# Patient Record
Sex: MALE | Race: WHITE | Employment: FULL TIME | URBAN - METROPOLITAN AREA
[De-identification: names, ages, dates, MRNs, and addresses within clinical notes are randomized per-mention and may not be internally consistent; named-entity substitution may affect disease eponyms.]

---

## 2017-01-03 ENCOUNTER — OFFICE VISIT (OUTPATIENT)
Dept: FAMILY MEDICINE CLINIC | Age: 67
End: 2017-01-03

## 2017-01-03 VITALS
WEIGHT: 240 LBS | SYSTOLIC BLOOD PRESSURE: 110 MMHG | HEART RATE: 84 BPM | RESPIRATION RATE: 20 BRPM | TEMPERATURE: 98.1 F | DIASTOLIC BLOOD PRESSURE: 70 MMHG | BODY MASS INDEX: 31.66 KG/M2

## 2017-01-03 DIAGNOSIS — H53.9 VISION DISTURBANCE: ICD-10-CM

## 2017-01-03 DIAGNOSIS — R42 DIZZINESS: ICD-10-CM

## 2017-01-03 DIAGNOSIS — I63.81 LACUNAR INFARCTION (HCC): Primary | ICD-10-CM

## 2017-01-03 DIAGNOSIS — F17.210 CIGARETTE SMOKER: ICD-10-CM

## 2017-01-03 DIAGNOSIS — R20.0 NUMBNESS ON RIGHT SIDE: ICD-10-CM

## 2017-01-03 DIAGNOSIS — S09.90XD CLOSED HEAD INJURY, SUBSEQUENT ENCOUNTER: ICD-10-CM

## 2017-01-03 DIAGNOSIS — H53.2 DIPLOPIA: ICD-10-CM

## 2017-01-03 PROCEDURE — 99214 OFFICE O/P EST MOD 30 MIN: CPT | Performed by: NURSE PRACTITIONER

## 2017-01-03 RX ORDER — ZOLPIDEM TARTRATE 10 MG/1
10 TABLET ORAL
Refills: 0 | COMMUNITY
Start: 2017-01-01 | End: 2017-01-30 | Stop reason: SDUPTHER

## 2017-01-30 RX ORDER — ALPRAZOLAM 0.5 MG/1
0.5 TABLET ORAL 2 TIMES DAILY PRN
Qty: 60 TABLET | Refills: 1 | Status: SHIPPED | OUTPATIENT
Start: 2017-01-30 | End: 2017-03-30 | Stop reason: SDUPTHER

## 2017-01-30 RX ORDER — ZOLPIDEM TARTRATE 10 MG/1
10 TABLET ORAL NIGHTLY PRN
Qty: 30 TABLET | Refills: 1 | Status: SHIPPED | OUTPATIENT
Start: 2017-01-30 | End: 2017-03-30 | Stop reason: SDUPTHER

## 2017-02-09 ENCOUNTER — OFFICE VISIT (OUTPATIENT)
Dept: PHYSICAL MEDICINE AND REHAB | Age: 67
End: 2017-02-09

## 2017-02-09 VITALS
SYSTOLIC BLOOD PRESSURE: 124 MMHG | WEIGHT: 248 LBS | HEART RATE: 79 BPM | HEIGHT: 73 IN | DIASTOLIC BLOOD PRESSURE: 81 MMHG | BODY MASS INDEX: 32.87 KG/M2

## 2017-02-09 DIAGNOSIS — R42 DIZZINESS: ICD-10-CM

## 2017-02-09 DIAGNOSIS — I69.30 HISTORY OF STROKE WITH RESIDUAL DEFICIT: Primary | ICD-10-CM

## 2017-02-09 PROCEDURE — G8484 FLU IMMUNIZE NO ADMIN: HCPCS | Performed by: PSYCHIATRY & NEUROLOGY

## 2017-02-09 PROCEDURE — 99213 OFFICE O/P EST LOW 20 MIN: CPT | Performed by: PSYCHIATRY & NEUROLOGY

## 2017-02-09 PROCEDURE — 4004F PT TOBACCO SCREEN RCVD TLK: CPT | Performed by: PSYCHIATRY & NEUROLOGY

## 2017-02-09 PROCEDURE — G8417 CALC BMI ABV UP PARAM F/U: HCPCS | Performed by: PSYCHIATRY & NEUROLOGY

## 2017-02-09 PROCEDURE — G8427 DOCREV CUR MEDS BY ELIG CLIN: HCPCS | Performed by: PSYCHIATRY & NEUROLOGY

## 2017-02-09 PROCEDURE — G8599 NO ASA/ANTIPLAT THER USE RNG: HCPCS | Performed by: PSYCHIATRY & NEUROLOGY

## 2017-02-09 PROCEDURE — 3017F COLORECTAL CA SCREEN DOC REV: CPT | Performed by: PSYCHIATRY & NEUROLOGY

## 2017-03-30 RX ORDER — ZOLPIDEM TARTRATE 10 MG/1
10 TABLET ORAL NIGHTLY PRN
Qty: 30 TABLET | Refills: 1 | Status: SHIPPED | OUTPATIENT
Start: 2017-03-30 | End: 2017-06-13 | Stop reason: SDUPTHER

## 2017-03-30 RX ORDER — ALPRAZOLAM 0.5 MG/1
0.5 TABLET ORAL 2 TIMES DAILY PRN
Qty: 60 TABLET | Refills: 1 | Status: SHIPPED | OUTPATIENT
Start: 2017-03-30 | End: 2017-06-13 | Stop reason: SDUPTHER

## 2017-06-05 RX ORDER — ALPRAZOLAM 0.5 MG/1
0.5 TABLET ORAL 2 TIMES DAILY PRN
Qty: 60 TABLET | Refills: 1 | Status: CANCELLED | OUTPATIENT
Start: 2017-06-05

## 2017-06-05 RX ORDER — ZOLPIDEM TARTRATE 10 MG/1
10 TABLET ORAL NIGHTLY PRN
Qty: 30 TABLET | Refills: 1 | Status: CANCELLED | OUTPATIENT
Start: 2017-06-05

## 2017-06-13 ENCOUNTER — OFFICE VISIT (OUTPATIENT)
Dept: FAMILY MEDICINE CLINIC | Age: 67
End: 2017-06-13

## 2017-06-13 VITALS
SYSTOLIC BLOOD PRESSURE: 102 MMHG | HEIGHT: 73 IN | BODY MASS INDEX: 30.62 KG/M2 | DIASTOLIC BLOOD PRESSURE: 70 MMHG | HEART RATE: 76 BPM | RESPIRATION RATE: 20 BRPM | WEIGHT: 231 LBS | TEMPERATURE: 98.4 F

## 2017-06-13 DIAGNOSIS — R25.2 CRAMPS OF RIGHT LOWER EXTREMITY: ICD-10-CM

## 2017-06-13 DIAGNOSIS — G47.00 INSOMNIA, UNSPECIFIED TYPE: Primary | Chronic | ICD-10-CM

## 2017-06-13 DIAGNOSIS — G89.29 CHRONIC PAIN OF RIGHT KNEE: ICD-10-CM

## 2017-06-13 DIAGNOSIS — Z96.651 STATUS POST TOTAL RIGHT KNEE REPLACEMENT: ICD-10-CM

## 2017-06-13 DIAGNOSIS — F41.9 ANXIETY: ICD-10-CM

## 2017-06-13 DIAGNOSIS — Z86.73 HISTORY OF CVA (CEREBROVASCULAR ACCIDENT): ICD-10-CM

## 2017-06-13 DIAGNOSIS — M25.561 CHRONIC PAIN OF RIGHT KNEE: ICD-10-CM

## 2017-06-13 PROCEDURE — 99213 OFFICE O/P EST LOW 20 MIN: CPT | Performed by: NURSE PRACTITIONER

## 2017-06-13 PROCEDURE — 4004F PT TOBACCO SCREEN RCVD TLK: CPT | Performed by: NURSE PRACTITIONER

## 2017-06-13 PROCEDURE — 3017F COLORECTAL CA SCREEN DOC REV: CPT | Performed by: NURSE PRACTITIONER

## 2017-06-13 PROCEDURE — G8427 DOCREV CUR MEDS BY ELIG CLIN: HCPCS | Performed by: NURSE PRACTITIONER

## 2017-06-13 PROCEDURE — G8599 NO ASA/ANTIPLAT THER USE RNG: HCPCS | Performed by: NURSE PRACTITIONER

## 2017-06-13 PROCEDURE — G8417 CALC BMI ABV UP PARAM F/U: HCPCS | Performed by: NURSE PRACTITIONER

## 2017-06-13 RX ORDER — ALPRAZOLAM 0.5 MG/1
0.5 TABLET ORAL 2 TIMES DAILY PRN
Qty: 60 TABLET | Refills: 1 | Status: SHIPPED | OUTPATIENT
Start: 2017-06-13 | End: 2017-08-16 | Stop reason: SDUPTHER

## 2017-06-13 RX ORDER — ZOLPIDEM TARTRATE 10 MG/1
10 TABLET ORAL NIGHTLY PRN
Qty: 30 TABLET | Refills: 1 | Status: SHIPPED | OUTPATIENT
Start: 2017-06-13 | End: 2017-08-16 | Stop reason: SDUPTHER

## 2017-06-13 RX ORDER — TIZANIDINE 4 MG/1
4 TABLET ORAL EVERY 8 HOURS PRN
Qty: 60 TABLET | Refills: 1 | Status: SHIPPED | OUTPATIENT
Start: 2017-06-13 | End: 2017-09-26 | Stop reason: SDUPTHER

## 2017-06-13 ASSESSMENT — PATIENT HEALTH QUESTIONNAIRE - PHQ9
SUM OF ALL RESPONSES TO PHQ9 QUESTIONS 1 & 2: 0
1. LITTLE INTEREST OR PLEASURE IN DOING THINGS: 0
SUM OF ALL RESPONSES TO PHQ QUESTIONS 1-9: 0
2. FEELING DOWN, DEPRESSED OR HOPELESS: 0

## 2017-08-17 RX ORDER — ALPRAZOLAM 0.5 MG/1
0.5 TABLET ORAL 2 TIMES DAILY PRN
Qty: 60 TABLET | Refills: 0 | Status: SHIPPED | OUTPATIENT
Start: 2017-08-17 | End: 2017-09-26 | Stop reason: SDUPTHER

## 2017-08-17 RX ORDER — ZOLPIDEM TARTRATE 10 MG/1
10 TABLET ORAL NIGHTLY PRN
Qty: 30 TABLET | Refills: 0 | Status: SHIPPED | OUTPATIENT
Start: 2017-08-17 | End: 2017-09-26 | Stop reason: SDUPTHER

## 2017-09-26 ENCOUNTER — OFFICE VISIT (OUTPATIENT)
Dept: FAMILY MEDICINE CLINIC | Age: 67
End: 2017-09-26
Payer: COMMERCIAL

## 2017-09-26 VITALS
BODY MASS INDEX: 31.32 KG/M2 | DIASTOLIC BLOOD PRESSURE: 76 MMHG | HEIGHT: 74 IN | RESPIRATION RATE: 16 BRPM | WEIGHT: 244 LBS | TEMPERATURE: 97.7 F | SYSTOLIC BLOOD PRESSURE: 116 MMHG | HEART RATE: 88 BPM

## 2017-09-26 DIAGNOSIS — F41.8 SITUATIONAL ANXIETY: ICD-10-CM

## 2017-09-26 DIAGNOSIS — Z87.898 HISTORY OF ALCOHOL USE: ICD-10-CM

## 2017-09-26 DIAGNOSIS — M54.40 CHRONIC LOW BACK PAIN WITH SCIATICA, SCIATICA LATERALITY UNSPECIFIED, UNSPECIFIED BACK PAIN LATERALITY: ICD-10-CM

## 2017-09-26 DIAGNOSIS — G89.29 CHRONIC LOW BACK PAIN WITH SCIATICA, SCIATICA LATERALITY UNSPECIFIED, UNSPECIFIED BACK PAIN LATERALITY: ICD-10-CM

## 2017-09-26 DIAGNOSIS — E78.2 MIXED HYPERLIPIDEMIA: Chronic | ICD-10-CM

## 2017-09-26 DIAGNOSIS — F51.01 PRIMARY INSOMNIA: Primary | Chronic | ICD-10-CM

## 2017-09-26 DIAGNOSIS — Z87.891 HISTORY OF TOBACCO USE: ICD-10-CM

## 2017-09-26 PROCEDURE — G8427 DOCREV CUR MEDS BY ELIG CLIN: HCPCS | Performed by: NURSE PRACTITIONER

## 2017-09-26 PROCEDURE — 99213 OFFICE O/P EST LOW 20 MIN: CPT | Performed by: NURSE PRACTITIONER

## 2017-09-26 PROCEDURE — 4004F PT TOBACCO SCREEN RCVD TLK: CPT | Performed by: NURSE PRACTITIONER

## 2017-09-26 PROCEDURE — G8417 CALC BMI ABV UP PARAM F/U: HCPCS | Performed by: NURSE PRACTITIONER

## 2017-09-26 PROCEDURE — 3017F COLORECTAL CA SCREEN DOC REV: CPT | Performed by: NURSE PRACTITIONER

## 2017-09-26 PROCEDURE — G8599 NO ASA/ANTIPLAT THER USE RNG: HCPCS | Performed by: NURSE PRACTITIONER

## 2017-09-26 RX ORDER — TIZANIDINE 4 MG/1
4 TABLET ORAL EVERY 8 HOURS PRN
Qty: 60 TABLET | Refills: 1 | Status: SHIPPED | OUTPATIENT
Start: 2017-09-26 | End: 2017-12-11 | Stop reason: SDUPTHER

## 2017-09-26 RX ORDER — ZOLPIDEM TARTRATE 10 MG/1
10 TABLET ORAL NIGHTLY PRN
Qty: 30 TABLET | Refills: 0 | Status: SHIPPED | OUTPATIENT
Start: 2017-09-26 | End: 2017-11-03 | Stop reason: SDUPTHER

## 2017-09-26 RX ORDER — ALPRAZOLAM 0.5 MG/1
0.5 TABLET ORAL 2 TIMES DAILY PRN
Qty: 60 TABLET | Refills: 0 | Status: SHIPPED | OUTPATIENT
Start: 2017-09-26 | End: 2017-11-03 | Stop reason: SDUPTHER

## 2017-09-26 NOTE — MR AVS SNAPSHOT
Name Date    Tdap (Boostrix, Adacel) 10/1/2014      Preventive Care        Date Due    Hepatitis C screening is recommended for all adults regardless of risk factors born between [facility] at least once (lifetime) who have never been tested. 1950    HIV screening is recommended for all people regardless of risk factors  aged 15-65 years at least once (lifetime) who have never been HIV tested. 11/4/1967    Pneumococcal Vaccine - Pneumovax for adults aged 19-64 years with: chronic heart disease, chronic lung disease, diabetes mellitus, alcoholism, chronic liver disease, or cigarette smoking. (1 of 1 - PPSV23) 11/4/1971    Zoster Vaccine 11/4/2012    Yearly Flu Vaccine (1) 9/1/2017    Colonoscopy 11/29/2020    Cholesterol Screening 12/25/2021    Tetanus Combination Vaccine (2 - Td) 10/1/2024            MyChart Signup           [employer] allows you to send messages to your doctor, view your test results, renew your prescriptions, schedule appointments, view visit notes, and more. How Do I Sign Up? 1. In your Internet browser, go to [email or web address]  2. Click on the Sign Up Now link in the Sign In box. You will see the New Member Sign Up page. 3. Enter your [employer] Access Code exactly as it appears below. You will not need to use this code after youve completed the sign-up process. If you do not sign up before the expiration date, you must request a new code. [employer] Access Code: [email or web address]  Expires: 11/25/2017  8:19 AM    4. Enter your Social Security Number ([id number]) and Date of Birth (mm/dd/yyyy) as indicated and click Submit. You will be taken to the next sign-up page. 5. Create a [employer] ID. This will be your [employer] login ID and cannot be changed, so think of one that is secure and easy to remember. 6. Create a [employer] password. You can change your password at any time. 7. Enter your Password Reset Question and Answer.  This can be used at a later time if you forget your password. 8. Enter your e-mail address. You will receive e-mail notification when new information is available in [address] 9. Click Sign Up. You can now view your medical record. Additional Information  If you have questions, please contact the physician practice where you receive care. Remember, [employer] is NOT to be used for urgent needs. For medical emergencies, dial 911. For questions regarding your [employer] account call [phone]. If you have a clinical question, please call your doctor's office.

## 2017-10-06 ASSESSMENT — ENCOUNTER SYMPTOMS: BACK PAIN: 1

## 2017-10-06 NOTE — PROGRESS NOTES
[address]  [facility]  [address] [place] [address] Rd., [address] [address]  Dept: [phone]  Dept Fax: [phone]: [phone]  PROGRESS NOTE      Visit Date: 10/6/2017    Jose David Veliz is a 61 y.o. male who presents today for:  Chief Complaint   Patient presents with    Medication Refill     Ambien and Xanax         Subjective:  HPI Comments: Refills/ 3 month. Hx insomnia, anxiety, chronic low back pain/ knee pain. Hx tobacco use/ etoh use. Mood stable. meds working well. Denies cp, sob, abd pain, edema. Review of Systems   Musculoskeletal: Positive for back pain. Psychiatric/Behavioral: Positive for sleep disturbance. The patient is nervous/anxious. All other systems reviewed and are negative.     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 Olivier Braun     Family History   Problem Relation Age of Onset    Dementia Mother     Heart Disease Father     High Blood Pressure Sister     Cancer Brother      testicular cancer     Social History   Substance Use Topics    Smoking status: Current Every Day Smoker     Packs/day: 1.00     Years: 30.00     Types: Cigarettes     Start date: 7/16/1968    Smokeless tobacco: Never Used      Comment: printed to avs    Alcohol use 25.2 oz/week     42 Cans of beer per week      Current Outpatient Prescriptions   Medication Sig Dispense Refill    ALPRAZolam (XANAX) 0.5 MG tablet Take 1 tablet by mouth 2 times daily as needed for sciatica, sciatica laterality unspecified, unspecified back pain laterality      Requested Prescriptions     Signed Prescriptions Disp Refills    ALPRAZolam (XANAX) 0.5 MG tablet 60 tablet 0     Sig: Take 1 tablet by mouth 2 times daily as needed for Anxiety    zolpidem (AMBIEN) 10 MG tablet 30 tablet 0     Sig: Take 1 tablet by mouth nightly as needed for Sleep    tiZANidine (ZANAFLEX) 4 MG tablet 60 tablet 1     Sig: Take 1 tablet by mouth every 8 hours as needed (leg cramps)     No orders of the defined types were placed in this encounter. Patient given educational materials - see patient instructions. Discussed use, benefit, and side effects of prescribed medications. All patient questions answered. Pt voiced understanding. Reviewed health maintenance. Patient agreed with treatment plan. Follow up as directed. Controlled Substances Monitoring:     Attestation: The Prescription Monitoring Report for this patient was reviewed today. (Elizabeth Francois NP)  Documentation: Possible medication side effects, risk of tolerance and/or dependence, and alternative treatments discussed., No signs of potential drug abuse or diversion identified. Elizabeth Francois NP)  Medication Contracts: Medication contract signed today.  Elizabeth Francois NP)      Electronically signed by Cody Griffin NP on 10/6/2017 at 8:45 AM

## 2017-11-07 RX ORDER — ZOLPIDEM TARTRATE 10 MG/1
10 TABLET ORAL NIGHTLY PRN
Qty: 30 TABLET | Refills: 0 | Status: SHIPPED | OUTPATIENT
Start: 2017-11-07 | End: 2017-12-11 | Stop reason: SDUPTHER

## 2017-11-07 RX ORDER — ALPRAZOLAM 0.5 MG/1
0.5 TABLET ORAL 2 TIMES DAILY PRN
Qty: 60 TABLET | Refills: 0 | Status: SHIPPED | OUTPATIENT
Start: 2017-11-07 | End: 2017-12-11 | Stop reason: SDUPTHER

## 2017-12-11 ENCOUNTER — OFFICE VISIT (OUTPATIENT)
Dept: FAMILY MEDICINE CLINIC | Age: 67
End: 2017-12-11
Payer: COMMERCIAL

## 2017-12-11 VITALS
HEART RATE: 76 BPM | TEMPERATURE: 98.2 F | WEIGHT: 243 LBS | SYSTOLIC BLOOD PRESSURE: 116 MMHG | RESPIRATION RATE: 12 BRPM | DIASTOLIC BLOOD PRESSURE: 76 MMHG | BODY MASS INDEX: 31.41 KG/M2

## 2017-12-11 DIAGNOSIS — J40 BRONCHITIS: ICD-10-CM

## 2017-12-11 DIAGNOSIS — E78.2 MIXED HYPERLIPIDEMIA: Chronic | ICD-10-CM

## 2017-12-11 DIAGNOSIS — F43.23 SITUATIONAL MIXED ANXIETY AND DEPRESSIVE DISORDER: Primary | ICD-10-CM

## 2017-12-11 DIAGNOSIS — Z86.73 HISTORY OF CVA IN ADULTHOOD: ICD-10-CM

## 2017-12-11 DIAGNOSIS — F51.01 PRIMARY INSOMNIA: ICD-10-CM

## 2017-12-11 DIAGNOSIS — M15.9 PRIMARY OSTEOARTHRITIS INVOLVING MULTIPLE JOINTS: ICD-10-CM

## 2017-12-11 DIAGNOSIS — Z12.5 SCREENING FOR PROSTATE CANCER: ICD-10-CM

## 2017-12-11 DIAGNOSIS — F17.210 CIGARETTE SMOKER: ICD-10-CM

## 2017-12-11 PROCEDURE — G8599 NO ASA/ANTIPLAT THER USE RNG: HCPCS | Performed by: NURSE PRACTITIONER

## 2017-12-11 PROCEDURE — 4004F PT TOBACCO SCREEN RCVD TLK: CPT | Performed by: NURSE PRACTITIONER

## 2017-12-11 PROCEDURE — 4040F PNEUMOC VAC/ADMIN/RCVD: CPT | Performed by: NURSE PRACTITIONER

## 2017-12-11 PROCEDURE — 99214 OFFICE O/P EST MOD 30 MIN: CPT | Performed by: NURSE PRACTITIONER

## 2017-12-11 PROCEDURE — G8417 CALC BMI ABV UP PARAM F/U: HCPCS | Performed by: NURSE PRACTITIONER

## 2017-12-11 PROCEDURE — G8427 DOCREV CUR MEDS BY ELIG CLIN: HCPCS | Performed by: NURSE PRACTITIONER

## 2017-12-11 PROCEDURE — 3017F COLORECTAL CA SCREEN DOC REV: CPT | Performed by: NURSE PRACTITIONER

## 2017-12-11 PROCEDURE — G8484 FLU IMMUNIZE NO ADMIN: HCPCS | Performed by: NURSE PRACTITIONER

## 2017-12-11 PROCEDURE — 1123F ACP DISCUSS/DSCN MKR DOCD: CPT | Performed by: NURSE PRACTITIONER

## 2017-12-11 RX ORDER — AZITHROMYCIN 250 MG/1
TABLET, FILM COATED ORAL
Qty: 6 TABLET | Refills: 0 | Status: SHIPPED | OUTPATIENT
Start: 2017-12-11 | End: 2017-12-21

## 2017-12-11 RX ORDER — ALPRAZOLAM 0.5 MG/1
0.5 TABLET ORAL 2 TIMES DAILY PRN
Qty: 60 TABLET | Refills: 0 | Status: SHIPPED | OUTPATIENT
Start: 2017-12-11 | End: 2018-01-11 | Stop reason: SDUPTHER

## 2017-12-11 RX ORDER — TIZANIDINE 4 MG/1
4 TABLET ORAL EVERY 8 HOURS PRN
Qty: 60 TABLET | Refills: 1 | Status: SHIPPED | OUTPATIENT
Start: 2017-12-11 | End: 2018-02-26

## 2017-12-11 RX ORDER — ZOLPIDEM TARTRATE 10 MG/1
10 TABLET ORAL NIGHTLY PRN
Qty: 30 TABLET | Refills: 0 | Status: SHIPPED | OUTPATIENT
Start: 2017-12-11 | End: 2018-01-11 | Stop reason: SDUPTHER

## 2017-12-11 RX ORDER — DEXTROMETHORPHAN HYDROBROMIDE AND PROMETHAZINE HYDROCHLORIDE 15; 6.25 MG/5ML; MG/5ML
5 SYRUP ORAL 3 TIMES DAILY PRN
Qty: 120 ML | Refills: 0 | Status: SHIPPED | OUTPATIENT
Start: 2017-12-11 | End: 2017-12-18

## 2017-12-22 ASSESSMENT — ENCOUNTER SYMPTOMS
EYE DISCHARGE: 1
SINUS PRESSURE: 1
COUGH: 1
BACK PAIN: 1
GASTROINTESTINAL NEGATIVE: 1
EYE ITCHING: 1
SHORTNESS OF BREATH: 0

## 2017-12-22 NOTE — PROGRESS NOTES
normal mood and affect. His speech is normal and behavior is normal. Judgment and thought content normal. Cognition and memory are normal.   Nursing note and vitals reviewed. /76   Pulse 76   Temp 98.2 °F (36.8 °C) (Oral)   Resp 12   Wt 243 lb (110.2 kg)   BMI 31.41 kg/m²       Impression/Plan:  1. Situational mixed anxiety and depressive disorder    2. Primary insomnia    3. Primary osteoarthritis involving multiple joints    4. Bronchitis    5. History of CVA in adulthood    6. Cigarette smoker    7. Mixed hyperlipidemia    8. Screening for prostate cancer      Requested Prescriptions     Signed Prescriptions Disp Refills    zolpidem (AMBIEN) 10 MG tablet 30 tablet 0     Sig: Take 1 tablet by mouth nightly as needed for Sleep .  ALPRAZolam (XANAX) 0.5 MG tablet 60 tablet 0     Sig: Take 1 tablet by mouth 2 times daily as needed for Anxiety .  tiZANidine (ZANAFLEX) 4 MG tablet 60 tablet 1     Sig: Take 1 tablet by mouth every 8 hours as needed (leg cramps)    azithromycin (ZITHROMAX Z-ALLY) 250 MG tablet 6 tablet 0     Si pills orally for 1 day, then 1 pill orally for 4 days    promethazine-dextromethorphan (PROMETHAZINE-DM) 6.25-15 MG/5ML syrup 120 mL 0     Sig: Take 5 mLs by mouth 3 times daily as needed for Cough     Orders Placed This Encounter   Procedures    Comprehensive Metabolic Panel     Standing Status:   Future     Standing Expiration Date:   2018    Psa screening     Standing Status:   Future     Standing Expiration Date:   2018    Lipid Panel     Standing Status:   Future     Standing Expiration Date:   2018     Order Specific Question:   Is Patient Fasting?/# of Hours     Answer:   yes/12       Patient given educational materials - see patient instructions. Discussed use, benefit, and side effects of prescribed medications. All patient questions answered. Pt voiced understanding. Reviewed health maintenance. Patient agreed with treatment plan.  Follow

## 2018-01-11 NOTE — TELEPHONE ENCOUNTER
Mason Cash called requesting a refill on the following medications:  Requested Prescriptions     Pending Prescriptions Disp Refills    zolpidem (AMBIEN) 10 MG tablet 30 tablet 0     Sig: Take 1 tablet by mouth nightly as needed for Sleep.  ALPRAZolam (XANAX) 0.5 MG tablet 60 tablet 0     Sig: Take 1 tablet by mouth 2 times daily as needed for Anxiety.      Pharmacy verified:  .pv      Date of last visit: 12/11/17  Date of next visit (if applicable): Visit date not found  [facility] [address]

## 2018-01-12 RX ORDER — ZOLPIDEM TARTRATE 10 MG/1
10 TABLET ORAL NIGHTLY PRN
Qty: 30 TABLET | Refills: 0 | Status: SHIPPED | OUTPATIENT
Start: 2018-01-12 | End: 2018-02-13 | Stop reason: SDUPTHER

## 2018-01-12 RX ORDER — ALPRAZOLAM 0.5 MG/1
0.5 TABLET ORAL 2 TIMES DAILY PRN
Qty: 60 TABLET | Refills: 0 | Status: SHIPPED | OUTPATIENT
Start: 2018-01-12 | End: 2018-02-13 | Stop reason: SDUPTHER

## 2018-02-02 LAB
CHOLESTEROL, TOTAL: 214 MG/DL
CHOLESTEROL/HDL RATIO: NORMAL
HDLC SERPL-MCNC: 38 MG/DL (ref 35–70)
LDL CHOLESTEROL CALCULATED: 145 MG/DL (ref 0–160)
PROSTATE SPECIFIC ANTIGEN: 1.3 NG/ML
TRIGL SERPL-MCNC: 155 MG/DL
VLDLC SERPL CALC-MCNC: NORMAL MG/DL

## 2018-02-13 DIAGNOSIS — F51.01 PRIMARY INSOMNIA: Primary | Chronic | ICD-10-CM

## 2018-02-13 DIAGNOSIS — F41.9 ANXIETY: Chronic | ICD-10-CM

## 2018-02-13 RX ORDER — ALPRAZOLAM 0.5 MG/1
0.5 TABLET ORAL 2 TIMES DAILY PRN
Qty: 60 TABLET | Refills: 0 | Status: SHIPPED | OUTPATIENT
Start: 2018-02-13 | End: 2018-03-14 | Stop reason: SDUPTHER

## 2018-02-13 RX ORDER — ZOLPIDEM TARTRATE 10 MG/1
10 TABLET ORAL NIGHTLY PRN
Qty: 30 TABLET | Refills: 0 | Status: SHIPPED | OUTPATIENT
Start: 2018-02-13 | End: 2018-03-14 | Stop reason: SDUPTHER

## 2018-02-13 NOTE — TELEPHONE ENCOUNTER
Pt came in requesting refills to be sent to [facility] on [address] Please notify pt when sent    zolpidem (AMBIEN) 10 MG tablet  Last refill: #30/0 1/12/18    ALPRAZolam (XANAX) 0.5 MG tablet  Last refill: #60/0 1/12/18    LOV 12/11/17

## 2018-02-26 ENCOUNTER — HOSPITAL ENCOUNTER (EMERGENCY)
Age: 68
Discharge: HOME | End: 2018-02-26
Payer: COMMERCIAL

## 2018-02-26 VITALS
BODY MASS INDEX: 32.47 KG/M2 | OXYGEN SATURATION: 94 % | WEIGHT: 245 LBS | RESPIRATION RATE: 20 BRPM | DIASTOLIC BLOOD PRESSURE: 69 MMHG | SYSTOLIC BLOOD PRESSURE: 128 MMHG | TEMPERATURE: 100.2 F | HEART RATE: 117 BPM | HEIGHT: 73 IN

## 2018-02-26 DIAGNOSIS — J02.9 ACUTE PHARYNGITIS, UNSPECIFIED ETIOLOGY: Primary | ICD-10-CM

## 2018-02-26 DIAGNOSIS — J20.9 ACUTE BRONCHITIS, UNSPECIFIED ORGANISM: ICD-10-CM

## 2018-02-26 LAB
FLU A ANTIGEN: NEGATIVE
FLU B ANTIGEN: NEGATIVE

## 2018-02-26 PROCEDURE — 99213 OFFICE O/P EST LOW 20 MIN: CPT

## 2018-02-26 PROCEDURE — 99213 OFFICE O/P EST LOW 20 MIN: CPT | Performed by: NURSE PRACTITIONER

## 2018-02-26 PROCEDURE — 87804 INFLUENZA ASSAY W/OPTIC: CPT

## 2018-02-26 RX ORDER — NICOTINE 14MG/24HR
1 PATCH, TRANSDERMAL 24 HOURS TRANSDERMAL DAILY
Qty: 14 CAPSULE | Refills: 0 | Status: SHIPPED | OUTPATIENT
Start: 2018-02-26 | End: 2018-03-12

## 2018-02-26 RX ORDER — ONDANSETRON 4 MG/1
4 TABLET, ORALLY DISINTEGRATING ORAL EVERY 8 HOURS PRN
Qty: 9 TABLET | Refills: 0 | Status: SHIPPED | OUTPATIENT
Start: 2018-02-26 | End: 2018-02-28

## 2018-02-26 RX ORDER — AMOXICILLIN 500 MG/1
500 CAPSULE ORAL 3 TIMES DAILY
Qty: 30 CAPSULE | Refills: 0 | Status: SHIPPED | OUTPATIENT
Start: 2018-02-26 | End: 2018-03-08

## 2018-02-26 RX ORDER — AZELASTINE 1 MG/ML
1 SPRAY, METERED NASAL 2 TIMES DAILY
Qty: 1 BOTTLE | Refills: 0 | Status: ON HOLD | OUTPATIENT
Start: 2018-02-26 | End: 2018-04-04 | Stop reason: ALTCHOICE

## 2018-02-26 RX ORDER — ALBUTEROL SULFATE 90 UG/1
2 AEROSOL, METERED RESPIRATORY (INHALATION) 2 TIMES DAILY
Qty: 1 INHALER | Refills: 0 | Status: ON HOLD | OUTPATIENT
Start: 2018-02-26 | End: 2018-04-04 | Stop reason: ALTCHOICE

## 2018-02-26 RX ORDER — PROMETHAZINE HYDROCHLORIDE AND CODEINE PHOSPHATE 6.25; 1 MG/5ML; MG/5ML
5 SYRUP ORAL NIGHTLY PRN
Qty: 50 ML | Refills: 0 | Status: SHIPPED | OUTPATIENT
Start: 2018-02-26 | End: 2018-02-28

## 2018-02-26 ASSESSMENT — ENCOUNTER SYMPTOMS
APNEA: 0
SORE THROAT: 1
VOMITING: 1
CONSTIPATION: 0
DIARRHEA: 0
WHEEZING: 0
COUGH: 1
NAUSEA: 1
SWOLLEN GLANDS: 0
RHINORRHEA: 1
CHEST TIGHTNESS: 0
STRIDOR: 0
CHOKING: 0
SINUS PRESSURE: 1
SHORTNESS OF BREATH: 0
SINUS PAIN: 1

## 2018-02-26 ASSESSMENT — PAIN SCALES - GENERAL: PAINLEVEL_OUTOF10: 5

## 2018-02-26 NOTE — ED PROVIDER NOTES
[address]  Urgent Care Encounter      CHIEF COMPLAINT       Chief Complaint   Patient presents with    Cough    Nasal Congestion    Generalized Body Aches    Headache       Nurses Notes reviewed and I agree except as noted in the HPI. HISTORY OF PRESENT ILLNESS   Palmer Johnson is a 74 y.o. The history is provided by the patient. No  was used. URI   Presenting symptoms: congestion, cough, rhinorrhea and sore throat    Presenting symptoms: no ear pain, no facial pain, no fatigue and no fever    Severity:  Moderate  Onset quality:  Sudden  Duration:  3 days  Timing:  Constant  Progression:  Worsening  Chronicity:  Recurrent  Relieved by:  Nothing  Worsened by:  Certain positions  Ineffective treatments:  OTC medications, certain positions, drinking, hot fluids and rest  Associated symptoms: headaches, myalgias and sinus pain    Associated symptoms: no arthralgias, no neck pain, no sneezing, no swollen glands and no wheezing    Risk factors: sick contacts    Risk factors: not elderly, no chronic cardiac disease, no chronic kidney disease, no chronic respiratory disease, no diabetes mellitus, no immunosuppression, no recent illness and no recent travel        REVIEW OF SYSTEMS     Review of Systems   Constitutional: Positive for appetite change. Negative for chills, diaphoresis, fatigue and fever. HENT: Positive for congestion, postnasal drip, rhinorrhea, sinus pain, sinus pressure and sore throat. Negative for ear pain and sneezing. Respiratory: Positive for cough. Negative for apnea, choking, chest tightness, shortness of breath, wheezing and stridor. Cardiovascular: Negative for chest pain, palpitations and leg swelling. Gastrointestinal: Positive for nausea and vomiting. Negative for constipation and diarrhea. Musculoskeletal: Positive for myalgias. Negative for arthralgias and neck pain.    Neurological: Positive for dizziness, light-headedness and headaches. 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 CURRENT MEDICATIONS       Previous Medications    ALPRAZOLAM (XANAX) 0.5 MG TABLET    Take 1 tablet by mouth 2 times daily as needed for Anxiety. ZOLPIDEM (AMBIEN) 10 MG TABLET    Take 1 tablet by mouth nightly as needed for Sleep. ALLERGIES     Patient is is allergic to tape [adhesive tape]. FAMILY HISTORY     Patient's family history includes Cancer in his brother; Dementia in his mother; Heart Disease in his father; High Blood Pressure in his sister. SOCIAL HISTORY     Patient  reports that he has been smoking Cigarettes. He started smoking about 49 years ago. He has a 30.00 pack-year smoking history. He has never used smokeless tobacco. He reports that he drinks about 25.2 oz of alcohol per week . He reports that he does not use drugs. PHYSICAL EXAM     ED TRIAGE VITALS  BP: 128/69, Temp: 100.2 °F (37.9 °C), Pulse: 117, Resp: 20, SpO2: 94 %  Physical Exam   Constitutional: He is oriented to person, place, and time. He appears well-developed and well-nourished. He is active and cooperative. Non-toxic appearance. He does not have a sickly appearance. He appears ill. No distress. HENT:   Head: Normocephalic and atraumatic. Right Ear: Tympanic membrane is bulging. Left Ear: Tympanic membrane is bulging. Nose: Mucosal edema and rhinorrhea present. Right sinus exhibits maxillary sinus tenderness. Left sinus exhibits maxillary sinus tenderness.    Mouth/Throat: develop, high fever >102, vomiting, breathing difficulty, lethargy, chest pain or shortness of breath Dial 911. The patient or patient's representative is agreeable to the treatment plan they're advised to follow-up with her primary care provider in one week for reevaluation. PATIENT REFERRED TO:  Joselin Joseph NP  [address] [facility] [address]  [phone]    Schedule an appointment as soon as possible for a visit   If symptoms worsen    DISCHARGE MEDICATIONS:  New Prescriptions    ALBUTEROL SULFATE HFA (VENTOLIN HFA) 108 (90 BASE) MCG/ACT INHALER    Inhale 2 puffs into the lungs 2 times daily for 5 days And every 6 hours as needed for shortness of breath, wheeze. AMOXICILLIN (AMOXIL) 500 MG CAPSULE    Take 1 capsule by mouth 3 times daily for 10 days    AZELASTINE (ASTELIN) 0.1 % NASAL SPRAY    1 spray by Nasal route 2 times daily for 7 days Use in each nostril as directed    ONDANSETRON (ZOFRAN ODT) 4 MG DISINTEGRATING TABLET    Take 1 tablet by mouth every 8 hours as needed for Nausea    PROMETHAZINE-CODEINE (PHENERGAN WITH CODEINE) 6.25-10 MG/5ML SYRUP    Take 5 mLs by mouth nightly as needed for Cough for up to 5 days.     SACCHAROMYCES BOULARDII (PROBIOTIC) 250 MG CAPS    Take 1 capsule by mouth daily for 14 days     Current Discharge Medication List          Joselin Doss, CARYL Doss, CARYL  02/26/18 [address] Richard Aviles NP  02/26/18 [id number]

## 2018-02-28 ENCOUNTER — OFFICE VISIT (OUTPATIENT)
Dept: FAMILY MEDICINE CLINIC | Age: 68
End: 2018-02-28
Payer: COMMERCIAL

## 2018-02-28 VITALS
HEART RATE: 88 BPM | WEIGHT: 242 LBS | SYSTOLIC BLOOD PRESSURE: 90 MMHG | RESPIRATION RATE: 20 BRPM | OXYGEN SATURATION: 94 % | BODY MASS INDEX: 32.07 KG/M2 | DIASTOLIC BLOOD PRESSURE: 58 MMHG | TEMPERATURE: 98 F | HEIGHT: 73 IN

## 2018-02-28 DIAGNOSIS — J40 BRONCHITIS: ICD-10-CM

## 2018-02-28 DIAGNOSIS — J18.9 PNEUMONIA OF LEFT LOWER LOBE DUE TO INFECTIOUS ORGANISM: Primary | ICD-10-CM

## 2018-02-28 PROCEDURE — G8599 NO ASA/ANTIPLAT THER USE RNG: HCPCS | Performed by: NURSE PRACTITIONER

## 2018-02-28 PROCEDURE — 3017F COLORECTAL CA SCREEN DOC REV: CPT | Performed by: NURSE PRACTITIONER

## 2018-02-28 PROCEDURE — G8417 CALC BMI ABV UP PARAM F/U: HCPCS | Performed by: NURSE PRACTITIONER

## 2018-02-28 PROCEDURE — 4040F PNEUMOC VAC/ADMIN/RCVD: CPT | Performed by: NURSE PRACTITIONER

## 2018-02-28 PROCEDURE — G8427 DOCREV CUR MEDS BY ELIG CLIN: HCPCS | Performed by: NURSE PRACTITIONER

## 2018-02-28 PROCEDURE — 4004F PT TOBACCO SCREEN RCVD TLK: CPT | Performed by: NURSE PRACTITIONER

## 2018-02-28 PROCEDURE — G8484 FLU IMMUNIZE NO ADMIN: HCPCS | Performed by: NURSE PRACTITIONER

## 2018-02-28 PROCEDURE — 99213 OFFICE O/P EST LOW 20 MIN: CPT | Performed by: NURSE PRACTITIONER

## 2018-02-28 PROCEDURE — 1123F ACP DISCUSS/DSCN MKR DOCD: CPT | Performed by: NURSE PRACTITIONER

## 2018-02-28 PROCEDURE — 96372 THER/PROPH/DIAG INJ SC/IM: CPT | Performed by: NURSE PRACTITIONER

## 2018-02-28 RX ORDER — DEXTROMETHORPHAN HYDROBROMIDE AND PROMETHAZINE HYDROCHLORIDE 15; 6.25 MG/5ML; MG/5ML
5 SYRUP ORAL 3 TIMES DAILY PRN
Qty: 180 ML | Refills: 0 | Status: SHIPPED | OUTPATIENT
Start: 2018-02-28 | End: 2018-03-10

## 2018-02-28 RX ORDER — PREDNISONE 1 MG/1
TABLET ORAL
Qty: 30 TABLET | Refills: 0 | Status: SHIPPED | OUTPATIENT
Start: 2018-02-28 | End: 2018-03-10

## 2018-02-28 RX ORDER — METHYLPREDNISOLONE ACETATE 80 MG/ML
120 INJECTION, SUSPENSION INTRA-ARTICULAR; INTRALESIONAL; INTRAMUSCULAR; SOFT TISSUE ONCE
Status: COMPLETED | OUTPATIENT
Start: 2018-02-28 | End: 2018-02-28

## 2018-02-28 RX ORDER — LEVOFLOXACIN 500 MG/1
500 TABLET, FILM COATED ORAL DAILY
Qty: 10 TABLET | Refills: 0 | Status: SHIPPED | OUTPATIENT
Start: 2018-02-28 | End: 2018-03-10

## 2018-02-28 RX ADMIN — METHYLPREDNISOLONE ACETATE 120 MG: 80 INJECTION, SUSPENSION INTRA-ARTICULAR; INTRALESIONAL; INTRAMUSCULAR; SOFT TISSUE at 12:46

## 2018-02-28 NOTE — PATIENT INSTRUCTIONS
Patient Education        Stopping Smoking: Care Instructions  Your Care Instructions    Cigarette smokers crave the nicotine in cigarettes. Giving it up is much harder than simply changing a habit. Your body has to stop craving the nicotine. It is hard to quit, but you can do it. There are many tools that people use to quit smoking. You may find that combining tools works best for you. There are several steps to quitting. First you get ready to quit. Then you get support to help you. After that, you learn new skills and behaviors to become a nonsmoker. For many people, a necessary step is getting and using medicine. Your doctor will help you set up the plan that best meets your needs. You may want to attend a smoking cessation program to help you quit smoking. When you choose a program, look for one that has proven success. Ask your doctor for ideas. You will greatly increase your chances of success if you take medicine as well as get counseling or join a cessation program.  Some of the changes you feel when you first quit tobacco are uncomfortable. Your body will miss the nicotine at first, and you may feel short-tempered and grumpy. You may have trouble sleeping or concentrating. Medicine can help you deal with these symptoms. You may struggle with changing your smoking habits and rituals. The last step is the tricky one: Be prepared for the smoking urge to continue for a time. This is a lot to deal with, but keep at it. You will feel better. Follow-up care is a key part of your treatment and safety. Be sure to make and go to all appointments, and call your doctor if you are having problems. It's also a good idea to know your test results and keep a list of the medicines you take. How can you care for yourself at home? · Ask your family, friends, and coworkers for support. You have a better chance of quitting if you have help and support.   · Join a support group, such as Nicotine Anonymous, for people who get mad at yourself if you smoke again. Make a list of things you learned and think about when you want to try again, such as next week, next month, or next year. Where can you learn more? Go to https://rafaela.[employer]. org and sign in to your [employer] account. Enter I511 in the [place] box to learn more about \"Stopping Smoking: Care Instructions. \"     If you do not have an account, please click on the \"Sign Up Now\" link. Current as of: March 20, 2017  Content Version: 11.5  © 0183-1124 Healthwise, Incorporated. Care instructions adapted under license by [facility] ([facility]). If you have questions about a medical condition or this instruction, always ask your healthcare professional. [address] any warranty or liability for your use of this information.

## 2018-02-28 NOTE — LETTER
[facility]  [address] [place] [address] Rd., [address] [address]  Phone: [phone]  Fax: [phone]    Magnus Grey NP        February 28, 2018     Patient: Claude Figueroa   YOB: 1950   Date of Visit: 2/28/2018       To Whom it May Concern:    Jules Martinez was seen in my clinic on 2/28/2018. He has been under my care from 2/28/2018 and may return to work on 03/05/2018     If you have any questions or concerns, please don't hesitate to call.     Sincerely,         Magnus Grey NP

## 2018-03-05 ASSESSMENT — ENCOUNTER SYMPTOMS
GASTROINTESTINAL NEGATIVE: 1
EYES NEGATIVE: 1
SHORTNESS OF BREATH: 1
COUGH: 1
SINUS PAIN: 1
SINUS PRESSURE: 1

## 2018-03-05 NOTE — PROGRESS NOTES
[address]  [facility]  [facility]  Dept: [phone]  Dept Fax: [phone]: [phone]  PROGRESS NOTE      Visit Date: 3/5/2018    Mehdi Alejandra is a 74 y.o. male who presents today for:  Chief Complaint   Patient presents with   [facility] ED Follow-up     Seen at [facility] on 2/26 Dx with Acute pharyngitis and Bronchitis. Tx with amox, promethazine with codeine, albuterol, astelin, zofran.  sinus pressure, yellowish nasal drainage, stuffy nose,    Cough     coughing up yellowish phlegm, chest tightness, SOB, Body aches, fatigue, chills. Sx for 5 days. Subjective:  [facility] 2/26/18 bronchitis. tx amoxil, albuterol. C/o continued sob, cough, congestion, body aches, fatigue, chest tightness, chills. Symptoms past 5 days. .           Review of Systems   Constitutional: Positive for activity change, appetite change, chills and fatigue. HENT: Positive for congestion, sinus pain and sinus pressure. Eyes: Negative. Respiratory: Positive for cough and shortness of breath. Cardiovascular: Negative. Gastrointestinal: Negative. Endocrine: Negative. Genitourinary: Negative. Musculoskeletal: Negative. Skin: Negative. Neurological: Positive for weakness and headaches. Hematological: Negative. Psychiatric/Behavioral: Negative.       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 Cornelio Saunders     Family History   Problem Relation Age of

## 2018-03-14 DIAGNOSIS — F41.9 ANXIETY: Chronic | ICD-10-CM

## 2018-03-14 DIAGNOSIS — F51.01 PRIMARY INSOMNIA: Chronic | ICD-10-CM

## 2018-03-15 RX ORDER — ALPRAZOLAM 0.5 MG/1
0.5 TABLET ORAL 2 TIMES DAILY PRN
Qty: 60 TABLET | Refills: 0 | Status: SHIPPED | OUTPATIENT
Start: 2018-03-15 | End: 2018-04-10 | Stop reason: SDUPTHER

## 2018-03-15 RX ORDER — ZOLPIDEM TARTRATE 10 MG/1
10 TABLET ORAL NIGHTLY PRN
Qty: 30 TABLET | Refills: 0 | Status: ON HOLD | OUTPATIENT
Start: 2018-03-15 | End: 2018-04-08 | Stop reason: HOSPADM

## 2018-04-04 ENCOUNTER — APPOINTMENT (OUTPATIENT)
Dept: CT IMAGING | Age: 68
DRG: 035 | End: 2018-04-04
Payer: COMMERCIAL

## 2018-04-04 ENCOUNTER — APPOINTMENT (OUTPATIENT)
Dept: MRI IMAGING | Age: 68
DRG: 035 | End: 2018-04-04
Payer: COMMERCIAL

## 2018-04-04 ENCOUNTER — HOSPITAL ENCOUNTER (INPATIENT)
Age: 68
LOS: 4 days | Discharge: HOME | DRG: 035 | End: 2018-04-08
Attending: EMERGENCY MEDICINE | Admitting: INTERNAL MEDICINE
Payer: COMMERCIAL

## 2018-04-04 ENCOUNTER — OFFICE VISIT (OUTPATIENT)
Dept: FAMILY MEDICINE CLINIC | Age: 68
End: 2018-04-04
Payer: COMMERCIAL

## 2018-04-04 VITALS
HEART RATE: 64 BPM | WEIGHT: 250 LBS | DIASTOLIC BLOOD PRESSURE: 70 MMHG | HEIGHT: 74 IN | BODY MASS INDEX: 32.08 KG/M2 | TEMPERATURE: 98.1 F | RESPIRATION RATE: 16 BRPM | SYSTOLIC BLOOD PRESSURE: 116 MMHG

## 2018-04-04 DIAGNOSIS — R47.81 SLURRED SPEECH: ICD-10-CM

## 2018-04-04 DIAGNOSIS — R41.0 CONFUSION: ICD-10-CM

## 2018-04-04 DIAGNOSIS — Z86.73 HISTORY OF CARDIOEMBOLIC CEREBROVASCULAR ACCIDENT (CVA): ICD-10-CM

## 2018-04-04 DIAGNOSIS — G45.9 TRANSIENT CEREBRAL ISCHEMIA, UNSPECIFIED TYPE: Primary | ICD-10-CM

## 2018-04-04 DIAGNOSIS — F33.9 RECURRENT MAJOR DEPRESSIVE DISORDER, REMISSION STATUS UNSPECIFIED (HCC): Primary | ICD-10-CM

## 2018-04-04 LAB
ANION GAP SERPL CALCULATED.3IONS-SCNC: 11 MEQ/L (ref 8–16)
ANISOCYTOSIS: ABNORMAL
BASOPHILS # BLD: 0.6 %
BASOPHILS ABSOLUTE: 0 THOU/MM3 (ref 0–0.1)
BUN BLDV-MCNC: 19 MG/DL (ref 7–22)
CALCIUM SERPL-MCNC: 9.5 MG/DL (ref 8.5–10.5)
CHLORIDE BLD-SCNC: 101 MEQ/L (ref 98–111)
CO2: 26 MEQ/L (ref 23–33)
CREAT SERPL-MCNC: 0.8 MG/DL (ref 0.4–1.2)
EOSINOPHIL # BLD: 3.8 %
EOSINOPHILS ABSOLUTE: 0.3 THOU/MM3 (ref 0–0.4)
GFR SERPL CREATININE-BSD FRML MDRD: > 90 ML/MIN/1.73M2
GLUCOSE BLD-MCNC: 108 MG/DL (ref 70–108)
HCT VFR BLD CALC: 46.3 % (ref 42–52)
HEMOGLOBIN: 15.9 GM/DL (ref 14–18)
LV EF: 63 %
LVEF MODALITY: NORMAL
LYMPHOCYTES # BLD: 24.1 %
LYMPHOCYTES ABSOLUTE: 2 THOU/MM3 (ref 1–4.8)
MCH RBC QN AUTO: 32.2 PG (ref 27–31)
MCHC RBC AUTO-ENTMCNC: 34.4 GM/DL (ref 33–37)
MCV RBC AUTO: 93.6 FL (ref 80–94)
MONOCYTES # BLD: 5.1 %
MONOCYTES ABSOLUTE: 0.4 THOU/MM3 (ref 0.4–1.3)
NUCLEATED RED BLOOD CELLS: 0 /100 WBC
OSMOLALITY CALCULATION: 278.5 MOSMOL/KG (ref 275–300)
PDW BLD-RTO: 15.2 % (ref 11.5–14.5)
PLATELET # BLD: 258 THOU/MM3 (ref 130–400)
PMV BLD AUTO: 8.3 FL (ref 7.4–10.4)
POTASSIUM SERPL-SCNC: 4.4 MEQ/L (ref 3.5–5.2)
RBC # BLD: 4.94 MILL/MM3 (ref 4.7–6.1)
SEG NEUTROPHILS: 66.4 %
SEGMENTED NEUTROPHILS ABSOLUTE COUNT: 5.4 THOU/MM3 (ref 1.8–7.7)
SODIUM BLD-SCNC: 138 MEQ/L (ref 135–145)
TROPONIN T: < 0.01 NG/ML
WBC # BLD: 8.1 THOU/MM3 (ref 4.8–10.8)

## 2018-04-04 PROCEDURE — 3017F COLORECTAL CA SCREEN DOC REV: CPT | Performed by: NURSE PRACTITIONER

## 2018-04-04 PROCEDURE — 80048 BASIC METABOLIC PNL TOTAL CA: CPT

## 2018-04-04 PROCEDURE — 1200000003 HC TELEMETRY R&B

## 2018-04-04 PROCEDURE — G8427 DOCREV CUR MEDS BY ELIG CLIN: HCPCS | Performed by: NURSE PRACTITIONER

## 2018-04-04 PROCEDURE — 99213 OFFICE O/P EST LOW 20 MIN: CPT | Performed by: NURSE PRACTITIONER

## 2018-04-04 PROCEDURE — 70496 CT ANGIOGRAPHY HEAD: CPT

## 2018-04-04 PROCEDURE — 93005 ELECTROCARDIOGRAM TRACING: CPT | Performed by: EMERGENCY MEDICINE

## 2018-04-04 PROCEDURE — G8417 CALC BMI ABV UP PARAM F/U: HCPCS | Performed by: NURSE PRACTITIONER

## 2018-04-04 PROCEDURE — 99285 EMERGENCY DEPT VISIT HI MDM: CPT

## 2018-04-04 PROCEDURE — 70498 CT ANGIOGRAPHY NECK: CPT

## 2018-04-04 PROCEDURE — 70551 MRI BRAIN STEM W/O DYE: CPT

## 2018-04-04 PROCEDURE — 70450 CT HEAD/BRAIN W/O DYE: CPT

## 2018-04-04 PROCEDURE — 84484 ASSAY OF TROPONIN QUANT: CPT

## 2018-04-04 PROCEDURE — G8598 ASA/ANTIPLAT THER USED: HCPCS | Performed by: NURSE PRACTITIONER

## 2018-04-04 PROCEDURE — G0378 HOSPITAL OBSERVATION PER HR: HCPCS

## 2018-04-04 PROCEDURE — 85025 COMPLETE CBC W/AUTO DIFF WBC: CPT

## 2018-04-04 PROCEDURE — 93306 TTE W/DOPPLER COMPLETE: CPT

## 2018-04-04 PROCEDURE — 4004F PT TOBACCO SCREEN RCVD TLK: CPT | Performed by: NURSE PRACTITIONER

## 2018-04-04 PROCEDURE — 99223 1ST HOSP IP/OBS HIGH 75: CPT | Performed by: INTERNAL MEDICINE

## 2018-04-04 PROCEDURE — 36415 COLL VENOUS BLD VENIPUNCTURE: CPT

## 2018-04-04 PROCEDURE — 1123F ACP DISCUSS/DSCN MKR DOCD: CPT | Performed by: NURSE PRACTITIONER

## 2018-04-04 PROCEDURE — 6360000004 HC RX CONTRAST MEDICATION: Performed by: EMERGENCY MEDICINE

## 2018-04-04 PROCEDURE — 6370000000 HC RX 637 (ALT 250 FOR IP): Performed by: INTERNAL MEDICINE

## 2018-04-04 PROCEDURE — 4040F PNEUMOC VAC/ADMIN/RCVD: CPT | Performed by: NURSE PRACTITIONER

## 2018-04-04 RX ORDER — ACETAMINOPHEN 325 MG/1
650 TABLET ORAL EVERY 4 HOURS PRN
Status: DISCONTINUED | OUTPATIENT
Start: 2018-04-04 | End: 2018-04-06 | Stop reason: SDUPTHER

## 2018-04-04 RX ORDER — ASPIRIN 81 MG/1
81 TABLET, CHEWABLE ORAL DAILY
Status: DISCONTINUED | OUTPATIENT
Start: 2018-04-04 | End: 2018-04-04 | Stop reason: ALTCHOICE

## 2018-04-04 RX ORDER — POTASSIUM CHLORIDE 20 MEQ/1
40 TABLET, EXTENDED RELEASE ORAL PRN
Status: DISCONTINUED | OUTPATIENT
Start: 2018-04-04 | End: 2018-04-08 | Stop reason: HOSPADM

## 2018-04-04 RX ORDER — ZOLPIDEM TARTRATE 10 MG/1
10 TABLET ORAL NIGHTLY PRN
Status: DISCONTINUED | OUTPATIENT
Start: 2018-04-04 | End: 2018-04-08 | Stop reason: HOSPADM

## 2018-04-04 RX ORDER — POTASSIUM CHLORIDE 20MEQ/15ML
40 LIQUID (ML) ORAL PRN
Status: DISCONTINUED | OUTPATIENT
Start: 2018-04-04 | End: 2018-04-08 | Stop reason: HOSPADM

## 2018-04-04 RX ORDER — AZELASTINE 1 MG/ML
1 SPRAY, METERED NASAL 2 TIMES DAILY
Status: DISCONTINUED | OUTPATIENT
Start: 2018-04-04 | End: 2018-04-05 | Stop reason: ALTCHOICE

## 2018-04-04 RX ORDER — SODIUM CHLORIDE 0.9 % (FLUSH) 0.9 %
10 SYRINGE (ML) INJECTION EVERY 12 HOURS SCHEDULED
Status: DISCONTINUED | OUTPATIENT
Start: 2018-04-04 | End: 2018-04-08 | Stop reason: HOSPADM

## 2018-04-04 RX ORDER — ALPRAZOLAM 0.5 MG/1
0.5 TABLET ORAL 2 TIMES DAILY PRN
Status: DISCONTINUED | OUTPATIENT
Start: 2018-04-04 | End: 2018-04-08 | Stop reason: HOSPADM

## 2018-04-04 RX ORDER — ATORVASTATIN CALCIUM 20 MG/1
20 TABLET, FILM COATED ORAL NIGHTLY
Status: DISCONTINUED | OUTPATIENT
Start: 2018-04-04 | End: 2018-04-06

## 2018-04-04 RX ORDER — POTASSIUM CHLORIDE 7.45 MG/ML
10 INJECTION INTRAVENOUS PRN
Status: DISCONTINUED | OUTPATIENT
Start: 2018-04-04 | End: 2018-04-08 | Stop reason: HOSPADM

## 2018-04-04 RX ORDER — SODIUM CHLORIDE 9 MG/ML
INJECTION, SOLUTION INTRAVENOUS ONCE
Status: DISCONTINUED | OUTPATIENT
Start: 2018-04-04 | End: 2018-04-08 | Stop reason: HOSPADM

## 2018-04-04 RX ORDER — ONDANSETRON 2 MG/ML
4 INJECTION INTRAMUSCULAR; INTRAVENOUS EVERY 6 HOURS PRN
Status: DISCONTINUED | OUTPATIENT
Start: 2018-04-04 | End: 2018-04-06 | Stop reason: DRUGHIGH

## 2018-04-04 RX ORDER — SODIUM CHLORIDE 0.9 % (FLUSH) 0.9 %
10 SYRINGE (ML) INJECTION PRN
Status: DISCONTINUED | OUTPATIENT
Start: 2018-04-04 | End: 2018-04-06 | Stop reason: SDUPTHER

## 2018-04-04 RX ADMIN — ATORVASTATIN CALCIUM 20 MG: 20 TABLET, FILM COATED ORAL at 20:41

## 2018-04-04 RX ADMIN — IOPAMIDOL 80 ML: 755 INJECTION, SOLUTION INTRAVENOUS at 10:42

## 2018-04-04 ASSESSMENT — ENCOUNTER SYMPTOMS
EYE REDNESS: 0
FACIAL SWELLING: 0
SHORTNESS OF BREATH: 0
ABDOMINAL DISTENTION: 0
PHOTOPHOBIA: 0
COUGH: 0
NAUSEA: 0
DIARRHEA: 0
RHINORRHEA: 0
EYE DISCHARGE: 0
VOMITING: 0
STRIDOR: 0
COLOR CHANGE: 0

## 2018-04-04 ASSESSMENT — PAIN SCALES - GENERAL
PAINLEVEL_OUTOF10: 0

## 2018-04-05 ENCOUNTER — APPOINTMENT (OUTPATIENT)
Dept: MRI IMAGING | Age: 68
DRG: 035 | End: 2018-04-05
Payer: COMMERCIAL

## 2018-04-05 LAB
ANION GAP SERPL CALCULATED.3IONS-SCNC: 11 MEQ/L (ref 8–16)
BUN BLDV-MCNC: 18 MG/DL (ref 7–22)
CALCIUM SERPL-MCNC: 9.3 MG/DL (ref 8.5–10.5)
CHLORIDE BLD-SCNC: 102 MEQ/L (ref 98–111)
CHOLESTEROL, TOTAL: 195 MG/DL (ref 100–199)
CO2: 25 MEQ/L (ref 23–33)
CREAT SERPL-MCNC: 0.7 MG/DL (ref 0.4–1.2)
EKG ATRIAL RATE: 75 BPM
EKG P AXIS: 79 DEGREES
EKG P-R INTERVAL: 174 MS
EKG Q-T INTERVAL: 400 MS
EKG QRS DURATION: 82 MS
EKG QTC CALCULATION (BAZETT): 446 MS
EKG R AXIS: 6 DEGREES
EKG T AXIS: 61 DEGREES
EKG VENTRICULAR RATE: 75 BPM
GFR SERPL CREATININE-BSD FRML MDRD: > 90 ML/MIN/1.73M2
GLUCOSE BLD-MCNC: 101 MG/DL (ref 70–108)
HDLC SERPL-MCNC: 37 MG/DL
LDL CHOLESTEROL CALCULATED: 132 MG/DL
POTASSIUM REFLEX MAGNESIUM: 4.4 MEQ/L (ref 3.5–5.2)
SODIUM BLD-SCNC: 138 MEQ/L (ref 135–145)
TRIGL SERPL-MCNC: 129 MG/DL (ref 0–199)
TROPONIN T: < 0.01 NG/ML

## 2018-04-05 PROCEDURE — 1200000003 HC TELEMETRY R&B

## 2018-04-05 PROCEDURE — G8978 MOBILITY CURRENT STATUS: HCPCS

## 2018-04-05 PROCEDURE — 99232 SBSQ HOSP IP/OBS MODERATE 35: CPT | Performed by: FAMILY MEDICINE

## 2018-04-05 PROCEDURE — 72148 MRI LUMBAR SPINE W/O DYE: CPT

## 2018-04-05 PROCEDURE — G8979 MOBILITY GOAL STATUS: HCPCS

## 2018-04-05 PROCEDURE — 6370000000 HC RX 637 (ALT 250 FOR IP): Performed by: INTERNAL MEDICINE

## 2018-04-05 PROCEDURE — 72141 MRI NECK SPINE W/O DYE: CPT

## 2018-04-05 PROCEDURE — 2580000003 HC RX 258: Performed by: INTERNAL MEDICINE

## 2018-04-05 PROCEDURE — 6370000000 HC RX 637 (ALT 250 FOR IP): Performed by: FAMILY MEDICINE

## 2018-04-05 PROCEDURE — 97161 PT EVAL LOW COMPLEX 20 MIN: CPT

## 2018-04-05 PROCEDURE — 36415 COLL VENOUS BLD VENIPUNCTURE: CPT

## 2018-04-05 PROCEDURE — G8980 MOBILITY D/C STATUS: HCPCS

## 2018-04-05 PROCEDURE — 80061 LIPID PANEL: CPT

## 2018-04-05 PROCEDURE — 037K3DZ DILATION OF RIGHT INTERNAL CAROTID ARTERY WITH INTRALUMINAL DEVICE, PERCUTANEOUS APPROACH: ICD-10-PCS | Performed by: PSYCHIATRY & NEUROLOGY

## 2018-04-05 PROCEDURE — 80048 BASIC METABOLIC PNL TOTAL CA: CPT

## 2018-04-05 RX ORDER — ASPIRIN 81 MG/1
81 TABLET, CHEWABLE ORAL DAILY
Status: DISCONTINUED | OUTPATIENT
Start: 2018-04-05 | End: 2018-04-08 | Stop reason: HOSPADM

## 2018-04-05 RX ADMIN — ATORVASTATIN CALCIUM 20 MG: 20 TABLET, FILM COATED ORAL at 20:07

## 2018-04-05 RX ADMIN — ASPIRIN 81 MG: 81 TABLET, CHEWABLE ORAL at 16:52

## 2018-04-05 RX ADMIN — ALPRAZOLAM 0.5 MG: 0.5 TABLET ORAL at 10:19

## 2018-04-05 RX ADMIN — ALPRAZOLAM 0.5 MG: 0.5 TABLET ORAL at 20:11

## 2018-04-05 RX ADMIN — ZOLPIDEM TARTRATE 10 MG: 10 TABLET, FILM COATED ORAL at 21:27

## 2018-04-05 RX ADMIN — Medication 10 ML: at 20:07

## 2018-04-05 ASSESSMENT — PAIN SCALES - GENERAL
PAINLEVEL_OUTOF10: 0

## 2018-04-05 ASSESSMENT — ENCOUNTER SYMPTOMS
EYES NEGATIVE: 1
RESPIRATORY NEGATIVE: 1
GASTROINTESTINAL NEGATIVE: 1

## 2018-04-06 ENCOUNTER — APPOINTMENT (OUTPATIENT)
Dept: CARDIAC CATH/INVASIVE PROCEDURES | Age: 68
DRG: 035 | End: 2018-04-06
Payer: COMMERCIAL

## 2018-04-06 LAB
ABO: NORMAL
ACTIVATED CLOTTING TIME: 219 SECONDS (ref 1–150)
ALBUMIN SERPL-MCNC: 3.7 G/DL (ref 3.5–5.1)
ALP BLD-CCNC: 76 U/L (ref 38–126)
ALT SERPL-CCNC: 19 U/L (ref 11–66)
ANION GAP SERPL CALCULATED.3IONS-SCNC: 12 MEQ/L (ref 8–16)
ANTIBODY SCREEN: NORMAL
AST SERPL-CCNC: 14 U/L (ref 5–40)
BILIRUB SERPL-MCNC: 0.9 MG/DL (ref 0.3–1.2)
BUN BLDV-MCNC: 19 MG/DL (ref 7–22)
CALCIUM SERPL-MCNC: 9.2 MG/DL (ref 8.5–10.5)
CHLORIDE BLD-SCNC: 101 MEQ/L (ref 98–111)
CO2: 25 MEQ/L (ref 23–33)
CREAT SERPL-MCNC: 0.9 MG/DL (ref 0.4–1.2)
GFR SERPL CREATININE-BSD FRML MDRD: 85 ML/MIN/1.73M2
GLUCOSE BLD-MCNC: 103 MG/DL (ref 70–108)
HCT VFR BLD CALC: 45.2 % (ref 42–52)
HEMOGLOBIN: 15.6 GM/DL (ref 14–18)
INR BLD: 1.07 (ref 0.85–1.13)
MCH RBC QN AUTO: 32.2 PG (ref 27–31)
MCHC RBC AUTO-ENTMCNC: 34.4 GM/DL (ref 33–37)
MCV RBC AUTO: 93.7 FL (ref 80–94)
MRSA SCREEN RT-PCR: NEGATIVE
PDW BLD-RTO: 15.3 % (ref 11.5–14.5)
PLATELET # BLD: 250 THOU/MM3 (ref 130–400)
PMV BLD AUTO: 8.4 FL (ref 7.4–10.4)
POTASSIUM SERPL-SCNC: 4.3 MEQ/L (ref 3.5–5.2)
RBC # BLD: 4.83 MILL/MM3 (ref 4.7–6.1)
RH FACTOR: NORMAL
SEDIMENTATION RATE, ERYTHROCYTE: 9 MM/HR (ref 0–10)
SODIUM BLD-SCNC: 138 MEQ/L (ref 135–145)
TOTAL PROTEIN: 6.6 G/DL (ref 6.1–8)
WBC # BLD: 7.8 THOU/MM3 (ref 4.8–10.8)

## 2018-04-06 PROCEDURE — 85027 COMPLETE CBC AUTOMATED: CPT

## 2018-04-06 PROCEDURE — 2000000000 HC ICU R&B

## 2018-04-06 PROCEDURE — 99233 SBSQ HOSP IP/OBS HIGH 50: CPT | Performed by: INTERNAL MEDICINE

## 2018-04-06 PROCEDURE — C1887 CATHETER, GUIDING: HCPCS

## 2018-04-06 PROCEDURE — 36226 PLACE CATH VERTEBRAL ART: CPT

## 2018-04-06 PROCEDURE — C1725 CATH, TRANSLUMIN NON-LASER: HCPCS

## 2018-04-06 PROCEDURE — 36223 PLACE CATH CAROTID/INOM ART: CPT | Performed by: PSYCHIATRY & NEUROLOGY

## 2018-04-06 PROCEDURE — 6370000000 HC RX 637 (ALT 250 FOR IP): Performed by: FAMILY MEDICINE

## 2018-04-06 PROCEDURE — C1769 GUIDE WIRE: HCPCS

## 2018-04-06 PROCEDURE — 85610 PROTHROMBIN TIME: CPT

## 2018-04-06 PROCEDURE — 85651 RBC SED RATE NONAUTOMATED: CPT

## 2018-04-06 PROCEDURE — 80053 COMPREHEN METABOLIC PANEL: CPT

## 2018-04-06 PROCEDURE — 97165 OT EVAL LOW COMPLEX 30 MIN: CPT

## 2018-04-06 PROCEDURE — 85347 COAGULATION TIME ACTIVATED: CPT

## 2018-04-06 PROCEDURE — 6360000002 HC RX W HCPCS

## 2018-04-06 PROCEDURE — G8988 SELF CARE GOAL STATUS: HCPCS

## 2018-04-06 PROCEDURE — 2580000003 HC RX 258: Performed by: PSYCHIATRY & NEUROLOGY

## 2018-04-06 PROCEDURE — C1876 STENT, NON-COA/NON-COV W/DEL: HCPCS

## 2018-04-06 PROCEDURE — C1894 INTRO/SHEATH, NON-LASER: HCPCS

## 2018-04-06 PROCEDURE — 6370000000 HC RX 637 (ALT 250 FOR IP): Performed by: INTERNAL MEDICINE

## 2018-04-06 PROCEDURE — 2500000003 HC RX 250 WO HCPCS

## 2018-04-06 PROCEDURE — 87641 MR-STAPH DNA AMP PROBE: CPT

## 2018-04-06 PROCEDURE — G8987 SELF CARE CURRENT STATUS: HCPCS

## 2018-04-06 PROCEDURE — 37215 TRANSCATH STENT CCA W/EPS: CPT | Performed by: PSYCHIATRY & NEUROLOGY

## 2018-04-06 PROCEDURE — 36223 PLACE CATH CAROTID/INOM ART: CPT

## 2018-04-06 PROCEDURE — 6370000000 HC RX 637 (ALT 250 FOR IP)

## 2018-04-06 PROCEDURE — 97535 SELF CARE MNGMENT TRAINING: CPT

## 2018-04-06 PROCEDURE — 86901 BLOOD TYPING SEROLOGIC RH(D): CPT

## 2018-04-06 PROCEDURE — 37215 TRANSCATH STENT CCA W/EPS: CPT

## 2018-04-06 PROCEDURE — 87081 CULTURE SCREEN ONLY: CPT

## 2018-04-06 PROCEDURE — C1884 EMBOLIZATION PROTECT SYST: HCPCS

## 2018-04-06 PROCEDURE — 6360000002 HC RX W HCPCS: Performed by: PSYCHIATRY & NEUROLOGY

## 2018-04-06 PROCEDURE — 86900 BLOOD TYPING SEROLOGIC ABO: CPT

## 2018-04-06 PROCEDURE — 86850 RBC ANTIBODY SCREEN: CPT

## 2018-04-06 PROCEDURE — 36415 COLL VENOUS BLD VENIPUNCTURE: CPT

## 2018-04-06 RX ORDER — SODIUM CHLORIDE 0.9 % (FLUSH) 0.9 %
10 SYRINGE (ML) INJECTION PRN
Status: DISCONTINUED | OUTPATIENT
Start: 2018-04-06 | End: 2018-04-08 | Stop reason: HOSPADM

## 2018-04-06 RX ORDER — SODIUM CHLORIDE 9 MG/ML
INJECTION, SOLUTION INTRAVENOUS CONTINUOUS
Status: DISCONTINUED | OUTPATIENT
Start: 2018-04-06 | End: 2018-04-06

## 2018-04-06 RX ORDER — DOPAMINE HYDROCHLORIDE 160 MG/100ML
2.5 INJECTION, SOLUTION INTRAVENOUS CONTINUOUS PRN
Status: DISCONTINUED | OUTPATIENT
Start: 2018-04-06 | End: 2018-04-08 | Stop reason: HOSPADM

## 2018-04-06 RX ORDER — ATORVASTATIN CALCIUM 40 MG/1
40 TABLET, FILM COATED ORAL NIGHTLY
Status: DISCONTINUED | OUTPATIENT
Start: 2018-04-06 | End: 2018-04-08 | Stop reason: HOSPADM

## 2018-04-06 RX ORDER — SODIUM CHLORIDE 9 MG/ML
INJECTION, SOLUTION INTRAVENOUS CONTINUOUS
Status: DISCONTINUED | OUTPATIENT
Start: 2018-04-06 | End: 2018-04-06 | Stop reason: SDUPTHER

## 2018-04-06 RX ORDER — DOPAMINE HYDROCHLORIDE 160 MG/100ML
2.5 INJECTION, SOLUTION INTRAVENOUS CONTINUOUS
Status: DISCONTINUED | OUTPATIENT
Start: 2018-04-06 | End: 2018-04-06

## 2018-04-06 RX ORDER — ACETAMINOPHEN 325 MG/1
650 TABLET ORAL EVERY 4 HOURS PRN
Status: DISCONTINUED | OUTPATIENT
Start: 2018-04-06 | End: 2018-04-08 | Stop reason: HOSPADM

## 2018-04-06 RX ORDER — ONDANSETRON 2 MG/ML
4 INJECTION INTRAMUSCULAR; INTRAVENOUS EVERY 8 HOURS PRN
Status: DISCONTINUED | OUTPATIENT
Start: 2018-04-06 | End: 2018-04-08 | Stop reason: HOSPADM

## 2018-04-06 RX ORDER — SODIUM CHLORIDE 9 MG/ML
INJECTION, SOLUTION INTRAVENOUS CONTINUOUS
Status: DISCONTINUED | OUTPATIENT
Start: 2018-04-06 | End: 2018-04-08 | Stop reason: HOSPADM

## 2018-04-06 RX ORDER — HYDROCODONE BITARTRATE AND ACETAMINOPHEN 5; 325 MG/1; MG/1
1 TABLET ORAL EVERY 4 HOURS PRN
Status: DISCONTINUED | OUTPATIENT
Start: 2018-04-06 | End: 2018-04-08 | Stop reason: HOSPADM

## 2018-04-06 RX ADMIN — ALPRAZOLAM 0.5 MG: 0.5 TABLET ORAL at 20:00

## 2018-04-06 RX ADMIN — SODIUM CHLORIDE: 9 INJECTION, SOLUTION INTRAVENOUS at 04:27

## 2018-04-06 RX ADMIN — PROTAMINE SULFATE 100 MG: 10 INJECTION, SOLUTION INTRAVENOUS at 18:41

## 2018-04-06 RX ADMIN — ASPIRIN 81 MG: 81 TABLET, CHEWABLE ORAL at 10:28

## 2018-04-06 RX ADMIN — ATORVASTATIN CALCIUM 40 MG: 40 TABLET, FILM COATED ORAL at 20:00

## 2018-04-06 RX ADMIN — SODIUM CHLORIDE: 9 INJECTION, SOLUTION INTRAVENOUS at 18:33

## 2018-04-06 ASSESSMENT — PAIN SCALES - GENERAL
PAINLEVEL_OUTOF10: 0

## 2018-04-07 PROCEDURE — 6370000000 HC RX 637 (ALT 250 FOR IP): Performed by: FAMILY MEDICINE

## 2018-04-07 PROCEDURE — 6370000000 HC RX 637 (ALT 250 FOR IP): Performed by: INTERNAL MEDICINE

## 2018-04-07 PROCEDURE — 1210000002 HC MED SURG R&B - NEUROSCIENCE

## 2018-04-07 PROCEDURE — 2580000003 HC RX 258: Performed by: PSYCHIATRY & NEUROLOGY

## 2018-04-07 PROCEDURE — 6370000000 HC RX 637 (ALT 250 FOR IP): Performed by: PSYCHIATRY & NEUROLOGY

## 2018-04-07 PROCEDURE — 2580000003 HC RX 258: Performed by: INTERNAL MEDICINE

## 2018-04-07 RX ORDER — CLOPIDOGREL BISULFATE 75 MG/1
75 TABLET ORAL DAILY
Status: DISCONTINUED | OUTPATIENT
Start: 2018-04-07 | End: 2018-04-08 | Stop reason: HOSPADM

## 2018-04-07 RX ADMIN — CLOPIDOGREL 75 MG: 75 TABLET, FILM COATED ORAL at 09:04

## 2018-04-07 RX ADMIN — HYDROCODONE BITARTRATE AND ACETAMINOPHEN 1 TABLET: 5; 325 TABLET ORAL at 00:28

## 2018-04-07 RX ADMIN — Medication 10 ML: at 11:36

## 2018-04-07 RX ADMIN — Medication 10 ML: at 20:31

## 2018-04-07 RX ADMIN — ASPIRIN 81 MG: 81 TABLET, CHEWABLE ORAL at 09:04

## 2018-04-07 RX ADMIN — ATORVASTATIN CALCIUM 40 MG: 40 TABLET, FILM COATED ORAL at 20:31

## 2018-04-07 RX ADMIN — HYDROCODONE BITARTRATE AND ACETAMINOPHEN 1 TABLET: 5; 325 TABLET ORAL at 13:26

## 2018-04-07 RX ADMIN — ZOLPIDEM TARTRATE 10 MG: 10 TABLET, FILM COATED ORAL at 20:31

## 2018-04-07 ASSESSMENT — PAIN SCALES - GENERAL
PAINLEVEL_OUTOF10: 6
PAINLEVEL_OUTOF10: 0
PAINLEVEL_OUTOF10: 7
PAINLEVEL_OUTOF10: 8
PAINLEVEL_OUTOF10: 1
PAINLEVEL_OUTOF10: 0
PAINLEVEL_OUTOF10: 0

## 2018-04-07 ASSESSMENT — PAIN DESCRIPTION - ORIENTATION: ORIENTATION: RIGHT

## 2018-04-07 ASSESSMENT — PAIN DESCRIPTION - LOCATION
LOCATION: HEAD

## 2018-04-07 ASSESSMENT — PAIN DESCRIPTION - PAIN TYPE
TYPE: CHRONIC PAIN

## 2018-04-07 ASSESSMENT — PAIN DESCRIPTION - ONSET: ONSET: ON-GOING

## 2018-04-07 ASSESSMENT — PAIN DESCRIPTION - DESCRIPTORS
DESCRIPTORS: NAGGING
DESCRIPTORS: NAGGING;HEADACHE

## 2018-04-07 ASSESSMENT — PAIN DESCRIPTION - FREQUENCY: FREQUENCY: INTERMITTENT

## 2018-04-08 VITALS
DIASTOLIC BLOOD PRESSURE: 57 MMHG | BODY MASS INDEX: 31.36 KG/M2 | RESPIRATION RATE: 16 BRPM | HEART RATE: 58 BPM | SYSTOLIC BLOOD PRESSURE: 99 MMHG | TEMPERATURE: 97.3 F | OXYGEN SATURATION: 94 % | WEIGHT: 241 LBS

## 2018-04-08 PROBLEM — I65.21 STENOSIS OF RIGHT INTERNAL CAROTID ARTERY: Status: ACTIVE | Noted: 2018-04-08

## 2018-04-08 PROBLEM — G45.9 TIA (TRANSIENT ISCHEMIC ATTACK): Status: RESOLVED | Noted: 2018-04-04 | Resolved: 2018-04-08

## 2018-04-08 LAB
MRSA SCREEN: NORMAL
VRE CULTURE: NORMAL

## 2018-04-08 PROCEDURE — 99239 HOSP IP/OBS DSCHRG MGMT >30: CPT | Performed by: HOSPITALIST

## 2018-04-08 PROCEDURE — 6370000000 HC RX 637 (ALT 250 FOR IP): Performed by: PSYCHIATRY & NEUROLOGY

## 2018-04-08 PROCEDURE — 2580000003 HC RX 258: Performed by: INTERNAL MEDICINE

## 2018-04-08 PROCEDURE — 6370000000 HC RX 637 (ALT 250 FOR IP): Performed by: FAMILY MEDICINE

## 2018-04-08 RX ORDER — CLOPIDOGREL BISULFATE 75 MG/1
75 TABLET ORAL DAILY
Qty: 30 TABLET | Refills: 3 | Status: SHIPPED | OUTPATIENT
Start: 2018-04-09 | End: 2018-06-11 | Stop reason: ALTCHOICE

## 2018-04-08 RX ORDER — ATORVASTATIN CALCIUM 40 MG/1
40 TABLET, FILM COATED ORAL NIGHTLY
Qty: 30 TABLET | Refills: 3 | Status: SHIPPED | OUTPATIENT
Start: 2018-04-08 | End: 2018-06-11 | Stop reason: ALTCHOICE

## 2018-04-08 RX ADMIN — Medication 10 ML: at 10:57

## 2018-04-08 RX ADMIN — ASPIRIN 81 MG: 81 TABLET, CHEWABLE ORAL at 09:28

## 2018-04-08 RX ADMIN — CLOPIDOGREL 75 MG: 75 TABLET, FILM COATED ORAL at 09:28

## 2018-04-08 ASSESSMENT — PAIN SCALES - GENERAL: PAINLEVEL_OUTOF10: 0

## 2018-04-10 ENCOUNTER — OFFICE VISIT (OUTPATIENT)
Dept: FAMILY MEDICINE CLINIC | Age: 68
End: 2018-04-10
Payer: COMMERCIAL

## 2018-04-10 ENCOUNTER — TELEPHONE (OUTPATIENT)
Dept: FAMILY MEDICINE CLINIC | Age: 68
End: 2018-04-10

## 2018-04-10 VITALS
HEIGHT: 74 IN | RESPIRATION RATE: 20 BRPM | HEART RATE: 64 BPM | SYSTOLIC BLOOD PRESSURE: 102 MMHG | BODY MASS INDEX: 31.57 KG/M2 | WEIGHT: 246 LBS | TEMPERATURE: 97.6 F | DIASTOLIC BLOOD PRESSURE: 70 MMHG

## 2018-04-10 DIAGNOSIS — Z86.73 HISTORY OF CVA IN ADULTHOOD: ICD-10-CM

## 2018-04-10 DIAGNOSIS — G45.9 TRANSIENT CEREBRAL ISCHEMIA, UNSPECIFIED TYPE: ICD-10-CM

## 2018-04-10 DIAGNOSIS — F41.9 ANXIETY: Chronic | ICD-10-CM

## 2018-04-10 DIAGNOSIS — I65.21 INTERNAL CAROTID ARTERY STENOSIS, RIGHT: Primary | ICD-10-CM

## 2018-04-10 DIAGNOSIS — F33.41 RECURRENT MAJOR DEPRESSIVE DISORDER, IN PARTIAL REMISSION (HCC): ICD-10-CM

## 2018-04-10 DIAGNOSIS — S02.2XXD CLOSED FRACTURE OF NASAL BONE WITH ROUTINE HEALING, SUBSEQUENT ENCOUNTER: ICD-10-CM

## 2018-04-10 PROCEDURE — 99495 TRANSJ CARE MGMT MOD F2F 14D: CPT | Performed by: NURSE PRACTITIONER

## 2018-04-10 PROCEDURE — 1111F DSCHRG MED/CURRENT MED MERGE: CPT | Performed by: NURSE PRACTITIONER

## 2018-04-10 RX ORDER — ZOLPIDEM TARTRATE 10 MG/1
10 TABLET ORAL NIGHTLY PRN
Qty: 30 TABLET | Refills: 2 | Status: SHIPPED | OUTPATIENT
Start: 2018-04-10 | End: 2018-05-14 | Stop reason: SDUPTHER

## 2018-04-10 RX ORDER — ALPRAZOLAM 0.5 MG/1
0.5 TABLET ORAL 2 TIMES DAILY PRN
Qty: 60 TABLET | Refills: 2 | Status: SHIPPED | OUTPATIENT
Start: 2018-04-10 | End: 2018-05-14 | Stop reason: SDUPTHER

## 2018-04-10 RX ORDER — ZOLPIDEM TARTRATE 10 MG/1
10 TABLET ORAL NIGHTLY PRN
COMMUNITY
End: 2018-04-10 | Stop reason: SDUPTHER

## 2018-04-11 ENCOUNTER — TELEPHONE (OUTPATIENT)
Dept: NEUROLOGY | Age: 68
End: 2018-04-11

## 2018-05-14 DIAGNOSIS — F41.9 ANXIETY: Chronic | ICD-10-CM

## 2018-05-14 RX ORDER — ALPRAZOLAM 0.5 MG/1
0.5 TABLET ORAL 2 TIMES DAILY PRN
Qty: 60 TABLET | Refills: 2 | Status: SHIPPED | OUTPATIENT
Start: 2018-05-14 | End: 2018-08-13 | Stop reason: SDUPTHER

## 2018-05-14 RX ORDER — ZOLPIDEM TARTRATE 10 MG/1
10 TABLET ORAL NIGHTLY PRN
Qty: 30 TABLET | Refills: 2 | Status: SHIPPED | OUTPATIENT
Start: 2018-05-14 | End: 2018-08-13 | Stop reason: SDUPTHER

## 2018-05-15 ENCOUNTER — APPOINTMENT (OUTPATIENT)
Dept: CT IMAGING | Age: 68
End: 2018-05-15
Payer: COMMERCIAL

## 2018-05-15 ENCOUNTER — HOSPITAL ENCOUNTER (EMERGENCY)
Age: 68
Discharge: HOME | End: 2018-05-15
Attending: EMERGENCY MEDICINE
Payer: COMMERCIAL

## 2018-05-15 VITALS
RESPIRATION RATE: 18 BRPM | OXYGEN SATURATION: 98 % | WEIGHT: 250 LBS | SYSTOLIC BLOOD PRESSURE: 120 MMHG | BODY MASS INDEX: 33.13 KG/M2 | HEIGHT: 73 IN | HEART RATE: 74 BPM | TEMPERATURE: 98.1 F | DIASTOLIC BLOOD PRESSURE: 73 MMHG

## 2018-05-15 DIAGNOSIS — R29.898 WEAKNESS OF EXTREMITY: Primary | ICD-10-CM

## 2018-05-15 DIAGNOSIS — R55 NEAR SYNCOPE: ICD-10-CM

## 2018-05-15 LAB
ANION GAP SERPL CALCULATED.3IONS-SCNC: 12 MEQ/L (ref 8–16)
APTT: 29.8 SECONDS (ref 22–38)
BASOPHILS # BLD: 1.1 %
BASOPHILS ABSOLUTE: 0.1 THOU/MM3 (ref 0–0.1)
BUN BLDV-MCNC: 18 MG/DL (ref 7–22)
CALCIUM SERPL-MCNC: 9.2 MG/DL (ref 8.5–10.5)
CHLORIDE BLD-SCNC: 103 MEQ/L (ref 98–111)
CO2: 25 MEQ/L (ref 23–33)
CREAT SERPL-MCNC: 0.9 MG/DL (ref 0.4–1.2)
EKG ATRIAL RATE: 83 BPM
EKG P AXIS: 19 DEGREES
EKG P-R INTERVAL: 168 MS
EKG Q-T INTERVAL: 384 MS
EKG QRS DURATION: 84 MS
EKG QTC CALCULATION (BAZETT): 451 MS
EKG R AXIS: 10 DEGREES
EKG T AXIS: 71 DEGREES
EKG VENTRICULAR RATE: 83 BPM
EOSINOPHIL # BLD: 6.6 %
EOSINOPHILS ABSOLUTE: 0.5 THOU/MM3 (ref 0–0.4)
GFR SERPL CREATININE-BSD FRML MDRD: 85 ML/MIN/1.73M2
GLUCOSE BLD-MCNC: 122 MG/DL (ref 70–108)
HCT VFR BLD CALC: 42.7 % (ref 42–52)
HEMOGLOBIN: 14.7 GM/DL (ref 14–18)
INR BLD: 1.03 (ref 0.85–1.13)
LYMPHOCYTES # BLD: 28.8 %
LYMPHOCYTES ABSOLUTE: 2.1 THOU/MM3 (ref 1–4.8)
MCH RBC QN AUTO: 31.8 PG (ref 27–31)
MCHC RBC AUTO-ENTMCNC: 34.4 GM/DL (ref 33–37)
MCV RBC AUTO: 92.4 FL (ref 80–94)
MONOCYTES # BLD: 7.7 %
MONOCYTES ABSOLUTE: 0.6 THOU/MM3 (ref 0.4–1.3)
NUCLEATED RED BLOOD CELLS: 0 /100 WBC
OSMOLALITY CALCULATION: 282.6 MOSMOL/KG (ref 275–300)
PDW BLD-RTO: 14.1 % (ref 11.5–14.5)
PLATELET # BLD: 312 THOU/MM3 (ref 130–400)
PMV BLD AUTO: 8.2 FL (ref 7.4–10.4)
POTASSIUM SERPL-SCNC: 4.1 MEQ/L (ref 3.5–5.2)
RBC # BLD: 4.62 MILL/MM3 (ref 4.7–6.1)
SEG NEUTROPHILS: 55.8 %
SEGMENTED NEUTROPHILS ABSOLUTE COUNT: 4 THOU/MM3 (ref 1.8–7.7)
SODIUM BLD-SCNC: 140 MEQ/L (ref 135–145)
WBC # BLD: 7.2 THOU/MM3 (ref 4.8–10.8)

## 2018-05-15 PROCEDURE — 36415 COLL VENOUS BLD VENIPUNCTURE: CPT

## 2018-05-15 PROCEDURE — 6360000004 HC RX CONTRAST MEDICATION: Performed by: EMERGENCY MEDICINE

## 2018-05-15 PROCEDURE — 70496 CT ANGIOGRAPHY HEAD: CPT

## 2018-05-15 PROCEDURE — 70450 CT HEAD/BRAIN W/O DYE: CPT

## 2018-05-15 PROCEDURE — 85610 PROTHROMBIN TIME: CPT

## 2018-05-15 PROCEDURE — 93010 ELECTROCARDIOGRAM REPORT: CPT | Performed by: INTERNAL MEDICINE

## 2018-05-15 PROCEDURE — 70498 CT ANGIOGRAPHY NECK: CPT

## 2018-05-15 PROCEDURE — 85025 COMPLETE CBC W/AUTO DIFF WBC: CPT

## 2018-05-15 PROCEDURE — 99285 EMERGENCY DEPT VISIT HI MDM: CPT

## 2018-05-15 PROCEDURE — 85730 THROMBOPLASTIN TIME PARTIAL: CPT

## 2018-05-15 PROCEDURE — 80048 BASIC METABOLIC PNL TOTAL CA: CPT

## 2018-05-15 PROCEDURE — 93005 ELECTROCARDIOGRAM TRACING: CPT | Performed by: EMERGENCY MEDICINE

## 2018-05-15 RX ADMIN — IOPAMIDOL 80 ML: 755 INJECTION, SOLUTION INTRAVENOUS at 09:14

## 2018-05-15 ASSESSMENT — ENCOUNTER SYMPTOMS
RHINORRHEA: 0
BACK PAIN: 0
EYE DISCHARGE: 0
VOMITING: 0
EYE REDNESS: 0
SHORTNESS OF BREATH: 0
SORE THROAT: 0
DIARRHEA: 0
NAUSEA: 0
COUGH: 0
ABDOMINAL PAIN: 0
WHEEZING: 0

## 2018-05-17 ENCOUNTER — APPOINTMENT (OUTPATIENT)
Dept: GENERAL RADIOLOGY | Age: 68
End: 2018-05-17
Payer: COMMERCIAL

## 2018-05-17 ENCOUNTER — HOSPITAL ENCOUNTER (EMERGENCY)
Age: 68
Discharge: HOME | End: 2018-05-17
Attending: FAMILY MEDICINE
Payer: COMMERCIAL

## 2018-05-17 ENCOUNTER — OFFICE VISIT (OUTPATIENT)
Dept: NEUROLOGY | Age: 68
End: 2018-05-17

## 2018-05-17 VITALS
WEIGHT: 249 LBS | RESPIRATION RATE: 17 BRPM | BODY MASS INDEX: 33 KG/M2 | HEIGHT: 73 IN | DIASTOLIC BLOOD PRESSURE: 85 MMHG | OXYGEN SATURATION: 98 % | HEART RATE: 70 BPM | TEMPERATURE: 97.8 F | SYSTOLIC BLOOD PRESSURE: 114 MMHG

## 2018-05-17 VITALS
HEART RATE: 78 BPM | SYSTOLIC BLOOD PRESSURE: 128 MMHG | HEIGHT: 73 IN | WEIGHT: 249 LBS | DIASTOLIC BLOOD PRESSURE: 72 MMHG | BODY MASS INDEX: 33 KG/M2

## 2018-05-17 DIAGNOSIS — R42 DIZZINESS: Primary | ICD-10-CM

## 2018-05-17 DIAGNOSIS — S80.01XA CONTUSION OF RIGHT KNEE, INITIAL ENCOUNTER: ICD-10-CM

## 2018-05-17 DIAGNOSIS — R26.0 ATAXIA INVOLVING LEGS: ICD-10-CM

## 2018-05-17 DIAGNOSIS — I99.8 ISCHEMIA: Primary | ICD-10-CM

## 2018-05-17 PROCEDURE — 99024 POSTOP FOLLOW-UP VISIT: CPT | Performed by: PSYCHIATRY & NEUROLOGY

## 2018-05-17 PROCEDURE — 73564 X-RAY EXAM KNEE 4 OR MORE: CPT

## 2018-05-17 PROCEDURE — 6370000000 HC RX 637 (ALT 250 FOR IP)

## 2018-05-17 PROCEDURE — 99283 EMERGENCY DEPT VISIT LOW MDM: CPT

## 2018-05-17 RX ORDER — GINSENG 100 MG
CAPSULE ORAL ONCE
Status: COMPLETED | OUTPATIENT
Start: 2018-05-17 | End: 2018-05-17

## 2018-05-17 RX ORDER — ASPIRIN 81 MG/1
81 TABLET, CHEWABLE ORAL DAILY
COMMUNITY

## 2018-05-17 RX ORDER — GINSENG 100 MG
CAPSULE ORAL
Status: COMPLETED
Start: 2018-05-17 | End: 2018-05-17

## 2018-05-17 RX ADMIN — Medication: at 15:48

## 2018-05-17 RX ADMIN — BACITRACIN: 500 OINTMENT TOPICAL at 15:48

## 2018-05-17 ASSESSMENT — PAIN DESCRIPTION - FREQUENCY: FREQUENCY: CONTINUOUS

## 2018-05-17 ASSESSMENT — PAIN SCALES - GENERAL: PAINLEVEL_OUTOF10: 4

## 2018-05-17 ASSESSMENT — PAIN DESCRIPTION - PAIN TYPE: TYPE: ACUTE PAIN

## 2018-05-17 ASSESSMENT — ENCOUNTER SYMPTOMS
SHORTNESS OF BREATH: 0
SORE THROAT: 0
CHEST TIGHTNESS: 0
COUGH: 0
EYE PAIN: 0
EYE DISCHARGE: 0
BLOOD IN STOOL: 0
ABDOMINAL PAIN: 0

## 2018-05-17 ASSESSMENT — PAIN DESCRIPTION - ORIENTATION: ORIENTATION: RIGHT

## 2018-05-17 ASSESSMENT — PAIN DESCRIPTION - DESCRIPTORS: DESCRIPTORS: ACHING

## 2018-05-17 ASSESSMENT — PAIN DESCRIPTION - LOCATION: LOCATION: KNEE

## 2018-05-21 ENCOUNTER — OFFICE VISIT (OUTPATIENT)
Dept: FAMILY MEDICINE CLINIC | Age: 68
End: 2018-05-21
Payer: COMMERCIAL

## 2018-05-21 VITALS
DIASTOLIC BLOOD PRESSURE: 66 MMHG | HEIGHT: 74 IN | RESPIRATION RATE: 16 BRPM | BODY MASS INDEX: 32.6 KG/M2 | TEMPERATURE: 98.6 F | HEART RATE: 88 BPM | WEIGHT: 254 LBS | SYSTOLIC BLOOD PRESSURE: 116 MMHG

## 2018-05-21 DIAGNOSIS — F10.10 ETOH ABUSE: Chronic | ICD-10-CM

## 2018-05-21 DIAGNOSIS — M54.16 LUMBAR RADICULOPATHY: ICD-10-CM
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
## 2018-05-21 DIAGNOSIS — R42 DIZZINESS: ICD-10-CM

## 2018-05-21 DIAGNOSIS — R29.6 FREQUENT FALLS: ICD-10-CM

## 2018-05-21 DIAGNOSIS — Z86.73 HISTORY OF CVA (CEREBROVASCULAR ACCIDENT): ICD-10-CM

## 2018-05-21 DIAGNOSIS — F17.210 CIGARETTE SMOKER: ICD-10-CM

## 2018-05-21 DIAGNOSIS — I95.9 HYPOTENSION, UNSPECIFIED HYPOTENSION TYPE: ICD-10-CM

## 2018-05-21 DIAGNOSIS — R26.81 GAIT INSTABILITY: ICD-10-CM

## 2018-05-21 DIAGNOSIS — M48.061 LUMBAR FORAMINAL STENOSIS: ICD-10-CM

## 2018-05-21 PROCEDURE — 99214 OFFICE O/P EST MOD 30 MIN: CPT | Performed by: NURSE PRACTITIONER

## 2018-05-21 PROCEDURE — 4040F PNEUMOC VAC/ADMIN/RCVD: CPT | Performed by: NURSE PRACTITIONER

## 2018-05-21 PROCEDURE — G8417 CALC BMI ABV UP PARAM F/U: HCPCS | Performed by: NURSE PRACTITIONER

## 2018-05-21 PROCEDURE — G8598 ASA/ANTIPLAT THER USED: HCPCS | Performed by: NURSE PRACTITIONER

## 2018-05-21 PROCEDURE — 3017F COLORECTAL CA SCREEN DOC REV: CPT | Performed by: NURSE PRACTITIONER

## 2018-05-21 PROCEDURE — G8427 DOCREV CUR MEDS BY ELIG CLIN: HCPCS | Performed by: NURSE PRACTITIONER

## 2018-05-21 PROCEDURE — 1036F TOBACCO NON-USER: CPT | Performed by: NURSE PRACTITIONER

## 2018-05-21 PROCEDURE — 1123F ACP DISCUSS/DSCN MKR DOCD: CPT | Performed by: NURSE PRACTITIONER

## 2018-05-29 ASSESSMENT — ENCOUNTER SYMPTOMS
GASTROINTESTINAL NEGATIVE: 1
RESPIRATORY NEGATIVE: 1
BACK PAIN: 1
EYES NEGATIVE: 1

## 2018-06-04 ENCOUNTER — OFFICE VISIT (OUTPATIENT)
Dept: NEUROLOGY | Age: 68
End: 2018-06-04
Payer: COMMERCIAL

## 2018-06-04 VITALS
HEIGHT: 73 IN | SYSTOLIC BLOOD PRESSURE: 126 MMHG | BODY MASS INDEX: 33.66 KG/M2 | DIASTOLIC BLOOD PRESSURE: 60 MMHG | HEART RATE: 66 BPM | WEIGHT: 254 LBS

## 2018-06-04 DIAGNOSIS — R42 DIZZINESS: ICD-10-CM

## 2018-06-04 DIAGNOSIS — R55 SYNCOPE AND COLLAPSE: Primary | ICD-10-CM

## 2018-06-04 PROCEDURE — 1123F ACP DISCUSS/DSCN MKR DOCD: CPT | Performed by: PSYCHIATRY & NEUROLOGY

## 2018-06-04 PROCEDURE — G8598 ASA/ANTIPLAT THER USED: HCPCS | Performed by: PSYCHIATRY & NEUROLOGY

## 2018-06-04 PROCEDURE — 99213 OFFICE O/P EST LOW 20 MIN: CPT | Performed by: PSYCHIATRY & NEUROLOGY

## 2018-06-04 PROCEDURE — G8427 DOCREV CUR MEDS BY ELIG CLIN: HCPCS | Performed by: PSYCHIATRY & NEUROLOGY

## 2018-06-04 PROCEDURE — G8417 CALC BMI ABV UP PARAM F/U: HCPCS | Performed by: PSYCHIATRY & NEUROLOGY

## 2018-06-04 PROCEDURE — 1036F TOBACCO NON-USER: CPT | Performed by: PSYCHIATRY & NEUROLOGY

## 2018-06-04 PROCEDURE — 3017F COLORECTAL CA SCREEN DOC REV: CPT | Performed by: PSYCHIATRY & NEUROLOGY

## 2018-06-04 PROCEDURE — 4040F PNEUMOC VAC/ADMIN/RCVD: CPT | Performed by: PSYCHIATRY & NEUROLOGY

## 2018-06-11 ENCOUNTER — OFFICE VISIT (OUTPATIENT)
Dept: FAMILY MEDICINE CLINIC | Age: 68
End: 2018-06-11
Payer: COMMERCIAL

## 2018-06-11 VITALS
WEIGHT: 253 LBS | HEART RATE: 80 BPM | DIASTOLIC BLOOD PRESSURE: 66 MMHG | SYSTOLIC BLOOD PRESSURE: 104 MMHG | HEIGHT: 73 IN | TEMPERATURE: 98.1 F | RESPIRATION RATE: 20 BRPM | BODY MASS INDEX: 33.53 KG/M2

## 2018-06-11 DIAGNOSIS — I63.9 FOCAL INFARCTION OF BRAIN (HCC): ICD-10-CM

## 2018-06-11 DIAGNOSIS — Z86.73 HISTORY OF CVA IN ADULTHOOD: Primary | ICD-10-CM

## 2018-06-11 DIAGNOSIS — E78.2 MIXED HYPERLIPIDEMIA: Chronic | ICD-10-CM

## 2018-06-11 PROCEDURE — 99213 OFFICE O/P EST LOW 20 MIN: CPT | Performed by: NURSE PRACTITIONER

## 2018-06-11 PROCEDURE — 1123F ACP DISCUSS/DSCN MKR DOCD: CPT | Performed by: NURSE PRACTITIONER

## 2018-06-11 PROCEDURE — 1036F TOBACCO NON-USER: CPT | Performed by: NURSE PRACTITIONER

## 2018-06-11 PROCEDURE — 4040F PNEUMOC VAC/ADMIN/RCVD: CPT | Performed by: NURSE PRACTITIONER

## 2018-06-11 PROCEDURE — G8598 ASA/ANTIPLAT THER USED: HCPCS | Performed by: NURSE PRACTITIONER

## 2018-06-11 PROCEDURE — G8417 CALC BMI ABV UP PARAM F/U: HCPCS | Performed by: NURSE PRACTITIONER

## 2018-06-11 PROCEDURE — 3017F COLORECTAL CA SCREEN DOC REV: CPT | Performed by: NURSE PRACTITIONER

## 2018-06-11 PROCEDURE — G8427 DOCREV CUR MEDS BY ELIG CLIN: HCPCS | Performed by: NURSE PRACTITIONER

## 2018-06-11 RX ORDER — ROSUVASTATIN CALCIUM 5 MG/1
5 TABLET, COATED ORAL NIGHTLY
Qty: 30 TABLET | Refills: 3 | Status: SHIPPED | OUTPATIENT
Start: 2018-06-11 | End: 2018-10-03 | Stop reason: SDUPTHER

## 2018-06-11 RX ORDER — CLOPIDOGREL BISULFATE 75 MG/1
75 TABLET ORAL DAILY
COMMUNITY
End: 2018-08-06 | Stop reason: SDUPTHER

## 2018-06-14 ENCOUNTER — HOSPITAL ENCOUNTER (OUTPATIENT)
Dept: NON INVASIVE DIAGNOSTICS | Age: 68
Discharge: HOME | End: 2018-06-14
Payer: COMMERCIAL

## 2018-06-14 VITALS — HEIGHT: 73 IN | BODY MASS INDEX: 33.13 KG/M2 | WEIGHT: 250 LBS

## 2018-06-14 DIAGNOSIS — F10.10 ETOH ABUSE: Chronic | ICD-10-CM

## 2018-06-14 DIAGNOSIS — R26.81 GAIT INSTABILITY: ICD-10-CM

## 2018-06-14 DIAGNOSIS — R42 DIZZINESS: ICD-10-CM

## 2018-06-14 DIAGNOSIS — R29.6 FREQUENT FALLS: ICD-10-CM

## 2018-06-14 DIAGNOSIS — I95.9 HYPOTENSION, UNSPECIFIED HYPOTENSION TYPE: ICD-10-CM

## 2018-06-14 DIAGNOSIS — Z86.73 HISTORY OF CVA (CEREBROVASCULAR ACCIDENT): ICD-10-CM

## 2018-06-14 DIAGNOSIS — F17.210 CIGARETTE SMOKER: ICD-10-CM

## 2018-06-14 LAB
LV EF: 60 %
LVEF MODALITY: NORMAL

## 2018-06-14 PROCEDURE — A9500 TC99M SESTAMIBI: HCPCS | Performed by: NUCLEAR MEDICINE

## 2018-06-14 PROCEDURE — 93017 CV STRESS TEST TRACING ONLY: CPT

## 2018-06-14 PROCEDURE — 78452 HT MUSCLE IMAGE SPECT MULT: CPT

## 2018-06-14 PROCEDURE — 3430000000 HC RX DIAGNOSTIC RADIOPHARMACEUTICAL: Performed by: NUCLEAR MEDICINE

## 2018-06-14 RX ADMIN — Medication 30.8 MILLICURIE: at 09:16

## 2018-06-14 RX ADMIN — Medication 8.4 MILLICURIE: at 08:20

## 2018-06-22 ASSESSMENT — ENCOUNTER SYMPTOMS: BACK PAIN: 1

## 2018-06-27 ENCOUNTER — HOSPITAL ENCOUNTER (OUTPATIENT)
Dept: NON INVASIVE DIAGNOSTICS | Age: 68
Discharge: HOME | End: 2018-06-27
Payer: COMMERCIAL

## 2018-06-27 DIAGNOSIS — R55 SYNCOPE AND COLLAPSE: ICD-10-CM

## 2018-06-27 DIAGNOSIS — R42 DIZZINESS: ICD-10-CM

## 2018-06-27 PROCEDURE — 93225 XTRNL ECG REC<48 HRS REC: CPT

## 2018-06-27 PROCEDURE — 93660 TILT TABLE EVALUATION: CPT

## 2018-06-27 PROCEDURE — 93017 CV STRESS TEST TRACING ONLY: CPT

## 2018-06-27 PROCEDURE — 93226 XTRNL ECG REC<48 HR SCAN A/R: CPT

## 2018-08-06 ENCOUNTER — OFFICE VISIT (OUTPATIENT)
Dept: NEUROLOGY | Age: 68
End: 2018-08-06
Payer: COMMERCIAL

## 2018-08-06 VITALS
SYSTOLIC BLOOD PRESSURE: 128 MMHG | HEART RATE: 66 BPM | DIASTOLIC BLOOD PRESSURE: 74 MMHG | BODY MASS INDEX: 33.13 KG/M2 | HEIGHT: 73 IN | WEIGHT: 250 LBS

## 2018-08-06 DIAGNOSIS — R55 SYNCOPE AND COLLAPSE: Primary | ICD-10-CM

## 2018-08-06 DIAGNOSIS — R42 DIZZINESS: ICD-10-CM

## 2018-08-06 PROCEDURE — G8598 ASA/ANTIPLAT THER USED: HCPCS | Performed by: PSYCHIATRY & NEUROLOGY

## 2018-08-06 PROCEDURE — G8427 DOCREV CUR MEDS BY ELIG CLIN: HCPCS | Performed by: PSYCHIATRY & NEUROLOGY

## 2018-08-06 PROCEDURE — 3017F COLORECTAL CA SCREEN DOC REV: CPT | Performed by: PSYCHIATRY & NEUROLOGY

## 2018-08-06 PROCEDURE — 1101F PT FALLS ASSESS-DOCD LE1/YR: CPT | Performed by: PSYCHIATRY & NEUROLOGY

## 2018-08-06 PROCEDURE — 99213 OFFICE O/P EST LOW 20 MIN: CPT | Performed by: PSYCHIATRY & NEUROLOGY

## 2018-08-06 PROCEDURE — 1036F TOBACCO NON-USER: CPT | Performed by: PSYCHIATRY & NEUROLOGY

## 2018-08-06 PROCEDURE — G8417 CALC BMI ABV UP PARAM F/U: HCPCS | Performed by: PSYCHIATRY & NEUROLOGY

## 2018-08-06 PROCEDURE — 1123F ACP DISCUSS/DSCN MKR DOCD: CPT | Performed by: PSYCHIATRY & NEUROLOGY

## 2018-08-06 PROCEDURE — 4040F PNEUMOC VAC/ADMIN/RCVD: CPT | Performed by: PSYCHIATRY & NEUROLOGY

## 2018-08-06 RX ORDER — CLOPIDOGREL BISULFATE 75 MG/1
75 TABLET ORAL DAILY
Qty: 30 TABLET | Refills: 11 | Status: SHIPPED | OUTPATIENT
Start: 2018-08-06 | End: 2019-07-29 | Stop reason: SDUPTHER

## 2018-08-06 NOTE — PATIENT INSTRUCTIONS
1.  Continue with antiplatelets. 2. Modify risk factors for stroke (blood pressure, cholesterol, diabetes, smoking cessation etc.. Control)   3. Follow up as needed. 4. Call if any questions or concerns.

## 2018-08-06 NOTE — PROGRESS NOTES
NEUROLOGY OUT PATIENT FOLLOW UP NOTE:  8/6/201812:08 PM    López Rodríguez is here for follow up for stroke. He reports no new symptoms. He is doing well, no reproted balance issue or dizziness. He has right side numbness arm and trunk that is unchanged. No leg numbnesss. No diplopia. No headache. No leg numbnesss. He is on aspirin. ROS:    Cardiac: no chest pain. No palpitations. Renal : no flank pain, no hematuria    Skin: no rash  Reviewed labs since last evaluation and discussed with patient. Allergies   Allergen Reactions    Tape Guy Estrin Tape]        Current Outpatient Prescriptions:     clopidogrel (PLAVIX) 75 MG tablet, Take 75 mg by mouth daily, Disp: , Rfl:     rosuvastatin (CRESTOR) 5 MG tablet, Take 1 tablet by mouth nightly, Disp: 30 tablet, Rfl: 3    aspirin 81 MG chewable tablet, Take 81 mg by mouth daily, Disp: , Rfl:     ALPRAZolam (XANAX) 0.5 MG tablet, Take 1 tablet by mouth 2 times daily as needed for Anxiety. ., Disp: 60 tablet, Rfl: 2    zolpidem (AMBIEN) 10 MG tablet, Take 1 tablet by mouth nightly as needed for Sleep. ., Disp: 30 tablet, Rfl: 2      PE:   Vitals:    08/06/18 1129   BP: 128/74   Site: Right Arm   Position: Sitting   Cuff Size: Large Adult   Pulse: 66   Weight: 250 lb (113.4 kg)   Height: 6' 1\" (1.854 m)     General Appearance:  alert and cooperative  Skin:  No rashes or lesions. Language is Intact. Head:  no icterus  Neck: There is no carotid bruits. The Neck is supple. Neuro: CN 2-12 grossly intact with no focal deficits. Power 5/5 Throughout symmetric, Reflexes are symmetric. Long tracts are intact. Cerebellar exam is Intact. Sensory exam is intact to light touch, except numbness on the right side of the body. .  Gait is intact. Musculoskeletal:  Has no hand arthritis, no limitation of ROM in any of the four extremities.   Lower extremities no edema        DATA:  Results for orders placed or performed during the hospital encounter of 05/15/18   CBC Auto Differential   Result Value Ref Range    WBC 7.2 4.8 - 10.8 thou/mm3    RBC 4.62 (L) 4.70 - 6.10 mill/mm3    Hemoglobin 14.7 14.0 - 18.0 gm/dl    Hematocrit 42.7 42.0 - 52.0 %    MCV 92.4 80.0 - 94.0 fL    MCH 31.8 (H) 27.0 - 31.0 pg    MCHC 34.4 33.0 - 37.0 gm/dl    RDW 14.1 11.5 - 14.5 %    Platelets 240 895 - 301 thou/mm3    MPV 8.2 7.4 - 10.4 fL    Seg Neutrophils 55.8 %    Lymphocytes 28.8 %    Monocytes 7.7 %    Eosinophils 6.6 %    Basophils 1.1 %    nRBC 0 /100 wbc    Segs Absolute 4.0 1.8 - 7.7 thou/mm3    Lymphocytes # 2.1 1.0 - 4.8 thou/mm3    Monocytes # 0.6 0.4 - 1.3 thou/mm3    Eosinophils # 0.5 (H) 0.0 - 0.4 thou/mm3    Basophils # 0.1 0.0 - 0.1 thou/mm3   Basic Metabolic Panel   Result Value Ref Range    Sodium 140 135 - 145 meq/L    Potassium 4.1 3.5 - 5.2 meq/L    Chloride 103 98 - 111 meq/L    CO2 25 23 - 33 meq/L    Glucose 122 (H) 70 - 108 mg/dL    BUN 18 7 - 22 mg/dL    CREATININE 0.9 0.4 - 1.2 mg/dL    Calcium 9.2 8.5 - 10.5 mg/dL   APTT   Result Value Ref Range    aPTT 29.8 22.0 - 38.0 seconds   Protime-INR   Result Value Ref Range    INR 1.03 0.85 - 1.13   Anion Gap   Result Value Ref Range    Anion Gap 12.0 8.0 - 16.0 meq/L   Glomerular Filtration Rate, Estimated   Result Value Ref Range    Est, Glom Filt Rate 85 (A) ml/min/1.73m2   Osmolality   Result Value Ref Range    Osmolality Calc 282.6 275.0 - 300 mOsmol/kg   EKG 12 Lead   Result Value Ref Range    Ventricular Rate 83 BPM    Atrial Rate 83 BPM    P-R Interval 168 ms    QRS Duration 84 ms    Q-T Interval 384 ms    QTc Calculation (Bazett) 451 ms    P Axis 19 degrees    R Axis 10 degrees    T Axis 71 degrees        MRI brain 12/2016:   I reviewed the MRI brain and agree with interpretation.               Impression   SUBACUTE LACUNAR INFARCTION OF THE LEFT THALAMUS.       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       REMOTE LACUNAR INFARCTIONS AND SMALL VESSEL DISEASE SEQUELAE NOTED.           Tilt

## 2018-08-13 DIAGNOSIS — F41.9 ANXIETY: Chronic | ICD-10-CM

## 2018-08-13 DIAGNOSIS — G47.00 INSOMNIA, UNSPECIFIED TYPE: Primary | ICD-10-CM

## 2018-08-13 RX ORDER — ZOLPIDEM TARTRATE 10 MG/1
10 TABLET ORAL NIGHTLY PRN
Qty: 30 TABLET | Refills: 2 | Status: SHIPPED | OUTPATIENT
Start: 2018-08-13 | End: 2018-11-13 | Stop reason: SDUPTHER

## 2018-08-13 RX ORDER — ALPRAZOLAM 0.5 MG/1
0.5 TABLET ORAL 2 TIMES DAILY PRN
Qty: 60 TABLET | Refills: 2 | Status: SHIPPED | OUTPATIENT
Start: 2018-08-13 | End: 2018-11-13 | Stop reason: SDUPTHER

## 2018-08-13 NOTE — TELEPHONE ENCOUNTER
Fred Desir called requesting a refill on the following medications:  Requested Prescriptions     Pending Prescriptions Disp Refills    ALPRAZolam (XANAX) 0.5 MG tablet 60 tablet 2     Sig: Take 1 tablet by mouth 2 times daily as needed for Anxiety. Anibal Murguia zolpidem (AMBIEN) 10 MG tablet 30 tablet 2     Sig: Take 1 tablet by mouth nightly as needed for Sleep. Sarmad Rashid      Pharmacy verified:  [facility]      Date of last visit: 06-11-18   Date of next visit (if applicable): Visit date not found

## 2018-09-04 ENCOUNTER — OFFICE VISIT (OUTPATIENT)
Dept: FAMILY MEDICINE CLINIC | Age: 68
End: 2018-09-04
Payer: COMMERCIAL

## 2018-09-04 VITALS
WEIGHT: 251.2 LBS | RESPIRATION RATE: 18 BRPM | HEIGHT: 73 IN | HEART RATE: 80 BPM | DIASTOLIC BLOOD PRESSURE: 74 MMHG | BODY MASS INDEX: 33.29 KG/M2 | TEMPERATURE: 98.6 F | SYSTOLIC BLOOD PRESSURE: 114 MMHG

## 2018-09-04 DIAGNOSIS — H11.31 CONJUNCTIVAL HEMORRHAGE, RIGHT: Primary | ICD-10-CM

## 2018-09-04 DIAGNOSIS — H11.31 SUBCONJUNCTIVAL HEMORRHAGE, RIGHT: ICD-10-CM

## 2018-09-04 PROCEDURE — 3017F COLORECTAL CA SCREEN DOC REV: CPT | Performed by: NURSE PRACTITIONER

## 2018-09-04 PROCEDURE — 4040F PNEUMOC VAC/ADMIN/RCVD: CPT | Performed by: NURSE PRACTITIONER

## 2018-09-04 PROCEDURE — 99213 OFFICE O/P EST LOW 20 MIN: CPT | Performed by: NURSE PRACTITIONER

## 2018-09-04 PROCEDURE — G8598 ASA/ANTIPLAT THER USED: HCPCS | Performed by: NURSE PRACTITIONER

## 2018-09-04 PROCEDURE — 1036F TOBACCO NON-USER: CPT | Performed by: NURSE PRACTITIONER

## 2018-09-04 PROCEDURE — 1123F ACP DISCUSS/DSCN MKR DOCD: CPT | Performed by: NURSE PRACTITIONER

## 2018-09-04 PROCEDURE — G8427 DOCREV CUR MEDS BY ELIG CLIN: HCPCS | Performed by: NURSE PRACTITIONER

## 2018-09-04 PROCEDURE — G8417 CALC BMI ABV UP PARAM F/U: HCPCS | Performed by: NURSE PRACTITIONER

## 2018-09-04 PROCEDURE — 1101F PT FALLS ASSESS-DOCD LE1/YR: CPT | Performed by: NURSE PRACTITIONER

## 2018-09-04 RX ORDER — ERYTHROMYCIN 5 MG/G
OINTMENT OPHTHALMIC 2 TIMES DAILY
Qty: 1 TUBE | Refills: 0 | Status: SHIPPED | OUTPATIENT
Start: 2018-09-04 | End: 2018-09-14

## 2018-09-04 NOTE — PATIENT INSTRUCTIONS
have any pain in your eye.    Watch closely for changes in your health, and be sure to contact your doctor if:    · You do not get better as expected. Where can you learn more? Go to https://The [employer].Send Word Now. org and sign in to your EDM[employer] account. Enter N087 in the Score The Board box to learn more about \"Subconjunctival Hemorrhage: Care Instructions. \"     If you do not have an account, please click on the \"Sign Up Now\" link. Current as of: December 3, 2017  Content Version: 11.7  © 1801-3798 [employer], [employer]. Care instructions adapted under license by [address] If you have questions about a medical condition or this instruction, always ask your healthcare professional. [address] any warranty or liability for your use of this information.

## 2018-09-17 ASSESSMENT — ENCOUNTER SYMPTOMS: EYE REDNESS: 1

## 2018-09-17 NOTE — PROGRESS NOTES
Outpatient Prescriptions   Medication Sig Dispense Refill    ALPRAZolam (XANAX) 0.5 MG tablet Take 1 tablet by mouth 2 times daily as needed for Anxiety. . 60 tablet 2    zolpidem (AMBIEN) 10 MG tablet Take 1 tablet by mouth nightly as needed for Sleep. . 30 tablet 2    clopidogrel (PLAVIX) 75 MG tablet Take 1 tablet by mouth daily 30 tablet 11    rosuvastatin (CRESTOR) 5 MG tablet Take 1 tablet by mouth nightly 30 tablet 3    aspirin 81 MG chewable tablet Take 81 mg by mouth daily       No current facility-administered medications for this visit. Allergies   Allergen Reactions    Tape [Ad[facility] Maintenance   Topic Date Due    AAA screen  1950    Hepatitis C screen  1950    HIV screen  11/04/1967    Shingles Vaccine (1 of 2 - 2 Dose Series) 11/04/2002    Low dose CT lung screening  11/04/2007    Pneumococcal low/med risk (1 of 2 - PCV13) 11/04/2017    Flu vaccine (1) 09/01/2018    Diabetes screen  12/25/2019    Colon cancer screen colonoscopy  11/29/2020    Lipid screen  04/05/2023    DTaP/Tdap/Td vaccine (2 - Td) 10/01/2024         Objective:     Physical Exam   Constitutional: He appears well-developed and well-nourished. HENT:   Head: Normocephalic. Mouth/Throat: Oropharynx is clear and moist.   Eyes: Right conjunctiva has a hemorrhage. Neck: Neck supple. No JVD present. No thyromegaly present. Cardiovascular: Normal rate, regular rhythm, normal heart sounds and intact distal pulses. Pulmonary/Chest: Effort normal and breath sounds normal.   Abdominal: Soft. Musculoskeletal: Normal range of motion. Lymphadenopathy:     He has no cervical adenopathy. Neurological: He is alert. Skin: Skin is warm and dry. Psychiatric: He has a normal mood and affect. Nursing note and vitals reviewed.     /74 (Site: Right Arm, Position: Sitting)   Pulse 80   Temp 98.6 °F (37 °C) (Oral)   Resp 18   Ht 6' 1\" (1.854 m)   Wt 251 lb 3.2 oz (113.9 kg)

## 2018-10-03 RX ORDER — ROSUVASTATIN CALCIUM 5 MG/1
TABLET, COATED ORAL
Qty: 30 TABLET | Refills: 5 | Status: SHIPPED | OUTPATIENT
Start: 2018-10-03 | End: 2018-10-09 | Stop reason: SINTOL

## 2018-10-09 ENCOUNTER — OFFICE VISIT (OUTPATIENT)
Dept: FAMILY MEDICINE CLINIC | Age: 68
End: 2018-10-09
Payer: COMMERCIAL

## 2018-10-09 VITALS
RESPIRATION RATE: 18 BRPM | SYSTOLIC BLOOD PRESSURE: 108 MMHG | WEIGHT: 251 LBS | TEMPERATURE: 98.9 F | DIASTOLIC BLOOD PRESSURE: 74 MMHG | HEIGHT: 73 IN | HEART RATE: 80 BPM | BODY MASS INDEX: 33.27 KG/M2

## 2018-10-09 DIAGNOSIS — R11.2 NON-INTRACTABLE VOMITING WITH NAUSEA, UNSPECIFIED VOMITING TYPE: Primary | ICD-10-CM

## 2018-10-09 PROCEDURE — G8427 DOCREV CUR MEDS BY ELIG CLIN: HCPCS | Performed by: FAMILY MEDICINE

## 2018-10-09 PROCEDURE — 99213 OFFICE O/P EST LOW 20 MIN: CPT | Performed by: FAMILY MEDICINE

## 2018-10-09 PROCEDURE — 1101F PT FALLS ASSESS-DOCD LE1/YR: CPT | Performed by: FAMILY MEDICINE

## 2018-10-09 PROCEDURE — G8598 ASA/ANTIPLAT THER USED: HCPCS | Performed by: FAMILY MEDICINE

## 2018-10-09 PROCEDURE — G8417 CALC BMI ABV UP PARAM F/U: HCPCS | Performed by: FAMILY MEDICINE

## 2018-10-09 PROCEDURE — 1036F TOBACCO NON-USER: CPT | Performed by: FAMILY MEDICINE

## 2018-10-09 PROCEDURE — 1123F ACP DISCUSS/DSCN MKR DOCD: CPT | Performed by: FAMILY MEDICINE

## 2018-10-09 PROCEDURE — G8484 FLU IMMUNIZE NO ADMIN: HCPCS | Performed by: FAMILY MEDICINE

## 2018-10-09 PROCEDURE — 3017F COLORECTAL CA SCREEN DOC REV: CPT | Performed by: FAMILY MEDICINE

## 2018-10-09 PROCEDURE — 4040F PNEUMOC VAC/ADMIN/RCVD: CPT | Performed by: FAMILY MEDICINE

## 2018-10-09 RX ORDER — PANTOPRAZOLE SODIUM 40 MG/1
40 TABLET, DELAYED RELEASE ORAL DAILY
Qty: 14 TABLET | Refills: 0 | Status: SHIPPED | OUTPATIENT
Start: 2018-10-09 | End: 2019-01-14 | Stop reason: SDUPTHER

## 2018-10-09 ASSESSMENT — ENCOUNTER SYMPTOMS
NAUSEA: 1
ABDOMINAL PAIN: 0
SHORTNESS OF BREATH: 0
COUGH: 0
EYE PAIN: 0
SINUS PRESSURE: 0
ABDOMINAL DISTENTION: 0
CONSTIPATION: 0
VOMITING: 1
SORE THROAT: 0
RHINORRHEA: 0

## 2018-10-09 NOTE — PROGRESS NOTES
normal heart sounds. Pulmonary/Chest: Effort normal and breath sounds normal. He has no wheezes. He has no rales. Musculoskeletal: He exhibits no edema or tenderness. Neurological: He is alert and oriented to person, place, and time. Skin: Skin is warm and dry. He is not diaphoretic. No pallor. /74 (Site: Left Upper Arm, Position: Sitting)   Pulse 80   Temp 98.9 °F (37.2 °C) (Oral)   Resp 18   Ht 6' 1\" (1.854 m)   Wt 251 lb (113.9 kg)   BMI 33.12 kg/m²       Impression/Plan:  1. Non-intractable vomiting with nausea, unspecified vomiting type      Requested Prescriptions     Signed Prescriptions Disp Refills    pantoprazole (PROTONIX) 40 MG tablet 14 tablet 0     Sig: Take 1 tablet by mouth daily     No orders of the defined types were placed in this encounter. discontinue Crestor wait for symptoms to resolve before trialing another cholesterol medication    Patient given educational materials - see patient instructions. Discussed use, benefit, and side effects of prescribed medications. All patient questions answered. Pt voiced understanding. Reviewed health maintenance. Patient agreed with treatment plan. Follow up as directed. **This report has been created using voice recognition software. It may contain minor errors which are inherent in voice recognition technology. **       Electronically signed by Jeannette Draper MD on 10/9/2018 at 1:33 PM

## 2018-10-16 ENCOUNTER — OFFICE VISIT (OUTPATIENT)
Dept: FAMILY MEDICINE CLINIC | Age: 68
End: 2018-10-16
Payer: COMMERCIAL

## 2018-10-16 VITALS
WEIGHT: 255.2 LBS | HEIGHT: 73 IN | DIASTOLIC BLOOD PRESSURE: 70 MMHG | HEART RATE: 68 BPM | RESPIRATION RATE: 18 BRPM | BODY MASS INDEX: 33.82 KG/M2 | TEMPERATURE: 97.9 F | SYSTOLIC BLOOD PRESSURE: 120 MMHG

## 2018-10-16 DIAGNOSIS — R42 LIGHTHEADEDNESS: Primary | ICD-10-CM

## 2018-10-16 DIAGNOSIS — R42 DIZZINESS: ICD-10-CM

## 2018-10-16 DIAGNOSIS — I63.9 FOCAL INFARCTION OF BRAIN (HCC): ICD-10-CM

## 2018-10-16 PROCEDURE — 1036F TOBACCO NON-USER: CPT | Performed by: NURSE PRACTITIONER

## 2018-10-16 PROCEDURE — 3017F COLORECTAL CA SCREEN DOC REV: CPT | Performed by: NURSE PRACTITIONER

## 2018-10-16 PROCEDURE — 1101F PT FALLS ASSESS-DOCD LE1/YR: CPT | Performed by: NURSE PRACTITIONER

## 2018-10-16 PROCEDURE — 1123F ACP DISCUSS/DSCN MKR DOCD: CPT | Performed by: NURSE PRACTITIONER

## 2018-10-16 PROCEDURE — G8484 FLU IMMUNIZE NO ADMIN: HCPCS | Performed by: NURSE PRACTITIONER

## 2018-10-16 PROCEDURE — G8598 ASA/ANTIPLAT THER USED: HCPCS | Performed by: NURSE PRACTITIONER

## 2018-10-16 PROCEDURE — 99213 OFFICE O/P EST LOW 20 MIN: CPT | Performed by: NURSE PRACTITIONER

## 2018-10-16 PROCEDURE — G8417 CALC BMI ABV UP PARAM F/U: HCPCS | Performed by: NURSE PRACTITIONER

## 2018-10-16 PROCEDURE — 4040F PNEUMOC VAC/ADMIN/RCVD: CPT | Performed by: NURSE PRACTITIONER

## 2018-10-16 PROCEDURE — G8427 DOCREV CUR MEDS BY ELIG CLIN: HCPCS | Performed by: NURSE PRACTITIONER

## 2018-10-16 NOTE — PATIENT INSTRUCTIONS
You may receive a survey about your visit with us today. The feedback from our patients helps us identify what is working well and where the service to all patients can be enhanced. Thank you!   TAKE PLAVIX IN PM

## 2018-10-18 ASSESSMENT — ENCOUNTER SYMPTOMS
GASTROINTESTINAL NEGATIVE: 1
RESPIRATORY NEGATIVE: 1
EYES NEGATIVE: 1

## 2018-10-18 NOTE — PROGRESS NOTES
Neurological: He is oriented to person, place, and time. Skin: Skin is warm and dry. Capillary refill takes less than 2 seconds. Nursing note and vitals reviewed. /70 (Site: Right Upper Arm, Position: Sitting)   Pulse 68   Temp 97.9 °F (36.6 °C) (Oral)   Resp 18   Ht 6' 1\" (1.854 m)   Wt 255 lb 3.2 oz (115.8 kg)   BMI 33.67 kg/m²       Impression/Plan:  1. Lightheadedness    2. Dizziness    3. Focal infarction of brain [facility])      Requested Prescriptions      No prescriptions requested or ordered in this encounter     Orders Placed This Encounter   Procedures    CBC Auto Differential     Standing Status:   Future     Standing Expiration Date:   10/16/2019    Comprehensive Metabolic Panel     Standing Status:   Future     Standing Expiration Date:   10/16/2019    TSH without Reflex     Standing Status:   Future     Standing Expiration Date:   10/16/2019    Magnesium     Standing Status:   Future     Standing Expiration Date:   10/16/2019       Patient giveneducational materials - see patient instructions. Discussed use, benefit, and side effects of prescribed medications. All patient questions answered. Pt voiced understanding. Reviewed health maintenance. Patient agreedwith treatment plan. Follow up as directed. Continue medications as prescribed.  Educational information on above diagnosis  provided per AVS.      Electronically signed by MANE Barraza CNP on 10/18/2018 at 1:16 PM

## 2018-10-20 LAB
ABSOLUTE BASO #: 0.1 K/UL (ref 0–0.1)
ABSOLUTE EOS #: 0.5 K/UL (ref 0.1–0.4)
ABSOLUTE LYMPH #: 3.3 K/UL (ref 0.8–5.2)
ABSOLUTE MONO #: 0.9 K/UL (ref 0.1–0.9)
ABSOLUTE NEUT #: 4.2 K/UL (ref 1.3–9.1)
ALBUMIN SERPL-MCNC: 4.5 G/DL (ref 3.5–5.2)
ALK PHOSPHATASE: 83 U/L (ref 39–118)
ALT SERPL-CCNC: 28 U/L (ref 5–50)
ANION GAP SERPL CALCULATED.3IONS-SCNC: 14 MEQ/L (ref 10–19)
AST SERPL-CCNC: 17 U/L (ref 9–50)
BASOPHILS RELATIVE PERCENT: 0.8 %
BILIRUB SERPL-MCNC: 0.5 MG/DL
BUN BLDV-MCNC: 19 MG/DL (ref 8–23)
CALCIUM SERPL-MCNC: 9.6 MG/DL (ref 8.5–10.5)
CHLORIDE BLD-SCNC: 101 MEQ/L (ref 95–107)
CO2: 26 MEQ/L (ref 19–31)
CREAT SERPL-MCNC: 1 MG/DL (ref 0.8–1.4)
EGFR AFRICAN AMERICAN: 91.1 ML/MIN/1.73 M2
EGFR IF NONAFRICAN AMERICAN: 78.6 ML/MIN/1.73 M2
EOSINOPHILS RELATIVE PERCENT: 5.2 %
GLUCOSE: 84 MG/DL (ref 70–99)
HCT VFR BLD CALC: 42.6 % (ref 41.4–51)
HEMOGLOBIN: 15.3 G/DL (ref 13.8–17)
LYMPHOCYTE %: 37 %
MAGNESIUM: 2.4 MG/DL (ref 1.6–2.6)
MCH RBC QN AUTO: 31.7 PG (ref 27–34)
MCHC RBC AUTO-ENTMCNC: 35.9 G/DL (ref 31–36)
MCV RBC AUTO: 88.2 FL (ref 80–100)
MONOCYTES # BLD: 9.7 %
NEUTROPHILS RELATIVE PERCENT: 47 %
PDW BLD-RTO: 13.1 % (ref 10.8–14.8)
PLATELETS: 263 K/UL (ref 150–450)
POTASSIUM SERPL-SCNC: 4.3 MEQ/L (ref 3.5–5.4)
RBC: 4.83 M/UL (ref 4–5.5)
SODIUM BLD-SCNC: 141 MEQ/L (ref 135–146)
TOTAL PROTEIN: 7.1 G/DL (ref 6.1–8.3)
TSH SERPL DL<=0.05 MIU/L-ACNC: 3.12 UIU/ML (ref 0.4–4.1)
WBC: 9 K/UL (ref 3.7–10.8)

## 2018-11-13 DIAGNOSIS — F41.9 ANXIETY: Chronic | ICD-10-CM

## 2018-11-13 DIAGNOSIS — G47.00 INSOMNIA, UNSPECIFIED TYPE: ICD-10-CM

## 2018-11-13 NOTE — TELEPHONE ENCOUNTER
Katlin Cagle called requesting a refill on the following medications:  Requested Prescriptions     Pending Prescriptions Disp Refills    zolpidem (AMBIEN) 10 MG tablet 30 tablet 2     Sig: Take 1 tablet by mouth nightly as needed for Sleep. [facility] ALPRAZolam (XANAX) 0.5 MG tablet 60 tablet 2     Sig: Take 1 tablet by mouth 2 times daily as needed for Anxiety. .     Pharmacy verified: [facility]      Date of last visit: 10/16/18  Date of next visit (if applicable): Visit date not found

## 2018-11-14 RX ORDER — ALPRAZOLAM 0.5 MG/1
0.5 TABLET ORAL 2 TIMES DAILY PRN
Qty: 60 TABLET | Refills: 1 | Status: SHIPPED | OUTPATIENT
Start: 2018-11-14 | End: 2019-01-14 | Stop reason: SDUPTHER

## 2018-11-14 RX ORDER — ZOLPIDEM TARTRATE 10 MG/1
10 TABLET ORAL NIGHTLY PRN
Qty: 30 TABLET | Refills: 1 | Status: SHIPPED | OUTPATIENT
Start: 2018-11-14 | End: 2019-01-14 | Stop reason: SDUPTHER

## 2019-01-14 ENCOUNTER — OFFICE VISIT (OUTPATIENT)
Dept: FAMILY MEDICINE CLINIC | Age: 69
End: 2019-01-14
Payer: COMMERCIAL

## 2019-01-14 VITALS
HEART RATE: 76 BPM | TEMPERATURE: 98.4 F | SYSTOLIC BLOOD PRESSURE: 90 MMHG | BODY MASS INDEX: 32.98 KG/M2 | DIASTOLIC BLOOD PRESSURE: 74 MMHG | WEIGHT: 250 LBS | RESPIRATION RATE: 12 BRPM

## 2019-01-14 DIAGNOSIS — G47.00 INSOMNIA, UNSPECIFIED TYPE: ICD-10-CM

## 2019-01-14 DIAGNOSIS — J01.90 ACUTE BACTERIAL SINUSITIS: ICD-10-CM

## 2019-01-14 DIAGNOSIS — F41.9 ANXIETY: Chronic | ICD-10-CM

## 2019-01-14 DIAGNOSIS — F51.01 PRIMARY INSOMNIA: Chronic | ICD-10-CM

## 2019-01-14 DIAGNOSIS — A08.4 VIRAL GASTROENTERITIS: ICD-10-CM

## 2019-01-14 DIAGNOSIS — B96.89 ACUTE BACTERIAL SINUSITIS: ICD-10-CM

## 2019-01-14 DIAGNOSIS — K21.9 GASTROESOPHAGEAL REFLUX DISEASE, ESOPHAGITIS PRESENCE NOT SPECIFIED: Primary | ICD-10-CM

## 2019-01-14 DIAGNOSIS — H65.02 ACUTE SEROUS OTITIS MEDIA OF LEFT EAR, RECURRENCE NOT SPECIFIED: ICD-10-CM

## 2019-01-14 PROCEDURE — G8484 FLU IMMUNIZE NO ADMIN: HCPCS | Performed by: NURSE PRACTITIONER

## 2019-01-14 PROCEDURE — 1036F TOBACCO NON-USER: CPT | Performed by: NURSE PRACTITIONER

## 2019-01-14 PROCEDURE — 4040F PNEUMOC VAC/ADMIN/RCVD: CPT | Performed by: NURSE PRACTITIONER

## 2019-01-14 PROCEDURE — 99214 OFFICE O/P EST MOD 30 MIN: CPT | Performed by: NURSE PRACTITIONER

## 2019-01-14 PROCEDURE — G8427 DOCREV CUR MEDS BY ELIG CLIN: HCPCS | Performed by: NURSE PRACTITIONER

## 2019-01-14 PROCEDURE — 3017F COLORECTAL CA SCREEN DOC REV: CPT | Performed by: NURSE PRACTITIONER

## 2019-01-14 PROCEDURE — 1101F PT FALLS ASSESS-DOCD LE1/YR: CPT | Performed by: NURSE PRACTITIONER

## 2019-01-14 PROCEDURE — G8598 ASA/ANTIPLAT THER USED: HCPCS | Performed by: NURSE PRACTITIONER

## 2019-01-14 PROCEDURE — 1123F ACP DISCUSS/DSCN MKR DOCD: CPT | Performed by: NURSE PRACTITIONER

## 2019-01-14 PROCEDURE — G8417 CALC BMI ABV UP PARAM F/U: HCPCS | Performed by: NURSE PRACTITIONER

## 2019-01-14 RX ORDER — ALPRAZOLAM 0.5 MG/1
0.5 TABLET ORAL 2 TIMES DAILY PRN
Qty: 60 TABLET | Refills: 1 | Status: SHIPPED | OUTPATIENT
Start: 2019-01-14 | End: 2019-03-14 | Stop reason: SDUPTHER

## 2019-01-14 RX ORDER — AMOXICILLIN 500 MG/1
500 CAPSULE ORAL 3 TIMES DAILY
Qty: 30 CAPSULE | Refills: 0 | Status: SHIPPED | OUTPATIENT
Start: 2019-01-14 | End: 2019-01-24

## 2019-01-14 RX ORDER — ZOLPIDEM TARTRATE 10 MG/1
10 TABLET ORAL NIGHTLY PRN
Qty: 30 TABLET | Refills: 1 | Status: SHIPPED | OUTPATIENT
Start: 2019-01-14 | End: 2019-03-14 | Stop reason: SDUPTHER

## 2019-01-14 RX ORDER — PANTOPRAZOLE SODIUM 40 MG/1
40 TABLET, DELAYED RELEASE ORAL DAILY
Qty: 30 TABLET | Refills: 5 | Status: SHIPPED | OUTPATIENT
Start: 2019-01-14 | End: 2019-07-02 | Stop reason: SDUPTHER

## 2019-01-21 ASSESSMENT — ENCOUNTER SYMPTOMS
EYE PAIN: 0
SINUS PRESSURE: 1
EYES NEGATIVE: 1
EYE DISCHARGE: 0
ABDOMINAL PAIN: 0
WHEEZING: 0
RHINORRHEA: 0
SHORTNESS OF BREATH: 0
BACK PAIN: 0
COUGH: 0
SINUS PAIN: 1
CHEST TIGHTNESS: 0
SORE THROAT: 1
GASTROINTESTINAL NEGATIVE: 1
APNEA: 0
ABDOMINAL DISTENTION: 0
PHOTOPHOBIA: 0
RECTAL PAIN: 0

## 2019-03-14 DIAGNOSIS — F41.9 ANXIETY: Chronic | ICD-10-CM

## 2019-03-14 DIAGNOSIS — G47.00 INSOMNIA, UNSPECIFIED TYPE: ICD-10-CM

## 2019-03-14 RX ORDER — ALPRAZOLAM 0.5 MG/1
0.5 TABLET ORAL 2 TIMES DAILY PRN
Qty: 60 TABLET | Refills: 0 | Status: SHIPPED | OUTPATIENT
Start: 2019-03-14 | End: 2019-04-12 | Stop reason: SDUPTHER

## 2019-03-14 RX ORDER — ZOLPIDEM TARTRATE 10 MG/1
10 TABLET ORAL NIGHTLY PRN
Qty: 30 TABLET | Refills: 0 | Status: SHIPPED | OUTPATIENT
Start: 2019-03-14 | End: 2019-04-12 | Stop reason: SDUPTHER

## 2019-04-12 ENCOUNTER — OFFICE VISIT (OUTPATIENT)
Dept: FAMILY MEDICINE CLINIC | Age: 69
End: 2019-04-12
Payer: COMMERCIAL

## 2019-04-12 VITALS
SYSTOLIC BLOOD PRESSURE: 122 MMHG | HEART RATE: 72 BPM | HEIGHT: 74 IN | WEIGHT: 250 LBS | DIASTOLIC BLOOD PRESSURE: 70 MMHG | RESPIRATION RATE: 16 BRPM | BODY MASS INDEX: 32.08 KG/M2 | TEMPERATURE: 97.9 F

## 2019-04-12 DIAGNOSIS — F41.9 ANXIETY: Chronic | ICD-10-CM

## 2019-04-12 DIAGNOSIS — G47.00 INSOMNIA, UNSPECIFIED TYPE: ICD-10-CM

## 2019-04-12 DIAGNOSIS — S46.911A RIGHT SHOULDER STRAIN, INITIAL ENCOUNTER: ICD-10-CM

## 2019-04-12 DIAGNOSIS — S46.211A BICEPS STRAIN, RIGHT, INITIAL ENCOUNTER: Primary | ICD-10-CM

## 2019-04-12 PROCEDURE — 4040F PNEUMOC VAC/ADMIN/RCVD: CPT | Performed by: NURSE PRACTITIONER

## 2019-04-12 PROCEDURE — 1123F ACP DISCUSS/DSCN MKR DOCD: CPT | Performed by: NURSE PRACTITIONER

## 2019-04-12 PROCEDURE — G8427 DOCREV CUR MEDS BY ELIG CLIN: HCPCS | Performed by: NURSE PRACTITIONER

## 2019-04-12 PROCEDURE — G8598 ASA/ANTIPLAT THER USED: HCPCS | Performed by: NURSE PRACTITIONER

## 2019-04-12 PROCEDURE — 1036F TOBACCO NON-USER: CPT | Performed by: NURSE PRACTITIONER

## 2019-04-12 PROCEDURE — 99214 OFFICE O/P EST MOD 30 MIN: CPT | Performed by: NURSE PRACTITIONER

## 2019-04-12 PROCEDURE — 90670 PCV13 VACCINE IM: CPT | Performed by: NURSE PRACTITIONER

## 2019-04-12 PROCEDURE — 90471 IMMUNIZATION ADMIN: CPT | Performed by: NURSE PRACTITIONER

## 2019-04-12 PROCEDURE — 3017F COLORECTAL CA SCREEN DOC REV: CPT | Performed by: NURSE PRACTITIONER

## 2019-04-12 PROCEDURE — G8417 CALC BMI ABV UP PARAM F/U: HCPCS | Performed by: NURSE PRACTITIONER

## 2019-04-12 RX ORDER — KETOROLAC TROMETHAMINE 10 MG/1
10 TABLET, FILM COATED ORAL EVERY 6 HOURS PRN
Qty: 20 TABLET | Refills: 0 | Status: SHIPPED | OUTPATIENT
Start: 2019-04-12 | End: 2019-07-10 | Stop reason: SDUPTHER

## 2019-04-12 RX ORDER — ALPRAZOLAM 0.5 MG/1
0.5 TABLET ORAL 2 TIMES DAILY PRN
Qty: 60 TABLET | Refills: 0 | Status: SHIPPED | OUTPATIENT
Start: 2019-04-12 | End: 2019-05-07 | Stop reason: SDUPTHER

## 2019-04-12 RX ORDER — ZOLPIDEM TARTRATE 10 MG/1
10 TABLET ORAL NIGHTLY PRN
Qty: 30 TABLET | Refills: 0 | Status: SHIPPED | OUTPATIENT
Start: 2019-04-12 | End: 2019-05-07 | Stop reason: SDUPTHER

## 2019-04-12 NOTE — PROGRESS NOTES
Immunization(s) given during visit:    Immunizations     Name Date Dose Route    Pneumococcal 13-valent Conjugate ([id number]) 4/12/2019 0.5 mL Intramuscular    Site: Deltoid- Left    Lot: [id number]    NDC: 3677-1584-02

## 2019-04-18 ASSESSMENT — ENCOUNTER SYMPTOMS
EYES NEGATIVE: 1
RESPIRATORY NEGATIVE: 1
GASTROINTESTINAL NEGATIVE: 1
BACK PAIN: 1

## 2019-04-18 NOTE — PROGRESS NOTES
[address] [facility] [facility] FAM[facility] [address] [address]  Dept: [phone]  Dept Fax: [phone]  Loc: [phone]  PROGRESS NOTE      VisitDate: 4/12/2019    Rob Lee is a 79 y.o. male who presents today for:     Chief Complaint   Patient presents with    3 Month Follow-Up    Shoulder Injury     Right shoulder injury. He was carrying a grate with someone at work, they weigh about 150-200 lbs and the person on the other end dropped his end. It twisted and his bicep and shoulder are hurting now. Subjective:  Patient presents for routine 3 month follow-up/refills of pain and anxiety medication. Patient complains of acute right shoulder pain for the past 2 days. Felt a pulling sensation in right upper arm while lifting heavy iron grate. Eyes any bruising or deformity of arm or shoulder. History of the CVA, erectile dysfunction, anxiety, EtOH abuse, osteoarthritis of knees and chronic low back pain. Review of Systems   Constitutional: Negative. HENT: Negative. Eyes: Negative. Respiratory: Negative. Cardiovascular: Negative. Gastrointestinal: Negative. Endocrine: Negative. Genitourinary: Negative. Musculoskeletal: Positive for arthralgias, back pain and gait problem. Skin: Negative. Hematological: Negative. Psychiatric/Behavioral: Positive for sleep disturbance.      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 Agapito Serrano     Family History   Problem Relation Age of Onset    Dementia Mother     Heart Disease Father     High Blood Pressure Sister     Cancer Brother         testicular cancer     Social History     Tobacco Use    Smoking status: Former Smoker     Packs/day: 1.00     Years: 30.00     Pack years: 30.00     Types: Cigarettes     Start date: 1968     Last attempt to quit: 3/31/2018     Years since quittin.0    Smokeless tobacco: Never Used   Substance Use Topics    Alcohol use: Yes     Alcohol/week: 25.2 oz     Types: 42 Cans of beer per week     Comment: States last beer was in 3/2018      Current Outpatient Medications   Medication Sig Dispense Refill    zolpidem (AMBIEN) 10 MG tablet Take 1 tablet by mouth nightly as needed for Sleep. 30 tablet 0    ALPRAZolam (XANAX) 0.5 MG tablet Take 1 tablet by mouth 2 times daily as needed for Anxiety. 60 tablet 0    diclofenac sodium 1 % GEL Apply 2 g topically 4 times daily 2 Tube 1    ketorolac (TORADOL) 10 MG tablet Take 1 tablet by mouth every 6 hours as needed for Pain 20 tablet 0    pantoprazole (PROTONIX) 40 MG tablet Take 1 tablet by mouth daily 30 tablet 5    clopidogrel (PLAVIX) 75 MG tablet Take 1 tablet by mouth daily 30 tablet 11    aspirin 81 MG chewable tablet Take 81 mg by mouth daily       No current facility-administered medications for this visit.       Allergies   Allergen Reactions    Tape [Adhesive [address] [facility]   Topic Date Due    AAA screen  1952    Hepatitis C screen  1950    Shingles Vaccine (1 of 2) 2002    Low dose CT lung screening  2007    Flu vaccine (Season Ended) 2019    Pneumococcal 65+ years Vaccine (2 of 2 - PPSV23) 2020    Colon cancer screen colonoscopy  2020    Lipid screen  2023    DTaP/Tdap/Td vaccine (2 - Td) 10/01/2024         Objective: Physical Exam   Constitutional: He is oriented to person, place, and time. He appears well-developed and well-nourished. HENT:   Head: Normocephalic. Right Ear: External ear normal.   Left Ear: External ear normal.   Nose: Nose normal.   Mouth/Throat: Oropharynx is clear and moist.   Eyes: Pupils are equal, round, and reactive to light. Conjunctivae and EOM are normal.   Neck: Normal range of motion. Neck supple. No JVD present. No thyromegaly present. Cardiovascular: Normal rate, regular rhythm, normal heart sounds and intact distal pulses. Pulmonary/Chest: Effort normal and breath sounds normal.   Abdominal: Soft. Musculoskeletal:        Right shoulder: He exhibits decreased range of motion, tenderness, pain and decreased strength. He exhibits no effusion, no crepitus and no deformity. Right upper arm: He exhibits tenderness, bony tenderness and deformity. He exhibits no swelling. Arms:  Lymphadenopathy:     He has no cervical adenopathy. Neurological: He is alert and oriented to person, place, and time. Skin: Skin is warm and dry. Psychiatric: He has a normal mood and affect. His behavior is normal. Judgment and thought content normal.   Nursing note and vitals reviewed. /70   Pulse 72   Temp 97.9 °F (36.6 °C) (Oral)   Resp 16   Ht 6' 1.5\" (1.867 m)   Wt 250 lb (113.4 kg)   BMI 32.54 kg/m²       Impression/Plan:  1. Biceps strain, right, initial encounter    2. Insomnia, unspecified type    3. Anxiety    4. Right shoulder strain, initial encounter      Requested Prescriptions     Signed Prescriptions Disp Refills    zolpidem (AMBIEN) 10 MG tablet 30 tablet 0     Sig: Take 1 tablet by mouth nightly as needed for Sleep.  ALPRAZolam (XANAX) 0.5 MG tablet 60 tablet 0     Sig: Take 1 tablet by mouth 2 times daily as needed for Anxiety.     diclofenac sodium 1 % GEL 2 Tube 1     Sig: Apply 2 g topically 4 times daily    ketorolac (TORADOL) 10 MG tablet 20 tablet 0 Sig: Take 1 tablet by mouth every 6 hours as needed for Pain     Orders Placed This Encounter   Procedures    PREVNAR 13 IM (Pneumococcal conjugate vaccine 13-valent)       Patient giveneducational materials - see patient instructions. Discussed use, benefit, and side effects of prescribed medications. All patient questions answered. Pt voiced understanding. Reviewed health maintenance. Patient agreedwith treatment plan. Follow up as directed. Continue medications as prescribed. Educational information on above diagnosis  provided per AVS.  shoulder stretches provided to patient.     Electronically signed by MANE Cantrell CNP on 4/18/2019 at 11:43 AM

## 2019-05-07 DIAGNOSIS — F41.9 ANXIETY: Chronic | ICD-10-CM

## 2019-05-07 DIAGNOSIS — G47.00 INSOMNIA, UNSPECIFIED TYPE: ICD-10-CM

## 2019-05-07 NOTE — TELEPHONE ENCOUNTER
Last seen Yanni Aparicio FNP 4/12/19. Last xanax refill was 4/12/19 for #60. Last ambien refill was 4/12/19 for #30.  No f/u scheduled

## 2019-05-08 RX ORDER — ALPRAZOLAM 0.5 MG/1
TABLET ORAL
Qty: 60 TABLET | Refills: 0 | Status: SHIPPED | OUTPATIENT
Start: 2019-05-08 | End: 2019-06-10 | Stop reason: SDUPTHER

## 2019-05-08 RX ORDER — ZOLPIDEM TARTRATE 10 MG/1
TABLET ORAL
Qty: 30 TABLET | Refills: 0 | Status: SHIPPED | OUTPATIENT
Start: 2019-05-08 | End: 2019-06-10 | Stop reason: SDUPTHER

## 2019-06-10 DIAGNOSIS — G47.00 INSOMNIA, UNSPECIFIED TYPE: ICD-10-CM

## 2019-06-10 DIAGNOSIS — F41.9 ANXIETY: Chronic | ICD-10-CM

## 2019-06-10 RX ORDER — ALPRAZOLAM 0.5 MG/1
TABLET ORAL
Qty: 60 TABLET | Refills: 0 | Status: SHIPPED | OUTPATIENT
Start: 2019-06-10 | End: 2019-07-10 | Stop reason: SDUPTHER

## 2019-06-10 RX ORDER — ZOLPIDEM TARTRATE 10 MG/1
TABLET ORAL
Qty: 30 TABLET | Refills: 0 | Status: SHIPPED | OUTPATIENT
Start: 2019-06-10 | End: 2019-07-10 | Stop reason: SDUPTHER

## 2019-06-10 NOTE — TELEPHONE ENCOUNTER
Edilma Golden called requesting a refill on the following medications:  Requested Prescriptions     Pending Prescriptions Disp Refills    zolpidem (AMBIEN) 10 MG tablet 30 tablet 0    ALPRAZolam (XANAX) 0.5 MG tablet 60 tablet 0     Pharmacy verified:  .pv      Date of last visit:   Date of next visit (if applicable): Visit date not found                        Edilma Golden called requesting a refill on the following medications:  Requested Prescriptions     Pending Prescriptions Disp Refills    zolpidem (AMBIEN) 10 MG tablet 30 tablet 0    ALPRAZolam (XANAX) 0.5 MG tablet 60 tablet 0     Pharmacy verified:  .[facility]      Date of last visit: 04/12/19  Date of next visit (if applicable): Visit date not found

## 2019-07-02 RX ORDER — PANTOPRAZOLE SODIUM 40 MG/1
TABLET, DELAYED RELEASE ORAL
Qty: 30 TABLET | Refills: 5 | Status: SHIPPED | OUTPATIENT
Start: 2019-07-02 | End: 2019-12-26

## 2019-07-10 ENCOUNTER — OFFICE VISIT (OUTPATIENT)
Dept: FAMILY MEDICINE CLINIC | Age: 69
End: 2019-07-10
Payer: COMMERCIAL

## 2019-07-10 VITALS
WEIGHT: 242.8 LBS | BODY MASS INDEX: 31.6 KG/M2 | DIASTOLIC BLOOD PRESSURE: 66 MMHG | SYSTOLIC BLOOD PRESSURE: 118 MMHG | RESPIRATION RATE: 16 BRPM | HEART RATE: 72 BPM

## 2019-07-10 DIAGNOSIS — M54.31 SCIATICA OF RIGHT SIDE: Primary | ICD-10-CM

## 2019-07-10 DIAGNOSIS — G47.00 INSOMNIA, UNSPECIFIED TYPE: ICD-10-CM

## 2019-07-10 DIAGNOSIS — F51.01 PRIMARY INSOMNIA: ICD-10-CM

## 2019-07-10 DIAGNOSIS — F41.9 ANXIETY: Chronic | ICD-10-CM

## 2019-07-10 PROCEDURE — 99214 OFFICE O/P EST MOD 30 MIN: CPT | Performed by: NURSE PRACTITIONER

## 2019-07-10 PROCEDURE — 1123F ACP DISCUSS/DSCN MKR DOCD: CPT | Performed by: NURSE PRACTITIONER

## 2019-07-10 PROCEDURE — 96372 THER/PROPH/DIAG INJ SC/IM: CPT | Performed by: NURSE PRACTITIONER

## 2019-07-10 PROCEDURE — 1036F TOBACCO NON-USER: CPT | Performed by: NURSE PRACTITIONER

## 2019-07-10 PROCEDURE — 4040F PNEUMOC VAC/ADMIN/RCVD: CPT | Performed by: NURSE PRACTITIONER

## 2019-07-10 PROCEDURE — 3017F COLORECTAL CA SCREEN DOC REV: CPT | Performed by: NURSE PRACTITIONER

## 2019-07-10 PROCEDURE — G8598 ASA/ANTIPLAT THER USED: HCPCS | Performed by: NURSE PRACTITIONER

## 2019-07-10 PROCEDURE — G8417 CALC BMI ABV UP PARAM F/U: HCPCS | Performed by: NURSE PRACTITIONER

## 2019-07-10 PROCEDURE — G8427 DOCREV CUR MEDS BY ELIG CLIN: HCPCS | Performed by: NURSE PRACTITIONER

## 2019-07-10 RX ORDER — PREDNISONE 1 MG/1
5 TABLET ORAL 2 TIMES DAILY
Qty: 10 TABLET | Refills: 0 | Status: SHIPPED | OUTPATIENT
Start: 2019-07-10 | End: 2019-07-15

## 2019-07-10 RX ORDER — KETOROLAC TROMETHAMINE 10 MG/1
10 TABLET, FILM COATED ORAL EVERY 6 HOURS PRN
Qty: 20 TABLET | Refills: 0 | Status: SHIPPED | OUTPATIENT
Start: 2019-07-10 | End: 2019-10-07 | Stop reason: ALTCHOICE

## 2019-07-10 RX ORDER — ZOLPIDEM TARTRATE 10 MG/1
TABLET ORAL
Qty: 30 TABLET | Refills: 0 | Status: SHIPPED | OUTPATIENT
Start: 2019-07-10 | End: 2019-08-12 | Stop reason: SDUPTHER

## 2019-07-10 RX ORDER — METHYLPREDNISOLONE ACETATE 80 MG/ML
120 INJECTION, SUSPENSION INTRA-ARTICULAR; INTRALESIONAL; INTRAMUSCULAR; SOFT TISSUE ONCE
Status: COMPLETED | OUTPATIENT
Start: 2019-07-10 | End: 2019-07-10

## 2019-07-10 RX ORDER — ALPRAZOLAM 0.5 MG/1
TABLET ORAL
Qty: 60 TABLET | Refills: 0 | Status: SHIPPED | OUTPATIENT
Start: 2019-07-10 | End: 2019-08-09 | Stop reason: SDUPTHER

## 2019-07-10 RX ORDER — CELECOXIB 100 MG/1
100 CAPSULE ORAL 2 TIMES DAILY
Qty: 60 CAPSULE | Refills: 5 | Status: SHIPPED | OUTPATIENT
Start: 2019-07-10 | End: 2020-01-07

## 2019-07-10 RX ADMIN — METHYLPREDNISOLONE ACETATE 120 MG: 80 INJECTION, SUSPENSION INTRA-ARTICULAR; INTRALESIONAL; INTRAMUSCULAR; SOFT TISSUE at 10:45

## 2019-07-18 ASSESSMENT — ENCOUNTER SYMPTOMS
EYE DISCHARGE: 0
SHORTNESS OF BREATH: 0
PHOTOPHOBIA: 0
WHEEZING: 0
EYE PAIN: 0
CHEST TIGHTNESS: 0
APNEA: 0
RHINORRHEA: 0
ABDOMINAL PAIN: 0
BACK PAIN: 1
ABDOMINAL DISTENTION: 0
COUGH: 0

## 2019-07-18 NOTE — PROGRESS NOTES
above diagnosis  provided per AVS.  oarrs reviewed    Electronically signed by MANE Herrera CNP on 7/18/2019 at 10:52 AM

## 2019-07-29 RX ORDER — CLOPIDOGREL BISULFATE 75 MG/1
TABLET ORAL
Qty: 30 TABLET | Refills: 11 | Status: SHIPPED | OUTPATIENT
Start: 2019-07-29 | End: 2020-08-14 | Stop reason: SDUPTHER

## 2019-08-08 DIAGNOSIS — G47.00 INSOMNIA, UNSPECIFIED TYPE: ICD-10-CM

## 2019-08-08 DIAGNOSIS — F41.9 ANXIETY: Chronic | ICD-10-CM

## 2019-08-09 RX ORDER — ALPRAZOLAM 0.5 MG/1
TABLET ORAL
Qty: 60 TABLET | Refills: 0 | Status: SHIPPED | OUTPATIENT
Start: 2019-08-09 | End: 2019-09-09 | Stop reason: SDUPTHER

## 2019-08-12 DIAGNOSIS — G47.00 INSOMNIA, UNSPECIFIED TYPE: ICD-10-CM

## 2019-08-13 RX ORDER — ZOLPIDEM TARTRATE 10 MG/1
TABLET ORAL
Qty: 30 TABLET | Refills: 2 | Status: SHIPPED | OUTPATIENT
Start: 2019-08-13 | End: 2019-09-11

## 2019-09-09 DIAGNOSIS — F41.9 ANXIETY: Chronic | ICD-10-CM

## 2019-09-09 RX ORDER — ALPRAZOLAM 0.5 MG/1
TABLET ORAL
Qty: 60 TABLET | Refills: 0 | Status: SHIPPED | OUTPATIENT
Start: 2019-09-09 | End: 2019-10-07 | Stop reason: SDUPTHER

## 2019-10-01 ENCOUNTER — HOSPITAL ENCOUNTER (EMERGENCY)
Dept: GENERAL RADIOLOGY | Age: 69
Discharge: HOME | End: 2019-10-01
Payer: COMMERCIAL

## 2019-10-01 ENCOUNTER — HOSPITAL ENCOUNTER (EMERGENCY)
Age: 69
Discharge: HOME | End: 2019-10-01
Payer: COMMERCIAL

## 2019-10-01 VITALS
RESPIRATION RATE: 18 BRPM | BODY MASS INDEX: 31.14 KG/M2 | WEIGHT: 235 LBS | OXYGEN SATURATION: 95 % | SYSTOLIC BLOOD PRESSURE: 112 MMHG | HEIGHT: 73 IN | TEMPERATURE: 98.3 F | DIASTOLIC BLOOD PRESSURE: 65 MMHG | HEART RATE: 98 BPM

## 2019-10-01 DIAGNOSIS — J06.9 UPPER RESPIRATORY TRACT INFECTION, UNSPECIFIED TYPE: Primary | ICD-10-CM

## 2019-10-01 LAB
FLU A ANTIGEN: NEGATIVE
INFLUENZA B AG, EIA: NEGATIVE

## 2019-10-01 PROCEDURE — 99214 OFFICE O/P EST MOD 30 MIN: CPT | Performed by: NURSE PRACTITIONER

## 2019-10-01 PROCEDURE — 99214 OFFICE O/P EST MOD 30 MIN: CPT

## 2019-10-01 PROCEDURE — 71046 X-RAY EXAM CHEST 2 VIEWS: CPT

## 2019-10-01 PROCEDURE — 87804 INFLUENZA ASSAY W/OPTIC: CPT

## 2019-10-01 RX ORDER — DEXTROMETHORPHAN HYDROBROMIDE AND PROMETHAZINE HYDROCHLORIDE 15; 6.25 MG/5ML; MG/5ML
5 SYRUP ORAL SEE ADMIN INSTRUCTIONS
Qty: 118 ML | Refills: 0 | Status: SHIPPED | OUTPATIENT
Start: 2019-10-01 | End: 2019-10-08

## 2019-10-01 RX ORDER — LORATADINE AND PSEUDOEPHEDRINE 10; 240 MG/1; MG/1
1 TABLET, EXTENDED RELEASE ORAL DAILY
Qty: 30 TABLET | Refills: 0 | Status: SHIPPED | OUTPATIENT
Start: 2019-10-01 | End: 2020-08-18

## 2019-10-01 RX ORDER — BENZONATATE 200 MG/1
200 CAPSULE ORAL 3 TIMES DAILY PRN
Qty: 21 CAPSULE | Refills: 0 | Status: SHIPPED | OUTPATIENT
Start: 2019-10-01 | End: 2019-10-08

## 2019-10-01 ASSESSMENT — ENCOUNTER SYMPTOMS
SHORTNESS OF BREATH: 0
EYE ITCHING: 0
NAUSEA: 0
VOMITING: 0
EYE REDNESS: 0
COUGH: 1
SORE THROAT: 0

## 2019-10-01 ASSESSMENT — PAIN DESCRIPTION - DESCRIPTORS: DESCRIPTORS: HEADACHE

## 2019-10-01 ASSESSMENT — PAIN DESCRIPTION - PAIN TYPE: TYPE: ACUTE PAIN

## 2019-10-01 ASSESSMENT — PAIN - FUNCTIONAL ASSESSMENT: PAIN_FUNCTIONAL_ASSESSMENT: ACTIVITIES ARE NOT PREVENTED

## 2019-10-01 ASSESSMENT — PAIN SCALES - GENERAL: PAINLEVEL_OUTOF10: 7

## 2019-10-04 ENCOUNTER — TELEPHONE (OUTPATIENT)
Dept: FAMILY MEDICINE CLINIC | Age: 69
End: 2019-10-04

## 2019-10-07 ENCOUNTER — OFFICE VISIT (OUTPATIENT)
Dept: FAMILY MEDICINE CLINIC | Age: 69
End: 2019-10-07
Payer: COMMERCIAL

## 2019-10-07 VITALS
TEMPERATURE: 98.2 F | BODY MASS INDEX: 31.36 KG/M2 | HEART RATE: 74 BPM | SYSTOLIC BLOOD PRESSURE: 110 MMHG | RESPIRATION RATE: 16 BRPM | WEIGHT: 237.7 LBS | DIASTOLIC BLOOD PRESSURE: 64 MMHG

## 2019-10-07 DIAGNOSIS — G47.9 SLEEP DISTURBANCE: Primary | ICD-10-CM

## 2019-10-07 DIAGNOSIS — J40 BRONCHITIS: ICD-10-CM

## 2019-10-07 DIAGNOSIS — J41.8 MIXED SIMPLE AND MUCOPURULENT CHRONIC BRONCHITIS (HCC): ICD-10-CM

## 2019-10-07 DIAGNOSIS — F41.9 ANXIETY: Chronic | ICD-10-CM

## 2019-10-07 PROCEDURE — 3023F SPIROM DOC REV: CPT | Performed by: NURSE PRACTITIONER

## 2019-10-07 PROCEDURE — 4040F PNEUMOC VAC/ADMIN/RCVD: CPT | Performed by: NURSE PRACTITIONER

## 2019-10-07 PROCEDURE — G8926 SPIRO NO PERF OR DOC: HCPCS | Performed by: NURSE PRACTITIONER

## 2019-10-07 PROCEDURE — 3017F COLORECTAL CA SCREEN DOC REV: CPT | Performed by: NURSE PRACTITIONER

## 2019-10-07 PROCEDURE — 99214 OFFICE O/P EST MOD 30 MIN: CPT | Performed by: NURSE PRACTITIONER

## 2019-10-07 PROCEDURE — 1036F TOBACCO NON-USER: CPT | Performed by: NURSE PRACTITIONER

## 2019-10-07 PROCEDURE — 94640 AIRWAY INHALATION TREATMENT: CPT | Performed by: NURSE PRACTITIONER

## 2019-10-07 PROCEDURE — 1123F ACP DISCUSS/DSCN MKR DOCD: CPT | Performed by: NURSE PRACTITIONER

## 2019-10-07 PROCEDURE — G8427 DOCREV CUR MEDS BY ELIG CLIN: HCPCS | Performed by: NURSE PRACTITIONER

## 2019-10-07 PROCEDURE — G8484 FLU IMMUNIZE NO ADMIN: HCPCS | Performed by: NURSE PRACTITIONER

## 2019-10-07 PROCEDURE — 96372 THER/PROPH/DIAG INJ SC/IM: CPT | Performed by: NURSE PRACTITIONER

## 2019-10-07 PROCEDURE — G8417 CALC BMI ABV UP PARAM F/U: HCPCS | Performed by: NURSE PRACTITIONER

## 2019-10-07 PROCEDURE — G8598 ASA/ANTIPLAT THER USED: HCPCS | Performed by: NURSE PRACTITIONER

## 2019-10-07 RX ORDER — METHYLPREDNISOLONE ACETATE 80 MG/ML
160 INJECTION, SUSPENSION INTRA-ARTICULAR; INTRALESIONAL; INTRAMUSCULAR; SOFT TISSUE ONCE
Status: COMPLETED | OUTPATIENT
Start: 2019-10-07 | End: 2019-10-07

## 2019-10-07 RX ORDER — PREDNISONE 10 MG/1
TABLET ORAL
Qty: 30 TABLET | Refills: 0 | Status: SHIPPED | OUTPATIENT
Start: 2019-10-07 | End: 2019-10-17

## 2019-10-07 RX ORDER — ALPRAZOLAM 0.5 MG/1
TABLET ORAL
Qty: 60 TABLET | Refills: 0 | Status: SHIPPED | OUTPATIENT
Start: 2019-10-07 | End: 2019-11-07 | Stop reason: SDUPTHER

## 2019-10-07 RX ORDER — ZOLPIDEM TARTRATE 10 MG/1
TABLET ORAL
Refills: 0 | COMMUNITY
Start: 2019-09-13 | End: 2019-10-07 | Stop reason: SDUPTHER

## 2019-10-07 RX ORDER — CEFUROXIME AXETIL 250 MG/1
250 TABLET ORAL 2 TIMES DAILY
Qty: 20 TABLET | Refills: 0 | Status: SHIPPED | OUTPATIENT
Start: 2019-10-07 | End: 2019-10-17

## 2019-10-07 RX ORDER — ALBUTEROL SULFATE 2.5 MG/3ML
2.5 SOLUTION RESPIRATORY (INHALATION) ONCE
Status: COMPLETED | OUTPATIENT
Start: 2019-10-07 | End: 2019-10-07

## 2019-10-07 RX ORDER — DEXTROMETHORPHAN HYDROBROMIDE AND PROMETHAZINE HYDROCHLORIDE 15; 6.25 MG/5ML; MG/5ML
5 SYRUP ORAL 3 TIMES DAILY PRN
Qty: 120 ML | Refills: 0 | Status: SHIPPED | OUTPATIENT
Start: 2019-10-07 | End: 2019-10-14

## 2019-10-07 RX ORDER — NEBULIZER ACCESSORIES
1 KIT MISCELLANEOUS DAILY PRN
Qty: 1 KIT | Refills: 0 | Status: SHIPPED | OUTPATIENT
Start: 2019-10-07

## 2019-10-07 RX ORDER — ALBUTEROL SULFATE 2.5 MG/3ML
2.5 SOLUTION RESPIRATORY (INHALATION) EVERY 6 HOURS PRN
Qty: 120 EACH | Refills: 3 | Status: SHIPPED | OUTPATIENT
Start: 2019-10-07 | End: 2022-01-20 | Stop reason: SDUPTHER

## 2019-10-07 RX ORDER — ZOLPIDEM TARTRATE 10 MG/1
10 TABLET ORAL NIGHTLY PRN
Qty: 90 TABLET | Refills: 1 | Status: SHIPPED | OUTPATIENT
Start: 2019-10-07 | End: 2020-01-07 | Stop reason: SDUPTHER

## 2019-10-07 RX ORDER — ALBUTEROL SULFATE 90 UG/1
2 AEROSOL, METERED RESPIRATORY (INHALATION) EVERY 6 HOURS PRN
Qty: 1 INHALER | Refills: 3 | Status: SHIPPED | OUTPATIENT
Start: 2019-10-07 | End: 2020-08-18 | Stop reason: SDUPTHER

## 2019-10-07 RX ADMIN — METHYLPREDNISOLONE ACETATE 160 MG: 80 INJECTION, SUSPENSION INTRA-ARTICULAR; INTRALESIONAL; INTRAMUSCULAR; SOFT TISSUE at 12:30

## 2019-10-07 RX ADMIN — ALBUTEROL SULFATE 2.5 MG: 2.5 SOLUTION RESPIRATORY (INHALATION) at 12:25

## 2019-10-16 ASSESSMENT — ENCOUNTER SYMPTOMS
COUGH: 1
WHEEZING: 1
GASTROINTESTINAL NEGATIVE: 1
EYES NEGATIVE: 1
BACK PAIN: 1

## 2019-11-07 DIAGNOSIS — F41.9 ANXIETY: Chronic | ICD-10-CM

## 2019-11-07 RX ORDER — ALPRAZOLAM 0.5 MG/1
TABLET ORAL
Qty: 60 TABLET | Refills: 0 | Status: SHIPPED | OUTPATIENT
Start: 2019-11-07 | End: 2019-12-05 | Stop reason: SDUPTHER

## 2019-12-04 DIAGNOSIS — F41.9 ANXIETY: Chronic | ICD-10-CM

## 2019-12-05 RX ORDER — ALPRAZOLAM 0.5 MG/1
TABLET ORAL
Qty: 60 TABLET | Refills: 0 | Status: SHIPPED | OUTPATIENT
Start: 2019-12-05 | End: 2020-01-07 | Stop reason: SDUPTHER

## 2019-12-26 RX ORDER — PANTOPRAZOLE SODIUM 40 MG/1
TABLET, DELAYED RELEASE ORAL
Qty: 90 TABLET | Refills: 2 | Status: SHIPPED | OUTPATIENT
Start: 2019-12-26 | End: 2020-09-24

## 2020-01-07 ENCOUNTER — NURSE ONLY (OUTPATIENT)
Dept: LAB | Age: 70
End: 2020-01-07

## 2020-01-07 ENCOUNTER — OFFICE VISIT (OUTPATIENT)
Dept: FAMILY MEDICINE CLINIC | Age: 70
End: 2020-01-07
Payer: COMMERCIAL

## 2020-01-07 VITALS
RESPIRATION RATE: 18 BRPM | BODY MASS INDEX: 32.24 KG/M2 | DIASTOLIC BLOOD PRESSURE: 72 MMHG | TEMPERATURE: 98 F | WEIGHT: 244.4 LBS | SYSTOLIC BLOOD PRESSURE: 128 MMHG | HEART RATE: 80 BPM

## 2020-01-07 LAB
ALBUMIN SERPL-MCNC: 4.3 G/DL (ref 3.5–5.1)
ALP BLD-CCNC: 67 U/L (ref 38–126)
ALT SERPL-CCNC: 27 U/L (ref 11–66)
ANION GAP SERPL CALCULATED.3IONS-SCNC: 13 MEQ/L (ref 8–16)
AST SERPL-CCNC: 22 U/L (ref 5–40)
BILIRUB SERPL-MCNC: 0.7 MG/DL (ref 0.3–1.2)
BUN BLDV-MCNC: 17 MG/DL (ref 7–22)
CALCIUM SERPL-MCNC: 9.8 MG/DL (ref 8.5–10.5)
CHLORIDE BLD-SCNC: 104 MEQ/L (ref 98–111)
CHOLESTEROL, TOTAL: 207 MG/DL (ref 100–199)
CO2: 23 MEQ/L (ref 23–33)
CREAT SERPL-MCNC: 1 MG/DL (ref 0.4–1.2)
GFR SERPL CREATININE-BSD FRML MDRD: 74 ML/MIN/1.73M2
GLUCOSE BLD-MCNC: 114 MG/DL (ref 70–108)
HDLC SERPL-MCNC: 28 MG/DL
LDL CHOLESTEROL CALCULATED: 128 MG/DL
POTASSIUM SERPL-SCNC: 3.9 MEQ/L (ref 3.5–5.2)
PROSTATE SPECIFIC ANTIGEN: 0.6 NG/ML (ref 0–1)
SODIUM BLD-SCNC: 140 MEQ/L (ref 135–145)
TOTAL PROTEIN: 7.2 G/DL (ref 6.1–8)
TRIGL SERPL-MCNC: 257 MG/DL (ref 0–199)

## 2020-01-07 PROCEDURE — 4040F PNEUMOC VAC/ADMIN/RCVD: CPT | Performed by: NURSE PRACTITIONER

## 2020-01-07 PROCEDURE — 1123F ACP DISCUSS/DSCN MKR DOCD: CPT | Performed by: NURSE PRACTITIONER

## 2020-01-07 PROCEDURE — G0296 VISIT TO DETERM LDCT ELIG: HCPCS | Performed by: NURSE PRACTITIONER

## 2020-01-07 PROCEDURE — 90653 IIV ADJUVANT VACCINE IM: CPT | Performed by: NURSE PRACTITIONER

## 2020-01-07 PROCEDURE — G8482 FLU IMMUNIZE ORDER/ADMIN: HCPCS | Performed by: NURSE PRACTITIONER

## 2020-01-07 PROCEDURE — 90471 IMMUNIZATION ADMIN: CPT | Performed by: NURSE PRACTITIONER

## 2020-01-07 PROCEDURE — 99214 OFFICE O/P EST MOD 30 MIN: CPT | Performed by: NURSE PRACTITIONER

## 2020-01-07 PROCEDURE — G8427 DOCREV CUR MEDS BY ELIG CLIN: HCPCS | Performed by: NURSE PRACTITIONER

## 2020-01-07 PROCEDURE — G8417 CALC BMI ABV UP PARAM F/U: HCPCS | Performed by: NURSE PRACTITIONER

## 2020-01-07 PROCEDURE — 1036F TOBACCO NON-USER: CPT | Performed by: NURSE PRACTITIONER

## 2020-01-07 PROCEDURE — 3017F COLORECTAL CA SCREEN DOC REV: CPT | Performed by: NURSE PRACTITIONER

## 2020-01-07 RX ORDER — ZOLPIDEM TARTRATE 10 MG/1
10 TABLET ORAL NIGHTLY PRN
Qty: 90 TABLET | Refills: 1 | Status: SHIPPED | OUTPATIENT
Start: 2020-01-07 | End: 2020-05-08 | Stop reason: SDUPTHER

## 2020-01-07 RX ORDER — ALPRAZOLAM 0.5 MG/1
TABLET ORAL
Qty: 60 TABLET | Refills: 0 | Status: SHIPPED | OUTPATIENT
Start: 2020-01-07 | End: 2020-02-06 | Stop reason: SDUPTHER

## 2020-01-07 NOTE — PROGRESS NOTES
Smokeless tobacco: Never Used   Substance Use Topics    Alcohol use: Yes     Alcohol/week: 42.0 standard drinks     Types: 42 Cans of beer per week     Comment: States last beer was in 3/2018      Current Outpatient Medications   Medication Sig Dispense Refill    ALPRAZolam (XANAX) 0.5 MG tablet take 1 tablet by mouth twice a day if needed for anxiety 60 tablet 0    zolpidem (AMBIEN) 10 MG tablet Take 1 tablet by mouth nightly as needed for Sleep for up to 90 days. 90 tablet 1    pantoprazole (PROTONIX) 40 MG tablet take 1 tablet by mouth once daily 90 tablet 2    albuterol sulfate HFA (PROAIR HFA) 108 (90 Base) MCG/ACT inhaler Inhale 2 puffs into the lungs every 6 hours as needed for Wheezing 1 Inhaler 3    albuterol (PROVENTIL) (2.5 MG/3ML) 0.083% nebulizer solution Take 3 mLs by nebulization every 6 hours as needed for Wheezing 120 each 3    Respiratory Therapy Supplies (NEBULIZER/TUBING/MOUTHPIECE) KIT 1 kit by Does not apply route daily as needed (cough, wheeze) Diagnosis:RAD and Bronchitis 1 kit 0    loratadine-pseudoephedrine (CLARITIN-D 24 HOUR)  MG per extended release tablet Take 1 tablet by mouth daily 30 tablet 0    clopidogrel (PLAVIX) 75 MG tablet take 1 tablet by mouth once daily 30 tablet 11    aspirin 81 MG chewable tablet Take 81 mg by mouth daily       No current facility-administered medications for this visit. Allergies   Allergen Reactions    Tape [Adhesive [address] [facility] Maintenance   Topic Date Due    AAA screen  1950    Hepatitis C screen  1950    Shingles Vaccine (1 of 2) 11/04/2002    Low dose CT lung screening  11/04/2007    Diabetes screen  12/25/2019    Pneumococcal 65+ years Vaccine (2 of 2 - PPSV23) 04/12/2020    Colon cancer screen colonoscopy  11/29/2020    DTaP/Tdap/Td vaccine (2 - Td) 10/01/2024    Lipid screen  01/07/2025    Flu vaccine  Completed         Objective:     Physical Exam  Vitals signs and nursing note reviewed. Constitutional:       Appearance: Normal appearance. He is normal weight. HENT:      Head: Normocephalic. Right Ear: Tympanic membrane normal.      Left Ear: Tympanic membrane normal.      Nose: Nose normal.      Mouth/Throat:      Mouth: Mucous membranes are moist.      Pharynx: Oropharynx is clear. Eyes:      Extraocular Movements: Extraocular movements intact. Conjunctiva/sclera: Conjunctivae normal.      Pupils: Pupils are equal, round, and reactive to light. Neck:      Musculoskeletal: Normal range of motion and neck supple. Cardiovascular:      Rate and Rhythm: Normal rate and regular rhythm. Pulses: Normal pulses. Heart sounds: Normal heart sounds. Pulmonary:      Effort: Pulmonary effort is normal.      Breath sounds: Normal breath sounds. Abdominal:      General: Bowel sounds are normal.      Palpations: Abdomen is soft. Musculoskeletal: Normal range of motion. Skin:     General: Skin is warm. Capillary Refill: Capillary refill takes 2 to 3 seconds. Neurological:      General: No focal deficit present. Mental Status: He is alert and oriented to person, place, and time. Psychiatric:         Mood and Affect: Mood normal.         Behavior: Behavior normal.         Thought Content: Thought content normal.         Judgment: Judgment normal.       /72 (Site: Right Upper Arm)   Pulse 80   Temp 98 °F (36.7 °C) (Oral)   Resp 18   Wt 244 lb 6.4 oz (110.9 kg)   BMI 32.24 kg/m²       Impression/Plan:  1. Insomnia, unspecified type    2. Anxiety    3. Sleep disturbance    4. Primary osteoarthritis involving multiple joints    5. Screening cholesterol level    6. Screening for prostate cancer    7.  Personal history of tobacco use      Requested Prescriptions     Signed Prescriptions Disp Refills    ALPRAZolam (XANAX) 0.5 MG tablet 60 tablet 0     Sig: take 1 tablet by mouth twice a day if needed for anxiety    zolpidem (AMBIEN) 10 MG tablet 90 tablet 1 Sig: Take 1 tablet by mouth nightly as needed for Sleep for up to 90 days. Orders Placed This Encounter   Procedures    CT Lung Screening     Standing Status:   Future     Standing Expiration Date:   1/7/2021     Order Specific Question:   Is there documentation of shared decision making? Answer:   Yes     Order Specific Question:   Is this a low dose CT or a routine CT? Answer:   Low Dose CT [1]     Order Specific Question:   Is this the first (baseline) CT or an annual exam?     Answer:   Baseline [1]     Order Specific Question:   Does the patient show any signs or symptoms of lung cancer? Answer:   No     Order Specific Question:   Does the patient show any signs or symptoms of lung cancer? Answer:   No     Order Specific Question:   Smoking Status? Answer: Former Smoker [4]     Order Specific Question:   Date quit smoking? (must be within 15 years)     Answer:   3/31/2018     Order Specific Question:   Smoking packs per day? Answer:   1     Order Specific Question:   Years smoking? Answer:   30    INFLUENZA, TRIV, INACTIVATED, SUBUNIT, ADJUVANTED, 65 YRS AND OLDER, IM, PREFILL SYR, 0.5ML (FLUAD TRIV)    Comprehensive Metabolic Panel     Standing Status:   Future     Number of Occurrences:   1     Standing Expiration Date:   1/6/2021    Lipid Panel     Standing Status:   Future     Number of Occurrences:   1     Standing Expiration Date:   1/6/2021     Order Specific Question:   Is Patient Fasting?/# of Hours     Answer:   yes/12    Psa screening     Standing Status:   Future     Number of Occurrences:   1     Standing Expiration Date:   1/6/2021    WA VISIT TO DISCUSS LUNG CA SCREEN W LDCT       Patient giveneducational materials - see patient instructions. Discussed use, benefit, and side effects of prescribed medications. All patient questions answered. Pt voiced understanding. Reviewed health maintenance. Patient agreedwith treatment plan. Follow up as directed.

## 2020-01-07 NOTE — PROGRESS NOTES
Immunizations Administered     Name Date Dose Route    Influenza, Triv, inactivated, subunit, adjuvanted, IM (Fluad 65 yrs and older) 1/7/2020 0.5 mL Intramuscular    Site: Deltoid- Left    Lot: [id number]    Shahriar Velasco [address]: [id number]

## 2020-01-08 ENCOUNTER — TELEPHONE (OUTPATIENT)
Dept: FAMILY MEDICINE CLINIC | Age: 70
End: 2020-01-08

## 2020-01-10 ASSESSMENT — ENCOUNTER SYMPTOMS: BACK PAIN: 1

## 2020-01-20 ENCOUNTER — HOSPITAL ENCOUNTER (OUTPATIENT)
Dept: CT IMAGING | Age: 70
Discharge: HOME | End: 2020-01-20
Payer: COMMERCIAL

## 2020-01-20 PROCEDURE — G0297 LDCT FOR LUNG CA SCREEN: HCPCS

## 2020-01-23 ENCOUNTER — TELEPHONE (OUTPATIENT)
Dept: FAMILY MEDICINE CLINIC | Age: 70
End: 2020-01-23

## 2020-02-06 RX ORDER — ALPRAZOLAM 0.5 MG/1
TABLET ORAL
Qty: 60 TABLET | Refills: 0 | Status: SHIPPED | OUTPATIENT
Start: 2020-02-06 | End: 2020-03-05 | Stop reason: SDUPTHER

## 2020-03-05 RX ORDER — ALPRAZOLAM 0.5 MG/1
TABLET ORAL
Qty: 60 TABLET | Refills: 0 | Status: SHIPPED | OUTPATIENT
Start: 2020-03-05 | End: 2020-04-06 | Stop reason: SDUPTHER

## 2020-04-06 RX ORDER — ALPRAZOLAM 0.5 MG/1
TABLET ORAL
Qty: 60 TABLET | Refills: 0 | Status: SHIPPED | OUTPATIENT
Start: 2020-04-06 | End: 2020-05-08 | Stop reason: SDUPTHER

## 2020-04-06 NOTE — TELEPHONE ENCOUNTER
Nikita Oconnor called requesting a refill on the following medications:  Requested Prescriptions     Pending Prescriptions Disp Refills    ALPRAZolam (XANAX) 0.5 MG tablet 60 tablet 0     Sig: take 1 tablet by mouth twice a day if needed for anxiety     Pharmacy verified: [facility]      Date of last visit: 1/7  Date of next visit (if applicable): Visit date not found

## 2020-04-23 ENCOUNTER — TELEPHONE (OUTPATIENT)
Dept: FAMILY MEDICINE CLINIC | Age: 70
End: 2020-04-23

## 2020-04-23 RX ORDER — AMOXICILLIN 500 MG/1
500 CAPSULE ORAL 3 TIMES DAILY
Qty: 30 CAPSULE | Refills: 0 | Status: SHIPPED | OUTPATIENT
Start: 2020-04-23 | End: 2020-05-03

## 2020-04-23 NOTE — TELEPHONE ENCOUNTER
Patient called in saying about a month ago he broke a tooth and has been trying to get in to see a dentist and no one is open. It is now infected and he is in pain. He was wondering if you would call in something for the infection and for pain.     [facility] [address]    Please advise

## 2020-05-05 RX ORDER — ZOLPIDEM TARTRATE 10 MG/1
10 TABLET ORAL NIGHTLY PRN
Qty: 90 TABLET | Refills: 1 | Status: CANCELLED | OUTPATIENT
Start: 2020-05-05 | End: 2020-08-03

## 2020-05-05 RX ORDER — ALPRAZOLAM 0.5 MG/1
TABLET ORAL
Qty: 60 TABLET | Refills: 0 | Status: CANCELLED | OUTPATIENT
Start: 2020-05-05 | End: 2020-06-02

## 2020-05-06 NOTE — TELEPHONE ENCOUNTER
Called the pt and left a message on his voicemail to call the office back.      Transfer to [phone]

## 2020-05-08 ENCOUNTER — VIRTUAL VISIT (OUTPATIENT)
Dept: FAMILY MEDICINE CLINIC | Age: 70
End: 2020-05-08
Payer: COMMERCIAL

## 2020-05-08 PROCEDURE — 98967 PH1 ASSMT&MGMT NQHP 11-20: CPT | Performed by: NURSE PRACTITIONER

## 2020-05-08 RX ORDER — ALPRAZOLAM 0.5 MG/1
TABLET ORAL
Qty: 60 TABLET | Refills: 0 | Status: SHIPPED | OUTPATIENT
Start: 2020-05-08 | End: 2020-06-04 | Stop reason: SDUPTHER

## 2020-05-08 RX ORDER — CETIRIZINE HYDROCHLORIDE 10 MG/1
10 TABLET ORAL DAILY
Qty: 30 TABLET | Refills: 5 | Status: ON HOLD | OUTPATIENT
Start: 2020-05-08 | End: 2021-10-27

## 2020-05-08 RX ORDER — ZOLPIDEM TARTRATE 10 MG/1
10 TABLET ORAL NIGHTLY PRN
Qty: 90 TABLET | Refills: 1 | Status: SHIPPED | OUTPATIENT
Start: 2020-05-08 | End: 2020-11-03 | Stop reason: SDUPTHER

## 2020-05-08 RX ORDER — OLOPATADINE HYDROCHLORIDE 2 MG/ML
1 SOLUTION/ DROPS OPHTHALMIC DAILY
Qty: 1 BOTTLE | Refills: 1 | Status: SHIPPED | OUTPATIENT
Start: 2020-05-08 | End: 2020-07-07

## 2020-05-08 ASSESSMENT — ENCOUNTER SYMPTOMS
EYE ITCHING: 1
EYE DISCHARGE: 0
EYE REDNESS: 1

## 2020-06-04 ENCOUNTER — OFFICE VISIT (OUTPATIENT)
Dept: FAMILY MEDICINE CLINIC | Age: 70
End: 2020-06-04
Payer: COMMERCIAL

## 2020-06-04 VITALS
RESPIRATION RATE: 16 BRPM | SYSTOLIC BLOOD PRESSURE: 116 MMHG | HEIGHT: 73 IN | BODY MASS INDEX: 33.32 KG/M2 | DIASTOLIC BLOOD PRESSURE: 70 MMHG | HEART RATE: 77 BPM | TEMPERATURE: 98.1 F | WEIGHT: 251.4 LBS

## 2020-06-04 PROCEDURE — 99214 OFFICE O/P EST MOD 30 MIN: CPT | Performed by: NURSE PRACTITIONER

## 2020-06-04 PROCEDURE — 3017F COLORECTAL CA SCREEN DOC REV: CPT | Performed by: NURSE PRACTITIONER

## 2020-06-04 PROCEDURE — 96372 THER/PROPH/DIAG INJ SC/IM: CPT | Performed by: NURSE PRACTITIONER

## 2020-06-04 PROCEDURE — G8417 CALC BMI ABV UP PARAM F/U: HCPCS | Performed by: NURSE PRACTITIONER

## 2020-06-04 PROCEDURE — G8427 DOCREV CUR MEDS BY ELIG CLIN: HCPCS | Performed by: NURSE PRACTITIONER

## 2020-06-04 PROCEDURE — 1036F TOBACCO NON-USER: CPT | Performed by: NURSE PRACTITIONER

## 2020-06-04 PROCEDURE — 1123F ACP DISCUSS/DSCN MKR DOCD: CPT | Performed by: NURSE PRACTITIONER

## 2020-06-04 PROCEDURE — 4040F PNEUMOC VAC/ADMIN/RCVD: CPT | Performed by: NURSE PRACTITIONER

## 2020-06-04 RX ORDER — METHYLPREDNISOLONE ACETATE 80 MG/ML
160 INJECTION, SUSPENSION INTRA-ARTICULAR; INTRALESIONAL; INTRAMUSCULAR; SOFT TISSUE ONCE
Status: COMPLETED | OUTPATIENT
Start: 2020-06-04 | End: 2020-06-04

## 2020-06-04 RX ORDER — ALPRAZOLAM 0.5 MG/1
TABLET ORAL
Qty: 60 TABLET | Refills: 0 | Status: SHIPPED | OUTPATIENT
Start: 2020-06-04 | End: 2020-07-06 | Stop reason: SDUPTHER

## 2020-06-04 RX ADMIN — METHYLPREDNISOLONE ACETATE 160 MG: 80 INJECTION, SUSPENSION INTRA-ARTICULAR; INTRALESIONAL; INTRAMUSCULAR; SOFT TISSUE at 13:10

## 2020-06-08 ASSESSMENT — ENCOUNTER SYMPTOMS
BACK PAIN: 1
EYE REDNESS: 1
SINUS PRESSURE: 1
GASTROINTESTINAL NEGATIVE: 1
RESPIRATORY NEGATIVE: 1
EYE ITCHING: 1
SINUS PAIN: 1
EYE DISCHARGE: 1

## 2020-06-08 NOTE — PROGRESS NOTES
2016    RTK       KNEE ARTHROSCOPY      Rt knee Dr. Jason Rees     Family History   Problem Relation Age of Onset    Dementia Mother     Heart Disease Father     High Blood Pressure Sister     Cancer Brother         testicular cancer     Social History     Tobacco Use    Smoking status: Former Smoker     Packs/day: 1.00     Years: 30.00     Pack years: 30.00     Types: Cigarettes     Start date: 1968     Last attempt to quit: 3/31/2018     Years since quittin.1    Smokeless tobacco: Never Used   Substance Use Topics    Alcohol use: Yes     Alcohol/week: 42.0 standard drinks     Types: 42 Cans of beer per week     Comment: States last beer was in 3/2018      Current Outpatient Medications   Medication Sig Dispense Refill    ALPRAZolam (XANAX) 0.5 MG tablet take 1 tablet by mouth twice a day if needed for anxiety 60 tablet 0    zolpidem (AMBIEN) 10 MG tablet Take 1 tablet by mouth nightly as needed for Sleep for up to 90 days.  90 tablet 1    olopatadine (PATADAY) 0.2 % SOLN ophthalmic solution Place 1 drop into both eyes daily 1 Bottle 1    cetirizine (ZYRTEC) 10 MG tablet Take 1 tablet by mouth daily 30 tablet 5    pantoprazole (PROTONIX) 40 MG tablet take 1 tablet by mouth once daily 90 tablet 2    albuterol sulfate HFA (PROAIR HFA) 108 (90 Base) MCG/ACT inhaler Inhale 2 puffs into the lungs every 6 hours as needed for Wheezing 1 Inhaler 3    albuterol (PROVENTIL) (2.5 MG/3ML) 0.083% nebulizer solution Take 3 mLs by nebulization every 6 hours as needed for Wheezing 120 each 3    Respiratory Therapy Supplies (NEBULIZER/TUBING/MOUTHPIECE) KIT 1 kit by Does not apply route daily as needed (cough, wheeze) Diagnosis:RAD and Bronchitis 1 kit 0    loratadine-pseudoephedrine (CLARITIN-D 24 HOUR)  MG per extended release tablet Take 1 tablet by mouth daily 30 tablet 0    clopidogrel (PLAVIX) 75 MG tablet take 1 tablet by mouth once daily 30 tablet 11    aspirin 81 MG chewable Ht 6' 1\" (1.854 m)   Wt 251 lb 6.4 oz (114 kg)   BMI 33.17 kg/m²       Impression/Plan:  1. Allergic rhinitis due to pollen, unspecified seasonality    2. Anxiety    3. Sleep disturbance    4. Allergic conjunctivitis of both eyes    5. Primary osteoarthritis involving multiple joints    6. Primary insomnia    7. Hx of ischemic vertebrobasilar artery thalamic stroke    8. Mixed hyperlipidemia      Requested Prescriptions     Signed Prescriptions Disp Refills    ALPRAZolam (XANAX) 0.5 MG tablet 60 tablet 0     Sig: take 1 tablet by mouth twice a day if needed for anxiety     Orders Placed This Encounter   Procedures    VL DUP CAROTID BILATERAL     Standing Status:   Future     Standing Expiration Date:   6/4/2021     Order Specific Question:   Reason for exam:     Answer:   headaches, hist cva   Guy Baker MD, Neurology, [facility]     Referral Priority:   Routine     Referral Type:   Eval and Treat     Referral Reason:   Specialty Services Required     Referred to Provider:   Mannie Padilla MD     Requested Specialty:   Neurology     Number of Visits Requested:   1       Patient giveneducational materials - see patient instructions. Discussed use, benefit, and side effects of prescribed medications. All patient questions answered. Pt voiced understanding. Reviewed health maintenance. Patient agreedwith treatment plan. Follow up as directed. Allergic conjunctivitis information provided to patient. Oars report reviewed. Refills.        Electronically signed by MANE Perez CNP on 6/8/2020 at 11:02 AM

## 2020-06-11 ENCOUNTER — HOSPITAL ENCOUNTER (OUTPATIENT)
Dept: INTERVENTIONAL RADIOLOGY/VASCULAR | Age: 70
Discharge: HOME | End: 2020-06-11
Payer: COMMERCIAL

## 2020-06-11 PROCEDURE — 93880 EXTRACRANIAL BILAT STUDY: CPT

## 2020-06-12 ENCOUNTER — TELEPHONE (OUTPATIENT)
Dept: FAMILY MEDICINE CLINIC | Age: 70
End: 2020-06-12

## 2020-06-12 NOTE — TELEPHONE ENCOUNTER
----- Message from MANE Mckeon CNP sent at 6/12/2020  7:59 AM EDT -----  Carotid ultrasound within normal limits.   No significant stenosis noted

## 2020-06-12 NOTE — TELEPHONE ENCOUNTER
Called pt and advised of results. Pt was very relieved and thanked me for calling. Pt voiced understanding.

## 2020-07-06 RX ORDER — ALPRAZOLAM 0.5 MG/1
TABLET ORAL
Qty: 60 TABLET | Refills: 0 | Status: SHIPPED | OUTPATIENT
Start: 2020-07-06 | End: 2020-08-06 | Stop reason: SDUPTHER

## 2020-07-13 ENCOUNTER — OFFICE VISIT (OUTPATIENT)
Dept: NEUROLOGY | Age: 70
End: 2020-07-13
Payer: COMMERCIAL

## 2020-07-13 VITALS
OXYGEN SATURATION: 95 % | BODY MASS INDEX: 33.29 KG/M2 | HEIGHT: 73 IN | WEIGHT: 251.2 LBS | HEART RATE: 77 BPM | SYSTOLIC BLOOD PRESSURE: 122 MMHG | DIASTOLIC BLOOD PRESSURE: 68 MMHG

## 2020-07-13 PROCEDURE — 3017F COLORECTAL CA SCREEN DOC REV: CPT | Performed by: NURSE PRACTITIONER

## 2020-07-13 PROCEDURE — 1036F TOBACCO NON-USER: CPT | Performed by: NURSE PRACTITIONER

## 2020-07-13 PROCEDURE — 4040F PNEUMOC VAC/ADMIN/RCVD: CPT | Performed by: NURSE PRACTITIONER

## 2020-07-13 PROCEDURE — 99214 OFFICE O/P EST MOD 30 MIN: CPT | Performed by: NURSE PRACTITIONER

## 2020-07-13 PROCEDURE — G8427 DOCREV CUR MEDS BY ELIG CLIN: HCPCS | Performed by: NURSE PRACTITIONER

## 2020-07-13 PROCEDURE — 1123F ACP DISCUSS/DSCN MKR DOCD: CPT | Performed by: NURSE PRACTITIONER

## 2020-07-13 PROCEDURE — G8417 CALC BMI ABV UP PARAM F/U: HCPCS | Performed by: NURSE PRACTITIONER

## 2020-07-13 NOTE — PROGRESS NOTES
NEUROLOGY OUT PATIENT FOLLOW UP NOTE:  7/13/20208:27 AM    Yasmin Garcia is here for follow up for history of stroke. He was last seen 8/6/2018. He reports new symptoms of intermittent blurred vision and headache for the past 1 month. No jaw claudication. Location of his headache is frontal. He can have the blurred vision without headache. He has history of right carotid stent placed in 4/2018. He is on aspirin and plavix. He stopped his statin as he could not tolerate it due to side effects. He comes in today by himself to discuss the plan of care going forward. ROS:  Respiratory : no cough, no shortness of breath  Cardiac: no chest pain. No palpitations. Renal : no flank pain, no hematuria    Skin: no rash      Allergies   Allergen Reactions    Tape Alicia Nicky Tape]        Current Outpatient Medications:     ALPRAZolam (XANAX) 0.5 MG tablet, take 1 tablet by mouth twice a day if needed for anxiety, Disp: 60 tablet, Rfl: 0    zolpidem (AMBIEN) 10 MG tablet, Take 1 tablet by mouth nightly as needed for Sleep for up to 90 days. , Disp: 90 tablet, Rfl: 1    cetirizine (ZYRTEC) 10 MG tablet, Take 1 tablet by mouth daily, Disp: 30 tablet, Rfl: 5    pantoprazole (PROTONIX) 40 MG tablet, take 1 tablet by mouth once daily, Disp: 90 tablet, Rfl: 2    albuterol sulfate HFA (PROAIR HFA) 108 (90 Base) MCG/ACT inhaler, Inhale 2 puffs into the lungs every 6 hours as needed for Wheezing, Disp: 1 Inhaler, Rfl: 3    albuterol (PROVENTIL) (2.5 MG/3ML) 0.083% nebulizer solution, Take 3 mLs by nebulization every 6 hours as needed for Wheezing, Disp: 120 each, Rfl: 3    Respiratory Therapy Supplies (NEBULIZER/TUBING/MOUTHPIECE) KIT, 1 kit by Does not apply route daily as needed (cough, wheeze) Diagnosis:RAD and Bronchitis, Disp: 1 kit, Rfl: 0    clopidogrel (PLAVIX) 75 MG tablet, take 1 tablet by mouth once daily, Disp: 30 tablet, Rfl: 11    aspirin 81 MG chewable tablet, Take 81 mg by mouth daily, Disp: , Rfl:   loratadine-pseudoephedrine (CLARITIN-D 24 HOUR)  MG per extended release tablet, Take 1 tablet by mouth daily (Patient not taking: Reported on 7/13/2020), Disp: 30 tablet, Rfl: 0      PE:   Vitals:    07/13/20 0808   BP: 122/68   Site: Left Upper Arm   Position: Sitting   Cuff Size: Small Adult   Pulse: 77   SpO2: 95%   Weight: 251 lb 3.2 oz (113.9 kg)   Height: 6' 1\" (1.854 m)     General Appearance:  awake, alert, oriented, in no acute distress  Gen: NAD, Language is Intact. Skin: no rash, lesion, dry to touch. warm  Head: no rash, no icterus  Neck: There is no carotid bruits. The Neck is supple. There is no neck lymphadenopathy. Neuro: CN 2-12 grossly intact with no focal deficits. Power 5/5 in the bilateral upper and left lower extremity, +3/5 in right lower extremity. Reflexes are decreased  symmetric. Long tracts are reduced distally. Cerebellar exam is Intact. Sensory exam is intact to light touch. Gait is intact. Musculoskeletal:  Has no hand arthritis, no limitation of ROM in any of the four extremities. Lower extremities no edema  The abdomen is soft,  intact bowel sounds.          DATA:  Results for orders placed or performed in visit on 01/07/20   Psa screening   Result Value Ref Range    PSA 0.60 0.00 - 1.00 ng/ml   Lipid Panel   Result Value Ref Range    Cholesterol, Total 207 (H) 100 - 199 mg/dL    Triglycerides 257 (H) 0 - 199 mg/dL    HDL 28 mg/dL    LDL Calculated 128 mg/dL   Comprehensive Metabolic Panel   Result Value Ref Range    Glucose 114 (H) 70 - 108 mg/dL    CREATININE 1.0 0.4 - 1.2 mg/dL    BUN 17 7 - 22 mg/dL    Sodium 140 135 - 145 meq/L    Potassium 3.9 3.5 - 5.2 meq/L    Chloride 104 98 - 111 meq/L    CO2 23 23 - 33 meq/L    Calcium 9.8 8.5 - 10.5 mg/dL    AST 22 5 - 40 U/L    Alkaline Phosphatase 67 38 - 126 U/L    Total Protein 7.2 6.1 - 8.0 g/dL    Alb 4.3 3.5 - 5.1 g/dL    Total Bilirubin 0.7 0.3 - 1.2 mg/dL    ALT 27 11 - 66 U/L   Anion Gap   Result Value Ref of innominate artery, left common carotid artery or either subclavian artery. Right common carotid artery/ICA: The right common carotid artery is normal. There is a stent in the right carotid bulb. This is patent. There is no stenosis. There is no stenosis of the right internal carotid artery. Left common carotid artery/ICA: The left common carotid artery is normal. There is some calcified atherosclerosis of the left carotid bulb. There is no stenosis. There is no stenosis of the left internal carotid artery. Vertebral arteries: The vertebral arteries are codominant and normal.        CTA HEAD:      Internal carotid arteries: There is some calcified atheromatosis of the right internal carotid artery. There is no stenosis. The left internal carotid artery is normal. The ophthalmic artery origins are normal bilaterally. Middle cerebral arteries: Normal. The proximal branches are also normal.    Anterior cerebral arteries: Normal. The proximal branches are normal. There is a normal small anterior communicating artery. Vertebral arteries: The vertebral arteries are normal.    Basilar artery: Normal.    Superior cerebellar arteries: Normal.    Posterior cerebral arteries: Normal. The proximal branches are also normal.    There is a normal posterior communicating artery on the left. The superior sagittal sinus, vein of Tani, internal cerebral veins, straight sinus, transverse sinuses and sigmoid sinuses are patent. Axial source data: The lung apices are clear. There is no cervical adenopathy. The submandibular and parotid glands are within appropriate limits. Impression    1. No evidence of stenosis of either carotid bulb. 2. No intracranial stenoses or occlusions. **This report has been created using voice recognition software. It may contain minor errors which are inherent in voice recognition technology. **    Final report electronically signed by Dr. Robert Khan on 5/15/2018 10:05 AM     Results for orders placed during the hospital encounter of 04/04/18   MRI BRAIN WO CONTRAST    Narrative PROCEDURE: MRI BRAIN WO CONTRAST    CLINICAL INFORMATION: stroke. Additional history obtained from the electronic medical record indicates the patient has dizziness and right-sided numbness. COMPARISON: MRI of the brain dated December 26, 2016. TECHNIQUE: Multiplanar and multiple spin echo MRI images were obtained of the brain without contrast.    FINDINGS:    There is prominence of the sulcal spaces and ventricular system secondary to generalized, age-related parenchymal volume loss. Mild, patchy foci of hyperintense T2 signal are noted in the periventricular and deep cerebral white matter. This is similar   compared to the prior exam and likely corresponds to sequela of chronic microvascular ischemic change. Hyperintense T2 signal is also noted within the bilateral basal ganglia and medial aspect of the vashti. No restricted diffusion is identified to suggest   acute ischemia. Susceptibility in the bilateral basal ganglia is again noted and likely corresponds to age-related mineralization. The ventricles are midline and stable in size and morphology. No mass, mass effect or extra-axial fluid collection is identified. The basal cisterns and visualized vascular flow voids are patent. The pituitary, brain stem and cervical medullary junction   are otherwise within normal limits. The visualized orbits and temporal bone structures are unremarkable. There is redemonstration of mucosal thickening within the left frontal, bilateral ethmoid and maxillary sinuses. Impression    1. Patchy foci of hyperintense T2 signal are noted throughout the periventricular and deep cerebral white matter which likely correspond to chronic microvascular ischemic angiopathy.  Additional foci of hyperintense T2 signal are noted within the   bilateral basal ganglia and vahsti which likely correspond to remote lacunar infarcts. No evidence to suggest acute ischemia is identified. 2. 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 **This report has been created using voice recognition software. It may contain minor errors which are inherent in voice recognition technology. **    Final report electronically signed by Dr. Christopher Santana on 4/5/2018 9:00 AM       Results for orders placed during the hospital encounter of 05/15/18   CT HEAD WO CONTRAST    Narrative PROCEDURE: CT HEAD WO CONTRAST    CLINICAL INFORMATION: TIA, . Sudden onset dizziness and lightheadedness. Recent carotid stent. COMPARISON: Head CT 4/4/2018. TECHNIQUE: Noncontrast 5 mm axial images were obtained through the brain. All CT scans at this facility use dose modulation, iterative reconstruction, and/or weight-based dosing when appropriate to reduce radiation dose to as low as reasonably achievable. FINDINGS:        There is no hemorrhage. There are no intra-or extra-axial collections. There is no hydrocephalus, midline shift or mass effect. The gray-white matter differentiation is preserved. There is a stable old lacunar type infarct in the right basal ganglia. There is a tiny old infarct in the right cerebellum. No new areas of abnormal attenuation are noted. There is mucosal thickening in the ethmoid air cells. There is mild mucosal   thickening in the maxillary sinuses. There is no suspicious calvarial abnormality. There is no significant change in the appearance of the brain compared to the prior study. Impression  No evidence of an acute process. No change from prior. **This report has been created using voice recognition software. It may contain minor errors which are inherent in voice recognition technology. **    Final report electronically signed by Dr. Melva Patricia on 5/15/2018 9:32 AM Assessment:     Diagnosis Orders   1. History of stroke  MRI BRAIN WO CONTRAST    CTA HEAD W WO CONTRAST    CTA NECK W WO CONTRAST    Sedimentation Rate    Lipid Panel   2. Right leg weakness     3. Blurred vision  MRI BRAIN WO CONTRAST    CTA HEAD W WO CONTRAST    CTA NECK W WO CONTRAST    Sedimentation Rate    Lipid Panel   4. Other headache syndrome  MRI BRAIN WO CONTRAST    CTA HEAD W WO CONTRAST    CTA NECK W WO CONTRAST    Sedimentation Rate    Lipid Panel        Follow up for history of stroke. He reports new symptoms of intermittent blurred vision and headache for the past 1 month. No jaw claudication. Location of his headache is frontal. He can have the blurred vision without headache. He has history of right carotid stent placed in 4/2018. He is on aspirin and plavix. He reports he could not tolerate statins due to side effects. We will arrange for him to undergo MRI brain Wo contrast to evaluate for organic causes of his symptoms including, but not limited to stroke. We will also order CTA head and neck to screen for intracranial/extracranial stenosis. We will also arrange for him to undergo lab work checking for lipid panel and sed rate. After detailed discussion with patient we agreed on the following plan. Plan:  1. MRI Brain Wo contrast  2. CTA head and neck W/Wo contrast  3. Sed rate  4. Lipid panel  5. Continue with aspirin and plavix  6. Modify risk factors for stroke (blood pressure, cholesterol, diabetes, smoking cessation etc.. Control)  7. Report any new symptoms  8. Follow up in 1 month or sooner if needed. 9. Call if any questions or concerns. Please call if any questions.      Gabe Leary CNP

## 2020-07-21 ENCOUNTER — TELEPHONE (OUTPATIENT)
Dept: FAMILY MEDICINE CLINIC | Age: 70
End: 2020-07-21

## 2020-07-21 ENCOUNTER — TELEPHONE (OUTPATIENT)
Dept: NEUROLOGY | Age: 70
End: 2020-07-21

## 2020-07-21 LAB
CHOLESTEROL/HDL RATIO: 5.4 (ref 1–5)
CHOLESTEROL: 205 MG/DL (ref 150–200)
HDLC SERPL-MCNC: 38 MG/DL
LDL CHOLESTEROL CALCULATED: 131 MG/DL
LDL/HDL RATIO: 3.4
SEDIMENTATION RATE, ERYTHROCYTE: 12 MM/H (ref 0–20)
TRIGL SERPL-MCNC: 178 MG/DL (ref 27–150)
VLDLC SERPL CALC-MCNC: 36 MG/DL (ref 0–30)

## 2020-07-21 NOTE — TELEPHONE ENCOUNTER
Patient had labs done at neurologist's office. They want patient to f/u with you regarding the results of the lipid panel.

## 2020-07-21 NOTE — TELEPHONE ENCOUNTER
Patient notified and verbalized understanding.  Lipid panel results routed to PCP per patient request.

## 2020-07-27 ENCOUNTER — HOSPITAL ENCOUNTER (OUTPATIENT)
Dept: MRI IMAGING | Age: 70
Discharge: HOME | End: 2020-07-27
Payer: COMMERCIAL

## 2020-07-27 ENCOUNTER — TELEPHONE (OUTPATIENT)
Dept: NEUROLOGY | Age: 70
End: 2020-07-27

## 2020-07-27 ENCOUNTER — HOSPITAL ENCOUNTER (OUTPATIENT)
Dept: CT IMAGING | Age: 70
Discharge: HOME | End: 2020-07-27
Payer: COMMERCIAL

## 2020-07-27 LAB — POC CREATININE WHOLE BLOOD: 1 MG/DL (ref 0.5–1.2)

## 2020-07-27 PROCEDURE — 70551 MRI BRAIN STEM W/O DYE: CPT

## 2020-07-27 PROCEDURE — 70496 CT ANGIOGRAPHY HEAD: CPT

## 2020-07-27 PROCEDURE — 70498 CT ANGIOGRAPHY NECK: CPT

## 2020-07-27 PROCEDURE — 82565 ASSAY OF CREATININE: CPT

## 2020-07-27 PROCEDURE — 6360000004 HC RX CONTRAST MEDICATION: Performed by: NURSE PRACTITIONER

## 2020-07-27 RX ADMIN — IOPAMIDOL 85 ML: 755 INJECTION, SOLUTION INTRAVENOUS at 07:06

## 2020-07-27 NOTE — TELEPHONE ENCOUNTER
----- Message from MANE Fong CNP sent at 7/27/2020  9:29 AM EDT -----  Please let patient know the CTA head and neck showed No flow-limiting stenosis or aneurysm identified in the intracranial circulation. . Redemonstration of right carotid stent which appears patent without significant in-stent stenosis.   Roni Monreal, CNP

## 2020-08-06 RX ORDER — ALPRAZOLAM 0.5 MG/1
TABLET ORAL
Qty: 60 TABLET | Refills: 0 | Status: SHIPPED | OUTPATIENT
Start: 2020-08-06 | End: 2020-09-04 | Stop reason: SDUPTHER

## 2020-08-14 ENCOUNTER — OFFICE VISIT (OUTPATIENT)
Dept: NEUROLOGY | Age: 70
End: 2020-08-14
Payer: COMMERCIAL

## 2020-08-14 VITALS
DIASTOLIC BLOOD PRESSURE: 80 MMHG | WEIGHT: 251.8 LBS | BODY MASS INDEX: 33.37 KG/M2 | HEART RATE: 70 BPM | SYSTOLIC BLOOD PRESSURE: 116 MMHG | OXYGEN SATURATION: 95 % | HEIGHT: 73 IN

## 2020-08-14 PROCEDURE — 3017F COLORECTAL CA SCREEN DOC REV: CPT | Performed by: PSYCHIATRY & NEUROLOGY

## 2020-08-14 PROCEDURE — G8417 CALC BMI ABV UP PARAM F/U: HCPCS | Performed by: PSYCHIATRY & NEUROLOGY

## 2020-08-14 PROCEDURE — 1123F ACP DISCUSS/DSCN MKR DOCD: CPT | Performed by: PSYCHIATRY & NEUROLOGY

## 2020-08-14 PROCEDURE — 4004F PT TOBACCO SCREEN RCVD TLK: CPT | Performed by: PSYCHIATRY & NEUROLOGY

## 2020-08-14 PROCEDURE — G8427 DOCREV CUR MEDS BY ELIG CLIN: HCPCS | Performed by: PSYCHIATRY & NEUROLOGY

## 2020-08-14 PROCEDURE — 4040F PNEUMOC VAC/ADMIN/RCVD: CPT | Performed by: PSYCHIATRY & NEUROLOGY

## 2020-08-14 PROCEDURE — 99213 OFFICE O/P EST LOW 20 MIN: CPT | Performed by: PSYCHIATRY & NEUROLOGY

## 2020-08-14 RX ORDER — CLOPIDOGREL BISULFATE 75 MG/1
TABLET ORAL
Qty: 30 TABLET | Refills: 11 | Status: SHIPPED | OUTPATIENT
Start: 2020-08-14 | End: 2021-08-04

## 2020-08-14 NOTE — PROGRESS NOTES
needed (cough, wheeze) Diagnosis:RAD and Bronchitis (Patient not taking: Reported on 8/14/2020), Disp: 1 kit, Rfl: 0    loratadine-pseudoephedrine (CLARITIN-D 24 HOUR)  MG per extended release tablet, Take 1 tablet by mouth daily (Patient not taking: Reported on 7/13/2020), Disp: 30 tablet, Rfl: 0    clopidogrel (PLAVIX) 75 MG tablet, take 1 tablet by mouth once daily (Patient not taking: Reported on 8/14/2020), Disp: 30 tablet, Rfl: 11      PE:   Vitals:    08/14/20 0938   BP: 116/80   Site: Left Upper Arm   Position: Sitting   Cuff Size: Small Adult   Pulse: 70   SpO2: 95%   Weight: 251 lb 12.8 oz (114.2 kg)   Height: 6' 1\" (1.854 m)     General Appearance:  awake, alert, oriented, in no acute distress  Gen: NAD, Language is Intact. Skin: no rash, lesion, dry to touch. warm  Head: no rash, no icterus  Neck: There is no carotid bruits. The Neck is supple. There is no neck lymphadenopathy. Neuro: CN 2-12 grossly intact with no focal deficits. Power 5/5 in the bilateral upper and left lower extremity, +3/5 in right lower extremity. Reflexes are decreased  symmetric. Long tracts are reduced distally. Cerebellar exam is Intact. Sensory exam is intact to light touch. Gait is intact. Musculoskeletal:  Has no hand arthritis, no limitation of ROM in any of the four extremities. Lower extremities no edema  The abdomen is soft,  intact bowel sounds. DATA:  Results for orders placed or performed during the hospital encounter of 07/27/20   POCT Creatinine   Result Value Ref Range    POC CREATININE WHOLE BLOOD 1.0 0.5 - 1.2 mg/dl          Results for orders placed during the hospital encounter of 04/04/18   MRI CERVICAL SPINE WO CONTRAST    Narrative PROCEDURE: MRI CERVICAL SPINE WO CONTRAST    CLINICAL INFORMATION: COORDINATION CHANGES, NEW OR PROGRESSIVE,  .  Additional history obtained from the electronic medical record indicates the patient is having some dizziness with right-sided numbness which is resolved. Some right leg weakness. COMPARISON: None available. Correlation is made to CT of the cervical spine dated December 18, 2016. TECHNIQUE: Sagittal T1, T2 and STIR sequences were obtained through the cervical spine. Axial fast and echo and gradient echo T2-weighted images were obtained. FINDINGS:    The cervical spine is imaged from the craniocervical junction to the superior aspect of T3. Hyperintense T2 signal is noted within the vashti which likely corresponds to a remote lacunar infarct. No suspicious abnormality is identified at the cervical   medullary junction. No abnormal signal or expansion is present within the cervical spinal cord. There is preservation of the expected cervical lordosis. There is minimal anterolisthesis of C3 on C4 and minimal retrolisthesis of C5 on C6. The vertebral body heights are maintained. No compression fracture deformity or suspicious, marrow replacing   lesion is identified. Bulky anterior osteophytes are noted from C4 to C6. Degenerative facet arthropathy is present at every level. With regards to the disc spaces, at C2-C3, uncovertebral joint spurring is present without significant spinal canal narrowing. There is mild   neural foraminal narrowing on the right with mild to moderate neural foraminal narrowing on the left. At C3-C4, uncovertebral joint spurring is present. No significant spinal canal narrowing is seen. Mild neural foraminal narrowing is present bilaterally. At C4-C5, there is uncovertebral joint spurring and ligamentum flavum thickening resulting in mild spinal canal narrowing. Mild to moderate neural foraminal narrowing is present on the right without significant neural foraminal narrowing on the left. At C5-C6, there is a disc bulge and flattening of the ventral thecal sac resulting in mild spinal canal narrowing.  Uncovertebral joint spurring is also present and there is mild to moderate neural foraminal narrowing on the right without significant   neural foraminal narrowing on the left. At C6-C7, there is a disc osteophyte complex resulting in mild spinal canal narrowing. Uncovertebral joint spurring is also present and there is mild bilateral neural foraminal narrowing. At C7-T1, no significant spinal canal or neural foraminal narrowing is present. No suspicious abnormality is identified within the visualized paraspinal soft tissues. Impression Multilevel degenerative changes are present throughout the cervical spine and are further discussed by level in the findings. These are most significant at C4-C5 with uncovertebral joint spurring and ligamentum flavum thickening resulting in mild spinal   canal narrowing. Mild to moderate neural foraminal narrowing is also present on the right secondary to uncovertebral joint spurring and facet arthropathy. There is also mild spinal canal narrowing at C5-C6 secondary to a disc bulge. Mild to moderate   neural foraminal narrowing is also noted on the right at this level secondary to facet arthropathy. **This report has been created using voice recognition software. It may contain minor errors which are inherent in voice recognition technology. **    Final report electronically signed by Dr. Kiera Cosby on 4/5/2018 10:08 AM       Results for orders placed during the hospital encounter of 05/15/18   CTA HEAD W WO CONTRAST    Narrative PROCEDURE: CTA HEAD W WO CONTRAST, CTA [address]: tia. Sudden onset dizziness and lightheadedness. COMPARISON: CTA head and neck 4/4/2018. Head CT 5/15/2018. TECHNIQUE: 1 mm axial images were obtained through the head and neck after the fast bolus administration of contrast. A noncontrast localizer was obtained. 3-D reconstructions were performed on a dedicated 3-D workstation. These include multiplanar MPR   images and multiplanar MIP images.   Centerline reconstructions were obtained of the carotid systems. Isovue intravenous contrast was given. All CT scans at this facility use dose modulation, iterative reconstruction, and/or weight-based dosing when appropriate to reduce radiation dose to as low as reasonably achievable. FINDINGS:      CTA NECK:    Aortic arch and branches: The aortic arch is within appropriate limits. There is no stenosis at the origin of innominate artery, left common carotid artery or either subclavian artery. Right common carotid artery/ICA: The right common carotid artery is normal. There is a stent in the right carotid bulb. This is patent. There is no stenosis. There is no stenosis of the right internal carotid artery. Left common carotid artery/ICA: The left common carotid artery is normal. There is some calcified atherosclerosis of the left carotid bulb. There is no stenosis. There is no stenosis of the left internal carotid artery. Vertebral arteries: The vertebral arteries are codominant and normal.        CTA HEAD:      Internal carotid arteries: There is some calcified atheromatosis of the right internal carotid artery. There is no stenosis. The left internal carotid artery is normal. The ophthalmic artery origins are normal bilaterally. Middle cerebral arteries: Normal. The proximal branches are also normal.    Anterior cerebral arteries: Normal. The proximal branches are normal. There is a normal small anterior communicating artery. Vertebral arteries: The vertebral arteries are normal.    Basilar artery: Normal.    Superior cerebellar arteries: Normal.    Posterior cerebral arteries: Normal. The proximal branches are also normal.    There is a normal posterior communicating artery on the left. The superior sagittal sinus, vein of Tani, internal cerebral veins, straight sinus, transverse sinuses and sigmoid sinuses are patent. Axial source data: The lung apices are clear. There is no cervical adenopathy.  The submandibular and parotid glands are within appropriate limits. Impression    1. No evidence of stenosis of either carotid bulb. 2. No intracranial stenoses or occlusions. **This report has been created using voice recognition software. It may contain minor errors which are inherent in voice recognition technology. **    Final report electronically signed by Dr. Levon Smart on 5/15/2018 10:05 AM     Results for orders placed during the hospital encounter of 05/15/18   CTA NECK W WO CONTRAST    Narrative PROCEDURE: CTA HEAD W WO CONTRAST, CTA [address]: tia. Sudden onset dizziness and lightheadedness. COMPARISON: CTA head and neck 4/4/2018. Head CT 5/15/2018. TECHNIQUE: 1 mm axial images were obtained through the head and neck after the fast bolus administration of contrast. A noncontrast localizer was obtained. 3-D reconstructions were performed on a dedicated 3-D workstation. These include multiplanar MPR   images and multiplanar MIP images. Centerline reconstructions were obtained of the carotid systems. Isovue intravenous contrast was given. All CT scans at this facility use dose modulation, iterative reconstruction, and/or weight-based dosing when appropriate to reduce radiation dose to as low as reasonably achievable. FINDINGS:      CTA NECK:    Aortic arch and branches: The aortic arch is within appropriate limits. There is no stenosis at the origin of innominate artery, left common carotid artery or either subclavian artery. Right common carotid artery/ICA: The right common carotid artery is normal. There is a stent in the right carotid bulb. This is patent. There is no stenosis. There is no stenosis of the right internal carotid artery. Left common carotid artery/ICA: The left common carotid artery is normal. There is some calcified atherosclerosis of the left carotid bulb. There is no stenosis. There is no stenosis of the left internal carotid artery.     Vertebral arteries: The vertebral arteries are codominant and normal.        CTA HEAD:      Internal carotid arteries: There is some calcified atheromatosis of the right internal carotid artery. There is no stenosis. The left internal carotid artery is normal. The ophthalmic artery origins are normal bilaterally. Middle cerebral arteries: Normal. The proximal branches are also normal.    Anterior cerebral arteries: Normal. The proximal branches are normal. There is a normal small anterior communicating artery. Vertebral arteries: The vertebral arteries are normal.    Basilar artery: Normal.    Superior cerebellar arteries: Normal.    Posterior cerebral arteries: Normal. The proximal branches are also normal.    There is a normal posterior communicating artery on the left. The superior sagittal sinus, vein of Tani, internal cerebral veins, straight sinus, transverse sinuses and sigmoid sinuses are patent. Axial source data: The lung apices are clear. There is no cervical adenopathy. The submandibular and parotid glands are within appropriate limits. Impression    1. No evidence of stenosis of either carotid bulb. 2. No intracranial stenoses or occlusions. **This report has been created using voice recognition software. It may contain minor errors which are inherent in voice recognition technology. **    Final report electronically signed by Dr. Lesa Gutiérrez on 5/15/2018 10:05 AM     Results for orders placed during the hospital encounter of 04/04/18   MRI BRAIN WO CONTRAST    Narrative PROCEDURE: MRI BRAIN WO CONTRAST    CLINICAL INFORMATION: stroke. Additional history obtained from the electronic medical record indicates the patient has dizziness and right-sided numbness. COMPARISON: MRI of the brain dated December 26, 2016.     TECHNIQUE: Multiplanar and multiple spin echo MRI images were obtained of the brain without contrast.    FINDINGS:    There is prominence of the sulcal spaces and ventricular system secondary to generalized, age-related parenchymal volume loss. Mild, patchy foci of hyperintense T2 signal are noted in the periventricular and deep cerebral white matter. This is similar   compared to the prior exam and likely corresponds to sequela of chronic microvascular ischemic change. Hyperintense T2 signal is also noted within the bilateral basal ganglia and medial aspect of the vashti. No restricted diffusion is identified to suggest   acute ischemia. Susceptibility in the bilateral basal ganglia is again noted and likely corresponds to age-related mineralization. The ventricles are midline and stable in size and morphology. No mass, mass effect or extra-axial fluid collection is identified. The basal cisterns and visualized vascular flow voids are patent. The pituitary, brain stem and cervical medullary junction   are otherwise within normal limits. The visualized orbits and temporal bone structures are unremarkable. There is redemonstration of mucosal thickening within the left frontal, bilateral ethmoid and maxillary sinuses. Impression    1. Patchy foci of hyperintense T2 signal are noted throughout the periventricular and deep cerebral white matter which likely correspond to chronic microvascular ischemic angiopathy. Additional foci of hyperintense T2 signal are noted within the   bilateral basal ganglia and vashti which likely correspond to remote lacunar infarcts. No evidence to suggest acute ischemia is identified. 2. 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 **This report has been created using voice recognition software. It may contain minor errors which are inherent in voice recognition technology. **    Final report electronically signed by Dr. Arturo Horan on 4/5/2018 9:00 AM       Results for orders placed during the hospital encounter of 05/15/18   CT HEAD WO CONTRAST    Narrative PROCEDURE: CT HEAD WO CONTRAST    CLINICAL INFORMATION: TIA, . Sudden onset dizziness and lightheadedness. Recent carotid stent. COMPARISON: Head CT 4/4/2018. TECHNIQUE: Noncontrast 5 mm axial images were obtained through the brain. All CT scans at this facility use dose modulation, iterative reconstruction, and/or weight-based dosing when appropriate to reduce radiation dose to as low as reasonably achievable. FINDINGS:        There is no hemorrhage. There are no intra-or extra-axial collections. There is no hydrocephalus, midline shift or mass effect. The gray-white matter differentiation is preserved. There is a stable old lacunar type infarct in the right basal ganglia. There is a tiny old infarct in the right cerebellum. No new areas of abnormal attenuation are noted. There is mucosal thickening in the ethmoid air cells. There is mild mucosal   thickening in the maxillary sinuses. There is no suspicious calvarial abnormality. There is no significant change in the appearance of the brain compared to the prior study. Impression  No evidence of an acute process. No change from prior. **This report has been created using voice recognition software. It may contain minor errors which are inherent in voice recognition technology. **    Final report electronically signed by Dr. Deni Solis on 5/15/2018 9:32 AM     Sed rate 12  7/21/2020 12:45 AM - [facility]     Component  Value  Ref Range & Units  Status  Collected  Lab    Cholesterol  205High    150 - 200 mg/dL  Final  07/20/2020  3:40 PM  [facility]    Triglycerides  178High    27 - 150 mg/dL  Final  07/20/2020  3:40 PM  [facility]    HDL  38Low    >39 mg/dL  Final  07/20/2020  3:40 PM  [facility]           HDL <40 mg/dL - High Risk   HDL > or = 40mg/dL- Desirable   HDL >60 mg/dL - Negative Risk   ---------------------------------------------    LDL Calculated  131High    <130 mg/dL Final  07/20/2020  3:40 PM  [facility]           LDL    <100 mg/dL - Desirable   LDL    >160 mg/dL - High Risk   ---------------------------------------------    VLDL Cholesterol Calculated  36High    0 - 30 mg/dL  Final  07/20/2020  3:40 PM  [facility]    LDl/HDL Ratio  3.4  <3.5  Final  07/20/2020  3:40 PM  [facility]    Chol/HDL Ratio  5. 4High    1.0 - 5.0  Final  07/20/2020  3:40 PM  [facility]          Assessment:     Diagnosis Orders   1. History of stroke     2. Blurred vision     3. Other headache syndrome     4. Right leg weakness          Follow up for history of stroke. He reports new symptoms. CTA head and neck showed no flow-limiting stenosis, or aneurysm. MRI brain shows no evidence of acute infarct, multiple chronic lacunar infarcts in the basal ganglia thalamus left corona radiata. His lipid profile was abnormal his LDL was 131, the patient was advised to follow-up with his family physician to have target LDL below 70. He is also advised to continue on antiplatelets. We will also arrange for him to undergo lab work checking for lipid panel and sed rate. After detailed discussion with patient we agreed on the following plan. Plan:  1. Continue with aspirin and plavix  2. Target LDL below 70, follow-up with your family physician  3. Modify risk factors for stroke (blood pressure, cholesterol, diabetes, smoking cessation etc.. Control)  4. Report any new symptoms  5. Follow up in 6 month or sooner if needed. 6. Call if any questions or concerns. Please call if any questions.      Donavon Wyman MD

## 2020-08-14 NOTE — PATIENT INSTRUCTIONS
1. Continue with aspirin and plavix  2. Target LDL below 70, follow-up with your family physician  3. Modify risk factors for stroke (blood pressure, cholesterol, diabetes, smoking cessation etc.. Control)  4. Report any new symptoms  5. Follow up in 6 month or sooner if needed. 6. Call if any questions or concerns.

## 2020-08-18 ENCOUNTER — OFFICE VISIT (OUTPATIENT)
Dept: FAMILY MEDICINE CLINIC | Age: 70
End: 2020-08-18
Payer: COMMERCIAL

## 2020-08-18 VITALS
DIASTOLIC BLOOD PRESSURE: 80 MMHG | WEIGHT: 248 LBS | BODY MASS INDEX: 32.87 KG/M2 | RESPIRATION RATE: 20 BRPM | SYSTOLIC BLOOD PRESSURE: 138 MMHG | HEIGHT: 73 IN | HEART RATE: 84 BPM | TEMPERATURE: 97.9 F

## 2020-08-18 PROCEDURE — 4040F PNEUMOC VAC/ADMIN/RCVD: CPT | Performed by: NURSE PRACTITIONER

## 2020-08-18 PROCEDURE — 99214 OFFICE O/P EST MOD 30 MIN: CPT | Performed by: NURSE PRACTITIONER

## 2020-08-18 PROCEDURE — G8417 CALC BMI ABV UP PARAM F/U: HCPCS | Performed by: NURSE PRACTITIONER

## 2020-08-18 PROCEDURE — 4004F PT TOBACCO SCREEN RCVD TLK: CPT | Performed by: NURSE PRACTITIONER

## 2020-08-18 PROCEDURE — 3017F COLORECTAL CA SCREEN DOC REV: CPT | Performed by: NURSE PRACTITIONER

## 2020-08-18 PROCEDURE — 1123F ACP DISCUSS/DSCN MKR DOCD: CPT | Performed by: NURSE PRACTITIONER

## 2020-08-18 PROCEDURE — G8427 DOCREV CUR MEDS BY ELIG CLIN: HCPCS | Performed by: NURSE PRACTITIONER

## 2020-08-18 RX ORDER — ACYCLOVIR 800 MG/1
800 TABLET ORAL
Qty: 35 TABLET | Refills: 1 | Status: SHIPPED | OUTPATIENT
Start: 2020-08-18 | End: 2020-08-25

## 2020-08-18 RX ORDER — ALBUTEROL SULFATE 90 UG/1
2 AEROSOL, METERED RESPIRATORY (INHALATION) EVERY 6 HOURS PRN
Qty: 1 INHALER | Refills: 3 | Status: SHIPPED | OUTPATIENT
Start: 2020-08-18 | End: 2021-07-21 | Stop reason: SDUPTHER

## 2020-08-18 RX ORDER — EZETIMIBE 10 MG/1
10 TABLET ORAL DAILY
Qty: 30 TABLET | Refills: 3 | Status: SHIPPED | OUTPATIENT
Start: 2020-08-18 | End: 2020-12-09

## 2020-08-18 NOTE — PATIENT INSTRUCTIONS
Patient Education        Shingles: Care Instructions  Your Care Instructions     Shingles (herpes zoster) causes pain and a blistered rash. The rash can appear anywhere on the body but will be on only one side of the body, the left or right. It will be in a band, a strip, or a small area. The pain can be very severe. Shingles can also cause tingling or itching in the area of the rash. The blisters scab over after a few days and heal in 2 to 4 weeks. Medicines can help you feel better and may help prevent more serious problems caused by shingles. Shingles is caused by the same virus that causes chickenpox. When you have chickenpox, the virus gets into your nerve roots and stays there (becomes dormant) long after you get over the chickenpox. If the virus becomes active again, it can cause shingles. Follow-up care is a key part of your treatment and safety. Be sure to make and go to all appointments, and call your doctor if you are having problems. It's also a good idea to know your test results and keep a list of the medicines you take. How can you care for yourself at home? · Be safe with medicines. Take your medicines exactly as prescribed. Call your doctor if you think you are having a problem with your medicine. Antiviral medicine helps you get better faster. · Try not to scratch or pick at the blisters. They will crust over and fall off on their own if you leave them alone. · Put cool, wet cloths on the area to relieve pain and itching. You can also use calamine lotion. Try not to use so much lotion that it cakes and is hard to get off. · Put cornstarch or baking soda on the sores to help dry them out so they heal faster. · Do not use thick ointment, such as petroleum jelly, on the sores. This will keep them from drying and healing. · To help remove loose crusts, soak them in tap water. This can help decrease oozing, and dry and soothe the skin.   · Take an over-the-counter pain medicine, such as acetaminophen (Tylenol), ibuprofen (Advil, Motrin), or naproxen (Aleve). Read and follow all instructions on the label. · Avoid close contact with people until the blisters have healed. It is very important for you to avoid contact with anyone who has never had chickenpox or the chickenpox vaccine. Pregnant women, young babies, and anyone else who has a hard time fighting infection (such as someone with HIV, diabetes, or cancer) is especially at risk. When should you call for help? Call your doctor now or seek immediate medical care if:  · You have a new or higher fever. · You have a severe headache and a stiff neck. · You lose the ability to think clearly. · The rash spreads to your forehead, nose, eyes, or eyelids. · You have eye pain, or your vision gets worse. · You have new pain in your face, or you cannot move the muscles in your face. · Blisters spread to new parts of your body. Watch closely for changes in your health, and be sure to contact your doctor if:  · The rash has not healed after 2 to 4 weeks. · You still have pain after the rash has healed. Where can you learn more? Go to https://[email or web address] and sign in to your [employer] account. Becca Freire in the [facility] box to learn more about \"Shingles: Care Instructions. \"     If you do not have an account, please click on the \"Sign Up Now\" link. Current as of: February 11, 2020               Content Version: 12.5  © 2006-2020 [employer]. Care instructions adapted under license by [facility] ([facility]). If you have questions about a medical condition or this instruction, always ask your healthcare professional. [address] any warranty or liability for your use of this information.

## 2020-08-20 ASSESSMENT — ENCOUNTER SYMPTOMS: BACK PAIN: 1

## 2020-08-20 NOTE — PROGRESS NOTES
[address] [place] [id number]  Dept: [phone]  Dept Fax: [phone]  Loc: [phone]  PROGRESS NOTE      VisitDate: 8/18/2020    Evette Patel is a 81 y.o. male who presents today for:     Chief Complaint   Patient presents with    3 Month Follow-Up         Subjective:  Routine 3 months/refills. Recently refilled Xanax. requesting refill of albuterol. Patient complains of shingles left mid back. reports that he has been under increased stressors lately. History of CVA, ETO H use, smoker, GERD, insomnia, chronic arthritis/low back pain, anxiety/depression. Patient reports that he is no longer drinking alcohol or smoking. Reports that he walks daily. Denies any fever, headache, dizziness, syncope, chest pain, shortness of breath, abdominal pain or edema. Reports that he had a recent consult with neurology and they recommended possible statin therapy for his mildly elevated cholesterol. Review of Systems   Musculoskeletal: Positive for arthralgias and back pain. Psychiatric/Behavioral: Positive for decreased concentration and sleep disturbance. The patient is nervous/anxious. All other systems reviewed and are negative.     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 Albert Cardenas     Family History   Problem Relation Age of Onset    Dementia Mother     Heart Disease Father     High Blood Pressure Sister     Cancer Brother         testicular cancer     Social History     Tobacco Use    Smoking status: Current Every Day Smoker     Packs/day: 0.50     Years: 30.00     Pack years: 15.00     Types: Cigarettes     Start date: 7/16/1968    Smokeless tobacco: Never Used   Substance Use Topics    Alcohol use: Not Currently     Alcohol/week: 42.0 standard drinks     Types: 42 Cans of beer per week     Comment: States last beer was in 3/2018      Current Outpatient Medications   Medication Sig Dispense Refill    albuterol sulfate HFA (PROAIR HFA) 108 (90 Base) MCG/ACT inhaler Inhale 2 puffs into the lungs every 6 hours as needed for Wheezing 1 Inhaler 3    ezetimibe (ZETIA) 10 MG tablet Take 1 tablet by mouth daily 30 tablet 3    acyclovir (ZOVIRAX) 800 MG tablet Take 1 tablet by mouth 5 times daily for 7 days 35 tablet 1    clopidogrel (PLAVIX) 75 MG tablet take 1 tablet by mouth once daily 30 tablet 11    ALPRAZolam (XANAX) 0.5 MG tablet take 1 tablet by mouth twice a day if needed for anxiety 60 tablet 0    cetirizine (ZYRTEC) 10 MG tablet Take 1 tablet by mouth daily 30 tablet 5    pantoprazole (PROTONIX) 40 MG tablet take 1 tablet by mouth once daily 90 tablet 2    albuterol (PROVENTIL) (2.5 MG/3ML) 0.083% nebulizer solution Take 3 mLs by nebulization every 6 hours as needed for Wheezing 120 each 3    aspirin 81 MG chewable tablet Take 81 mg by mouth daily      Respiratory Therapy Supplies (NEBULIZER/TUBING/MOUTHPIECE) KIT 1 kit by Does not apply route daily as needed (cough, wheeze) Diagnosis:RAD and Bronchitis (Patient not taking: Reported on 8/14/2020) 1 kit 0     No current facility-administered medications for this visit.       Allergies   Allergen Reactions    Tape [[employer] Maintenance   Topic Date Due    AAA screen  1950    Hepatitis C screen  1950    Shingles Vaccine (1 of 2) 11/04/2002    Diabetes screen  12/25/2019    Pneumococcal 65+ years Vaccine (2 of 2 - PPSV23) 04/12/2020    Flu vaccine (1) 09/01/2020    Colon cancer screen colonoscopy  11/29/2020    DTaP/Tdap/Td vaccine (2 - Td) 10/01/2024    Lipid screen  07/20/2025    Hepatitis A vaccine  Aged Out    Hepatitis B vaccine  Aged Out    Hib vaccine  Aged Out    Meningococcal (ACWY) vaccine  Aged Out         Objective:     Physical Exam  Vitals signs and nursing note reviewed. Constitutional:       Appearance: Normal appearance. He is well-developed. He is not diaphoretic. HENT:      Head: Normocephalic and atraumatic. Not macrocephalic and not microcephalic. Right Ear: Hearing, tympanic membrane, ear canal and external ear normal. No drainage or tenderness. No middle ear effusion. No hemotympanum. Tympanic membrane is not injected, scarred, perforated or bulging. Left Ear: Hearing, tympanic membrane, ear canal and external ear normal. No drainage or tenderness. No middle ear effusion. No hemotympanum. Tympanic membrane is not injected, scarred, perforated or bulging. Nose: Nose normal. No nasal deformity, septal deviation, mucosal edema or rhinorrhea. Right Sinus: No maxillary sinus tenderness or frontal sinus tenderness. Left Sinus: No maxillary sinus tenderness or frontal sinus tenderness. Mouth/Throat:      Mouth: No oral lesions. Dentition: Normal dentition. Does not have dentures. No dental caries or dental abscesses. Pharynx: Oropharynx is clear. No oropharyngeal exudate or posterior oropharyngeal erythema. Tonsils: No tonsillar abscesses. Eyes:      General: Lids are normal. No scleral icterus. Extraocular Movements: Extraocular movements intact. Right eye: Normal extraocular motion. Left eye: Normal extraocular motion. Conjunctiva/sclera: Conjunctivae normal.      Right eye: Right conjunctiva is not injected. Left eye: Left conjunctiva is not injected. Pupils: Pupils are equal, round, and reactive to light. Neck:      Musculoskeletal: Normal range of motion and neck supple. Normal range of motion. No edema or erythema. Thyroid: No thyroid mass or thyromegaly. Vascular: No carotid bruit or JVD. Trachea: Trachea normal.   Cardiovascular:      Rate and Rhythm: Normal rate and regular rhythm. Heart sounds: Normal heart sounds, S1 normal and S2 normal. No murmur. No friction rub. No gallop. Pulmonary:      Effort: Pulmonary effort is normal. No respiratory distress. Breath sounds: Normal breath sounds. No wheezing, rhonchi or rales. Chest:      Chest wall: No tenderness. Abdominal:      General: Bowel sounds are normal.      Palpations: Abdomen is soft. There is no hepatomegaly, splenomegaly or mass. Tenderness: There is no guarding or rebound. Hernia: No hernia is present. There is no hernia in the ventral area or left inguinal area. Musculoskeletal: Normal range of motion. General: No tenderness. Lymphadenopathy:      Head:      Right side of head: No submental, submandibular, tonsillar, preauricular, posterior auricular or occipital adenopathy. Left side of head: No submental, submandibular, tonsillar, preauricular, posterior auricular or occipital adenopathy. Cervical: No cervical adenopathy. Right cervical: No superficial, deep or posterior cervical adenopathy. Left cervical: No superficial, deep or posterior cervical adenopathy. Upper Body:      Right upper body: No supraclavicular or pectoral adenopathy. Left upper body: No supraclavicular or pectoral adenopathy. Skin:     General: Skin is warm and dry. Coloration: Skin is not pale. Findings: Erythema and rash present. No bruising, ecchymosis, laceration or lesion. Rash is vesicular. Nails: There is no clubbing. Neurological:      General: No focal deficit present.       Mental Status: He is alert and oriented to person, place, and time. Cranial Nerves: No cranial nerve deficit. Motor: No abnormal muscle tone. Coordination: Coordination normal.      Deep Tendon Reflexes: Reflexes normal.   Psychiatric:         Mood and Affect: Mood normal.         Speech: Speech normal.         Behavior: Behavior normal.         Thought Content: Thought content normal.         Judgment: Judgment normal.       /80   Pulse 84   Temp 97.9 °F (36.6 °C) (Infrared)   Resp 20   Ht 6' 1\" (1.854 m)   Wt 248 lb (112.5 kg)   BMI 32.72 kg/m²       Impression/Plan:  1. Primary osteoarthritis involving multiple joints    2. Sleep disturbance    3. Primary insomnia    4. Mixed hyperlipidemia    5. Hx of ischemic vertebrobasilar artery thalamic stroke    6. Herpes zoster without complication    7. History of tobacco use    8.  History of ETOH abuse      Requested Prescriptions     Signed Prescriptions Disp Refills    albuterol sulfate HFA (PROAIR HFA) 108 (90 Base) MCG/ACT inhaler 1 Inhaler 3     Sig: Inhale 2 puffs into the lungs every 6 hours as needed for Wheezing    ezetimibe (ZETIA) 10 MG tablet 30 tablet 3     Sig: Take 1 tablet by mouth daily    acyclovir (ZOVIRAX) 800 MG tablet 35 tablet 1     Sig: Take 1 tablet by mouth 5 times daily for 7 days     Orders Placed This Encounter   Procedures    AST(SGOT) & ALT(SGPT)     Standing Status:   Future     Standing Expiration Date:   8/18/2021    Lipid Panel     Standing Status:   Future     Standing Expiration Date:   8/18/2021     Order Specific Question:   Is Patient Fasting?/# of Hours     Answer:   yes/12     Component      Latest Ref Rng & Units 7/27/2020 7/20/2020 7/20/2020           7:08 AM  3:40 PM  3:40 PM   Cholesterol      150 - 200 mg/dL   205 (H)   Triglycerides      27 - 150 mg/dL   178 (H)   HDL Cholesterol      >39 mg/dL   38 (L)   LDL Calculated      <130 mg/dL   131 (H)   VLDL Cholesterol Calculated      0 - 30 mg/dL   36 (H)   LDl/HDL Ratio      <3.5   3.4   Chol/HDL Ratio      1.0 - 5.0   5.4 (H)   Sed Rate      0 - 20 mm/h  12    POC CREATININE WHOLE BLOOD      0.5 - 1.2 mg/dl 1.0     Reviewed with patient    Patient giveneducational materials - see patient instructions. Discussed use, benefit, and side effects of prescribed medications. All patient questions answered. Pt voiced understanding. Reviewed health maintenance. Patient agreedwith treatment plan. Follow up as directed. For lipidemia information provided to patient low-fat high-fiber diet. Repeat AST ALT and FLP in 3 months. Recently refilled Xanax. Zetia 10 mg 1 p.o. daily. Patient reports intolerance to previous statin trials. Continue medications as prescribed.  Educational information on above diagnosis  provided per AVS.    Electronically signed by MANE Woods CNP on 8/20/2020 at 8:28 AM

## 2020-09-04 RX ORDER — ALPRAZOLAM 0.5 MG/1
TABLET ORAL
Qty: 60 TABLET | Refills: 2 | Status: SHIPPED | OUTPATIENT
Start: 2020-09-04 | End: 2020-11-03 | Stop reason: SDUPTHER

## 2020-09-24 RX ORDER — PANTOPRAZOLE SODIUM 40 MG/1
TABLET, DELAYED RELEASE ORAL
Qty: 90 TABLET | Refills: 3 | Status: SHIPPED | OUTPATIENT
Start: 2020-09-24 | End: 2021-10-03

## 2020-09-29 ENCOUNTER — TELEPHONE (OUTPATIENT)
Dept: FAMILY MEDICINE CLINIC | Age: 70
End: 2020-09-29

## 2020-09-29 LAB
ALT SERPL-CCNC: 17 U/L (ref 0–40)
AST SERPL-CCNC: 14 U/L (ref 0–41)
CHOLESTEROL/HDL RATIO: 5.1 (ref 1–5)
CHOLESTEROL: 153 MG/DL (ref 150–200)
HDLC SERPL-MCNC: 30 MG/DL
LDL CHOLESTEROL CALCULATED: 88 MG/DL
LDL/HDL RATIO: 2.9
TRIGL SERPL-MCNC: 176 MG/DL (ref 27–150)
VLDLC SERPL CALC-MCNC: 35 MG/DL (ref 0–30)

## 2020-09-29 NOTE — TELEPHONE ENCOUNTER
----- Message from MANE Beasley CNP sent at 9/29/2020  7:59 AM EDT -----  Flp much improved cholesterol 153. Continue current meds high-fiber low-fat diet.

## 2020-11-03 ENCOUNTER — OFFICE VISIT (OUTPATIENT)
Dept: FAMILY MEDICINE CLINIC | Age: 70
End: 2020-11-03
Payer: COMMERCIAL

## 2020-11-03 VITALS
RESPIRATION RATE: 16 BRPM | HEIGHT: 73 IN | HEART RATE: 76 BPM | SYSTOLIC BLOOD PRESSURE: 116 MMHG | BODY MASS INDEX: 32.74 KG/M2 | DIASTOLIC BLOOD PRESSURE: 76 MMHG | WEIGHT: 247 LBS | TEMPERATURE: 97.7 F

## 2020-11-03 PROBLEM — R07.9 CHEST PAIN: Status: RESOLVED | Noted: 2020-11-03 | Resolved: 2020-11-03

## 2020-11-03 LAB — HBA1C MFR BLD: 5.5 %

## 2020-11-03 PROCEDURE — 3017F COLORECTAL CA SCREEN DOC REV: CPT | Performed by: NURSE PRACTITIONER

## 2020-11-03 PROCEDURE — 90472 IMMUNIZATION ADMIN EACH ADD: CPT | Performed by: NURSE PRACTITIONER

## 2020-11-03 PROCEDURE — 4004F PT TOBACCO SCREEN RCVD TLK: CPT | Performed by: NURSE PRACTITIONER

## 2020-11-03 PROCEDURE — 99214 OFFICE O/P EST MOD 30 MIN: CPT | Performed by: NURSE PRACTITIONER

## 2020-11-03 PROCEDURE — 1123F ACP DISCUSS/DSCN MKR DOCD: CPT | Performed by: NURSE PRACTITIONER

## 2020-11-03 PROCEDURE — 90471 IMMUNIZATION ADMIN: CPT | Performed by: NURSE PRACTITIONER

## 2020-11-03 PROCEDURE — 83036 HEMOGLOBIN GLYCOSYLATED A1C: CPT | Performed by: NURSE PRACTITIONER

## 2020-11-03 PROCEDURE — 90694 VACC AIIV4 NO PRSRV 0.5ML IM: CPT | Performed by: NURSE PRACTITIONER

## 2020-11-03 PROCEDURE — G8427 DOCREV CUR MEDS BY ELIG CLIN: HCPCS | Performed by: NURSE PRACTITIONER

## 2020-11-03 PROCEDURE — G8484 FLU IMMUNIZE NO ADMIN: HCPCS | Performed by: NURSE PRACTITIONER

## 2020-11-03 PROCEDURE — 4040F PNEUMOC VAC/ADMIN/RCVD: CPT | Performed by: NURSE PRACTITIONER

## 2020-11-03 PROCEDURE — 90732 PPSV23 VACC 2 YRS+ SUBQ/IM: CPT | Performed by: NURSE PRACTITIONER

## 2020-11-03 PROCEDURE — G8417 CALC BMI ABV UP PARAM F/U: HCPCS | Performed by: NURSE PRACTITIONER

## 2020-11-03 RX ORDER — TRAMADOL HYDROCHLORIDE 50 MG/1
50 TABLET ORAL EVERY 4 HOURS PRN
Qty: 42 TABLET | Refills: 0 | Status: SHIPPED | OUTPATIENT
Start: 2020-11-03 | End: 2021-01-19 | Stop reason: SDUPTHER

## 2020-11-03 RX ORDER — ALPRAZOLAM 0.5 MG/1
TABLET ORAL
Qty: 60 TABLET | Refills: 2 | Status: SHIPPED | OUTPATIENT
Start: 2020-11-03 | End: 2021-02-01

## 2020-11-03 RX ORDER — ZOLPIDEM TARTRATE 10 MG/1
10 TABLET ORAL NIGHTLY PRN
Qty: 90 TABLET | Refills: 1 | Status: SHIPPED | OUTPATIENT
Start: 2020-11-03 | End: 2021-01-19

## 2020-11-03 NOTE — PROGRESS NOTES
[address] [facility]  [address] [facility] [place] [address]  Dept: [phone]  Dept Fax: [phone]  Loc: [phone]  PROGRESS NOTE      VisitDate: 11/3/2020    Tamar Novak is a 81 y.o. male who presents today for:     Chief Complaint   Patient presents with    3 Month Follow-Up     He is trying to quit smoking, it increases his appetite.  Injections     Flu shot and pneumovax         Subjective:  Routine 3-month follow-up. Refill of Xanax and Ambien. Patient requesting a orthopedic referral to [facility] for chronic right knee pain. History of CVA, ED, EtOH tobacco use, GERD, insomnia, chronic low back pain, anxiety/depression. Reports mood stable. Currently smoking half pack a day. Attempting to quit. Denies any fever, illness, headache, dizziness, syncope, chest pain, shortness of breath, abdominal pain, rash or edema. Patient in agreement to update flu vaccination as well as pneumovax. Review of Systems   Musculoskeletal: Positive for arthralgias and gait problem. Psychiatric/Behavioral: Positive for decreased concentration, dysphoric mood and sleep disturbance. The patient is nervous/anxious. All other systems reviewed and are negative.     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 Clara Douglass     Family History   Problem Relation Age of Onset    Dementia Mother     Heart Disease Father     High Blood Pressure Sister     Cancer Brother         testicular cancer     Social History     Tobacco Use    Smoking status: Current Every Day Smoker     Packs/day: 0.50     Years: 30.00     Pack years: 15.00     Types: Cigarettes     Start date: 7/16/1968    Smokeless tobacco: Never Used   Substance Use Topics    Alcohol use: Not Currently     Alcohol/week: 42.0 standard drinks     Types: 42 Cans of beer per week     Comment: States last beer was in 3/2018      Current Outpatient Medications   Medication Sig Dispense Refill    ALPRAZolam (XANAX) 0.5 MG tablet take 1 tablet by mouth twice a day if needed for anxiety 60 tablet 2    zolpidem (AMBIEN) 10 MG tablet Take 1 tablet by mouth nightly as needed for Sleep for up to 90 days. 90 tablet 1    traMADol (ULTRAM) 50 MG tablet Take 1 tablet by mouth every 4 hours as needed for Pain for up to 7 days. Intended supply: 7 days. Take lowest dose possible to manage pain 42 tablet 0    pantoprazole (PROTONIX) 40 MG tablet take 1 tablet by mouth once daily 90 tablet 3    albuterol sulfate HFA (PROAIR HFA) 108 (90 Base) MCG/ACT inhaler Inhale 2 puffs into the lungs every 6 hours as needed for Wheezing 1 Inhaler 3    ezetimibe (ZETIA) 10 MG tablet Take 1 tablet by mouth daily 30 tablet 3    clopidogrel (PLAVIX) 75 MG tablet take 1 tablet by mouth once daily 30 tablet 11    cetirizine (ZYRTEC) 10 MG tablet Take 1 tablet by mouth daily 30 tablet 5    albuterol (PROVENTIL) (2.5 MG/3ML) 0.083% nebulizer solution Take 3 mLs by nebulization every 6 hours as needed for Wheezing 120 each 3    Respiratory Therapy Supplies (NEBULIZER/TUBING/MOUTHPIECE) KIT 1 kit by Does not apply route daily as needed (cough, wheeze) Diagnosis:RAD and Bronchitis 1 kit 0    aspirin 81 MG chewable tablet Take 81 mg by mouth daily       No current facility-administered medications for this visit.       Allergies   Allergen Reactions    [address] [facility] Maintenance   Topic Date Due    AAA screen  1950    Hepatitis C screen  1950    Shingles Vaccine (1 of 2) 11/04/2002    Colon cancer screen colonoscopy  11/29/2020    Diabetes screen  11/03/2023    DTaP/Tdap/Td vaccine (2 - Td) 10/01/2024    Lipid screen  09/28/2025    Flu vaccine  Completed    Pneumococcal 65+ years Vaccine  Completed    Hepatitis A vaccine  Aged Out    Hepatitis B vaccine  Aged Out    Hib vaccine  Aged Out    Meningococcal (ACWY) vaccine  Aged Out         Objective:     Physical Exam  Vitals signs and nursing note reviewed. Constitutional:       Appearance: Normal appearance. HENT:      Head: Normocephalic. Right Ear: Tympanic membrane normal.      Left Ear: Tympanic membrane normal.      Nose: Nose normal.      Mouth/Throat:      Pharynx: Oropharynx is clear. Eyes:      Conjunctiva/sclera: Conjunctivae normal.      Pupils: Pupils are equal, round, and reactive to light. Neck:      Musculoskeletal: Normal range of motion. Cardiovascular:      Rate and Rhythm: Normal rate and regular rhythm. Pulses: Normal pulses. Heart sounds: Normal heart sounds. Pulmonary:      Effort: Pulmonary effort is normal.      Breath sounds: Normal breath sounds. Abdominal:      General: Bowel sounds are normal.      Palpations: Abdomen is soft. Musculoskeletal:      Right knee: He exhibits decreased range of motion, swelling, abnormal alignment, bony tenderness and abnormal meniscus. He exhibits no erythema, no LCL laxity and no MCL laxity. Tenderness found. Medial joint line, lateral joint line and patellar tendon tenderness noted. Skin:     General: Skin is warm. Capillary Refill: Capillary refill takes 2 to 3 seconds. Neurological:      General: No focal deficit present. Mental Status: He is alert and oriented to person, place, and time.    Psychiatric:         Mood and Affect: Mood normal.         Behavior: Behavior normal.         Thought Content: Thought content normal.         Judgment: Judgment normal.       /76   Pulse 76   Temp 97.7 °F (36.5 °C) (Temporal)   Resp 16   Ht 6' 1\" (1.854 m)   Wt 247 lb (112 kg)   BMI 32.59 kg/m²       Impression/Plan:  1. Primary osteoarthritis involving multiple joints    2. Anxiety    3. Sleep disturbance    4. Mixed hyperlipidemia    5. History of tobacco use    6. History of ETOH abuse    7. Hx of ischemic vertebrobasilar artery thalamic stroke    8. Primary insomnia    9. Chronic pain of right knee    10. Hyperglycemia      Requested Prescriptions     Signed Prescriptions Disp Refills    ALPRAZolam (XANAX) 0.5 MG tablet 60 tablet 2     Sig: take 1 tablet by mouth twice a day if needed for anxiety    zolpidem (AMBIEN) 10 MG tablet 90 tablet 1     Sig: Take 1 tablet by mouth nightly as needed for Sleep for up to 90 days.  traMADol (ULTRAM) 50 MG tablet 42 tablet 0     Sig: Take 1 tablet by mouth every 4 hours as needed for Pain for up to 7 days. Intended supply: 7 days. Take lowest dose possible to manage pain     Orders Placed This Encounter   Procedures    INFLUENZA, QUADV, ADJUVANTED, 72 YRS =, IM, PF, PREFILL SYR, 0.5ML (FLUAD)    Pneumococcal polysaccharide vaccine 23-valent greater than or equal to 1yo subcutaneous/IM    Amb External Referral To Orthopedic Surgery     Referral Priority:   Routine     Referral Type:   Consult for Advice and Opinion     Requested Specialty:   Orthopedic Surgery     Number of Visits Requested:   1    POCT glycosylated hemoglobin (Hb A1C)   A1c 5.5 in office    Patient giveneducational materials - see patient instructions. Discussed use, benefit, and side effects of prescribed medications. All patient questions answered. Pt voiced understanding. Reviewed health maintenance. Patient agreedwith treatment plan. Follow up as directed. Continue medications as prescribed.  Educational information on above diagnosis  provided per AVS.  Knee arthritis exercises provided. OARRS report reviewed. Prescription for tramadol also provided. Ambien and Xanax refilled. Pneumovax flu vaccine updated.   Follow-up in 3 months       Electronically signed by MANE Ayers CNP on 11/3/2020 at 11:42 AM

## 2020-11-18 ENCOUNTER — TELEPHONE (OUTPATIENT)
Dept: FAMILY MEDICINE CLINIC | Age: 70
End: 2020-11-18

## 2020-11-18 NOTE — TELEPHONE ENCOUNTER
Referral is made to Dr Denise Zaman in [facility]. Appt will be 12-23-20 at 10:30 am. Be there at 10:10. He must come alone and wear a mask. Bring all insurance information and any copay he may have. Left VM for him to return call to be informed.      [facility] [address], [address]

## 2020-12-09 RX ORDER — EZETIMIBE 10 MG/1
TABLET ORAL
Qty: 30 TABLET | Refills: 5 | Status: SHIPPED | OUTPATIENT
Start: 2020-12-09 | End: 2021-06-07

## 2021-01-19 ENCOUNTER — OFFICE VISIT (OUTPATIENT)
Dept: FAMILY MEDICINE CLINIC | Age: 71
End: 2021-01-19
Payer: COMMERCIAL

## 2021-01-19 VITALS
SYSTOLIC BLOOD PRESSURE: 124 MMHG | BODY MASS INDEX: 33.93 KG/M2 | RESPIRATION RATE: 20 BRPM | TEMPERATURE: 97.3 F | HEIGHT: 73 IN | DIASTOLIC BLOOD PRESSURE: 70 MMHG | WEIGHT: 256 LBS | HEART RATE: 88 BPM

## 2021-01-19 DIAGNOSIS — Z12.5 SCREENING FOR PROSTATE CANCER: ICD-10-CM

## 2021-01-19 DIAGNOSIS — G89.29 CHRONIC PAIN OF RIGHT KNEE: ICD-10-CM

## 2021-01-19 DIAGNOSIS — E78.2 MIXED HYPERLIPIDEMIA: ICD-10-CM

## 2021-01-19 DIAGNOSIS — Z86.73 HX OF ISCHEMIC VERTEBROBASILAR ARTERY THALAMIC STROKE: ICD-10-CM

## 2021-01-19 DIAGNOSIS — M15.9 PRIMARY OSTEOARTHRITIS INVOLVING MULTIPLE JOINTS: Primary | ICD-10-CM

## 2021-01-19 DIAGNOSIS — F41.9 ANXIETY: Chronic | ICD-10-CM

## 2021-01-19 DIAGNOSIS — M25.561 CHRONIC PAIN OF RIGHT KNEE: ICD-10-CM

## 2021-01-19 DIAGNOSIS — F51.01 PRIMARY INSOMNIA: ICD-10-CM

## 2021-01-19 DIAGNOSIS — Z87.891 HISTORY OF TOBACCO USE: ICD-10-CM

## 2021-01-19 DIAGNOSIS — F10.11 HISTORY OF ETOH ABUSE: ICD-10-CM

## 2021-01-19 DIAGNOSIS — Z12.11 SCREEN FOR COLON CANCER: ICD-10-CM

## 2021-01-19 DIAGNOSIS — Z11.59 ENCOUNTER FOR HEPATITIS C SCREENING TEST FOR LOW RISK PATIENT: ICD-10-CM

## 2021-01-19 DIAGNOSIS — G47.9 SLEEP DISTURBANCE: ICD-10-CM

## 2021-01-19 PROCEDURE — 90694 VACC AIIV4 NO PRSRV 0.5ML IM: CPT | Performed by: NURSE PRACTITIONER

## 2021-01-19 PROCEDURE — 99214 OFFICE O/P EST MOD 30 MIN: CPT | Performed by: NURSE PRACTITIONER

## 2021-01-19 PROCEDURE — 90471 IMMUNIZATION ADMIN: CPT | Performed by: NURSE PRACTITIONER

## 2021-01-19 RX ORDER — ZOLPIDEM TARTRATE 10 MG/1
10 TABLET, FILM COATED ORAL NIGHTLY PRN
Qty: 90 TABLET | Refills: 1 | Status: SHIPPED | OUTPATIENT
Start: 2021-01-19 | End: 2021-04-27 | Stop reason: SDUPTHER

## 2021-01-19 RX ORDER — TRAMADOL HYDROCHLORIDE 50 MG/1
50 TABLET ORAL EVERY 4 HOURS PRN
Qty: 120 TABLET | Refills: 0 | Status: SHIPPED | OUTPATIENT
Start: 2021-01-19 | End: 2021-04-27 | Stop reason: SDUPTHER

## 2021-01-19 NOTE — PROGRESS NOTES
Immunizations Administered     Name Date Dose Route    Influenza, Quadv, adjuvanted, 65 yrs +, IM, PF (Fluad) 1/19/2021 0.5 mL Intramuscular    Site: Deltoid- Left    Lot: [id number]    NDC: 08929-007-25

## 2021-01-19 NOTE — PATIENT INSTRUCTIONS
Patient Education        Stopping Smoking: Care Instructions  Your Care Instructions     Cigarette smokers crave the nicotine in cigarettes. Giving it up is much harder than simply changing a habit. Your body has to stop craving the nicotine. It is hard to quit, but you can do it. There are many tools that people use to quit smoking. You may find that combining tools works best for you. There are several steps to quitting. First you get ready to quit. Then you get support to help you. After that, you learn new skills and behaviors to become a nonsmoker. For many people, a necessary step is getting and using medicine. Your doctor will help you set up the plan that best meets your needs. You may want to attend a smoking cessation program to help you quit smoking. When you choose a program, look for one that has proven success. Ask your doctor for ideas. You will greatly increase your chances of success if you take medicine as well as get counseling or join a cessation program.  Some of the changes you feel when you first quit tobacco are uncomfortable. Your body will miss the nicotine at first, and you may feel short-tempered and grumpy. You may have trouble sleeping or concentrating. Medicine can help you deal with these symptoms. You may struggle with changing your smoking habits and rituals. The last step is the tricky one: Be prepared for the smoking urge to continue for a time. This is a lot to deal with, but keep at it. You will feel better. Follow-up care is a key part of your treatment and safety. Be sure to make and go to all appointments, and call your doctor if you are having problems. It's also a good idea to know your test results and keep a list of the medicines you take. How can you care for yourself at home? · Ask your family, friends, and coworkers for support. You have a better chance of quitting if you have help and support. · Join a support group, such as Nicotine Anonymous, for people who are trying to quit smoking. · Consider signing up for a smoking cessation program, such as the American Lung Association's Freedom from Smoking program.  · Get text messaging support. Go to the website at www.smokefree. gov to sign up for the [facility] program.  · Set a quit date. Pick your date carefully so that it is not right in the middle of a big deadline or stressful time. Once you quit, do not even take a puff. Get rid of all ashtrays and lighters after your last cigarette. Clean your house and your clothes so that they do not smell of smoke. · Learn how to be a nonsmoker. Think about ways you can avoid those things that make you reach for a cigarette. ? Avoid situations that put you at greatest risk for smoking. For some people, it is hard to have a drink with friends without smoking. For others, they might skip a coffee break with coworkers who smoke. ? Change your daily routine. Take a different route to work or eat a meal in a different place. · Cut down on stress. Calm yourself or release tension by doing an activity you enjoy, such as reading a book, taking a hot bath, or gardening. · Talk to your doctor or pharmacist about nicotine replacement therapy, which replaces the nicotine in your body. You still get nicotine but you do not use tobacco. Nicotine replacement products help you slowly reduce the amount of nicotine you need. These products come in several forms, many of them available over-the-counter:  ? Nicotine patches  ? Nicotine gum and lozenges  ? Nicotine inhaler  · Ask your doctor about bupropion (Wellbutrin) or varenicline (Chantix), which are prescription medicines. They do not contain nicotine. They help you by reducing withdrawal symptoms, such as stress and anxiety. · Some people find hypnosis, acupuncture, and massage helpful for ending the smoking habit. · Eat a healthy diet and get regular exercise. Having healthy habits will help your body move past its craving for nicotine. · Be prepared to keep trying. Most people are not successful the first few times they try to quit. Do not get mad at yourself if you smoke again. Make a list of things you learned and think about when you want to try again, such as next week, next month, or next year. Where can you learn more? Go to https://AuthypeJelli.Apex Guard. org and sign in to your VoxFeed account. Enter G619 in the Pins box to learn more about \"Stopping Smoking: Care Instructions. \"     If you do not have an account, please click on the \"Sign Up Now\" link. Current as of: March 12, 2020               Content Version: 12.6  © 3692-1632 [employer]. Care instructions adapted under license by [facility] ([facility]). If you have questions about a medical condition or this instruction, always ask your healthcare professional. [place] [address] any warranty or liability for your use of this information. Patient Education        Learning About the COVID-19 Vaccine  Overview     The COVID-19 vaccine can help you avoid getting COVID-19, a disease caused by a new type of coronavirus. COVID-19 can cause pneumonia and even death. You may need two doses of the vaccine. And you might need \"booster\" doses later on to help you stay protected. The vaccine prevents most cases of COVID-19. But if you do still catch COVID-19, your symptoms will probably be less severe than if you hadn't gotten the vaccine. You can't get COVID-19 from the vaccine. The risk of serious problems from the vaccine is very low. And you might not have any side effects from the vaccine at all. If you do, they will probably be a lot like the common side effects of other vaccines. They include things like a slight fever, muscle aches, and soreness. These side effects don't last too long, and they can be treated if they bother you. Why is it a good idea to get the COVID-19 vaccine? The COVID-19 vaccine is one of the best ways to help stop the pandemic. Getting vaccinated as soon as you can will help protect you from the virus. It will also help you protect people around you from the viruspeople who could really be hurt. The COVID-19 vaccine is safe and effective. In fact, the risk of serious problems from COVID-19 is much higher than the risk of serious problems from the vaccine. So it's safer to get the vaccine than it is to catch COVID-19. Who should get the COVID-19 vaccine? Everyone who is able to get the vaccine should get it as soon as possible. The more people who get vaccinated, the better we'll be able to stop the spread of the virus. The vaccine is extra important for people who are at high risk. This includes people who may be exposed to COVID-19 more often because of their jobs. It also includes people who are at high risk for complications from TCLFI-54 if they catch it. Some examples of people at high risk include those who:  · Work in health care. · Are considered essential workers. · Have certain health conditions. · Are older than age 72. If you've already had COVID-19, you may still be able to catch it again. Getting the vaccine may provide extra protection. Where can you learn more? Go to https://chpepiceweb.healthThe Bully Tracker. org and sign in to your Priceza account. Enter 96 [id number] in the Sina box to learn more about \"Learning About the COVID-19 Vaccine. \"     If you do not have an account, please click on the \"Sign Up Now\" link. Current as of: December 18, 2020               Content Version: 12.7  © 1449-0134 Healthwise, iSites. Care instructions adapted under license by [facility] ([facility]). If you have questions about a medical condition or this instruction, always ask your healthcare professional. [address] any warranty or liability for your use of this information. Patient Education        COVID-19 Vaccine: Care Instructions  Overview     The COVID-19 vaccine can help you avoid getting COVID-19, a disease caused by a new type of coronavirus. COVID-19 can cause pneumonia and even death. You may need two doses of the vaccine. And you might need \"booster\" doses later on to help you stay protected. The vaccine prevents most cases of COVID-19. But if you do still catch COVID-19, your symptoms will probably be less severe than if you hadn't gotten the vaccine. You can't get COVID-19 from the vaccine. The risk of serious problems from the vaccine is very low. And you might not have any side effects from the vaccine at all. If you do, they will probably be a lot like the common side effects of other vaccines. They include things like a slight fever, muscle aches, and soreness. These side effects don't last too long, and they can be treated if they bother you. Follow-up care is a key part of your treatment and safety. Be sure to make and go to all appointments, and call your doctor if you are having problems. It's also a good idea to know your test results and keep a list of the medicines you take. How can you care for yourself at home? · If you have a sore arm or a slight fever after the vaccine, take an over-the-counter pain medicine, such as acetaminophen or ibuprofen. Read and follow all instructions on the label. Do not give aspirin to anyone younger than 20. It has been linked to Reye syndrome, a serious illness. · Put ice or a cold pack on the sore area for 10 to 20 minutes at a time. Put a thin cloth between the ice and your skin. · Continue to practice social distancing, wear a mask, and follow all the steps for good hand-washing. When should you call for help? Call 911 anytime you think you may need emergency care. For example, call if after getting the COVID-19 vaccine:    · You have symptoms of a severe reaction to the vaccine. Symptoms of a severe reaction may include:  ? Severe difficulty breathing. ? Sudden raised, red areas (hives) all over your body. ? Severe lightheadedness. Watch closely for changes in your health, and be sure to contact your doctor if you have any problems. Where can you learn more? Go to https://"SmartTurn[employer] a [employer]".Foradian. org and sign in to your eucl3D account. Enter C126 in the Zank box to learn more about \"COVID-19 Vaccine: Care Instructions. \"     If you do not have an account, please click on the \"Sign Up Now\" link. Current as of: December 18, 2020               Content Version: 12.7  © 4280-7412 Healthwise, Incorporated. Care instructions adapted under license by [facility] ([facility]). If you have questions about a medical condition or this instruction, always ask your healthcare professional. [address] any warranty or liability for your use of this information.

## 2021-01-20 ASSESSMENT — ENCOUNTER SYMPTOMS: BACK PAIN: 1

## 2021-01-20 NOTE — PROGRESS NOTES
[address] [place] [place] [place] [address]  Dept: [phone]  Dept Fax: [phone]  Loc: [phone]  PROGRESS NOTE      VisitDate: 1/19/2021    Radha Amaro is a 78 y.o. male who presents today for:     Chief Complaint   Patient presents with    3 Month Follow-Up     Anxiety, Insomnia, Hyperlipidemia, OA    Health Maintenance     due for Hep C screen, and colonoscopy    Medication Refill         Subjective:  Routine 3-month follow-up. Refills. Due for routine labs, hepatitis screen and Cologuard. Patient requesting influenza vaccination. Reports feeling well. Patient is currently consulting with Dr. Malinda Wahl for potential revision of previous total knee. History of ischemic CVA, ED, EtOH abuse, GERD, insomnia, chronic low back pain, osteoarthritis knees, smoker. Denies any fever, illness, dizziness, syncope, headache, chest pain, shortness of breath, abdominal pain, rash or edema. continues to refrain from alcohol use. reports mood stable. Review of Systems   Musculoskeletal: Positive for arthralgias, back pain and gait problem. Psychiatric/Behavioral: Positive for decreased concentration, dysphoric mood and sleep disturbance. The patient is nervous/anxious. All other systems reviewed and are negative.     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)     Erectile dysfunction     ETOH abuse     GERD (gastroesophageal reflux disease)     Insomnia     Low back pain     Major depressive disorder, recurrent ([facility] [address] 4/4/2018    Other headache syndromes 12/25/2016    Sciatica     right side strain      Past Surgical History:   Procedure Laterality Date    APPENDECTOMY      COLONOSCOPY      EKG 12-LEAD  7/17/2015         HERNIA REPAIR      umbilical    JOINT REPLACEMENT  08/03/2016    RTK    Marni Israel [address] [facility]   Topic Date Due    AAA screen  1950    Hepatitis C screen  1950    Shingles Vaccine (1 of 2) 11/04/2002    Colon cancer screen colonoscopy  11/29/2020    Diabetes screen  11/03/2023    DTaP/Tdap/Td vaccine (2 - Td) 10/01/2024    Lipid screen  09/28/2025    Flu vaccine  Completed    Pneumococcal 65+ years Vaccine  Completed    Hepatitis A vaccine  Aged Out    Hepatitis B vaccine  Aged Out    Hib vaccine  Aged Out    Meningococcal (ACWY) vaccine  Aged Out         Objective:     Physical Exam  Vitals signs and nursing note reviewed. Constitutional:       Appearance: Normal appearance. He is well-developed. He is not diaphoretic. HENT:      Head: Normocephalic and atraumatic. Not macrocephalic and not microcephalic. Right Ear: Hearing, tympanic membrane, ear canal and external ear normal. No drainage or tenderness. No middle ear effusion. No hemotympanum. Tympanic membrane is not injected, scarred, perforated or bulging. Left Ear: Hearing, tympanic membrane, ear canal and external ear normal. No drainage or tenderness. No middle ear effusion. No hemotympanum. Tympanic membrane is not injected, scarred, perforated or bulging. Nose: Nose normal. No nasal deformity, septal deviation, mucosal edema or rhinorrhea. Right Sinus: No maxillary sinus tenderness or frontal sinus tenderness. Left Sinus: No maxillary sinus tenderness or frontal sinus tenderness. Mouth/Throat:      Mouth: Mucous membranes are moist. No oral lesions. Dentition: Normal dentition. Does not have dentures. No dental caries or dental abscesses. Pharynx: Oropharynx is clear. No oropharyngeal exudate or posterior oropharyngeal erythema. Tonsils: No tonsillar abscesses. Eyes:      General: Lids are normal. No scleral icterus. Extraocular Movements:      Right eye: Normal extraocular motion. Left eye: Normal extraocular motion. Conjunctiva/sclera: Conjunctivae normal.      Right eye: Right conjunctiva is not injected. Left eye: Left conjunctiva is not injected. Pupils: Pupils are equal, round, and reactive to light. Neck:      Musculoskeletal: Normal range of motion and neck supple. Normal range of motion. No edema or erythema. Thyroid: No thyroid mass or thyromegaly. Vascular: No carotid bruit or JVD. Trachea: Trachea normal.   Cardiovascular:      Rate and Rhythm: Normal rate and regular rhythm. Pulses: Normal pulses. Heart sounds: Normal heart sounds, S1 normal and S2 normal. No murmur. No friction rub. No gallop. Pulmonary:      Effort: Pulmonary effort is normal. No respiratory distress. Breath sounds: Normal breath sounds. No wheezing, rhonchi or rales. Chest:      Chest wall: No tenderness. Abdominal:      General: Bowel sounds are normal.      Palpations: Abdomen is soft. There is no hepatomegaly, splenomegaly or mass. Tenderness: There is no guarding or rebound. Hernia: No hernia is present. There is no hernia in the ventral area or left inguinal area. Musculoskeletal: Normal range of motion. Right knee: Tenderness found. Medial joint line and lateral joint line tenderness noted. Left knee: Tenderness found. Medial joint line and lateral joint line tenderness noted. Lymphadenopathy:      Head:      Right side of head: No submental, submandibular, tonsillar, preauricular, posterior auricular or occipital adenopathy. Left side of head: No submental, submandibular, tonsillar, preauricular, posterior auricular or occipital adenopathy. Cervical: No cervical adenopathy. Right cervical: No superficial, deep or posterior cervical adenopathy. Left cervical: No superficial, deep or posterior cervical adenopathy. Upper Body:      Right upper body: No supraclavicular or pectoral adenopathy. Left upper body: No supraclavicular or pectoral adenopathy. Skin:     General: Skin is warm and dry. Coloration: Skin is not pale. Findings: No bruising, ecchymosis, laceration, lesion or rash. Nails: There is no clubbing. Neurological:      General: No focal deficit present. Mental Status: He is alert and oriented to person, place, and time. Cranial Nerves: No cranial nerve deficit. Motor: No abnormal muscle tone. Coordination: Coordination normal.      Deep Tendon Reflexes: Reflexes normal.   Psychiatric:         Mood and Affect: Mood normal.         Speech: Speech normal.         Behavior: Behavior normal.         Thought Content: Thought content normal.         Judgment: Judgment normal.       /70   Pulse 88   Temp 97.3 °F (36.3 °C) (Temporal)   Resp 20   Ht 6' 0.5\" (1.842 m)   Wt 256 lb (116.1 kg)   BMI 34.24 kg/m²       Impression/Plan:  1. Primary osteoarthritis involving multiple joints    2. Anxiety    3. Chronic pain of right knee    4. Sleep disturbance    5. History of ETOH abuse    6. History of tobacco use    7. Primary insomnia    8. Mixed hyperlipidemia    9. Hx of ischemic vertebrobasilar artery thalamic stroke    10. Screen for colon cancer    11. Encounter for hepatitis C screening test for low risk patient    12. Screening for prostate cancer      Requested Prescriptions     Signed Prescriptions Disp Refills    traMADol (ULTRAM) 50 MG tablet 120 tablet 0     Sig: Take 1 tablet by mouth every 4 hours as needed for Pain for up to 30 days. Take lowest dose possible to manage pain    AMBIEN 10 MG tablet 90 tablet 1     Sig: Take 1 tablet by mouth nightly as needed for Sleep for up to 90 days.      Orders Placed This Encounter   Procedures    Cologuard (For External Results Only)

## 2021-02-01 DIAGNOSIS — F41.9 ANXIETY: Chronic | ICD-10-CM

## 2021-02-01 RX ORDER — ALPRAZOLAM 0.5 MG/1
TABLET ORAL
Qty: 60 TABLET | Refills: 2 | Status: SHIPPED | OUTPATIENT
Start: 2021-02-01 | End: 2021-04-27 | Stop reason: SDUPTHER

## 2021-02-02 LAB
ABSOLUTE BASO #: 0.1 X10E9/L (ref 0–0.2)
ABSOLUTE EOS #: 0.4 X10E9/L (ref 0–0.4)
ABSOLUTE LYMPH #: 3 X10E9/L (ref 1–3.5)
ABSOLUTE MONO #: 0.8 X10E9/L (ref 0–0.9)
ABSOLUTE NEUT #: 6.6 X10E9/L (ref 1.5–6.6)
ALBUMIN SERPL-MCNC: 4.1 G/DL (ref 3.2–5.3)
ALK PHOSPHATASE: 73 U/L (ref 39–130)
ALT SERPL-CCNC: 14 U/L (ref 0–40)
ANION GAP SERPL CALCULATED.3IONS-SCNC: 6 MMOL/L (ref 5–15)
AST SERPL-CCNC: 13 U/L (ref 0–41)
BASOPHILS RELATIVE PERCENT: 0.6 %
BILIRUB SERPL-MCNC: 0.5 MG/DL (ref 0.3–1.2)
BUN BLDV-MCNC: 14 MG/DL (ref 5–27)
CALCIUM SERPL-MCNC: 9.4 MG/DL (ref 8.5–10.5)
CHLORIDE BLD-SCNC: 103 MMOL/L (ref 98–109)
CHOLESTEROL/HDL RATIO: 5.4 (ref 1–5)
CHOLESTEROL: 156 MG/DL (ref 150–200)
CO2: 29 MMOL/L (ref 22–32)
CREAT SERPL-MCNC: 1.11 MG/DL (ref 0.6–1.3)
EGFR AFRICAN AMERICAN: >60 ML/MIN/1.73SQ.M
EGFR IF NONAFRICAN AMERICAN: >60 ML/MIN/1.73SQ.M
EOSINOPHILS RELATIVE PERCENT: 3.9 %
GLUCOSE: 85 MG/DL (ref 65–99)
HCT VFR BLD CALC: 46.4 % (ref 39–49)
HDLC SERPL-MCNC: 29 MG/DL
HEMOGLOBIN: 16 G/DL (ref 13–17)
HEPATITIS C GENOTYPE: NOT DETECTED
LDL CHOLESTEROL CALCULATED: 90 MG/DL
LDL/HDL RATIO: 3.1
LYMPHOCYTE %: 27.7 %
MCH RBC QN AUTO: 31.6 PG (ref 27–34)
MCHC RBC AUTO-ENTMCNC: 34.5 G/DL (ref 32–36)
MCV RBC AUTO: 92 FL (ref 80–100)
MONOCYTES # BLD: 7.4 %
NEUTROPHILS RELATIVE PERCENT: 60.4 %
PDW BLD-RTO: 14.4 % (ref 11.5–15)
PLATELETS: 336 X10E9/L (ref 150–450)
PMV BLD AUTO: 8.8 FL (ref 7–12)
POTASSIUM SERPL-SCNC: 4 MMOL/L (ref 3.5–5)
PSA, ULTRASENSITIVE: 1.26 NG/ML (ref 0–4)
RBC: 5.06 X10E12/L (ref 4.1–5.7)
SODIUM BLD-SCNC: 138 MMOL/L (ref 134–146)
TOTAL PROTEIN: 7.3 G/DL (ref 6–8)
TRIGL SERPL-MCNC: 184 MG/DL (ref 27–150)
VLDLC SERPL CALC-MCNC: 37 MG/DL (ref 0–30)
WBC: 11 X10E9/L (ref 4–11)

## 2021-02-08 ENCOUNTER — TELEPHONE (OUTPATIENT)
Dept: FAMILY MEDICINE CLINIC | Age: 71
End: 2021-02-08

## 2021-02-08 NOTE — TELEPHONE ENCOUNTER
----- Message from Remington Beeradu, MANE - CNP sent at 2/8/2021  8:12 AM EST -----  BC, CMP, FLP, PSA within normal limits. Negative hepatitis C screen.

## 2021-02-10 ENCOUNTER — TELEPHONE (OUTPATIENT)
Dept: FAMILY MEDICINE CLINIC | Age: 71
End: 2021-02-10

## 2021-02-10 DIAGNOSIS — R19.5 POSITIVE COLORECTAL CANCER SCREENING USING COLOGUARD TEST: Primary | ICD-10-CM

## 2021-02-10 NOTE — TELEPHONE ENCOUNTER
----- Message from MANE Madison CNP sent at 2/10/2021  9:28 AM EST -----  Positive Cologuard.   Recommend consult with gastroenterology

## 2021-02-11 ENCOUNTER — TELEPHONE (OUTPATIENT)
Dept: FAMILY MEDICINE CLINIC | Age: 71
End: 2021-02-11

## 2021-02-11 DIAGNOSIS — Z01.818 PRE-OP TESTING: Primary | ICD-10-CM

## 2021-02-11 NOTE — TELEPHONE ENCOUNTER
VM left for pt advising him to have pre-op testing done prior to his appt on Monday, Roseonly message sent as well

## 2021-02-11 NOTE — TELEPHONE ENCOUNTER
Pt has pre-op appt scheduled 2/15/21 for a right total knee arthroplasty, femoral revision with Dr Jennifer Hill at [facility] on 3/11/21. He needs to have a CBC, PTT, PT/INR, BMP, A1C, and EKG for pre-op testing, orders pended if ok.      Pt will need notified of orders

## 2021-02-12 ENCOUNTER — HOSPITAL ENCOUNTER (OUTPATIENT)
Age: 71
Discharge: HOME | End: 2021-02-12
Payer: COMMERCIAL

## 2021-02-12 DIAGNOSIS — Z01.818 PRE-OP TESTING: ICD-10-CM

## 2021-02-12 LAB
ANION GAP SERPL CALCULATED.3IONS-SCNC: 10 MEQ/L (ref 8–16)
APTT: 31.6 SECONDS (ref 22–38)
AVERAGE GLUCOSE: 108 MG/DL (ref 70–126)
BASOPHILS # BLD: 0.9 %
BASOPHILS ABSOLUTE: 0.1 THOU/MM3 (ref 0–0.1)
BUN BLDV-MCNC: 12 MG/DL (ref 7–22)
CALCIUM SERPL-MCNC: 9.3 MG/DL (ref 8.5–10.5)
CHLORIDE BLD-SCNC: 102 MEQ/L (ref 98–111)
CO2: 28 MEQ/L (ref 23–33)
CREAT SERPL-MCNC: 1 MG/DL (ref 0.4–1.2)
EKG ATRIAL RATE: 67 BPM
EKG P AXIS: 57 DEGREES
EKG P-R INTERVAL: 170 MS
EKG Q-T INTERVAL: 408 MS
EKG QRS DURATION: 86 MS
EKG QTC CALCULATION (BAZETT): 431 MS
EKG R AXIS: 16 DEGREES
EKG T AXIS: 67 DEGREES
EKG VENTRICULAR RATE: 67 BPM
EOSINOPHIL # BLD: 7.2 %
EOSINOPHILS ABSOLUTE: 0.6 THOU/MM3 (ref 0–0.4)
ERYTHROCYTE [DISTWIDTH] IN BLOOD BY AUTOMATED COUNT: 13.9 % (ref 11.5–14.5)
ERYTHROCYTE [DISTWIDTH] IN BLOOD BY AUTOMATED COUNT: 47.6 FL (ref 35–45)
GFR SERPL CREATININE-BSD FRML MDRD: 74 ML/MIN/1.73M2
GLUCOSE BLD-MCNC: 96 MG/DL (ref 70–108)
HBA1C MFR BLD: 5.6 % (ref 4.4–6.4)
HCT VFR BLD CALC: 48.2 % (ref 42–52)
HEMOGLOBIN: 15.7 GM/DL (ref 14–18)
IMMATURE GRANS (ABS): 0.02 THOU/MM3 (ref 0–0.07)
IMMATURE GRANULOCYTES: 0.3 %
INR BLD: 0.96 (ref 0.85–1.13)
LYMPHOCYTES # BLD: 41.8 %
LYMPHOCYTES ABSOLUTE: 3.3 THOU/MM3 (ref 1–4.8)
MCH RBC QN AUTO: 30.5 PG (ref 26–33)
MCHC RBC AUTO-ENTMCNC: 32.6 GM/DL (ref 32.2–35.5)
MCV RBC AUTO: 93.8 FL (ref 80–94)
MONOCYTES # BLD: 8 %
MONOCYTES ABSOLUTE: 0.6 THOU/MM3 (ref 0.4–1.3)
NUCLEATED RED BLOOD CELLS: 0 /100 WBC
PLATELET # BLD: 299 THOU/MM3 (ref 130–400)
PMV BLD AUTO: 10.2 FL (ref 9.4–12.4)
POTASSIUM SERPL-SCNC: 3.8 MEQ/L (ref 3.5–5.2)
RBC # BLD: 5.14 MILL/MM3 (ref 4.7–6.1)
SEG NEUTROPHILS: 41.8 %
SEGMENTED NEUTROPHILS ABSOLUTE COUNT: 3.3 THOU/MM3 (ref 1.8–7.7)
SODIUM BLD-SCNC: 140 MEQ/L (ref 135–145)
WBC # BLD: 7.8 THOU/MM3 (ref 4.8–10.8)

## 2021-02-12 PROCEDURE — 36415 COLL VENOUS BLD VENIPUNCTURE: CPT

## 2021-02-12 PROCEDURE — 80048 BASIC METABOLIC PNL TOTAL CA: CPT

## 2021-02-12 PROCEDURE — 83036 HEMOGLOBIN GLYCOSYLATED A1C: CPT

## 2021-02-12 PROCEDURE — 93005 ELECTROCARDIOGRAM TRACING: CPT

## 2021-02-12 PROCEDURE — 85730 THROMBOPLASTIN TIME PARTIAL: CPT

## 2021-02-12 PROCEDURE — 93010 ELECTROCARDIOGRAM REPORT: CPT | Performed by: INTERNAL MEDICINE

## 2021-02-12 PROCEDURE — 85610 PROTHROMBIN TIME: CPT

## 2021-02-12 PROCEDURE — 85025 COMPLETE CBC W/AUTO DIFF WBC: CPT

## 2021-02-15 ENCOUNTER — OFFICE VISIT (OUTPATIENT)
Dept: FAMILY MEDICINE CLINIC | Age: 71
End: 2021-02-15
Payer: COMMERCIAL

## 2021-02-15 VITALS
HEIGHT: 73 IN | HEART RATE: 76 BPM | BODY MASS INDEX: 32.95 KG/M2 | WEIGHT: 248.6 LBS | RESPIRATION RATE: 18 BRPM | DIASTOLIC BLOOD PRESSURE: 70 MMHG | TEMPERATURE: 97 F | SYSTOLIC BLOOD PRESSURE: 128 MMHG

## 2021-02-15 DIAGNOSIS — Z01.818 PREOP EXAMINATION: Primary | ICD-10-CM

## 2021-02-15 PROCEDURE — 99242 OFF/OP CONSLTJ NEW/EST SF 20: CPT | Performed by: NURSE PRACTITIONER

## 2021-02-17 ASSESSMENT — ENCOUNTER SYMPTOMS: BACK PAIN: 1

## 2021-02-17 NOTE — PROGRESS NOTES
[address] [place] [facility] [address]  Dept: [phone]  Dept Fax: [phone]  Loc: [phone]  PROGRESS NOTE      VisitDate: 2/15/2021    Charo Palma is a 78 y.o. male who presents today for:     Chief Complaint   Patient presents with    Pre-op Exam     Right TKA 3/11/21 in [place], pre-op testing in epic         Subjective:  Patient presents for preop examination for right revision total knee 3/11/2021 new [place] See Dr. Eunice Kennedy. History of CVA, erectile dysfunction, EtOH abuse, GERD, insomnia, low back pain, osteoarthritis of the knees, depression, sciatica. Denies any fever or illness. Denies chest pain, shortness of breath, abdominal pain. Review of Systems   Musculoskeletal: Positive for arthralgias, back pain and gait problem. Psychiatric/Behavioral: The patient is nervous/anxious. All other systems reviewed and are negative.     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 Charmain Schwab     Family History   Problem Relation Age of Onset    Dementia Mother     Heart Disease Father     High Blood Pressure Sister     Cancer Brother         testicular cancer     Social History     Tobacco Use    Smoking status: Current Every Day Smoker     Packs/day: 0.50     Years: 30.00     Pack years: 15.00     Types: Cigarettes Start date: 7/16/1968    Smokeless tobacco: Never Used    Tobacco comment: printed to avs   Substance Use Topics    Alcohol use: Not Currently     Alcohol/week: 42.0 standard drinks     Types: 42 Cans of beer per week     Comment: States last beer was in 3/2018      Current Outpatient Medications   Medication Sig Dispense Refill    ALPRAZolam (XANAX) 0.5 MG tablet take 1 tablet by mouth twice a day if needed for anxiety 60 tablet 2    traMADol (ULTRAM) 50 MG tablet Take 1 tablet by mouth every 4 hours as needed for Pain for up to 30 days. Take lowest dose possible to manage pain 120 tablet 0    AMBIEN 10 MG tablet Take 1 tablet by mouth nightly as needed for Sleep for up to 90 days. 90 tablet 1    ezetimibe (ZETIA) 10 MG tablet take 1 tablet by mouth once daily 30 tablet 5    pantoprazole (PROTONIX) 40 MG tablet take 1 tablet by mouth once daily 90 tablet 3    albuterol sulfate HFA (PROAIR HFA) 108 (90 Base) MCG/ACT inhaler Inhale 2 puffs into the lungs every 6 hours as needed for Wheezing 1 Inhaler 3    clopidogrel (PLAVIX) 75 MG tablet take 1 tablet by mouth once daily 30 tablet 11    cetirizine (ZYRTEC) 10 MG tablet Take 1 tablet by mouth daily 30 tablet 5    albuterol (PROVENTIL) (2.5 MG/3ML) 0.083% nebulizer solution Take 3 mLs by nebulization every 6 hours as needed for Wheezing 120 each 3    Respiratory Therapy Supplies (NEBULIZER/TUBING/MOUTHPIECE) KIT 1 kit by Does not apply route daily as needed (cough, wheeze) Diagnosis:RAD and Bronchitis 1 kit 0    aspirin 81 MG chewable tablet Take 81 mg by mouth daily       No current facility-administered medications for this visit.       Allergies   Allergen Reactions    Tape Lynn Cuellar [address] [facility]   Topic Date Due    AAA screen  1950    COVID-19 Vaccine (1 of 2) 11/04/1968    Shingles Vaccine (1 of 2) 11/04/2002    Colon cancer screen fecal DNA test (Cologuard)  02/09/2024    DTaP/Tdap/Td vaccine (2 - Td) 10/01/2024  Lipid screen  02/01/2026    Flu vaccine  Completed    Pneumococcal 65+ years Vaccine  Completed    Hepatitis C screen  Completed    Hepatitis A vaccine  Aged Out    Hepatitis B vaccine  Aged Out    Hib vaccine  Aged Out    Meningococcal (ACWY) vaccine  Aged Out         Objective:     Physical Exam  Vitals signs and nursing note reviewed. Constitutional:       Appearance: Normal appearance. He is well-developed. He is obese. He is not diaphoretic. HENT:      Head: Normocephalic and atraumatic. Not macrocephalic and not microcephalic. Right Ear: Hearing, tympanic membrane, ear canal and external ear normal. No drainage or tenderness. No middle ear effusion. No hemotympanum. Tympanic membrane is not injected, scarred, perforated or bulging. Left Ear: Hearing, tympanic membrane, ear canal and external ear normal. No drainage or tenderness. No middle ear effusion. No hemotympanum. Tympanic membrane is not injected, scarred, perforated or bulging. Nose: Nose normal. No nasal deformity, septal deviation, mucosal edema or rhinorrhea. Right Sinus: No maxillary sinus tenderness or frontal sinus tenderness. Left Sinus: No maxillary sinus tenderness or frontal sinus tenderness. Mouth/Throat:      Mouth: Mucous membranes are dry. No oral lesions. Dentition: Normal dentition. Does not have dentures. No dental caries or dental abscesses. Pharynx: Oropharynx is clear. No oropharyngeal exudate or posterior oropharyngeal erythema. Tonsils: No tonsillar abscesses. Comments: Top dentures. No obvious loose teeth on the bottom. Eyes:      General: Lids are normal. No scleral icterus. Extraocular Movements:      Right eye: Normal extraocular motion. Left eye: Normal extraocular motion. Conjunctiva/sclera: Conjunctivae normal.      Right eye: Right conjunctiva is not injected. Left eye: Left conjunctiva is not injected. Pupils: Pupils are equal, round, and reactive to light. Neck:      Musculoskeletal: Normal range of motion and neck supple. Normal range of motion. No edema or erythema. Thyroid: No thyroid mass or thyromegaly. Vascular: No carotid bruit or JVD. Trachea: Trachea normal.   Cardiovascular:      Rate and Rhythm: Normal rate and regular rhythm. Pulses: Normal pulses. Heart sounds: Normal heart sounds, S1 normal and S2 normal. No murmur. No friction rub. No gallop. Pulmonary:      Effort: Pulmonary effort is normal. No respiratory distress. Breath sounds: Normal breath sounds. No wheezing, rhonchi or rales. Chest:      Chest wall: No tenderness. Abdominal:      General: Bowel sounds are normal.      Palpations: Abdomen is soft. There is no hepatomegaly, splenomegaly or mass. Tenderness: There is no guarding or rebound. Hernia: No hernia is present. There is no hernia in the ventral area or left inguinal area. Musculoskeletal: Normal range of motion. General: No tenderness. Lymphadenopathy:      Head:      Right side of head: No submental, submandibular, tonsillar, preauricular, posterior auricular or occipital adenopathy. Left side of head: No submental, submandibular, tonsillar, preauricular, posterior auricular or occipital adenopathy. Cervical: No cervical adenopathy. Right cervical: No superficial, deep or posterior cervical adenopathy. Left cervical: No superficial, deep or posterior cervical adenopathy. Upper Body:      Right upper body: No supraclavicular or pectoral adenopathy. Left upper body: No supraclavicular or pectoral adenopathy. Skin:     General: Skin is warm and dry. Coloration: Skin is not pale. Findings: No bruising, ecchymosis, laceration, lesion or rash. Nails: There is no clubbing. Neurological:      General: No focal deficit present. Mental Status: He is alert and oriented to person, place, and time. Cranial Nerves: No cranial nerve deficit. Motor: No abnormal muscle tone. Coordination: Coordination normal.      Deep Tendon Reflexes: Reflexes normal.   Psychiatric:         Mood and Affect: Mood normal.         Speech: Speech normal.         Behavior: Behavior normal.         Thought Content:  Thought content normal.         Judgment: Judgment normal.       /70 (Site: Right Upper Arm)   Pulse 76   Temp 97 °F (36.1 °C) (Infrared)   Resp 18   Ht 6' 1\" (1.854 m)   Wt 248 lb 9.6 oz (112.8 kg)   BMI 32.80 kg/m²     Component      Latest Ref Rng & Units 2/12/2021   WBC      4.8 - 10.8 thou/mm3 7.8   RBC      4.70 - 6.10 mill/mm3 5.14   Hemoglobin Quant      14.0 - 18.0 gm/dl 15.7   Hematocrit      42.0 - 52.0 % 48.2   MCV      80.0 - 94.0 fL 93.8   MCH      26.0 - 33.0 pg 30.5   MCHC      32.2 - 35.5 gm/dl 32.6   RDW-CV      11.5 - 14.5 % 13.9   RDW-SD      35.0 - 45.0 fL 47.6 (H)   Platelet Count      897 - 400 thou/mm3 299   MPV      9.4 - 12.4 fL 10.2   Seg Neutrophils      % 41.8   Lymphocytes      % 41.8   Monocytes      % 8.0   Eosinophils      % 7.2   Basophils      % 0.9   Immature Granulocytes      % 0.3   Segs Absolute      1.8 - 7.7 thou/mm3 3.3   Lymphocytes Absolute      1.0 - 4.8 thou/mm3 3.3   Monocytes Absolute      0.4 - 1.3 thou/mm3 0.6   Eosinophils Absolute      0.0 - 0.4 thou/mm3 0.6 (H)   Basophils Absolute      0.0 - 0.1 thou/mm3 0.1   Immature Grans (Abs)      0.00 - 0.07 thou/mm3 0.02   Nucleated Red Blood Cells      /100 wbc 0   Sodium      135 - 145 meq/L 140   Potassium      3.5 - 5.2 meq/L 3.8   Chloride      98 - 111 meq/L 102   CO2      23 - 33 meq/L 28   Glucose      70 - 108 mg/dL 96   BUN      7 - 22 mg/dL 12   Creatinine      0.4 - 1.2 mg/dL 1.0   Calcium      8.5 - 10.5 mg/dL 9.3   Hemoglobin A1C      4.4 - 6.4 % 5.6   AVERAGE GLUCOSE      70 - 126 mg/dL 108   aPTT 22.0 - 38.0 seconds 31.6   INR      0.85 - 1.13 0.96   Anion Gap      8.0 - 16.0 meq/L 10.0   Est, Glom Filt Rate      ml/min/1.73m2 74 (A)      Value Ref Range & Units Status Collected Lab   Ventricular Rate 67  BPM Final 02/12/2021  7:44    Atrial Rate 67  BPM Final 02/12/2021  7:44    P-R Interval 170  ms Final 02/12/2021  7:44    QRS Duration 86  ms Final 02/12/2021  7:44    Q-T Interval 408  ms Final 02/12/2021  7:44    QTc Calculation (Bazett) 431  ms Final 02/12/2021  7:44    P Axis 57  degrees Final 02/12/2021  7:44    R Axis 16  degrees Final 02/12/2021  7:44    T Axis 67  degrees Final 02/12/2021  7:44    Testing Performed By    Lab - 10 [facility]. Name Director Address Valid Date Range   184-Unknown [facility] Unknown Unknown 05/03/12 1018-Present   Narrative & Impression    Normal sinus rhythm  Normal ECG  When compared with ECG of 15-MAY-2018 07:19,  No significant change was found  Confirmed by Marilou Bernal MD, Zulma Gimenez ([id number]) on 2/12/2021 6:29:01 PM   Scans on Order [id number]    Scan on 2/12/2021  6:29 PMScan on 2/12/2021  6:29 PM       Lab and Collection    EKG 12 lead - 2/12/2021  Result History    EKG 12 lead on 2/12/2021 - Result Edited   Order Report    Order Details    Impression/Plan:  1. Preop examination      Requested Prescriptions      No prescriptions requested or ordered in this encounter     No orders of the defined types were placed in this encounter. Patient giveneducational materials - see patient instructions. Discussed use, benefit, and side effects of prescribed medications. All patient questions answered. Pt voiced understanding. Reviewed health maintenance. Patient agreedwith treatment plan. Follow up as directed.    Acceptable risk for surgery Romulo Turner FNP       Electronically signed by MANE Cazares CNP on 2/17/2021 at 8:56 AM

## 2021-02-24 ENCOUNTER — OFFICE VISIT (OUTPATIENT)
Dept: NEUROLOGY | Age: 71
End: 2021-02-24
Payer: COMMERCIAL

## 2021-02-24 VITALS
DIASTOLIC BLOOD PRESSURE: 80 MMHG | WEIGHT: 247 LBS | SYSTOLIC BLOOD PRESSURE: 130 MMHG | HEIGHT: 73 IN | HEART RATE: 76 BPM | BODY MASS INDEX: 32.74 KG/M2

## 2021-02-24 DIAGNOSIS — Z86.73 HISTORY OF STROKE: Primary | ICD-10-CM

## 2021-02-24 PROCEDURE — 99213 OFFICE O/P EST LOW 20 MIN: CPT | Performed by: NURSE PRACTITIONER

## 2021-02-24 NOTE — PATIENT INSTRUCTIONS
1. Continue with aspirin and plavix  2. Target LDL below 70, follow-up with your family physician  3. Modify risk factors for stroke (blood pressure, cholesterol, diabetes, smoking cessation etc.. Control)  4. Report any new symptoms  5. Follow up in 1 year or sooner if needed. 6. Call if any questions or concerns.
no

## 2021-02-24 NOTE — PROGRESS NOTES
NEUROLOGY OUT PATIENT FOLLOW UP NOTE:  2/24/202110:27 AM    Brian Krueger is here for follow up for history of stroke. He is doing well. He denies any new symptoms. No new numbness or weakness. He is on aspirin, plavix, as well as lipid lowering medications. He comes in today by himself to discuss the plan of care going forward. ROS:  Respiratory : no cough, no shortness of breath  Cardiac: no chest pain. No palpitations. Renal : no flank pain, no hematuria    Skin: no rash      Allergies   Allergen Reactions    Tape Leslye Marks Tape]        Current Outpatient Medications:     ALPRAZolam (XANAX) 0.5 MG tablet, take 1 tablet by mouth twice a day if needed for anxiety, Disp: 60 tablet, Rfl: 2    AMBIEN 10 MG tablet, Take 1 tablet by mouth nightly as needed for Sleep for up to 90 days. , Disp: 90 tablet, Rfl: 1    ezetimibe (ZETIA) 10 MG tablet, take 1 tablet by mouth once daily, Disp: 30 tablet, Rfl: 5    pantoprazole (PROTONIX) 40 MG tablet, take 1 tablet by mouth once daily, Disp: 90 tablet, Rfl: 3    albuterol sulfate HFA (PROAIR HFA) 108 (90 Base) MCG/ACT inhaler, Inhale 2 puffs into the lungs every 6 hours as needed for Wheezing, Disp: 1 Inhaler, Rfl: 3    clopidogrel (PLAVIX) 75 MG tablet, take 1 tablet by mouth once daily, Disp: 30 tablet, Rfl: 11    cetirizine (ZYRTEC) 10 MG tablet, Take 1 tablet by mouth daily, Disp: 30 tablet, Rfl: 5    albuterol (PROVENTIL) (2.5 MG/3ML) 0.083% nebulizer solution, Take 3 mLs by nebulization every 6 hours as needed for Wheezing, Disp: 120 each, Rfl: 3    Respiratory Therapy Supplies (NEBULIZER/TUBING/MOUTHPIECE) KIT, 1 kit by Does not apply route daily as needed (cough, wheeze) Diagnosis:RAD and Bronchitis, Disp: 1 kit, Rfl: 0    aspirin 81 MG chewable tablet, Take 81 mg by mouth daily, Disp: , Rfl:     I reviewed the past medical history, allergies, medications, social history and family history.        PE:   Vitals:    02/24/21 1019   BP: 130/80 Site: Left Upper Arm   Position: Sitting   Cuff Size: Large Adult   Pulse: 76   Weight: 247 lb (112 kg)   Height: 6' 1\" (1.854 m)     General Appearance:  awake, alert, oriented, in no acute distress  Gen: NAD, Language is Intact. Skin: no rash, lesion, dry  to touch. warm  Head: no rash, no icterus  Neck: There is no carotid bruits. The Neck is supple. There is no neck lymphadenopathy. Neuro: CN 2-12 grossly intact with no focal deficits. Power 5/5 in the bilateral upper and left lower extremity, +4/5 in right lower extremity. , Reflexes are  symmetric. Long tracts are intact. Cerebellar exam is Intact. Sensory exam is intact to light touch. Gait is intact. Musculoskeletal:  Has no hand arthritis, no limitation of ROM in any of the four extremities. Lower extremities no edema  The abdomen is soft,  intact bowel sounds.          DATA:  Results for orders placed or performed during the hospital encounter of 02/12/21   CBC With Auto Differential   Result Value Ref Range    WBC 7.8 4.8 - 10.8 thou/mm3    RBC 5.14 4.70 - 6.10 mill/mm3    Hemoglobin 15.7 14.0 - 18.0 gm/dl    Hematocrit 48.2 42.0 - 52.0 %    MCV 93.8 80.0 - 94.0 fL    MCH 30.5 26.0 - 33.0 pg    MCHC 32.6 32.2 - 35.5 gm/dl    RDW-CV 13.9 11.5 - 14.5 %    RDW-SD 47.6 (H) 35.0 - 45.0 fL    Platelets 360 773 - 440 thou/mm3    MPV 10.2 9.4 - 12.4 fL    Seg Neutrophils 41.8 %    Lymphocytes 41.8 %    Monocytes 8.0 %    Eosinophils 7.2 %    Basophils 0.9 %    Immature Granulocytes 0.3 %    Segs Absolute 3.3 1.8 - 7.7 thou/mm3    Lymphocytes Absolute 3.3 1.0 - 4.8 thou/mm3    Monocytes Absolute 0.6 0.4 - 1.3 thou/mm3    Eosinophils Absolute 0.6 (H) 0.0 - 0.4 thou/mm3    Basophils Absolute 0.1 0.0 - 0.1 thou/mm3    Immature Grans (Abs) 0.02 0.00 - 0.07 thou/mm3    nRBC 0 /100 wbc   APTT   Result Value Ref Range    aPTT 31.6 22.0 - 38.0 seconds   Protime-INR   Result Value Ref Range    INR 0.96 0.85 - 1.71   Basic Metabolic Panel   Result Value Ref Range Impression Multilevel degenerative changes are present throughout the cervical spine and are further discussed by level in the findings. These are most significant at C4-C5 with uncovertebral joint spurring and ligamentum flavum thickening resulting in mild spinal   canal narrowing. Mild to moderate neural foraminal narrowing is also present on the right secondary to uncovertebral joint spurring and facet arthropathy. There is also mild spinal canal narrowing at C5-C6 secondary to a disc bulge. Mild to moderate   neural foraminal narrowing is also noted on the right at this level secondary to facet arthropathy. **This report has been created using voice recognition software. It may contain minor errors which are inherent in voice recognition technology. **    Final report electronically signed by Dr. Christofer Carter on 4/5/2018 10:08 AM       Results for orders placed during the hospital encounter of 07/27/20   CTA HEAD W WO CONTRAST    Narrative PROCEDURE: CTA HEAD W WO CONTRAST, CTA NECK W WO CONTRAST    CLINICAL INFORMATION: Blurred vision, History of stroke, Other headache syndrome . Worsening headaches and blurred vision for one month. COMPARISON: MR brain from the same date and CTA dated 5/15/2018. TECHNIQUE: Helical CT from the aortic arch through the head in arterial phase during intravenous administration of 85 mL  injected in the right [facility] with multiplanar reconstructions to include volumetric maximum intensity projection sequences. All CT scans at this facility use dose modulation, iterative reconstruction, and/or weight-based dosing when appropriate to reduce radiation dose to as low as reasonably achievable.     FINDINGS:     CTA HEAD: without focal nodular thickening or visualized mass. There are scattered cervical chain lymph nodes bilaterally without definite cervical lymphadenopathy identified. The parotid, submandibular and thyroid glands are unremarkable. There is calcified   granuloma in the right upper lobe with right hilar calcifications is evidence for old granulomatous disease. Visualized portions of lungs are otherwise clear. There are stable degenerative changes of the cervical spine. Impression    1. No flow-limiting stenosis or aneurysm identified in the intracranial circulation. 2. Redemonstration of right carotid stent which appears patent without significant in-stent stenosis. 3. Mild mural calcification at the left carotid bulb without hemodynamically significant stenosis by NASCET criteria. **This report has been created using voice recognition software. It may contain minor errors which are inherent in voice recognition technology. **    Final report electronically signed by Dr. Joan Day MD on 7/27/2020 9:24 AM     Results for orders placed during the hospital encounter of 07/27/20   CTA NECK W WO CONTRAST    Narrative PROCEDURE: CTA HEAD W WO CONTRAST, CTA NECK W WO CONTRAST    CLINICAL INFORMATION: Blurred vision, History of stroke, Other headache syndrome . Worsening headaches and blurred vision for one month. COMPARISON: MR brain from the same date and CTA dated 5/15/2018. TECHNIQUE: Helical CT from the aortic arch through the head in arterial phase during intravenous administration of 85 mL  injected in the right [facility] with multiplanar reconstructions to include volumetric maximum intensity projection sequences. All CT scans at this facility use dose modulation, iterative reconstruction, and/or weight-based dosing when appropriate to reduce radiation dose to as low as reasonably achievable.     FINDINGS:     CTA HEAD: without focal nodular thickening or visualized mass. There are scattered cervical chain lymph nodes bilaterally without definite cervical lymphadenopathy identified. The parotid, submandibular and thyroid glands are unremarkable. There is calcified   granuloma in the right upper lobe with right hilar calcifications is evidence for old granulomatous disease. Visualized portions of lungs are otherwise clear. There are stable degenerative changes of the cervical spine. Impression    1. No flow-limiting stenosis or aneurysm identified in the intracranial circulation. 2. Redemonstration of right carotid stent which appears patent without significant in-stent stenosis. 3. Mild mural calcification at the left carotid bulb without hemodynamically significant stenosis by NASCET criteria. **This report has been created using voice recognition software. It may contain minor errors which are inherent in voice recognition technology. **    Final report electronically signed by Dr. Yuridia Oates MD on 7/27/2020 9:24 AM     Results for orders placed during the hospital encounter of 07/27/20   MRI BRAIN WO CONTRAST    Narrative PROCEDURE: MRI BRAIN WO CONTRAST    INDICATION:  History of stroke, Blurred vision, Other headache syndrome. Worsening headaches and blurred vision for one month. COMPARISON: MR brain dated 4/4/2018. TECHNIQUE: Multiplanar and multiple spin echo MRI images were obtained of the brain without contrast.    FINDINGS:  There is stable mild parietal predominant volume loss.  There are multiple small focal areas of encephalomalacia in the bilateral thalami and basal ganglia, stable compared to prior exam. There is stable small focal area of encephalomalacia in the left corona radiata. There are mild scattered focal areas of T2/FLAIR prolongation elsewhere in the subcortical and deep white matter which have now the progressed in the interval. No intra or extra-axial mass is identified. No focal areas of restricted   diffusion are present. The major vascular flow voids appear patent. There is bilateral proptosis possibly on the basis of thyroid ophthalmopathy, stable compared to prior exam. Orbits are otherwise unremarkable. There is mild to moderate mucosal thickening in the paranasal   sinuses, similar to prior MRI. Mastoid air cells are clear. Impression    1. No evidence of acute intracranial abnormality. 2. Multiple chronic lacunar infarcts in the basal ganglia, thalami and left corona radiata, stable compared to prior MRI. 3. Mild chronic microvascular angiopathy which has mildly worsened in the interval.  4. Mild to moderate chronic sinusitis. **This report has been created using voice recognition software. It may contain minor errors which are inherent in voice recognition technology. **    Final report electronically signed by Dr. Yuridia Oates MD on 7/27/2020 8:34 AM       Results for orders placed during the hospital encounter of 05/15/18   CT HEAD WO CONTRAST    Narrative PROCEDURE: CT HEAD WO CONTRAST    CLINICAL INFORMATION: TIA, . Sudden onset dizziness and lightheadedness. Recent carotid stent. COMPARISON: Head CT 4/4/2018. TECHNIQUE: Noncontrast 5 mm axial images were obtained through the brain. All CT scans at this facility use dose modulation, iterative reconstruction, and/or weight-based dosing when appropriate to reduce radiation dose to as low as reasonably achievable. FINDINGS:        There is no hemorrhage. There are no intra-or extra-axial collections. There is no hydrocephalus, midline shift or mass effect. The gray-white matter differentiation is preserved. There is a stable old lacunar type infarct in the right basal ganglia. There is a tiny old infarct in the right cerebellum. No new areas of abnormal attenuation are noted. There is mucosal thickening in the ethmoid air cells. There is mild mucosal   thickening in the maxillary sinuses. There is no suspicious calvarial abnormality. There is no significant change in the appearance of the brain compared to the prior study. Impression  No evidence of an acute process. No change from prior. **This report has been created using voice recognition software. It may contain minor errors which are inherent in voice recognition technology. **    Final report electronically signed by Dr. Geovanna Laguerre on 5/15/2018 9:32 AM          Assessment:     Diagnosis Orders   1. History of stroke          He is doing well. He denies any new symptoms. No new numbness or weakness. He is on aspirin, plavix, as well as lipid lowering medications. After detailed discussion with patient we agreed on the following plan. Plan:  1. Continue with aspirin and plavix  2. Target LDL below 70, follow-up with your family physician  3. Modify risk factors for stroke (blood pressure, cholesterol, diabetes, smoking cessation etc.. Control)  4. Report any new symptoms  5. Follow up in 1 year or sooner if needed. 6. Call if any questions or concerns. Time spent evaluating patient, reviewing records, counseling with more than 50% of the time spent for counseling was 25 min.     Missy Ramirez CNP

## 2021-03-04 ENCOUNTER — TELEPHONE (OUTPATIENT)
Dept: FAMILY MEDICINE CLINIC | Age: 71
End: 2021-03-04

## 2021-03-04 DIAGNOSIS — Z20.822 ENCOUNTER FOR PREOPERATIVE SCREENING LABORATORY TESTING FOR COVID-19 VIRUS: Primary | ICD-10-CM

## 2021-03-04 DIAGNOSIS — Z01.812 ENCOUNTER FOR PREOPERATIVE SCREENING LABORATORY TESTING FOR COVID-19 VIRUS: Primary | ICD-10-CM

## 2021-03-04 NOTE — TELEPHONE ENCOUNTER
Needs covid test for pre op/knee surgery in [facility].  This is ordered per verbal order of Claudia Gallo

## 2021-03-08 ENCOUNTER — HOSPITAL ENCOUNTER (OUTPATIENT)
Age: 71
Discharge: HOME | End: 2021-03-08
Payer: COMMERCIAL

## 2021-03-08 PROCEDURE — U0003 INFECTIOUS AGENT DETECTION BY NUCLEIC ACID (DNA OR RNA); SEVERE ACUTE RESPIRATORY SYNDROME CORONAVIRUS 2 (SARS-COV-2) (CORONAVIRUS DISEASE [COVID-19]), AMPLIFIED PROBE TECHNIQUE, MAKING USE OF HIGH THROUGHPUT TECHNOLOGIES AS DESCRIBED BY CMS-2020-01-R: HCPCS

## 2021-03-09 LAB
SARS-COV-2: NOT DETECTED
SOURCE: NORMAL

## 2021-03-10 ENCOUNTER — TELEPHONE (OUTPATIENT)
Dept: FAMILY MEDICINE CLINIC | Age: 71
End: 2021-03-10

## 2021-03-10 DIAGNOSIS — Z87.891 PERSONAL HISTORY OF TOBACCO USE: Primary | ICD-10-CM

## 2021-03-10 NOTE — TELEPHONE ENCOUNTER
Pt is on recall calendar for a CT Lung screen. Left a message on pt's machine to call back to be informed and see if he wants to proceed. Order pended.

## 2021-03-23 NOTE — TELEPHONE ENCOUNTER
Just had knee surgery, he would like to wait at least one month so it is extended one month for them to contact him.

## 2021-04-27 ENCOUNTER — OFFICE VISIT (OUTPATIENT)
Dept: FAMILY MEDICINE CLINIC | Age: 71
End: 2021-04-27
Payer: COMMERCIAL

## 2021-04-27 VITALS
HEART RATE: 70 BPM | BODY MASS INDEX: 31.45 KG/M2 | TEMPERATURE: 98.6 F | WEIGHT: 238.4 LBS | OXYGEN SATURATION: 98 % | RESPIRATION RATE: 16 BRPM | DIASTOLIC BLOOD PRESSURE: 68 MMHG | SYSTOLIC BLOOD PRESSURE: 132 MMHG

## 2021-04-27 DIAGNOSIS — F10.11 HISTORY OF ETOH ABUSE: ICD-10-CM

## 2021-04-27 DIAGNOSIS — Z87.891 HISTORY OF TOBACCO USE: Primary | ICD-10-CM

## 2021-04-27 DIAGNOSIS — M15.9 PRIMARY OSTEOARTHRITIS INVOLVING MULTIPLE JOINTS: ICD-10-CM

## 2021-04-27 DIAGNOSIS — F51.01 PRIMARY INSOMNIA: ICD-10-CM

## 2021-04-27 DIAGNOSIS — G47.9 SLEEP DISTURBANCE: ICD-10-CM

## 2021-04-27 DIAGNOSIS — F41.9 ANXIETY: Chronic | ICD-10-CM

## 2021-04-27 DIAGNOSIS — G89.29 CHRONIC PAIN OF RIGHT KNEE: ICD-10-CM

## 2021-04-27 DIAGNOSIS — M25.561 CHRONIC PAIN OF RIGHT KNEE: ICD-10-CM

## 2021-04-27 DIAGNOSIS — Z96.659 STATUS POST TOTAL KNEE REPLACEMENT, UNSPECIFIED LATERALITY: ICD-10-CM

## 2021-04-27 PROCEDURE — 99214 OFFICE O/P EST MOD 30 MIN: CPT | Performed by: NURSE PRACTITIONER

## 2021-04-27 RX ORDER — ALPRAZOLAM 0.5 MG/1
0.5 TABLET ORAL
COMMUNITY
Start: 2021-03-08 | End: 2021-07-21 | Stop reason: SDUPTHER

## 2021-04-27 RX ORDER — ZOLPIDEM TARTRATE 10 MG/1
10 TABLET, FILM COATED ORAL NIGHTLY PRN
Qty: 90 TABLET | Refills: 1 | Status: SHIPPED | OUTPATIENT
Start: 2021-04-27 | End: 2022-05-11 | Stop reason: SDUPTHER

## 2021-04-27 RX ORDER — ZOLPIDEM TARTRATE 10 MG/1
10 TABLET ORAL NIGHTLY PRN
Qty: 90 TABLET | Refills: 1 | Status: CANCELLED | OUTPATIENT
Start: 2021-04-27 | End: 2021-07-26

## 2021-04-27 RX ORDER — ALPRAZOLAM 0.5 MG/1
TABLET ORAL
Qty: 60 TABLET | Refills: 2 | Status: SHIPPED | OUTPATIENT
Start: 2021-04-27 | End: 2021-05-25

## 2021-04-27 RX ORDER — TRAMADOL HYDROCHLORIDE 50 MG/1
50 TABLET ORAL EVERY 4 HOURS PRN
Qty: 120 TABLET | Refills: 0 | Status: SHIPPED | OUTPATIENT
Start: 2021-04-27 | End: 2021-07-23 | Stop reason: SDUPTHER

## 2021-04-27 RX ORDER — ZOLPIDEM TARTRATE 10 MG/1
10 TABLET ORAL
COMMUNITY
Start: 2021-03-08 | End: 2021-07-21 | Stop reason: SDUPTHER

## 2021-04-28 ASSESSMENT — ENCOUNTER SYMPTOMS: BACK PAIN: 1

## 2021-04-28 NOTE — PROGRESS NOTES
[address] [place] [facility] [address]  Dept: [phone]  Dept Fax: [phone]  Loc: [phone]  PROGRESS NOTE      VisitDate: 4/27/2021    Aleshia Rivers is a 78 y.o. male who presents today for:     Chief Complaint   Patient presents with    3 Month Follow-Up     anxiety, insomnia, OA, hyperlipidemia, recent surgery on right knee-doing well, back to work in [place]     second covid vaccine May 7, 2021         Subjective:  Routine 3-month/medication refills. Status post revision total right knee. Patient reports he is doing very well patient has completed physical therapy and range of motion within normal limits. Patient plans to return back to work in 2 weeks. History of CVA, ED, alcohol use, GERD, insomnia, chronic low back pain, anxiety/depression. Reports mood stable. Patient reports that he is exercising daily. Denies any fever, illness, chest pain, shortness of breath, headaches, dizziness, syncope, abdominal pain or edema. Patient reports this is abstaining from alcohol use. Patient is 1/2 pack a day smoker for 30 years. Review of Systems   Musculoskeletal: Positive for arthralgias and back pain. Psychiatric/Behavioral: Positive for decreased concentration, dysphoric mood and sleep disturbance. The patient is nervous/anxious. All other systems reviewed and are negative.     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)     Erectile dysfunction     ETOH abuse     GERD (gastroesophageal reflux disease)     Insomnia     Low back pain     [facility], [facility] 4/4/2018    Other headache syndromes 12/25/2016    Sciatica     right side strain      Past Surgical History:   Procedure Laterality Date    APPENDECTOMY      COLONOSCOPY      EKG 12-LEAD  7/17/2015         HERNIA REPAIR each 3    Respiratory Therapy Supplies (NEBULIZER/TUBING/MOUTHPIECE) KIT 1 kit by Does not apply route daily as needed (cough, wheeze) Diagnosis:RAD and Bronchitis 1 kit 0    aspirin 81 MG chewable tablet Take 81 mg by mouth daily       No current facility-administered medications for this visit. Allergies   Allergen Reactions    Tape Amelia Farley [address] [facility]   Topic Date Due    AAA screen  Never done    COVID-19 Vaccine (1) Never done    Shingles Vaccine (1 of 2) Never done    Colon cancer screen fecal DNA test (Cologuard)  02/09/2024    DTaP/Tdap/Td vaccine (2 - Td) 10/01/2024    Lipid screen  02/01/2026    Flu vaccine  Completed    Pneumococcal 65+ years Vaccine  Completed    Hepatitis C screen  Completed    Hepatitis A vaccine  Aged Out    Hepatitis B vaccine  Aged Out    Hib vaccine  Aged Out    Meningococcal (ACWY) vaccine  Aged Out         Objective:     Physical Exam  Vitals signs and nursing note reviewed. Constitutional:       Appearance: Normal appearance. He is well-developed and normal weight. He is not diaphoretic. HENT:      Head: Normocephalic and atraumatic. Not macrocephalic and not microcephalic. Right Ear: Hearing, tympanic membrane, ear canal and external ear normal. No drainage or tenderness. No middle ear effusion. No hemotympanum. Tympanic membrane is not injected, scarred, perforated or bulging. Left Ear: Hearing, tympanic membrane, ear canal and external ear normal. No drainage or tenderness. No middle ear effusion. No hemotympanum. Tympanic membrane is not injected, scarred, perforated or bulging. Nose: No nasal deformity, septal deviation, mucosal edema or rhinorrhea. Right Sinus: No maxillary sinus tenderness or frontal sinus tenderness. Left Sinus: No maxillary sinus tenderness or frontal sinus tenderness. Mouth/Throat:      Mouth: No oral lesions. Dentition: Normal dentition. Does not have dentures.  No dental caries or dental abscesses. Pharynx: Oropharynx is clear. No oropharyngeal exudate or posterior oropharyngeal erythema. Tonsils: No tonsillar abscesses. Eyes:      General: Lids are normal. No scleral icterus. Extraocular Movements:      Right eye: Normal extraocular motion. Left eye: Normal extraocular motion. Conjunctiva/sclera: Conjunctivae normal.      Right eye: Right conjunctiva is not injected. Left eye: Left conjunctiva is not injected. Pupils: Pupils are equal, round, and reactive to light. Neck:      Musculoskeletal: Normal range of motion and neck supple. Normal range of motion. No edema or erythema. Thyroid: No thyroid mass or thyromegaly. Vascular: No carotid bruit or JVD. Trachea: Trachea normal.   Cardiovascular:      Rate and Rhythm: Normal rate and regular rhythm. Pulses: Normal pulses. Heart sounds: Normal heart sounds, S1 normal and S2 normal. No murmur. No friction rub. No gallop. Pulmonary:      Effort: Pulmonary effort is normal. No respiratory distress. Breath sounds: Normal breath sounds. No wheezing, rhonchi or rales. Chest:      Chest wall: No tenderness. Abdominal:      General: Bowel sounds are normal.      Palpations: Abdomen is soft. There is no hepatomegaly, splenomegaly or mass. Tenderness: There is no guarding or rebound. Hernia: No hernia is present. There is no hernia in the ventral area or left inguinal area. Musculoskeletal: Normal range of motion. General: No tenderness. Lymphadenopathy:      Head:      Right side of head: No submental, submandibular, tonsillar, preauricular, posterior auricular or occipital adenopathy. Left side of head: No submental, submandibular, tonsillar, preauricular, posterior auricular or occipital adenopathy. Cervical: No cervical adenopathy. Right cervical: No superficial, deep or posterior cervical adenopathy.      Left cervical: No superficial, deep or posterior cervical adenopathy. Upper Body:      Right upper body: No supraclavicular or pectoral adenopathy. Left upper body: No supraclavicular or pectoral adenopathy. Skin:     General: Skin is warm and dry. Coloration: Skin is not pale. Findings: No bruising, ecchymosis, laceration, lesion or rash. Nails: There is no clubbing. Neurological:      General: No focal deficit present. Mental Status: He is alert and oriented to person, place, and time. Cranial Nerves: No cranial nerve deficit. Motor: No abnormal muscle tone. Coordination: Coordination normal.      Deep Tendon Reflexes: Reflexes normal.   Psychiatric:         Speech: Speech normal.         Behavior: Behavior normal.         Thought Content: Thought content normal.         Judgment: Judgment normal.       /68 (Site: Right Upper Arm)   Pulse 70   Temp 98.6 °F (37 °C) (Oral)   Resp 16   Wt 238 lb 6.4 oz (108.1 kg)   SpO2 98%   BMI 31.45 kg/m²       Impression/Plan:  1. History of tobacco use    2. Anxiety    3. Sleep disturbance    4. Primary insomnia    5. Chronic pain of right knee    6. History of ETOH abuse    7. Primary osteoarthritis involving multiple joints    8. Status post total knee replacement, unspecified laterality      Requested Prescriptions     Signed Prescriptions Disp Refills    AMBIEN 10 MG tablet 90 tablet 1     Sig: Take 1 tablet by mouth nightly as needed for Sleep for up to 90 days.  traMADol (ULTRAM) 50 MG tablet 120 tablet 0     Sig: Take 1 tablet by mouth every 4 hours as needed for Pain for up to 30 days. Take lowest dose possible to manage pain    ALPRAZolam (XANAX) 0.5 MG tablet 60 tablet 2     Sig: take 1 tablet by mouth twice a day if needed for anxiety     No orders of the defined types were placed in this encounter. Patient giveneducational materials - see patient instructions.   Discussed use, benefit, and side effects of prescribed medications. All patient questions answered. Pt voiced understanding. Reviewed health maintenance. Patient agreedwith treatment plan. Follow up as directed. Refills of Ambien, tramadol Xanax provided. OARRS report reviewed. Continue medications as prescribed.  Educational information on above diagnosis  provided per AVS. follow-up 3 months       30 minutes  Electronically signed by MANE Adkins CNP on 4/28/2021 at 8:36 AM

## 2021-05-17 DIAGNOSIS — F41.9 ANXIETY: Chronic | ICD-10-CM

## 2021-05-17 DIAGNOSIS — G47.9 SLEEP DISTURBANCE: ICD-10-CM

## 2021-05-17 RX ORDER — ZOLPIDEM TARTRATE 10 MG/1
10 TABLET ORAL NIGHTLY PRN
Qty: 90 TABLET | Refills: 1 | Status: SHIPPED | OUTPATIENT
Start: 2021-05-17 | End: 2021-11-11 | Stop reason: SDUPTHER

## 2021-06-07 RX ORDER — EZETIMIBE 10 MG/1
TABLET ORAL
Qty: 30 TABLET | Refills: 5 | Status: SHIPPED | OUTPATIENT
Start: 2021-06-07 | End: 2021-10-21 | Stop reason: SDUPTHER

## 2021-07-21 ENCOUNTER — OFFICE VISIT (OUTPATIENT)
Dept: FAMILY MEDICINE CLINIC | Age: 71
End: 2021-07-21
Payer: COMMERCIAL

## 2021-07-21 VITALS
WEIGHT: 234 LBS | HEIGHT: 73 IN | SYSTOLIC BLOOD PRESSURE: 122 MMHG | BODY MASS INDEX: 31.01 KG/M2 | TEMPERATURE: 98.2 F | HEART RATE: 84 BPM | RESPIRATION RATE: 24 BRPM | DIASTOLIC BLOOD PRESSURE: 76 MMHG

## 2021-07-21 DIAGNOSIS — Z87.891 HISTORY OF TOBACCO USE: ICD-10-CM

## 2021-07-21 DIAGNOSIS — F10.11 HISTORY OF ETOH ABUSE: ICD-10-CM

## 2021-07-21 DIAGNOSIS — F41.9 ANXIETY: Primary | ICD-10-CM

## 2021-07-21 DIAGNOSIS — M15.9 PRIMARY OSTEOARTHRITIS INVOLVING MULTIPLE JOINTS: ICD-10-CM

## 2021-07-21 DIAGNOSIS — E78.2 MIXED HYPERLIPIDEMIA: ICD-10-CM

## 2021-07-21 DIAGNOSIS — K21.9 GASTROESOPHAGEAL REFLUX DISEASE, UNSPECIFIED WHETHER ESOPHAGITIS PRESENT: ICD-10-CM

## 2021-07-21 DIAGNOSIS — G47.9 SLEEP DISTURBANCE: ICD-10-CM

## 2021-07-21 DIAGNOSIS — F51.01 PRIMARY INSOMNIA: ICD-10-CM

## 2021-07-21 DIAGNOSIS — Z86.73 HX OF ISCHEMIC VERTEBROBASILAR ARTERY THALAMIC STROKE: ICD-10-CM

## 2021-07-21 PROCEDURE — 99214 OFFICE O/P EST MOD 30 MIN: CPT | Performed by: NURSE PRACTITIONER

## 2021-07-21 RX ORDER — ALPRAZOLAM 0.5 MG/1
0.5 TABLET ORAL 2 TIMES DAILY
Qty: 60 TABLET | Refills: 2 | Status: SHIPPED | OUTPATIENT
Start: 2021-07-21 | End: 2021-08-20

## 2021-07-21 RX ORDER — ALBUTEROL SULFATE 90 UG/1
2 AEROSOL, METERED RESPIRATORY (INHALATION) EVERY 6 HOURS PRN
Qty: 1 INHALER | Refills: 3 | Status: SHIPPED | OUTPATIENT
Start: 2021-07-21 | End: 2022-01-20 | Stop reason: SDUPTHER

## 2021-07-21 SDOH — ECONOMIC STABILITY: FOOD INSECURITY: WITHIN THE PAST 12 MONTHS, THE FOOD YOU BOUGHT JUST DIDN'T LAST AND YOU DIDN'T HAVE MONEY TO GET MORE.: NEVER TRUE

## 2021-07-21 SDOH — ECONOMIC STABILITY: FOOD INSECURITY: WITHIN THE PAST 12 MONTHS, YOU WORRIED THAT YOUR FOOD WOULD RUN OUT BEFORE YOU GOT MONEY TO BUY MORE.: NEVER TRUE

## 2021-07-21 ASSESSMENT — SOCIAL DETERMINANTS OF HEALTH (SDOH): HOW HARD IS IT FOR YOU TO PAY FOR THE VERY BASICS LIKE FOOD, HOUSING, MEDICAL CARE, AND HEATING?: NOT HARD AT ALL

## 2021-07-23 DIAGNOSIS — G89.29 CHRONIC PAIN OF RIGHT KNEE: ICD-10-CM

## 2021-07-23 DIAGNOSIS — M25.561 CHRONIC PAIN OF RIGHT KNEE: ICD-10-CM

## 2021-07-23 RX ORDER — TRAMADOL HYDROCHLORIDE 50 MG/1
50 TABLET ORAL EVERY 4 HOURS PRN
Qty: 120 TABLET | Refills: 0 | Status: SHIPPED | OUTPATIENT
Start: 2021-07-23 | End: 2021-10-21 | Stop reason: SDUPTHER

## 2021-07-26 ASSESSMENT — ENCOUNTER SYMPTOMS
ABDOMINAL PAIN: 0
WHEEZING: 0
CHEST TIGHTNESS: 0
COUGH: 0
SHORTNESS OF BREATH: 0
ABDOMINAL DISTENTION: 0
BACK PAIN: 1

## 2021-07-26 NOTE — PROGRESS NOTES
[address] [address]  Dept: [phone]  Dept Fax: [phone]  Loc: [phone]  PROGRESS NOTE      VisitDate: 7/21/2021    Wilbur Landaverde is a 78 y.o. male who presents today for:     Chief Complaint   Patient presents with    Annual Exam     Routine check up    Eye Problem     Eyes are watering all the time, using an OTC eye rinse. Subjective:  Routine 3-month/medication refills. History of CVA, ED, EtOH abuse, GERD, insomnia chronic low back pain, osteoarthritis. Half pack a day smoker for the past 30 years. Denies any alcohol use currently. Denies any headaches, dizziness, syncope, chest pain, shortness of breath, abdominal pain or edema. Denies any dysuria. Reports regular bowel movements no blood or melena. Review of Systems   Constitutional: Negative for activity change, appetite change, chills, fatigue and fever. Eyes: Negative for visual disturbance. Respiratory: Negative for cough, chest tightness, shortness of breath and wheezing. Cardiovascular: Negative for chest pain, palpitations and leg swelling. Gastrointestinal: Negative for abdominal distention and abdominal pain. Genitourinary: Negative for dysuria. Musculoskeletal: Positive for arthralgias, back pain and gait problem. Negative for neck pain. Skin: Negative. Negative for rash. Neurological: Negative for dizziness, light-headedness and headaches. Hematological: Negative for adenopathy. Psychiatric/Behavioral: Positive for decreased concentration, dysphoric mood and sleep disturbance. The patient is nervous/anxious. All other systems reviewed and are negative.     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 Mariella Lima     Family History   Problem Relation Age of Onset    Dementia Mother     Heart Disease Father     High Blood Pressure Sister     Cancer Brother         testicular cancer     Social History     Tobacco Use    Smoking status: Current Every Day Smoker     Packs/day: 0.50     Years: 30.00     Pack years: 15.00     Types: Cigarettes     Start date: 7/16/1968    Smokeless tobacco: Never Used    Tobacco comment: printed to avs   Substance Use Topics    Alcohol use: Not Currently     Alcohol/week: 42.0 standard drinks     Types: 42 Cans of beer per week     Comment: States last beer was in 3/2018      Current Outpatient Medications   Medication Sig Dispense Refill    ALPRAZolam (XANAX) 0.5 MG tablet Take 1 tablet by mouth 2 times daily for 30 days. 60 tablet 2    albuterol sulfate HFA (PROAIR HFA) 108 (90 Base) MCG/ACT inhaler Inhale 2 puffs into the lungs every 6 hours as needed for Wheezing 1 Inhaler 3    ezetimibe (ZETIA) 10 MG tablet take 1 tablet by mouth once daily 30 tablet 5    zolpidem (AMBIEN) 10 MG tablet Take 1 tablet by mouth nightly as needed for Sleep for up to 90 days. 90 tablet 1    AMBIEN 10 MG tablet Take 1 tablet by mouth nightly as needed for Sleep for up to 90 days.  90 tablet 1    pantoprazole (PROTONIX) 40 MG tablet take 1 tablet by mouth once daily 90 tablet 3    clopidogrel (PLAVIX) 75 MG tablet take 1 tablet by mouth once daily 30 tablet 11    cetirizine (ZYRTEC) 10 MG tablet Take 1 tablet by mouth daily 30 tablet 5    albuterol (PROVENTIL) (2.5 MG/3ML) 0.083% nebulizer solution Take 3 mLs by nebulization every 6 hours as needed for Wheezing 120 each 3    Respiratory Therapy Supplies (NEBULIZER/TUBING/MOUTHPIECE) KIT 1 kit by Does not apply route daily as needed (cough, wheeze) Diagnosis:RAD and Bronchitis 1 kit 0    aspirin 81 MG chewable tablet Take 81 mg by mouth daily      traMADol (ULTRAM) 50 MG tablet Take 1 tablet by mouth every 4 hours as needed for Pain for up to 30 days. Take lowest dose possible to manage pain 120 tablet 0     No current facility-administered medications for this visit. Allergies   Allergen Reactions    T[facility] [address] [facility]   Topic Date Due    AAA screen  Never done    COVID-19 Vaccine (1) Never done    Shingles Vaccine (1 of 2) Never done    Flu vaccine (1) 09/01/2021    Colon cancer screen fecal DNA test (Cologuard)  02/09/2024    DTaP/Tdap/Td vaccine (2 - Td or Tdap) 10/01/2024    Lipid screen  02/01/2026    Pneumococcal 65+ years Vaccine  Completed    Hepatitis C screen  Completed    Hepatitis A vaccine  Aged Out    Hepatitis B vaccine  Aged Out    Hib vaccine  Aged Out    Meningococcal (ACWY) vaccine  Aged Out         Objective:     Physical Exam  Vitals and nursing note reviewed. Constitutional:       Appearance: Normal appearance. He is well-developed and normal weight. He is not diaphoretic. HENT:      Head: Normocephalic and atraumatic. Not macrocephalic and not microcephalic. Right Ear: Hearing, tympanic membrane, ear canal and external ear normal. No drainage or tenderness. No middle ear effusion. No hemotympanum. Tympanic membrane is not injected, scarred, perforated or bulging. Left Ear: Hearing, tympanic membrane, ear canal and external ear normal. No drainage or tenderness. No middle ear effusion. No hemotympanum. Tympanic membrane is not injected, scarred, perforated or bulging. Nose: Nose normal. No nasal deformity, septal deviation, mucosal edema or rhinorrhea.       Right Sinus: No maxillary sinus tenderness or frontal sinus tenderness. Left Sinus: No maxillary sinus tenderness or frontal sinus tenderness. Mouth/Throat:      Mouth: No oral lesions. Dentition: Normal dentition. Does not have dentures. No dental caries or dental abscesses. Pharynx: Oropharynx is clear. No oropharyngeal exudate or posterior oropharyngeal erythema. Tonsils: No tonsillar abscesses. Eyes:      General: Lids are normal. No scleral icterus. Extraocular Movements:      Right eye: Normal extraocular motion. Left eye: Normal extraocular motion. Conjunctiva/sclera: Conjunctivae normal.      Right eye: Right conjunctiva is not injected. Left eye: Left conjunctiva is not injected. Pupils: Pupils are equal, round, and reactive to light. Neck:      Thyroid: No thyroid mass or thyromegaly. Vascular: No carotid bruit or JVD. Trachea: Trachea normal.   Cardiovascular:      Rate and Rhythm: Normal rate and regular rhythm. Pulses: Normal pulses. Heart sounds: Normal heart sounds, S1 normal and S2 normal. No murmur heard. No friction rub. No gallop. Pulmonary:      Effort: Pulmonary effort is normal. No respiratory distress. Breath sounds: Normal breath sounds. No wheezing, rhonchi or rales. Chest:      Chest wall: No tenderness. Abdominal:      General: Bowel sounds are normal.      Palpations: Abdomen is soft. There is no hepatomegaly, splenomegaly or mass. Tenderness: There is no guarding or rebound. Hernia: No hernia is present. There is no hernia in the ventral area or left inguinal area. Musculoskeletal:         General: No tenderness. Normal range of motion. Cervical back: Normal range of motion and neck supple. No edema or erythema. Normal range of motion. Lymphadenopathy:      Head:      Right side of head: No submental, submandibular, tonsillar, preauricular, posterior auricular or occipital adenopathy.       Left side of head: placed in this encounter. Patient giveneducational materials - see patient instructions. Discussed use, benefit, and side effects of prescribed medications. All patient questions answered. Pt voiced understanding. Reviewed health maintenance. Patient agreedwith treatment plan. Follow up as directed. Xanax refilled. Albuterol refilled. OARRS report reviewed. Continue medications as prescribed. Educational information on above diagnosis  provided per AVS.  Daily walking regimen encouraged.      30 min  Electronically signed by MANE Ramos CNP on 7/26/2021 at 1:39 PM

## 2021-08-04 DIAGNOSIS — Z86.73 HISTORY OF STROKE: Primary | ICD-10-CM

## 2021-08-04 RX ORDER — CLOPIDOGREL BISULFATE 75 MG/1
TABLET ORAL
Qty: 30 TABLET | Refills: 11 | Status: SHIPPED | OUTPATIENT
Start: 2021-08-04 | End: 2022-07-20

## 2021-10-04 RX ORDER — PANTOPRAZOLE SODIUM 40 MG/1
TABLET, DELAYED RELEASE ORAL
Qty: 90 TABLET | Refills: 2 | Status: SHIPPED | OUTPATIENT
Start: 2021-10-04 | End: 2022-06-16

## 2021-10-21 ENCOUNTER — OFFICE VISIT (OUTPATIENT)
Dept: FAMILY MEDICINE CLINIC | Age: 71
End: 2021-10-21
Payer: COMMERCIAL

## 2021-10-21 VITALS
SYSTOLIC BLOOD PRESSURE: 126 MMHG | HEIGHT: 73 IN | RESPIRATION RATE: 20 BRPM | DIASTOLIC BLOOD PRESSURE: 72 MMHG | WEIGHT: 235 LBS | BODY MASS INDEX: 31.14 KG/M2 | TEMPERATURE: 98.9 F | HEART RATE: 88 BPM

## 2021-10-21 DIAGNOSIS — F41.8 SITUATIONAL ANXIETY: ICD-10-CM

## 2021-10-21 DIAGNOSIS — G47.9 SLEEP DISTURBANCE: Primary | ICD-10-CM

## 2021-10-21 DIAGNOSIS — Z86.73 HX OF ISCHEMIC VERTEBROBASILAR ARTERY THALAMIC STROKE: ICD-10-CM

## 2021-10-21 DIAGNOSIS — F41.0 PANIC DISORDER: ICD-10-CM

## 2021-10-21 DIAGNOSIS — F51.01 PRIMARY INSOMNIA: ICD-10-CM

## 2021-10-21 DIAGNOSIS — K21.9 GASTROESOPHAGEAL REFLUX DISEASE, UNSPECIFIED WHETHER ESOPHAGITIS PRESENT: ICD-10-CM

## 2021-10-21 DIAGNOSIS — M15.9 PRIMARY OSTEOARTHRITIS INVOLVING MULTIPLE JOINTS: ICD-10-CM

## 2021-10-21 DIAGNOSIS — G89.29 CHRONIC PAIN OF RIGHT KNEE: ICD-10-CM

## 2021-10-21 DIAGNOSIS — Z87.891 HISTORY OF TOBACCO USE: ICD-10-CM

## 2021-10-21 DIAGNOSIS — M25.561 CHRONIC PAIN OF RIGHT KNEE: ICD-10-CM

## 2021-10-21 DIAGNOSIS — E78.2 MIXED HYPERLIPIDEMIA: ICD-10-CM

## 2021-10-21 DIAGNOSIS — Z96.659 STATUS POST TOTAL KNEE REPLACEMENT, UNSPECIFIED LATERALITY: ICD-10-CM

## 2021-10-21 DIAGNOSIS — F10.11 HISTORY OF ETOH ABUSE: ICD-10-CM

## 2021-10-21 PROCEDURE — 90694 VACC AIIV4 NO PRSRV 0.5ML IM: CPT | Performed by: NURSE PRACTITIONER

## 2021-10-21 PROCEDURE — 90471 IMMUNIZATION ADMIN: CPT | Performed by: NURSE PRACTITIONER

## 2021-10-21 PROCEDURE — 99214 OFFICE O/P EST MOD 30 MIN: CPT | Performed by: NURSE PRACTITIONER

## 2021-10-21 RX ORDER — ALPRAZOLAM 0.5 MG/1
TABLET ORAL
COMMUNITY
Start: 2021-09-23 | End: 2021-10-21 | Stop reason: SDUPTHER

## 2021-10-21 RX ORDER — TRAMADOL HYDROCHLORIDE 50 MG/1
50 TABLET ORAL EVERY 4 HOURS PRN
Qty: 120 TABLET | Refills: 0 | Status: SHIPPED | OUTPATIENT
Start: 2021-10-21 | End: 2022-01-20 | Stop reason: SDUPTHER

## 2021-10-21 RX ORDER — ALPRAZOLAM 0.5 MG/1
TABLET ORAL
Qty: 120 TABLET | Refills: 0 | Status: SHIPPED | OUTPATIENT
Start: 2021-10-21 | End: 2021-11-24 | Stop reason: SDUPTHER

## 2021-10-21 RX ORDER — EZETIMIBE 10 MG/1
TABLET ORAL
Qty: 30 TABLET | Refills: 5 | Status: SHIPPED | OUTPATIENT
Start: 2021-10-21 | End: 2022-05-23

## 2021-10-21 ASSESSMENT — ENCOUNTER SYMPTOMS
COUGH: 0
CHEST TIGHTNESS: 0
ABDOMINAL PAIN: 0
SHORTNESS OF BREATH: 0
WHEEZING: 0
BACK PAIN: 0
ABDOMINAL DISTENTION: 0

## 2021-10-21 NOTE — PROGRESS NOTES
[address] [place] [facility] [address]  Dept: [phone]  Dept Fax: [phone]  Loc: [phone]  PROGRESS NOTE      VisitDate: 10/21/2021    Jose Suarez is a 78 y.o. male who presents today for:     Chief Complaint   Patient presents with    3 Month Follow-Up     Refill Xanax and Zetia         Subjective:  Patient presents for routine 3-month/refill of Xanax and Zetia. History of tobacco use, EtOH use, ED, GERD, insomnia, anxiety/panic/depression, osteoarthritis,thalamic cva. Patient complains of intermittent anxiety attacks/panic attacks. Reports he is not not experienced this in the past.  Patient does report increased stressors. Denies fever, illness, chest pain, shortness of breath, abdominal pain, headaches, syncope, dizziness, edema. Patient reports he continues to walk on a daily basis. Reports healthwise he is feeling very good. Review of Systems   Constitutional: Negative for activity change, appetite change, chills, fatigue and fever. Eyes: Negative for visual disturbance. Respiratory: Negative for cough, chest tightness, shortness of breath and wheezing. Cardiovascular: Negative for chest pain, palpitations and leg swelling. Gastrointestinal: Negative for abdominal distention and abdominal pain. Genitourinary: Negative for dysuria. Musculoskeletal: Positive for arthralgias. Negative for back pain and neck pain. Skin: Negative. Negative for rash. Neurological: Negative for dizziness, light-headedness and headaches. Hematological: Negative for adenopathy. Psychiatric/Behavioral: Positive for decreased concentration, dysphoric mood and sleep disturbance. The patient is nervous/anxious. All other systems reviewed and are negative.     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 Allergies   Allergen Reactions    Tape [address] [facility]   Topic Date Due    AAA screen  Never done    COVID-19 Vaccine (1) Never done    Shingles Vaccine (1 of 2) Never done    Flu vaccine (1) 09/01/2021    Colon cancer screen fecal DNA test (Cologuard)  02/09/2024    DTaP/Tdap/Td vaccine (2 - Td or Tdap) 10/01/2024    Lipid screen  02/01/2026    Pneumococcal 65+ years Vaccine  Completed    Hepatitis C screen  Completed    Hepatitis A vaccine  Aged Out    Hepatitis B vaccine  Aged Out    Hib vaccine  Aged Out    Meningococcal (ACWY) vaccine  Aged Out         Objective:     Physical Exam  Vitals and nursing note reviewed. Constitutional:       Appearance: Normal appearance. He is well-developed and normal weight. He is not diaphoretic. HENT:      Head: Normocephalic and atraumatic. Not macrocephalic and not microcephalic. Right Ear: Hearing, tympanic membrane, ear canal and external ear normal. No drainage or tenderness. No middle ear effusion. No hemotympanum. Tympanic membrane is not injected, scarred, perforated or bulging. Left Ear: Hearing, tympanic membrane, ear canal and external ear normal. No drainage or tenderness. No middle ear effusion. No hemotympanum. Tympanic membrane is not injected, scarred, perforated or bulging. Nose: Nose normal. No nasal deformity, septal deviation, mucosal edema or rhinorrhea. Right Sinus: No maxillary sinus tenderness or frontal sinus tenderness. Left Sinus: No maxillary sinus tenderness or frontal sinus tenderness. Mouth/Throat:      Mouth: No oral lesions. Dentition: Normal dentition. Does not have dentures.  No dental caries or dental abscesses. Pharynx: Oropharynx is clear. No oropharyngeal exudate or posterior oropharyngeal erythema. Tonsils: No tonsillar abscesses. Eyes:      General: Lids are normal. No scleral icterus. Extraocular Movements:      Right eye: Normal extraocular motion. Left eye: Normal extraocular motion. Conjunctiva/sclera: Conjunctivae normal.      Right eye: Right conjunctiva is not injected. Left eye: Left conjunctiva is not injected. Neck:      Thyroid: No thyroid mass or thyromegaly. Vascular: No carotid bruit or JVD. Trachea: Trachea normal.   Cardiovascular:      Rate and Rhythm: Normal rate and regular rhythm. Pulses: Normal pulses. Heart sounds: Normal heart sounds, S1 normal and S2 normal. No murmur heard. No friction rub. No gallop. Pulmonary:      Effort: Pulmonary effort is normal. No respiratory distress. Breath sounds: Normal breath sounds. No wheezing, rhonchi or rales. Chest:      Chest wall: No tenderness. Abdominal:      General: Bowel sounds are normal.      Palpations: Abdomen is soft. There is no hepatomegaly, splenomegaly or mass. Tenderness: There is no guarding or rebound. Hernia: No hernia is present. There is no hernia in the ventral area or left inguinal area. Musculoskeletal:         General: No tenderness. Normal range of motion. Cervical back: Normal range of motion and neck supple. No edema or erythema. Normal range of motion. Lymphadenopathy:      Head:      Right side of head: No submental, submandibular, tonsillar, preauricular, posterior auricular or occipital adenopathy. Left side of head: No submental, submandibular, tonsillar, preauricular, posterior auricular or occipital adenopathy. Cervical: No cervical adenopathy. Right cervical: No superficial, deep or posterior cervical adenopathy. Left cervical: No superficial, deep or posterior cervical adenopathy.       Upper Body:      Right upper body: No supraclavicular or pectoral adenopathy. Left upper body: No supraclavicular or pectoral adenopathy. Skin:     General: Skin is warm and dry. Coloration: Skin is not pale. Findings: No bruising, ecchymosis, laceration, lesion or rash. Nails: There is no clubbing. Neurological:      General: No focal deficit present. Mental Status: He is alert and oriented to person, place, and time. Cranial Nerves: No cranial nerve deficit. Motor: No abnormal muscle tone. Coordination: Coordination normal.      Deep Tendon Reflexes: Reflexes normal.   Psychiatric:         Attention and Perception: Attention normal.         Mood and Affect: Mood is anxious. Speech: Speech normal.         Behavior: Behavior normal. Behavior is cooperative. Thought Content: Thought content normal.         Cognition and Memory: Cognition normal.         Judgment: Judgment normal.       /72   Pulse 88   Temp 98.9 °F (37.2 °C) (Oral)   Resp 20   Ht 6' 1\" (1.854 m)   Wt 235 lb (106.6 kg)   BMI 31.00 kg/m²       Impression/Plan:  1. Sleep disturbance    2. Primary insomnia    3. Primary osteoarthritis involving multiple joints    4. Gastroesophageal reflux disease, unspecified whether esophagitis present    5. Hx of ischemic vertebrobasilar artery thalamic stroke    6. History of tobacco use    7. History of ETOH abuse    8. Mixed hyperlipidemia    9. Status post total knee replacement, unspecified laterality    10. Panic disorder    11. Situational anxiety    12. Chronic pain of right knee      Requested Prescriptions     Pending Prescriptions Disp Refills    traMADol (ULTRAM) 50 MG tablet 120 tablet 0     Sig: Take 1 tablet by mouth every 4 hours as needed for Pain for up to 30 days.  Take lowest dose possible to manage pain     Signed Prescriptions Disp Refills    ezetimibe (ZETIA) 10 MG tablet 30 tablet 5     Sig: take 1 tablet by mouth once daily    ALPRAZolam (XANAX) 0.5 MG tablet 120 tablet 0     Sig: take 1-2 tablet by mouth 3 times daily  As needed for anxiety     Orders Placed This Encounter   Procedures    INFLUENZA, QUADV, ADJUVANTED, 65 YRS =, IM, PF, PREFILL SYR, 0.5ML (FLUAD)    Comprehensive Metabolic Panel     Standing Status:   Future     Standing Expiration Date:   10/21/2022    Lipid Panel     Standing Status:   Future     Standing Expiration Date:   10/21/2022     Order Specific Question:   Is Patient Fasting?/# of Hours     Answer:   yes/12    T4, Free     Standing Status:   Future     Standing Expiration Date:   10/21/2022    TSH without Reflex     Standing Status:   Future     Standing Expiration Date:   10/21/2022       Patient giveneducational materials - see patient instructions. Discussed use, benefit, and side effects of prescribed medications. All patient questions answered. Pt voiced understanding. Reviewed health maintenance. Patient agreedwith treatment plan. Follow up as directed. CMP, FLP, TSH free T4 in December. Influenza vaccine updated today. Xanax increased 1 to 2 tablets 0.5 mg 3 times daily as needed anxiety. #120. OARRS report reviewed. Tramadol refilled for #120. Zetia refilled. Continue medications as prescribed. Educational information on above diagnosis  provided per AVS.  Continue daily walking regimen.        Electronically signed by MANE Cuevas CNP on 10/21/2021 at 11:38 AM

## 2021-10-21 NOTE — PROGRESS NOTES
Immunization(s) given during visit:    Immunizations Administered     Name Date Dose Route    Influenza, Quadv, adjuvanted, 65 yrs +, IM, PF (Fluad) 10/21/2021 0.5 mL Intramuscular    Site: Deltoid- Left    Lot: [id number]    NDC: 64863-611-90

## 2021-10-21 NOTE — PATIENT INSTRUCTIONS
Patient Education        Panic Attacks: Care Instructions  Overview     During a panic attack, you may have a feeling of intense fear or terror, trouble breathing, chest pain or tightness, heartbeat changes, dizziness, sweating, and shaking. A panic attack starts suddenly and usually lasts from 5 to 20 minutes but may last even longer. An attack can begin with a stressful event. Or it can happen without a cause. Although panic attacks can cause scary symptoms, you can learn to manage them with self-care, counseling, and medicine. Follow-up care is a key part of your treatment and safety. Be sure to make and go to all appointments, and call your doctor if you are having problems. It's also a good idea to know your test results and keep a list of the medicines you take. How can you care for yourself at home? · Take your medicine exactly as directed. Call your doctor if you think you are having a problem with your medicine. · Go to your counseling sessions and follow-up appointments. · Recognize and accept your anxiety. Then, when you are in a situation that makes you anxious, say to yourself, \"This is not an emergency. I feel uncomfortable, but I am not in danger. I can keep going even if I feel anxious. \"  · Be kind to your body:  ? Relieve tension with exercise or a massage. ? Get enough rest.  ? Avoid alcohol, caffeine, nicotine, and illegal drugs. They can increase your anxiety level, cause sleep problems, or trigger a panic attack. ? Learn and do relaxation techniques. See below for more about these techniques. · Engage your mind. Get out and do something you enjoy. Go to a funny movie, or take a walk or hike. Plan your day. Having too much or too little to do can make you anxious. · Keep a record of your symptoms. Discuss your fears with a good friend or family member, or join a support group for people with similar problems. Talking to others sometimes relieves stress.   · Get involved in social groups, or volunteer to help others. Being alone sometimes makes things seem worse than they are. · Get at least 30 minutes of exercise on most days of the week to relieve stress. Walking is a good choice. You also may want to do other activities, such as running, swimming, cycling, or playing tennis or team sports. Relaxation techniques  Do relaxation exercises for 10 to 20 minutes a day. You can play soothing, relaxing music while you do them, if you wish. · Tell others in your house that you are going to do your relaxation exercises. Ask them not to disturb you. · Find a comfortable place, away from all distractions and noise. · Lie down on your back, or sit with your back straight. · Focus on your breathing. Make it slow and steady. · Breathe in through your nose. Breathe out through either your nose or mouth. · Breathe deeply, filling up the area between your navel and your rib cage. Breathe so that your belly goes up and down. · Do not hold your breath. · Breathe like this for 5 to 10 minutes. Notice the feeling of calmness throughout your whole body. As you continue to breathe slowly and deeply, relax by doing the following for another 5 to 10 minutes:  · Tighten and relax each muscle group in your body. You can begin at your toes and work your way up to your head. · Imagine your muscle groups relaxing and becoming heavy. · Empty your mind of all thoughts. · Let yourself relax more and more deeply. · Become aware of the state of calmness that surrounds you. · When your relaxation time is over, you can bring yourself back to alertness by moving your fingers and toes and then your hands and feet and then stretching and moving your entire body. Sometimes people fall asleep during relaxation, but they usually wake up shortly afterward. · Always give yourself time to return to full alertness before you drive a car or do anything that might cause an accident if you are not fully alert.  Never play a relaxation tape while driving a car. When should you call for help? Call 911 anytime you think you may need emergency care. For example, call if:    · You feel you cannot stop from hurting yourself or someone else. Watch closely for changes in your health, and be sure to contact your doctor if:    · Your panic attacks get worse.     · You have new or different anxiety.     · You are not getting better as expected. Where can you learn more? Go to https://Databanq.CleanScapes. org and sign in to your [employer]wil[employer] account. Enter H601 in the [employer] box to learn more about \"Panic Attacks: Care Instructions. \"     If you do not have an account, please click on the \"Sign Up Now\" link. Current as of: June 16, 2021               Content Version: 13.0  © 6832-3372 [employer]wise, [employer]. Care instructions adapted under license by [facility] [address] If you have questions about a medical condition or this instruction, always ask your healthcare professional. [place] [address] any warranty or liability for your use of this information.

## 2021-10-26 ENCOUNTER — APPOINTMENT (OUTPATIENT)
Dept: CT IMAGING | Age: 71
DRG: 092 | End: 2021-10-26
Payer: COMMERCIAL

## 2021-10-26 ENCOUNTER — HOSPITAL ENCOUNTER (INPATIENT)
Age: 71
LOS: 3 days | Discharge: HOME | DRG: 092 | End: 2021-10-29
Attending: EMERGENCY MEDICINE | Admitting: HOSPITALIST
Payer: COMMERCIAL

## 2021-10-26 DIAGNOSIS — I50.23 ACUTE ON CHRONIC SYSTOLIC HEART FAILURE (HCC): ICD-10-CM

## 2021-10-26 DIAGNOSIS — I51.89 DIASTOLIC DYSFUNCTION: Primary | ICD-10-CM

## 2021-10-26 PROBLEM — R29.90 STROKE-LIKE SYMPTOMS: Status: ACTIVE | Noted: 2021-10-26

## 2021-10-26 LAB
ALBUMIN SERPL-MCNC: 4 G/DL (ref 3.5–5.1)
ALP BLD-CCNC: 87 U/L (ref 38–126)
ALT SERPL-CCNC: 16 U/L (ref 11–66)
AMMONIA: 33 UMOL/L (ref 11–60)
ANION GAP SERPL CALCULATED.3IONS-SCNC: 10 MEQ/L (ref 8–16)
AST SERPL-CCNC: 13 U/L (ref 5–40)
BASOPHILS # BLD: 0.6 %
BASOPHILS ABSOLUTE: 0.1 THOU/MM3 (ref 0–0.1)
BILIRUB SERPL-MCNC: 0.5 MG/DL (ref 0.3–1.2)
BUN BLDV-MCNC: 11 MG/DL (ref 7–22)
C-REACTIVE PROTEIN: 5.71 MG/DL (ref 0–1)
CALCIUM SERPL-MCNC: 9.2 MG/DL (ref 8.5–10.5)
CHLORIDE BLD-SCNC: 99 MEQ/L (ref 98–111)
CO2: 24 MEQ/L (ref 23–33)
CREAT SERPL-MCNC: 0.9 MG/DL (ref 0.4–1.2)
EKG ATRIAL RATE: 81 BPM
EKG P AXIS: 25 DEGREES
EKG P-R INTERVAL: 164 MS
EKG Q-T INTERVAL: 368 MS
EKG QRS DURATION: 80 MS
EKG QTC CALCULATION (BAZETT): 427 MS
EKG R AXIS: 15 DEGREES
EKG T AXIS: 60 DEGREES
EKG VENTRICULAR RATE: 81 BPM
EOSINOPHIL # BLD: 3.7 %
EOSINOPHILS ABSOLUTE: 0.4 THOU/MM3 (ref 0–0.4)
ERYTHROCYTE [DISTWIDTH] IN BLOOD BY AUTOMATED COUNT: 14 % (ref 11.5–14.5)
ERYTHROCYTE [DISTWIDTH] IN BLOOD BY AUTOMATED COUNT: 48.9 FL (ref 35–45)
GFR SERPL CREATININE-BSD FRML MDRD: 84 ML/MIN/1.73M2
GLUCOSE BLD-MCNC: 94 MG/DL (ref 70–108)
GLUCOSE BLD-MCNC: 97 MG/DL (ref 70–108)
HCT VFR BLD CALC: 44.8 % (ref 42–52)
HEMOGLOBIN: 15 GM/DL (ref 14–18)
IMMATURE GRANS (ABS): 0.02 THOU/MM3 (ref 0–0.07)
IMMATURE GRANULOCYTES: 0.2 %
LYMPHOCYTES # BLD: 32.4 %
LYMPHOCYTES ABSOLUTE: 3.3 THOU/MM3 (ref 1–4.8)
MCH RBC QN AUTO: 32.1 PG (ref 26–33)
MCHC RBC AUTO-ENTMCNC: 33.5 GM/DL (ref 32.2–35.5)
MCV RBC AUTO: 95.7 FL (ref 80–94)
MONOCYTES # BLD: 8.3 %
MONOCYTES ABSOLUTE: 0.8 THOU/MM3 (ref 0.4–1.3)
NUCLEATED RED BLOOD CELLS: 0 /100 WBC
OSMOLALITY CALCULATION: 265.7 MOSMOL/KG (ref 275–300)
PLATELET # BLD: 284 THOU/MM3 (ref 130–400)
PMV BLD AUTO: 9.7 FL (ref 9.4–12.4)
POTASSIUM REFLEX MAGNESIUM: 3.9 MEQ/L (ref 3.5–5.2)
RBC # BLD: 4.68 MILL/MM3 (ref 4.7–6.1)
SEDIMENTATION RATE, ERYTHROCYTE: 7 MM/HR (ref 0–10)
SEG NEUTROPHILS: 54.8 %
SEGMENTED NEUTROPHILS ABSOLUTE COUNT: 5.6 THOU/MM3 (ref 1.8–7.7)
SODIUM BLD-SCNC: 133 MEQ/L (ref 135–145)
TOTAL PROTEIN: 6.9 G/DL (ref 6.1–8)
TSH SERPL DL<=0.05 MIU/L-ACNC: 3.27 UIU/ML (ref 0.4–4.2)
WBC # BLD: 10.2 THOU/MM3 (ref 4.8–10.8)

## 2021-10-26 PROCEDURE — 70450 CT HEAD/BRAIN W/O DYE: CPT

## 2021-10-26 PROCEDURE — 82077 ASSAY SPEC XCP UR&BREATH IA: CPT

## 2021-10-26 PROCEDURE — 85651 RBC SED RATE NONAUTOMATED: CPT

## 2021-10-26 PROCEDURE — 87641 MR-STAPH DNA AMP PROBE: CPT

## 2021-10-26 PROCEDURE — 96361 HYDRATE IV INFUSION ADD-ON: CPT

## 2021-10-26 PROCEDURE — 6370000000 HC RX 637 (ALT 250 FOR IP): Performed by: STUDENT IN AN ORGANIZED HEALTH CARE EDUCATION/TRAINING PROGRAM

## 2021-10-26 PROCEDURE — G0378 HOSPITAL OBSERVATION PER HR: HCPCS

## 2021-10-26 PROCEDURE — 85025 COMPLETE CBC W/AUTO DIFF WBC: CPT

## 2021-10-26 PROCEDURE — 86140 C-REACTIVE PROTEIN: CPT

## 2021-10-26 PROCEDURE — 82140 ASSAY OF AMMONIA: CPT

## 2021-10-26 PROCEDURE — 87500 VANOMYCIN DNA AMP PROBE: CPT

## 2021-10-26 PROCEDURE — 84443 ASSAY THYROID STIM HORMONE: CPT

## 2021-10-26 PROCEDURE — 36415 COLL VENOUS BLD VENIPUNCTURE: CPT

## 2021-10-26 PROCEDURE — 82948 REAGENT STRIP/BLOOD GLUCOSE: CPT

## 2021-10-26 PROCEDURE — 99223 1ST HOSP IP/OBS HIGH 75: CPT | Performed by: NURSE PRACTITIONER

## 2021-10-26 PROCEDURE — 6360000002 HC RX W HCPCS: Performed by: STUDENT IN AN ORGANIZED HEALTH CARE EDUCATION/TRAINING PROGRAM

## 2021-10-26 PROCEDURE — 87081 CULTURE SCREEN ONLY: CPT

## 2021-10-26 PROCEDURE — 6360000004 HC RX CONTRAST MEDICATION: Performed by: EMERGENCY MEDICINE

## 2021-10-26 PROCEDURE — 87147 CULTURE TYPE IMMUNOLOGIC: CPT

## 2021-10-26 PROCEDURE — 99284 EMERGENCY DEPT VISIT MOD MDM: CPT

## 2021-10-26 PROCEDURE — 70498 CT ANGIOGRAPHY NECK: CPT

## 2021-10-26 PROCEDURE — 70496 CT ANGIOGRAPHY HEAD: CPT

## 2021-10-26 PROCEDURE — 80053 COMPREHEN METABOLIC PANEL: CPT

## 2021-10-26 PROCEDURE — 96372 THER/PROPH/DIAG INJ SC/IM: CPT

## 2021-10-26 PROCEDURE — 93005 ELECTROCARDIOGRAM TRACING: CPT | Performed by: EMERGENCY MEDICINE

## 2021-10-26 PROCEDURE — 99223 1ST HOSP IP/OBS HIGH 75: CPT | Performed by: HOSPITALIST

## 2021-10-26 PROCEDURE — 2580000003 HC RX 258: Performed by: STUDENT IN AN ORGANIZED HEALTH CARE EDUCATION/TRAINING PROGRAM

## 2021-10-26 PROCEDURE — 2060000000 HC ICU INTERMEDIATE R&B

## 2021-10-26 RX ORDER — ALPRAZOLAM 0.5 MG/1
0.5 TABLET ORAL 3 TIMES DAILY PRN
Status: DISCONTINUED | OUTPATIENT
Start: 2021-10-26 | End: 2021-10-29 | Stop reason: HOSPADM

## 2021-10-26 RX ORDER — SODIUM CHLORIDE 9 MG/ML
25 INJECTION, SOLUTION INTRAVENOUS PRN
Status: DISCONTINUED | OUTPATIENT
Start: 2021-10-26 | End: 2021-10-29 | Stop reason: HOSPADM

## 2021-10-26 RX ORDER — EZETIMIBE 10 MG/1
10 TABLET ORAL NIGHTLY
Status: DISCONTINUED | OUTPATIENT
Start: 2021-10-26 | End: 2021-10-26 | Stop reason: CLARIF

## 2021-10-26 RX ORDER — CLOPIDOGREL BISULFATE 75 MG/1
75 TABLET ORAL DAILY
Status: DISCONTINUED | OUTPATIENT
Start: 2021-10-27 | End: 2021-10-29 | Stop reason: HOSPADM

## 2021-10-26 RX ORDER — ALBUTEROL SULFATE 2.5 MG/3ML
2.5 SOLUTION RESPIRATORY (INHALATION) EVERY 6 HOURS PRN
Status: DISCONTINUED | OUTPATIENT
Start: 2021-10-26 | End: 2021-10-29 | Stop reason: HOSPADM

## 2021-10-26 RX ORDER — ASPIRIN 81 MG/1
81 TABLET, CHEWABLE ORAL DAILY
Status: DISCONTINUED | OUTPATIENT
Start: 2021-10-27 | End: 2021-10-29 | Stop reason: HOSPADM

## 2021-10-26 RX ORDER — ONDANSETRON 4 MG/1
4 TABLET, ORALLY DISINTEGRATING ORAL EVERY 8 HOURS PRN
Status: DISCONTINUED | OUTPATIENT
Start: 2021-10-26 | End: 2021-10-29 | Stop reason: HOSPADM

## 2021-10-26 RX ORDER — ONDANSETRON 2 MG/ML
4 INJECTION INTRAMUSCULAR; INTRAVENOUS EVERY 6 HOURS PRN
Status: DISCONTINUED | OUTPATIENT
Start: 2021-10-26 | End: 2021-10-29 | Stop reason: HOSPADM

## 2021-10-26 RX ORDER — LABETALOL 20 MG/4 ML (5 MG/ML) INTRAVENOUS SYRINGE
10 EVERY 10 MIN PRN
Status: DISCONTINUED | OUTPATIENT
Start: 2021-10-26 | End: 2021-10-29 | Stop reason: HOSPADM

## 2021-10-26 RX ORDER — ATORVASTATIN CALCIUM 80 MG/1
80 TABLET, FILM COATED ORAL NIGHTLY
Status: DISCONTINUED | OUTPATIENT
Start: 2021-10-26 | End: 2021-10-29 | Stop reason: HOSPADM

## 2021-10-26 RX ORDER — POLYETHYLENE GLYCOL 3350 17 G/17G
17 POWDER, FOR SOLUTION ORAL DAILY PRN
Status: DISCONTINUED | OUTPATIENT
Start: 2021-10-26 | End: 2021-10-29 | Stop reason: HOSPADM

## 2021-10-26 RX ORDER — PANTOPRAZOLE SODIUM 40 MG/1
40 TABLET, DELAYED RELEASE ORAL
Status: DISCONTINUED | OUTPATIENT
Start: 2021-10-27 | End: 2021-10-29 | Stop reason: HOSPADM

## 2021-10-26 RX ORDER — SODIUM CHLORIDE 9 MG/ML
INJECTION, SOLUTION INTRAVENOUS CONTINUOUS
Status: DISCONTINUED | OUTPATIENT
Start: 2021-10-26 | End: 2021-10-29 | Stop reason: HOSPADM

## 2021-10-26 RX ADMIN — ENOXAPARIN SODIUM 40 MG: 40 INJECTION SUBCUTANEOUS at 21:33

## 2021-10-26 RX ADMIN — SODIUM CHLORIDE: 9 INJECTION, SOLUTION INTRAVENOUS at 21:33

## 2021-10-26 RX ADMIN — ATORVASTATIN CALCIUM 80 MG: 80 TABLET, FILM COATED ORAL at 22:21

## 2021-10-26 RX ADMIN — IOPAMIDOL 80 ML: 755 INJECTION, SOLUTION INTRAVENOUS at 18:14

## 2021-10-26 ASSESSMENT — ENCOUNTER SYMPTOMS
ABDOMINAL PAIN: 0
DIARRHEA: 0
PHOTOPHOBIA: 0
SORE THROAT: 0
NAUSEA: 0
SHORTNESS OF BREATH: 0
VOMITING: 0
COUGH: 0
CONSTIPATION: 0

## 2021-10-26 NOTE — ED NOTES
Bed: 017A  Expected date:   Expected time:   Means of arrival:   Comments:  Pao Chavarria RN  10/26/21 [id number]

## 2021-10-26 NOTE — ED NOTES
Housekeeping at the bedside helping clean up the floor. Patient respirations are regular and unlabored. Patient appears to be in no current distress. Patient VSS. Call light is within reach. Patient expresses no needs at this time.        Abdirizak Wilson RN  10/26/21 [id number]

## 2021-10-26 NOTE — ED PROVIDER NOTES
[address] COMPLAINT       Chief Complaint   Patient presents with    Altered Mental Status    Aphasia       Nurses Notes reviewed and I agree except as noted in the HPI. HISTORY OF PRESENT ILLNESS    Xander Da Silva is a 78 y.o. male. Patient's last known normal was 6 AM.  He was mainly having trouble with speech. He was seen as an outpatient somewhere and was told to come in. At the time my evaluation I thought his speech was pretty clear. NIH score of 0. I did however discuss the patient with neurology on arrival.    REVIEW OF SYSTEMS         No fever, no visual changes, no chest pain, no dyspnea    Remainder of review of systems is otherwise reviewed as negative. 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 SURGICAL HISTORY      has a past surgical history that includes hernia repair; Appendectomy; Knee arthroscopy; EKG 12 Lead (7/17/2015); joint replacement (08/03/2016); and Colonoscopy.     CURRENT MEDICATIONS       Previous Medications    ALBUTEROL (PROVENTIL) (2.5 MG/3ML) 0.083% NEBULIZER SOLUTION    Take 3 mLs by nebulization every 6 hours as needed for Wheezing    ALBUTEROL SULFATE HFA (PROAIR HFA) 108 (90 BASE) MCG/ACT INHALER    Inhale 2 puffs into the lungs every 6 hours as needed for Wheezing    ALPRAZOLAM (XANAX) 0.5 MG TABLET    take 1-2 tablet by mouth 3 times daily  As needed for anxiety    ASPIRIN 81 MG CHEWABLE TABLET    Take 81 mg by mouth daily    CETIRIZINE (ZYRTEC) 10 MG TABLET    Take 1 tablet by mouth daily    CLOPIDOGREL (PLAVIX) 75 MG TABLET    take 1 tablet by mouth once daily    EZETIMIBE (ZETIA) 10 MG TABLET    take 1 tablet by mouth once daily    PANTOPRAZOLE (PROTONIX) 40 MG TABLET    take 1 tablet by mouth once daily    RESPIRATORY THERAPY SUPPLIES (NEBULIZER/TUBING/MOUTHPIECE) KIT    1 kit by Does not apply route daily as needed (cough, wheeze) Diagnosis:RAD and Bronchitis    TRAMADOL (ULTRAM) 50 MG TABLET    Take 1 tablet by mouth every 4 hours as needed for Pain for up to 30 days. Take lowest dose possible to manage pain       ALLERGIES     is allergic to tape [adhesive tape]. FAMILY HISTORY     He indicated that his mother is alive. He indicated that his father is . He indicated that the status of his sister is unknown. He indicated that the status of his brother is unknown.   family history includes Cancer in his brother; Dementia in his mother; Heart Disease in his father; High Blood Pressure in his sister. SOCIAL HISTORY      reports that he has been smoking cigarettes. He started smoking about 53 years ago. He has a 15.00 pack-year smoking history. He has never used smokeless tobacco. He reports previous alcohol use of about 42.0 standard drinks of alcohol per week. He reports that he does not use drugs. PHYSICAL EXAM     INITIAL VITALS:  oral temperature is 98.9 °F (37.2 °C). His blood pressure is 130/76 and his pulse is 78. His respiration is 22 and oxygen saturation is 97%. Constitutional: Somewhat chronically ill-appearing  Eyes:  Pupils are equal and reactive, extraocular muscles intact   HENT:  Atraumatic appearing  oropharynx moist, no pharyngeal exudates. Neck- normal range of motion,  supple   Respiratory:  No wheezing, rhonchi or rales  Cardiovascular: regular  GI:  Non tender, no rigidity, rebound or guarding  Musculoskeletal:  2/4 distal pulses, no pitting edema  Integument: warm and dry  Neurologic:  Alert & oriented x 3, cranial nerves II through XII are grossly intact. Equal strength in the upper and lower extremities bilaterally. Speech is clear but is very slow and deliberate. DIAGNOSTIC RESULTS     EKG: All EKG's are interpreted by the Emergency Department Physician who either signs or Co-signs this chart in the absence of a cardiologist.  EKG interpreted by me shows sinus rhythm at a rate of 81, QRS of 80, QTc of 427, axis of 15, no acute ischemic changes    RADIOLOGY: non-plain film images(s) such as CT, Ultrasound and MRI are read by the radiologist.  Initial CT of the head has come back negative    LABS:   Labs Reviewed   CBC WITH AUTO DIFFERENTIAL - Abnormal; Notable for the following components:       Result Value    RBC 4.68 (*)     MCV 95.7 (*)     RDW-SD 48.9 (*)     All other components within normal limits   COMPREHENSIVE METABOLIC PANEL W/ REFLEX TO MG FOR LOW K - Abnormal; Notable for the following components:    Sodium 133 (*)     All other components within normal limits   OSMOLALITY - Abnormal; Notable for the following components:    Osmolality Calc 265.7 (*)     All other components within normal limits   GLOMERULAR FILTRATION RATE, ESTIMATED - Abnormal; Notable for the following components:    Est, Glom Filt Rate 84 (*)     All other components within normal limits   AMMONIA   ANION GAP   POCT GLUCOSE       EMERGENCY DEPARTMENT COURSE:   Vitals:    Vitals:    10/26/21 1650 10/26/21 1758 10/26/21 1858   BP: 129/80 117/79 130/76   Pulse: 82 82 78   Resp: 18 13 22   Temp: 98.9 °F (37.2 °C)     TempSrc: Oral     SpO2: 95% 98% 97%     Patient was seen by the interventional neurology team.  They do feel that he might be having a bit of dysarthria. At most he is a 1 on the NIH score. He is well out of 6-hour window. They are recommending that he be admitted for further work-up. We have ordered a CTA of the head and neck. Patient admitted to the hospitalist service.       CRITICAL CARE:   10 min    CONSULTS:  Interventional neurology    PROCEDURES:  None    FINAL IMPRESSION    CVA versus TIA      DISPOSITION/PLAN   Admitted    DISCHARGE MEDICATIONS:  New Prescriptions    No medications on file       (Please note that portions of this note were completed with a voice recognition program.  Efforts were made to edit the dictations but occasionally words are mis-transcribed.)    Marciano Hartman, [address] [place] [address] DO [place]  10/26/21 1936

## 2021-10-26 NOTE — CONSULTS
Neurology Consult Note    Date:10/26/2021       [facility]  Patient Montse Mahoney     YOB: 1950     Age:75 y.o. Requesting Physician: Varsha Valdovinos DO     Reason for Consult:  Evaluate for expressive aphasia       Chief Complaint: slurred speech, gait concern    Subjective     Dimple Mehran is a 79-year-old male with a PMH of HLD, multiple lacunar infarcts, right carotid stent placement in 2018, prior brain bleed after a fall in 2018, and current half a pack per day smoker who presents to [facility] ED with his son with complaints of slurred speech. The patient went to bed around 7:30pm last night, around 6 am today, he called into work to call off due to recent back pain issues, for which he went to a chiropractor appointment today. He works with his son for the [address] [place] [address],[address] and his coworkers told his son that his dad sounded drunk when he called in to work this morning. His son then tried to call around lunch and the patient did not answer because he was at the chiropractor, but around 1:30 this afternoon his son stopped over. Per his son the patient went outside to meet him and was walking funny at this time, was noted to have slurred speech. The patient himself does not feel he has slurred speech. He is currently on aspirin and Plavix for secondary stroke prevention. CT head without any acute intracranial abnormality. Of note he does mention that he has had a lot of stress and anxiety lately. He denies any headache, vision difficulty, lightheadedness, dizziness, paresthesias, or weakness. Review of Systems   Review of Systems   Constitutional: Negative for chills and fever. HENT: Negative for congestion, postnasal drip and sore throat. Eyes: Negative for photophobia and visual disturbance. Respiratory: Negative for cough and shortness of breath. Cardiovascular: Negative for chest pain.    Gastrointestinal: Negative for abdominal pain, constipation, diarrhea, nausea and vomiting. Genitourinary: Negative for difficulty urinating and dysuria. Musculoskeletal: Negative for neck pain and neck stiffness. Skin: Negative for rash. Neurological: Negative for dizziness, facial asymmetry, weakness, light-headedness, numbness and headaches. Hematological: Does not bruise/bleed easily. Psychiatric/Behavioral: Negative for confusion and decreased concentration. Medications     No current facility-administered medications on file prior to encounter. Current Outpatient Medications on File Prior to Encounter   Medication Sig Dispense Refill    ezetimibe (ZETIA) 10 MG tablet take 1 tablet by mouth once daily 30 tablet 5    ALPRAZolam (XANAX) 0.5 MG tablet take 1-2 tablet by mouth 3 times daily  As needed for anxiety 120 tablet 0    traMADol (ULTRAM) 50 MG tablet Take 1 tablet by mouth every 4 hours as needed for Pain for up to 30 days.  Take lowest dose possible to manage pain 120 tablet 0    pantoprazole (PROTONIX) 40 MG tablet take 1 tablet by mouth once daily 90 tablet 2    clopidogrel (PLAVIX) 75 MG tablet take 1 tablet by mouth once daily 30 tablet 11    albuterol sulfate HFA (PROAIR HFA) 108 (90 Base) MCG/ACT inhaler Inhale 2 puffs into the lungs every 6 hours as needed for Wheezing 1 Inhaler 3    cetirizine (ZYRTEC) 10 MG tablet Take 1 tablet by mouth daily 30 tablet 5    albuterol (PROVENTIL) (2.5 MG/3ML) 0.083% nebulizer solution Take 3 mLs by nebulization every 6 hours as needed for Wheezing 120 each 3    Respiratory Therapy Supplies (NEBULIZER/TUBING/MOUTHPIECE) KIT 1 kit by Does not apply route daily as needed (cough, wheeze) Diagnosis:RAD and Bronchitis 1 kit 0    aspirin 81 MG chewable tablet Take 81 mg by mouth daily       Scheduled Meds:   Continuous Infusions:   PRN Meds:     Past History    Past Medical History:   has a past medical history of 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 Social History:   reports that he has been smoking cigarettes. He started smoking about 53 years ago. He has a 15.00 pack-year smoking history. He has never used smokeless tobacco. He reports previous alcohol use of about 42.0 standard drinks of alcohol per week. He reports that he does not use drugs. Family History:   Family History   Problem Relation Age of Onset    Dementia Mother     Heart Disease Father     High Blood Pressure Sister     Cancer Brother         testicular cancer       Physical Examination      Vitals:  /76   Pulse 78   Temp 98.9 °F (37.2 °C) (Oral)   Resp 22   SpO2 97%   Temp (24hrs), Av.9 °F (37.2 °C), Min:98.9 °F (37.2 °C), Max:98.9 °F (37.2 °C)      I/O (24Hr): No intake or output data in the 24 hours ending 10/26/21 1937      Physical Exam  Constitutional:       Appearance: He is ill-appearing. HENT:      Head: Normocephalic and atraumatic. Right Ear: External ear normal.      Left Ear: External ear normal.      Nose: Nose normal.      Mouth/Throat:      Mouth: Mucous membranes are moist.   Eyes:      Extraocular Movements: EOM normal.      Pupils: Pupils are equal, round, and reactive to light. Cardiovascular:      Rate and Rhythm: Normal rate and regular rhythm. Pulses: Normal pulses. Heart sounds: Normal heart sounds. Pulmonary:      Effort: Pulmonary effort is normal.      Breath sounds: Normal breath sounds. Abdominal:      General: Bowel sounds are normal.      Palpations: Abdomen is soft. Musculoskeletal:         General: Normal range of motion. Cervical back: Normal range of motion and neck supple. Skin:     General: Skin is warm and dry. Neurological:      General: No focal deficit present. Mental Status: He is alert and oriented to person, place, and time. Coordination: Finger-Nose-Finger Test and Heel to NIX [facility] Test normal.      Gait: Gait is intact. Deep Tendon Reflexes:      Reflex Scores:       Brachioradialis reflexes are 2+ on the right side and 2+ on the left side. Patellar reflexes are 1+ on the right side and 1+ on the left side. Achilles reflexes are 2+ on the right side and 2+ on the left side. Psychiatric:         Mood and Affect: Mood normal.         Speech: Speech is slurred. Behavior: Behavior normal.         Thought Content: Thought content normal.         Judgment: Judgment normal.       Neurologic Exam     Mental Status   Oriented to person, place, and time. Registration: recalls 3 of 3 objects. Follows 1 step commands. Attention: normal. Concentration: normal.   Speech: slurred   Level of consciousness: alert  Knowledge: good. Able to name object. Able to read. Able to repeat. Cranial Nerves     CN II   Visual fields full to confrontation. CN III, IV, VI   Pupils are equal, round, and reactive to light. Extraocular motions are normal.   Right pupil: Size: 3 mm. Shape: regular. Reactivity: brisk. Left pupil: Size: 3 mm. Shape: regular. Reactivity: brisk. CN V   Facial sensation intact. CN VII   Facial expression full, symmetric.      CN VIII   CN VIII normal.     CN IX, X   CN IX normal.   CN X normal.   Palate: symmetric    CN XI   CN XI normal.   Right trapezius strength: normal  Left trapezius strength: normal    CN XII   CN XII normal.   Tongue deviation: none    Motor Exam   Muscle bulk: normal  Overall muscle tone: normal  Right arm pronator drift: absent  Left arm pronator drift: absent    BUE: 5/5  BLE: 5/5     Sensory Exam   Light touch normal.     Gait, Coordination, and Reflexes     Gait  Gait: normal    Coordination   Finger to nose coordination: normal  Heel to shin coordination: normal    Tremor   Resting tremor: absent  Intention tremor: absent  Action tremor: absent    Reflexes   Right brachioradialis: 2+  Left brachioradialis: 2+  Right patellar: 1+  Left patellar: 1+  Right achilles: 2+  Left achilles: 2+  Gait not formally tested        Labs/Imaging/Diagnostics   Labs:  CBC:  Recent Labs     10/26/21  1725   WBC 10.2   RBC 4.68*   HGB 15.0   HCT 44.8   MCV 95.7*        CHEMISTRIES:  Recent Labs     10/26/21  1700   *   K 3.9   CL 99   CO2 24   BUN 11   CREATININE 0.9   GLUCOSE 97     PT/INR:No results for input(s): PROTIME, INR in the last 72 hours. APTT:No results for input(s): APTT in the last 72 hours. LIVER PROFILE:  Recent Labs     10/26/21  1700   AST 13   ALT 16   BILITOT 0.5   ALKPHOS 87       Imaging Last 24 Hours:  CT Head WO Contrast    Result Date: 10/26/2021  PROCEDURE: CT HEAD WO CONTRAST CLINICAL INFORMATION:TIA symptoms COMPARISON: MRI brain 7/27/2020 TECHNIQUE: 5 mm axial imaging through the head without IV contrast. All CT scans at this facility use dose modulation, iterative reconstruction, and/or weight based dosing when appropriate to reduce the radiation dose to as low as reasonably achievable. FINDINGS: Old lacunar infarcts are seen in the right basal ganglion, and right thalamus. Periventricular  and subcortical white matter hypodensities that likely relate to chronic microangiopathic disease. Mild cerebral volume loss. No ventriculomegaly. No midline shift or mass effect. No acute intracranial hemorrhage. No intracranial collection. Gray-white differentiation is unremarkable. The posterior fossa is unremarkable. The craniocervical junction is unremarkable. No acute bony abnormality. Mild paranasal sinus mucosal disease. The  mastoid air cells are clear. The orbits are unremarkable. 1. No acute intracranial abnormality. 2. Sequela of chronic microvascular changes. **This report has been created using voice recognition software. It may contain minor errors which are inherent in voice recognition technology. ** Final report electronically signed by  Tammie Peguero on 10/26/2021 7:10 PM        Assessment and Plan:          1. Dysarthria, history of CVA  · CT head with no acute intracranial abnormality   · CT angiogram head and neck pending  · MRI brain ordered  · NIHSS Q shift  · Frequent neuro checks  · Will assess risk factor control, A1c, LDL ordered. Will continue with risk factor control pending MRI results  · Continue with good hydration  · Neurology will continue to follow    This case was discussed with Dr. Darlyn Gomez and he is in agreement with the assessment and plan.       Electronically signed by MANE Ellis CNP on 10/26/21 at 7:37 PM EDT

## 2021-10-26 NOTE — ED NOTES
Patient in bed and talking with family at the bedside. Patient provided education regarding current situation with pt plan of care. Patient VSS and patient does not appear to be in any current distress at this time. Will continue to monitor. Call light within reach.        Mago Brush RN  10/26/21 [id number]

## 2021-10-26 NOTE — ED NOTES
Pt up in bed with blankets over bottom half. Patient updated on plan of care at this time and expresses no concerns regarding treatment. Patient VSS. Respirations are regular and unlabored, patient is alert and oriented x4. Patient bed rails up x2 and call light within reach. Will continue to monitor.        Shivani Thomson RN  10/26/21 8045

## 2021-10-27 ENCOUNTER — APPOINTMENT (OUTPATIENT)
Dept: MRI IMAGING | Age: 71
DRG: 092 | End: 2021-10-27
Payer: COMMERCIAL

## 2021-10-27 LAB
ANION GAP SERPL CALCULATED.3IONS-SCNC: 10 MEQ/L (ref 8–16)
AVERAGE GLUCOSE: 123 MG/DL (ref 70–126)
BUN BLDV-MCNC: 11 MG/DL (ref 7–22)
CALCIUM SERPL-MCNC: 9 MG/DL (ref 8.5–10.5)
CHLORIDE BLD-SCNC: 105 MEQ/L (ref 98–111)
CHOLESTEROL, TOTAL: 154 MG/DL (ref 100–199)
CO2: 25 MEQ/L (ref 23–33)
CREAT SERPL-MCNC: 0.8 MG/DL (ref 0.4–1.2)
ERYTHROCYTE [DISTWIDTH] IN BLOOD BY AUTOMATED COUNT: 14 % (ref 11.5–14.5)
ERYTHROCYTE [DISTWIDTH] IN BLOOD BY AUTOMATED COUNT: 48.8 FL (ref 35–45)
ETHYL ALCOHOL, SERUM: < 0.01 %
GFR SERPL CREATININE-BSD FRML MDRD: > 90 ML/MIN/1.73M2
GLUCOSE BLD-MCNC: 100 MG/DL (ref 70–108)
HBA1C MFR BLD: 6.1 % (ref 4.4–6.4)
HCT VFR BLD CALC: 44.7 % (ref 42–52)
HDLC SERPL-MCNC: 32 MG/DL
HEMOGLOBIN: 15 GM/DL (ref 14–18)
LDL CHOLESTEROL CALCULATED: 79 MG/DL
LV EF: 48 %
LVEF MODALITY: NORMAL
MAGNESIUM: 2.2 MG/DL (ref 1.6–2.4)
MCH RBC QN AUTO: 32.1 PG (ref 26–33)
MCHC RBC AUTO-ENTMCNC: 33.6 GM/DL (ref 32.2–35.5)
MCV RBC AUTO: 95.5 FL (ref 80–94)
MRSA SCREEN RT-PCR: NEGATIVE
OSMOLALITY CALCULATION: 278.9 MOSMOL/KG (ref 275–300)
PLATELET # BLD: 283 THOU/MM3 (ref 130–400)
PMV BLD AUTO: 9.5 FL (ref 9.4–12.4)
POTASSIUM SERPL-SCNC: 4.4 MEQ/L (ref 3.5–5.2)
RBC # BLD: 4.68 MILL/MM3 (ref 4.7–6.1)
SODIUM BLD-SCNC: 140 MEQ/L (ref 135–145)
TRIGL SERPL-MCNC: 214 MG/DL (ref 0–199)
VANCOMYCIN RESISTANT ENTEROCOCCUS: NEGATIVE
WBC # BLD: 8.8 THOU/MM3 (ref 4.8–10.8)

## 2021-10-27 PROCEDURE — 2500000003 HC RX 250 WO HCPCS

## 2021-10-27 PROCEDURE — 85027 COMPLETE CBC AUTOMATED: CPT

## 2021-10-27 PROCEDURE — 83735 ASSAY OF MAGNESIUM: CPT

## 2021-10-27 PROCEDURE — G0378 HOSPITAL OBSERVATION PER HR: HCPCS

## 2021-10-27 PROCEDURE — 80061 LIPID PANEL: CPT

## 2021-10-27 PROCEDURE — 70551 MRI BRAIN STEM W/O DYE: CPT

## 2021-10-27 PROCEDURE — 97165 OT EVAL LOW COMPLEX 30 MIN: CPT

## 2021-10-27 PROCEDURE — 83036 HEMOGLOBIN GLYCOSYLATED A1C: CPT

## 2021-10-27 PROCEDURE — 2060000000 HC ICU INTERMEDIATE R&B

## 2021-10-27 PROCEDURE — 36415 COLL VENOUS BLD VENIPUNCTURE: CPT

## 2021-10-27 PROCEDURE — 6360000002 HC RX W HCPCS: Performed by: STUDENT IN AN ORGANIZED HEALTH CARE EDUCATION/TRAINING PROGRAM

## 2021-10-27 PROCEDURE — 96372 THER/PROPH/DIAG INJ SC/IM: CPT

## 2021-10-27 PROCEDURE — 96374 THER/PROPH/DIAG INJ IV PUSH: CPT

## 2021-10-27 PROCEDURE — 96361 HYDRATE IV INFUSION ADD-ON: CPT

## 2021-10-27 PROCEDURE — 99233 SBSQ HOSP IP/OBS HIGH 50: CPT | Performed by: NURSE PRACTITIONER

## 2021-10-27 PROCEDURE — 80048 BASIC METABOLIC PNL TOTAL CA: CPT

## 2021-10-27 PROCEDURE — 6360000002 HC RX W HCPCS

## 2021-10-27 PROCEDURE — 92610 EVALUATE SWALLOWING FUNCTION: CPT

## 2021-10-27 PROCEDURE — 97535 SELF CARE MNGMENT TRAINING: CPT

## 2021-10-27 PROCEDURE — 2580000003 HC RX 258: Performed by: STUDENT IN AN ORGANIZED HEALTH CARE EDUCATION/TRAINING PROGRAM

## 2021-10-27 PROCEDURE — 96375 TX/PRO/DX INJ NEW DRUG ADDON: CPT

## 2021-10-27 PROCEDURE — 6370000000 HC RX 637 (ALT 250 FOR IP): Performed by: STUDENT IN AN ORGANIZED HEALTH CARE EDUCATION/TRAINING PROGRAM

## 2021-10-27 PROCEDURE — 93306 TTE W/DOPPLER COMPLETE: CPT

## 2021-10-27 RX ORDER — THIAMINE HYDROCHLORIDE 100 MG/ML
100 INJECTION, SOLUTION INTRAMUSCULAR; INTRAVENOUS DAILY
Status: DISCONTINUED | OUTPATIENT
Start: 2021-10-27 | End: 2021-10-29 | Stop reason: HOSPADM

## 2021-10-27 RX ORDER — FOLIC ACID 5 MG/ML
1 INJECTION, SOLUTION INTRAMUSCULAR; INTRAVENOUS; SUBCUTANEOUS DAILY
Status: DISCONTINUED | OUTPATIENT
Start: 2021-10-27 | End: 2021-10-29 | Stop reason: HOSPADM

## 2021-10-27 RX ORDER — ZOLPIDEM TARTRATE 10 MG/1
10 TABLET ORAL NIGHTLY PRN
COMMUNITY
End: 2022-01-20 | Stop reason: SDUPTHER

## 2021-10-27 RX ORDER — PHENOBARBITAL SODIUM 65 MG/ML
260 INJECTION INTRAMUSCULAR
Status: DISCONTINUED | OUTPATIENT
Start: 2021-10-27 | End: 2021-10-29 | Stop reason: HOSPADM

## 2021-10-27 RX ORDER — PHENOBARBITAL SODIUM 65 MG/ML
130 INJECTION INTRAMUSCULAR
Status: DISCONTINUED | OUTPATIENT
Start: 2021-10-27 | End: 2021-10-29 | Stop reason: HOSPADM

## 2021-10-27 RX ADMIN — PHENOBARBITAL SODIUM 130 MG: 65 INJECTION INTRAMUSCULAR at 12:58

## 2021-10-27 RX ADMIN — PANTOPRAZOLE SODIUM 40 MG: 40 TABLET, DELAYED RELEASE ORAL at 06:40

## 2021-10-27 RX ADMIN — ASPIRIN 81 MG CHEWABLE TABLET 81 MG: 81 TABLET CHEWABLE at 10:17

## 2021-10-27 RX ADMIN — CLOPIDOGREL BISULFATE 75 MG: 75 TABLET ORAL at 10:18

## 2021-10-27 RX ADMIN — ENOXAPARIN SODIUM 40 MG: 40 INJECTION SUBCUTANEOUS at 20:58

## 2021-10-27 RX ADMIN — ATORVASTATIN CALCIUM 80 MG: 80 TABLET, FILM COATED ORAL at 20:58

## 2021-10-27 RX ADMIN — SODIUM CHLORIDE: 9 INJECTION, SOLUTION INTRAVENOUS at 12:58

## 2021-10-27 RX ADMIN — FOLIC ACID 1 MG: 5 INJECTION, SOLUTION INTRAMUSCULAR; INTRAVENOUS; SUBCUTANEOUS at 14:49

## 2021-10-27 RX ADMIN — THIAMINE HYDROCHLORIDE 100 MG: 100 INJECTION, SOLUTION INTRAMUSCULAR; INTRAVENOUS at 14:49

## 2021-10-27 ASSESSMENT — ENCOUNTER SYMPTOMS
PHOTOPHOBIA: 0
COUGH: 0
SHORTNESS OF BREATH: 0

## 2021-10-27 ASSESSMENT — PAIN SCALES - GENERAL
PAINLEVEL_OUTOF10: 0

## 2021-10-27 NOTE — PLAN OF CARE
This is a Plan of Care note for a brief summary of the CC and main diagnostic workup and treatment. Full H&P to follow with date of service and time set prior to this note. Briefly this is a 51-year-old male, current everyday smoker, with PMH of multiple CVAs s/p right carotid stent placement in 2018 as well as a traumatic brain hemorrhage in 2018 after a fall who is being admitted for dysarthria and gait disturbances suspicious for acute CVA. Last known well time 7:30 PM on the night prior. Patient had called into work did call off due to lower back pain. Patient son, who also works with his father, was told by his coworkers that the patient sounded drunk when he called. Patient's son had gone to  the patient at the chiropractor's office when he noticed that the patient had an unsteady gait. Also reports that patient's speech was noticeably different than baseline. Due to his history of strokes, patient's son immediately brought the patient to the ED for further evaluation. Immediate CT head performed revealed no acute intracranial abnormalities. Patient is chronically on ASA and Plavix due to his carotid stent as well as for stroke prevention. CTA head and neck was ordered by neurology. Wet read shows no hemodynamically significant stenosis in the extracranial and intracranial carotid systems. MRI brain without contrast was ordered and is pending. On my evaluation patient does not exhibit any focal neurologic deficits. Strength is intact bilaterally in all extremities. Patient's gait was also tested and no abnormalities were seen. Patient's son and daughter at bedside reported that the patient was a heavy drinker in the past. Some concern that he is still drinking particularly on the weekends. Patient takes Xanax 3 times a day, took 1 dose during the day before last known well time. Has remained hemodynamically stable throughout the night.     Case and plan discussed with attending physician, Dr. Jennifer Roy Laureano Browning MD.    Jammie Perez MD   Internal Medicine Resident  PGY-2

## 2021-10-27 NOTE — PROGRESS NOTES
[address] ICU STEPDOWN TELEMETRY 4K  Clinical Swallow Evaluation      SLP Individual Minutes  Time In: 8326  Time Out: 1585  Minutes: 15  Timed Code Treatment Minutes: 0 Minutes       Date: 10/27/2021  Patient Name: Mckenna Maddox      CSN: [id number]   : 1950  (78 y.o.)  Gender: male   Referring Physician:  Triny Trimble MD  Diagnosis: Stroke-like symptoms  Secondary Diagnosis: Dysphagia, dysarthria     History of Present Illness/Injury: Patient admitted to [facility] with above med dx; please refer to physician H&P for full details. Per chart review, \"78year-old male, current everyday smoker, with PMH of multiple CVAs s/p right carotid stent placement in 2018 as well as a traumatic brain hemorrhage in 2018 after a fall who is being admitted for dysarthria and gait disturbances suspicious for acute CVA. Last known well time 7:30 PM on the night prior. Patient had called into work did call off due to lower back pain. Patient son, who also works with his father, was told by his coworkers that the patient sounded drunk when he called. Patient's son had gone to  the patient at the chiropractor's office when he noticed that the patient had an unsteady gait. Also reports that patient's speech was noticeably different than baseline. Due to his history of strokes, patient's son immediately brought the patient to the ED for further evaluation. \" MRI brain:    Impression       1. No evidence of acute intracranial abnormality. 2. Stable old lacunar infarcts in the bilateral basal ganglia, thalami and right cerebellar hemisphere compared to prior MRI. 3. Mild to moderate chronic sinus mucosal inflammation, decreased compared to prior MRI. ST consulted to complete clinical swallow evaluation d/t concerns for potential dysphagia within nursing swallow screen to assist with POC development as appropriate.      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 Upon arrival, patient resting in bed with son present at bedside. Prior to admission, patient endorsed consumption of \"softer foods\" with avoidance of hard/raw vegetables+fruits. Positive appetite communicated. *per patient and son reported, general resolving of slurred speech that resulting in presentation to this acute facility; dysarthria documented in 2016 s/p acute CVA with son reporting current level of intelligibility to, \"be at baseline\"; however, concerns conveyed for cognitive deficits presenting with needs to f/u with further evaluation (not able to be completed at date d/t patient's need for toileting)    OBJECTIVE:    Pain:  No pain reported. Current Diet: NPO    Respiratory Status:  Independent    Behavioral Observation:  Alert, Confused and Dysarthric    Oral Mechanism Evaluation:      Facial / Labial Impaired Decreased tone   Lingual Impaired Reduced strength, coordination, ROM   Dentition Impaired Top partial; sparse dentition    Velum WFL    Vocal Quality WFL    Sensation WFL    Cough Not Tested      Patient Evaluated Using: Thin H20 via cup+straw; mixed/soft solids    Oral Phase:  Impaired:  Impaired Mastication and Reduced Bolus Formation    Pharyngeal Phase: WFL:  Pharyngeal phase appears WFL but cannot rule out pharyngeal phase deficits from a bedside swallowing evaluation alone. Signs and Symptoms of Laryngeal Penetration/Aspiration: No signs/symptoms of aspiration evident in this evaluation, but cannot rule out silent aspiration.     Impresssions: Patient presents with mild oral dysphagia with inability to fully discern potential presence of pharyngeal phase deficits without formal instrumentation. Oral phase deficits presenting compounded d/t deficits for coordination/strength of lingual body with increased time required to achieve textural breakdown and to manipulate bolus within soft solids consumed. Thin H20 via cup+straw x4 oz consumed with what appeared to be adequate control/containment of viscous bolus. No overt s/s aspiration exhibited across all consistencies/trials consumed, certainly not able to r/o totality of airway invasion events and/or pharyngeal dysfunction at bedside alone; patient's swallow physiology appears functional to support goal for comfortable oral intake without distress. Recommendations for initiation of an easy to chew diet with thin liquids, f/u dysphagia services to be rendered to assist with achievement of baseline swallow function status. Will closely monitor pulmonary status throughout hospital course; should adverse changes be conveyed, will consider completion of instrumental evaluation via MBS. *post evaluation, patient without respiratory distress upon leaving room; RN Juan Garcia notified re: clinical findings and recommendations from the assessment; verbal receptiveness noted     RECOMMENDATIONS/ASSESSMENT:  Instrumental Evaluation: Instrumental evaluation not indicated at this time.   Diet Recommendations:  Easy to chew, thin liquids   Strategies:  Full Upright Position, Small Bite/Sip, Pulmonary Monitoring, Medications Whole with Puree, Alternate Solids and Liquids and Limit Distractions   Rehabilitation Potential: good    EDUCATION:  Learner: Patient and Family  Education:  Reviewed results and recommendations of this evaluation, Reviewed diet and strategies, Reviewed signs, symptoms and risks of aspiration, Reviewed ST goals and Plan of Care and Reviewed recommendations for follow-up  Evaluation of Education: Verbalizes understanding, Demonstrates with assistance and Needs further instruction    PLAN:  Speech Therapy evaluation to assess speech, language, cognition and/or voice   Skilled SLP intervention on acute care 3-5 x per week or until goals met and/or pt plateaus in function. Specific interventions for next session may include: dysphagia management. PATIENT GOAL:    Did not state. Will further assess during treatment. SHORT TERM GOALS:  Short-term Goals  Timeframe for Short-term Goals: 2 weeks  Goal 1: Patient will safely consume easy to chew diet with thin liquids without overt s/s aspiration to assist with nutrition/hydration measures. Goal 2: Consider completion of formal instrumentation via MBS should acute changes with pulmonary status be conveyed. Goal 3: Complete full cognitive linguistic evaluation to assist with further goal development per POC.     LONG TERM GOALS:  No LTG established given short ELOS      Theorrohith Su M.A., [facility]

## 2021-10-27 NOTE — PROGRESS NOTES
Neurology Progress Note    Date:10/27/2021       [id number]  Patient Gina Hoskins     YOB: 1950     Age:75 y.o. Requesting Physician: Haider Almazan MD     Reason for Consult:  Evaluate for expressive aphasia       Chief Complaint: slurred speech, gait concern    Subjective     Julia Canas is a 56-year-old male with a PMH of HLD, multiple lacunar infarcts, right carotid stent placement in 2018, prior brain bleed after a fall in 2018, and current half a pack per day smoker who presents to [facility] ED with his son with complaints of slurred speech. The patient went to bed around 7:30pm last night, around 6 am today, he called into work to call off due to recent back pain issues, for which he went to a chiropractor appointment today. He works with his son for the [address] [place] [address],[address] and his coworkers told his son that his dad sounded drunk when he called in to work this morning. His son then tried to call around lunch and the patient did not answer because he was at the chiropractor, but around 1:30 this afternoon his son stopped over. Per his son the patient went outside to meet him and was walking funny at this time, was noted to have slurred speech. The patient himself does not feel he has slurred speech. He is currently on aspirin and Plavix for secondary stroke prevention. CT head without any acute intracranial abnormality. Of note he does mention that he has had a lot of stress and anxiety lately. He denies any headache, vision difficulty, lightheadedness, dizziness, paresthesias, or weakness. Interval history 10/27/2021:  Patient had a good night. Still having some slurred speech intermittently. Denies any headache, numbness, tingling, weakness, dizziness, lightheadedness, or vision difficulty. Review of Systems   Review of Systems   Eyes: Negative for photophobia and visual disturbance. Respiratory: Negative for cough and shortness of breath.     Musculoskeletal: Negative for neck pain and neck stiffness. Neurological: Negative for dizziness, facial asymmetry, weakness, light-headedness, numbness and headaches. Hematological: Does not bruise/bleed easily. Psychiatric/Behavioral: Negative for confusion and decreased concentration. Medications     No current facility-administered medications on file prior to encounter. Current Outpatient Medications on File Prior to Encounter   Medication Sig Dispense Refill    ezetimibe (ZETIA) 10 MG tablet take 1 tablet by mouth once daily 30 tablet 5    ALPRAZolam (XANAX) 0.5 MG tablet take 1-2 tablet by mouth 3 times daily  As needed for anxiety 120 tablet 0    traMADol (ULTRAM) 50 MG tablet Take 1 tablet by mouth every 4 hours as needed for Pain for up to 30 days.  Take lowest dose possible to manage pain 120 tablet 0    pantoprazole (PROTONIX) 40 MG tablet take 1 tablet by mouth once daily 90 tablet 2    clopidogrel (PLAVIX) 75 MG tablet take 1 tablet by mouth once daily 30 tablet 11    albuterol sulfate HFA (PROAIR HFA) 108 (90 Base) MCG/ACT inhaler Inhale 2 puffs into the lungs every 6 hours as needed for Wheezing 1 Inhaler 3    cetirizine (ZYRTEC) 10 MG tablet Take 1 tablet by mouth daily 30 tablet 5    albuterol (PROVENTIL) (2.5 MG/3ML) 0.083% nebulizer solution Take 3 mLs by nebulization every 6 hours as needed for Wheezing 120 each 3    Respiratory Therapy Supplies (NEBULIZER/TUBING/MOUTHPIECE) KIT 1 kit by Does not apply route daily as needed (cough, wheeze) Diagnosis:RAD and Bronchitis 1 kit 0    aspirin 81 MG chewable tablet Take 81 mg by mouth daily       Scheduled Meds:   Continuous Infusions:   PRN Meds:     Past History    Past Medical History:   has a past medical history of 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 [address] Erectile dysfunction, Essential hypertension, malignant, ETOH abuse, GERD (gastroesophageal reflux disease), Insomnia, Low back pain, Major depressive disorder, recurrent ([address] Other headache syndromes, and Sciatica. Social History:   reports that he has been smoking cigarettes. He started smoking about 53 years ago. He has a 15.00 pack-year smoking history. He has never used smokeless tobacco. He reports previous alcohol use of about 42.0 standard drinks of alcohol per week. He reports that he does not use drugs. Family History:   Family History   Problem Relation Age of Onset    Dementia Mother     Heart Disease Father     High Blood Pressure Sister     Cancer Brother         testicular cancer       Physical Examination      Vitals:  BP (!) 117/57   Pulse 78   Temp 98.1 °F (36.7 °C) (Oral)   Resp 17   Ht 6' 1\" (1.854 m)   Wt 237 lb (107.5 kg)   SpO2 96%   BMI 31.27 kg/m²   Temp (24hrs), Av.1 °F (36.7 °C), Min:97.5 °F (36.4 °C), Max:98.9 °F (37.2 °C)      I/O (24Hr): Intake/Output Summary (Last 24 hours) at 10/27/2021 0805  Last data filed at 10/27/2021 0356  Gross per 24 hour   Intake 477.3 ml   Output 350 ml   Net 127.3 ml         Physical Exam  Constitutional:       Appearance: He is ill-appearing. HENT:      Head: Normocephalic and atraumatic. Right Ear: External ear normal.      Left Ear: External ear normal.      Nose: Nose normal.      Mouth/Throat:      Mouth: Mucous membranes are moist.   Eyes:      Extraocular Movements: EOM normal.      Pupils: Pupils are equal, round, and reactive to light. Cardiovascular:      Rate and Rhythm: Normal rate and regular rhythm. Pulses: Normal pulses. Heart sounds: Normal heart sounds. Pulmonary:      Effort: Pulmonary effort is normal.      Breath sounds: Normal breath sounds. Abdominal:      General: Bowel sounds are normal.      Palpations: Abdomen is soft. Musculoskeletal:         General: Normal range of motion. Cervical back: Normal range of motion and neck supple. Skin:     General: Skin is warm and dry.    Neurological:      General: No focal deficit present. Mental Status: He is alert and oriented to person, place, and time. Coordination: Finger-Nose-Finger Test and Heel to [place] tay Test normal.      Gait: Gait is intact. Deep Tendon Reflexes:      Reflex Scores:       Brachioradialis reflexes are 2+ on the right side and 2+ on the left side. Patellar reflexes are 1+ on the right side and 1+ on the left side. Achilles reflexes are 2+ on the right side and 2+ on the left side. Psychiatric:         Mood and Affect: Mood normal.         Speech: Speech is slurred. Behavior: Behavior normal.         Thought Content: Thought content normal.         Judgment: Judgment normal.       Neurologic Exam     Mental Status   Oriented to person, place, and time. Follows 1 step commands. Attention: normal. Concentration: normal.   Speech: slurred (Muffled at times  )  Level of consciousness: alert  Knowledge: good. Able to name object. Able to read. Able to repeat. Cranial Nerves     CN II   Visual fields full to confrontation. CN III, IV, VI   Pupils are equal, round, and reactive to light. Extraocular motions are normal.   Right pupil: Size: 3 mm. Shape: regular. Reactivity: brisk. Left pupil: Size: 3 mm. Shape: regular. Reactivity: brisk. CN V   Facial sensation intact. CN VII   Facial expression full, symmetric.      CN VIII   CN VIII normal.     CN IX, X   CN IX normal.   CN X normal.   Palate: symmetric    CN XI   CN XI normal.   Right trapezius strength: normal  Left trapezius strength: normal    CN XII   CN XII normal.   Tongue deviation: none    Motor Exam   Muscle bulk: normal  Overall muscle tone: normal  Right arm pronator drift: absent  Left arm pronator drift: absent    BUE: 5/5  BLE: 5/5     Sensory Exam   Light touch normal.     Gait, Coordination, and Reflexes     Gait  Gait: normal    Coordination   Finger to nose coordination: normal  Heel to shin coordination: normal    Tremor   Resting tremor: absent  Intention tremor: absent  Action tremor: absent    Reflexes   Right brachioradialis: 2+  Left brachioradialis: 2+  Right patellar: 1+  Left patellar: 1+  Right achilles: 2+  Left achilles: 2+  Gait not formally tested        Labs/Imaging/Diagnostics   Labs:  CBC:  Recent Labs     10/26/21  1725 10/27/21  0504   WBC 10.2 8.8   RBC 4.68* 4.68*   HGB 15.0 15.0   HCT 44.8 44.7   MCV 95.7* 95.5*    283     CHEMISTRIES:  Recent Labs     10/26/21  1700 10/27/21  0504   * 140   K 3.9 4.4   CL 99 105   CO2 24 25   BUN 11 11   CREATININE 0.9 0.8   GLUCOSE 97 100   MG  --  2.2     PT/INR:No results for input(s): PROTIME, INR in the last 72 hours. APTT:No results for input(s): APTT in the last 72 hours. LIVER PROFILE:  Recent Labs     10/26/21  1700   AST 13   ALT 16   BILITOT 0.5   ALKPHOS 87       Imaging Last 24 Hours:  CT Head WO Contrast    Result Date: 10/26/2021  PROCEDURE: CT HEAD WO CONTRAST CLINICAL INFORMATION:TIA symptoms COMPARISON: MRI brain 7/27/2020 TECHNIQUE: 5 mm axial imaging through the head without IV contrast. All CT scans at this facility use dose modulation, iterative reconstruction, and/or weight based dosing when appropriate to reduce the radiation dose to as low as reasonably achievable. FINDINGS: Old lacunar infarcts are seen in the right basal ganglion, and right thalamus. Periventricular  and subcortical white matter hypodensities that likely relate to chronic microangiopathic disease. Mild cerebral volume loss. No ventriculomegaly. No midline shift or mass effect. No acute intracranial hemorrhage. No intracranial collection. Gray-white differentiation is unremarkable. The posterior fossa is unremarkable. The craniocervical junction is unremarkable. No acute bony abnormality. Mild paranasal sinus mucosal disease. The  mastoid air cells are clear. The orbits are unremarkable. 1. No acute intracranial abnormality. 2. Sequela of chronic microvascular changes.  **This report has been created using voice recognition software. It may contain minor errors which are inherent in voice recognition technology. ** Final report electronically signed by Dr Anthony Marie on 10/26/2021 7:10 PM      CTA  HEAD AND NECK  Impression       1. Mild mural calcification in the cavernous and clinoid segments of internal carotid arteries without flow-limiting stenosis or aneurysm in the intracranial circulation. 2. Redemonstration of a right carotid stent extending from the distal common carotid artery into the proximal internal carotid artery which appears patent with some in-stent stenosis the severity of which can't be well evaluated secondary to streak    artifact from the stent. 3. Stable mild mural calcification of left carotid bulb without hemodynamically significant stenosis by NASCET criteria.                   **This report has been created using voice recognition software. It may contain minor errors which are inherent in voice recognition technology. **       Final report electronically signed by Dr. Hossein Rose MD on 10/27/2021 8:45 AM             MRI BRAIN  Impression       1. No evidence of acute intracranial abnormality. 2. Stable old lacunar infarcts in the bilateral basal ganglia, thalami and right cerebellar hemisphere compared to prior MRI. 3. Mild to moderate chronic sinus mucosal inflammation, decreased compared to prior MRI.                   **This report has been created using voice recognition software. It may contain minor errors which are inherent in voice recognition technology. **       Final report electronically signed by Dr. Hossein Rose MD on 10/27/2021 9:35 AM             Assessment and Plan:        Hospital Problems         Last Modified POA    Stroke-like symptoms 10/26/2021 Yes    TIA (transient ischemic attack) 10/27/2021 Yes    Essential hypertension, malignant 10/27/2021 Yes          1.    Dysarthria, history of CVA  · CT head with no

## 2021-10-27 NOTE — PROGRESS NOTES
PHARMACY NOTE  Long Saavedra was ordered ezetimibe. Per the [address], this medication is non-formulary and not stocked by pharmacy. The medication can be reordered at discharge.

## 2021-10-27 NOTE — PROGRESS NOTES
Pharmacy Medication History Note      List of current medications patient is taking is complete. Source of information: patient, surescripts    Changes made to medication list:  Medications removed (include reason, ex. therapy complete or physician discontinued):  Zyrtec 10 mg - list cleanup    Medications added/doses adjusted:  Zolpidem 10 mg - 1 qhs prn sleep    Other notes (ex. Recent course of antibiotics, Coumadin dosing):  Denies use of other OTC or herbal medications.       Allergies reviewed      Electronically signed by Nayla Whyte on 10/27/2021 at 12:47 PM

## 2021-10-27 NOTE — ED NOTES
Patient transported to [facility]  by cart in stable condition. Patient monitored on cardiac telemetry. IV  line is patent.         Sena Valenzuela, EMT-P  10/26/21 2051

## 2021-10-27 NOTE — H&P
Hospitalist - History & Physical      Patient: Raheem Ho    Unit/Bed:4K-09/009-A  YOB: 1950  MRN: [id number]   Acct: [de-identified]   PCP: MANE Dejesus CNP    Date of Service: Pt seen/examined on 10/27/21  and Admitted to Inpatient with expected LOS greater than two midnights due to medical therapy. Chief Complaint: Slurred speech     Assessment and Plan:    1. Dysarthria: Unclear if patient's slurred speech, which as per son is not his baseline, is due to an acute stroke/TIA. CT head negative. CTA head and neck negative. No focal deficit on exam. No gait abnormality when testing patient's ambulation. Could be due to medication interaction as patient took Xanax prior to his last known well time. Unclear if he was also drinking at that time. Electrolytes are WNL, no hypoglycemia on admission. Serum ammonia WNL. TSH WNL. Patient reports daily compliance with Plavix and ASA which he takes due to his right carotid stent. Smokes 1 PPD. · Will follow up with MRI brain to further assess for structural etiology of patient's dysarthria. · Will continue with Plavix and ASA daily. No evidence of bleeding. No hemorrhage on CT head. · High dose statin added with Lipitor 80 mg. Lipid panel ordered. · Check ESR and CRP. · Continue with IV NS at 75 cc/h. · Avoid use of adding antihypertensives to allow for permissive hypertension. Labetalol PRN for SBP > 220 mmHg. · Aspiration, seizure and fall precautions. NIHSS checks every 4 hours. · PT/OT ordered. SLP to further assess dysarthria. · NPO until bedside swallow is passed. · Provide smoking cessation education. EtOH abstinence. · Further recs as per Neurology. 2. History of CVA s/p Right Carotid Stent: Placed in 2018. CTA head and neck does not demonstrate stent abnormalities. Continue with Plavix and ASA daily. 3. Ethanol Abuse:  Son reports that patient used to be a heavy drinker in the past. Patient states he has not had a drink in over 5 years, however son and daughter both seem to imply that he still drinks. No signs of withdrawal on exam. Will hold patient's Xanax while being worked up for stroke. Consider decreasing or discontinuing due to suspected combined use with EtOH. 4. Anxiety Disorder with Insomnia: Xanax on hold as above. History Of Present Illness:      Selina Wyman is a 78 y.o. male, current everyday smoker, with PMH of multiple CVAs s/p right carotid stent placement, traumatic cerebral hemorrhage in 2018 who is being admitted for further work-up of dysarthria and gait disturbances suspicious for acute CVA. Patient's son and daughter at bedside. Patient's last known well time was the night prior to admission at 7:30 PM on 10/26/2021. Patient had gotten up on the morning of admission and had worsening lower back pain. He had then called his job to call off of work and go to a chiropractor. Patient's son, who works with his father, had received messages from his coworkers stating that the patient sounded drunk when he called. Patient son went to go  the patient from the chiropractors office where he noticed that the patient had \"balance issues\" while walking. Afterward the patient's son had also noticed that the patient had difficulty with words which is not his baseline. Patient apparently struggled opening a soda can as well. Due to patient's history of strokes, patient was taken by his son to the ED for further evaluation. ED staff had called a code stroke and patient underwent CT head which showed no acute intracranial abnormalities, negative for stroke. Neurology was consulted who ordered a CTA head and neck which revealed no hemodynamically significant stenosis in all the intracranial and extracranial vessels. MRI without contrast was ordered. On further assessment, patient states that he does not feel like his speech is slurred. Denies having balance issues as stated by his son.  He reports compliance with his Plavix and ASA which he takes daily. Neurologic exam was overall benign - no focal deficits, strength intact in all extremities 5/5, no unsteady gait when ambulating. Cranial nerves seem to also be intact bilaterally. On questioning the patient about his alcohol use, he states that he quit 5 years ago. However both the son and the daughter at bedside was seen shaking their head at this question which raises concerns about the patient's continued alcohol use. Patient takes Xanax 3 times a day and states that he took 1 just prior to his last well-known time. He denies having any headaches, changes in vision, presyncope, chest pain, palpitations, shortness of breath, cough, abdominal pain, N/V/D/C, extremity weakness or pain. Overall has no complaints. Summarized ED course: Initial vital signs included temperature 98.9 °F, respirations 18, pulse 82 bpm, /80 mmHg, SPO2 95% on room air. Pertinent Admission Labs:   CBC: WBC 10,200, H&H 15.0/44.8, platelet count 512,196   BMP/CMP: Sodium 133, potassium 3.9, chloride 99, CO2 24, BUN 11, creatinine 0.9, anion gap 10, EGFR 84, magnesium 2.2, glucose 97, calcium 9.2   Albumin 4.0, alk phos 87, ALT 16, AST 13, ammonia 33, bilirubin 0.5   TSH 3.27    Pertinent Admission Imaging:   CT head without contrast: No acute intracranial abnormalities. Old lacunar infarcts present in the right basal ganglia, right thalamus.  CTA head and neck with and without contrast: No hemodynamically significant stenosis in the extracranial or intracranial carotid systems.  MRI brain without contrast pending. 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      Past Surgical History:  has a past surgical history that includes hernia repair; Appendectomy; Knee arthroscopy; EKG 12 Lead (7/17/2015); joint replacement (08/03/2016); and Colonoscopy. Home Medications:   No current facility-administered medications on file prior to encounter. Current Outpatient Medications on File Prior to Encounter   Medication Sig Dispense Refill    ezetimibe (ZETIA) 10 MG tablet take 1 tablet by mouth once daily 30 tablet 5    ALPRAZolam (XANAX) 0.5 MG tablet take 1-2 tablet by mouth 3 times daily  As needed for anxiety 120 tablet 0    traMADol (ULTRAM) 50 MG tablet Take 1 tablet by mouth every 4 hours as needed for Pain for up to 30 days. Take lowest dose possible to manage pain 120 tablet 0    pantoprazole (PROTONIX) 40 MG tablet take 1 tablet by mouth once daily 90 tablet 2    clopidogrel (PLAVIX) 75 MG tablet take 1 tablet by mouth once daily 30 tablet 11    albuterol sulfate HFA (PROAIR HFA) 108 (90 Base) MCG/ACT inhaler Inhale 2 puffs into the lungs every 6 hours as needed for Wheezing 1 Inhaler 3    cetirizine (ZYRTEC) 10 MG tablet Take 1 tablet by mouth daily 30 tablet 5    albuterol (PROVENTIL) (2.5 MG/3ML) 0.083% nebulizer solution Take 3 mLs by nebulization every 6 hours as needed for Wheezing 120 each 3    Respiratory Therapy Supplies (NEBULIZER/TUBING/MOUTHPIECE) KIT 1 kit by Does not apply route daily as needed (cough, wheeze) Diagnosis:RAD and Bronchitis 1 kit 0    aspirin 81 MG chewable tablet Take 81 mg by mouth daily         Allergies:    Tape [adhesive tape]    Social History:  reports that he has been smoking cigarettes. He started smoking about 53 years ago. He has a 15.00 pack-year smoking history. He has never used smokeless tobacco. He reports previous alcohol use of about 42.0 standard drinks of alcohol per week. He reports that he does not use drugs.       Family History: family history includes Cancer in his brother; Dementia in his mother; Heart Disease in his father; High Blood Pressure in his sister. Diet:  Diet NPO    Review of systems:   Pertinent positives as noted in the HPI. All other systems reviewed and negative. PHYSICAL EXAM:   height is 6' 1\" (1.854 m) and weight is 237 lb (107.5 kg). His oral temperature is 97.8 °F (36.6 °C). His blood pressure is 129/79 and his pulse is 76. His respiration is 18 and oxygen saturation is 96%. Body mass index is 31.27 kg/m². Physical Exam  Vitals and nursing note reviewed. Constitutional:       General: He is not in acute distress. Appearance: Normal appearance. He is obese. He is not ill-appearing. HENT:      Head: Normocephalic and atraumatic. Nose: Nose normal. No congestion or rhinorrhea. Mouth/Throat:      Mouth: Mucous membranes are moist.      Pharynx: Oropharynx is clear. Eyes:      General: No scleral icterus. Right eye: No discharge. Left eye: No discharge. Extraocular Movements: Extraocular movements intact. Conjunctiva/sclera: Conjunctivae normal.   Cardiovascular:      Rate and Rhythm: Normal rate and regular rhythm. Pulses: Normal pulses. Heart sounds: Normal heart sounds. No murmur heard. Pulmonary:      Effort: Pulmonary effort is normal. No respiratory distress. Breath sounds: Normal breath sounds. No wheezing, rhonchi or rales. Abdominal:      General: Abdomen is flat. Bowel sounds are normal.      Palpations: Abdomen is soft. Musculoskeletal:         General: No tenderness, deformity or signs of injury. Normal range of motion. Cervical back: Normal range of motion and neck supple. Right lower leg: No edema. Left lower leg: No edema. Skin:     General: Skin is warm and dry. Capillary Refill: Capillary refill takes less than 2 seconds. Neurological:      General: No focal deficit present. Mental Status: He is alert and oriented to person, place, and time.       Cranial Nerves: No cranial nerve deficit. Motor: No weakness. Coordination: Coordination normal.      Gait: Gait normal.   Psychiatric:         Mood and Affect: Mood normal.         Behavior: Behavior normal.         Thought Content: Thought content normal.         Judgment: Judgment normal.     Addendum: I personally evaluated and examined this patient. I discussed this patient's case with Amanda Arriaza, and agree with his assessment and plan as detailed above. Electronically signed by Kaitlin Thorne M.D. on 10/27/2021 at 7:35 AM

## 2021-10-27 NOTE — PROGRESS NOTES
Utilize [facility] alcohol withdrawal scale (Based on [place] Modified Alcohol Withdrawal Scale). Tabulate score based on classifications including Tremor, Sweating, Hallucination, Orientation, and Agitation. Tremor: 0  Sweatin  Hallucinations: 0  Orientation: 0  Agitation: 0  Total Score: 0  Action perform as described below     Tremor:  No tremor is 0 points. Tremor on movement is 1 point. Tremor at rest is 2 points. Sweating: No Sweat 0 points. Moist is 1 point. Drenching sweats is 2 points. Hallucinations: No present 0 points. Dissuadable is 1 point. Not dissuadable is 2 points. Orientation: Oriented 0 points. Vague/detached 1 point. Disoriented/no contact 2 points. Agitation: Calm 0 points. Anxious 1 point. Panicky 2 points. Check scale every 2 hours. Discontinue scoring with 4 consecutive scorings of 0. Scale 0: No phenobarbital given. Re-assess every 60 minutes as needed. Scale 1-3: Phenobarbital 130 mg IV over 3 minutes. Re-assess every 60 minutes as needed. May administer every 60 minutes to a maximum dose of phenobarbital 1040 mg in 24 hours! Scale 4-8: Phenobarbital 260 mg IV over 5 minutes. Re-assess every 60 minutes as needed. May administer every 60 minutes to a maximum dose of phenobarbital 1040mg in 24 hours! Scale 9-10: Transfer to ICU (if not already in ICU). Administer 10mg/kg phenobarbital IV over 60 minutes. Maximum dose phenobarbital is 1040mg in 24 hours!

## 2021-10-27 NOTE — PROGRESS NOTES
[place] [facility] ICU STEPDOWN TELEMETRY 4K  EVALUATION    Time:   Time In: 6102  Time Out: 1116  Timed Code Treatment Minutes: 23 Minutes  Minutes: 31          Date: 10/27/2021  Patient Name: Emeka Joe,   Gender: male      MRN: [id number]  : 1950  (78 y.o.)  Referring Practitioner: Ros Castillo MD  Diagnosis: Stroke-like Symptoms       Restrictions/Precautions:  Restrictions/Precautions: Up as Tolerated, General Precautions    Subjective  Chart Reviewed: Yes, Orders, Progress Notes, Imaging  Patient assessed for rehabilitation services?: Yes  Response to previous treatment: Patient with no complaints from previous session  Family / Caregiver Present: Yes    Subjective: RN approved of OT session. Pt supine in bed on arrival, with both children present in room. Pt agreeable to OT. Pt identified that he believes he is at [facility] and does not require additional therapy. Pain:  Pain Assessment  Patient Currently in Pain: Denies    Vitals: Vitals not assessed per clinical judgement, see nursing flowsheet    Social/Functional History:  Lives With: Alone  Type of Home: House  Home Layout: One level  Home Access: Stairs to enter without rails  Entrance Stairs - Number of Steps: 1  Home Equipment: Rolling walker, Cane   Bathroom Shower/Tub: Walk-in shower       ADL Assistance: Independent  Homemaking Assistance: Independent  Ambulation Assistance: Independent    Active : Yes  Occupation: Full time employment  Type of occupation:  for construction  Additional Comments: Pt indep at [facility]    VISION:WFL    HEARING:  slight Samish     COGNITION: Slight Expressive Aphasia - per notes, baseline     RANGE OF MOTION:  Bilateral Upper Extremity:  WFL    STRENGTH:  Bilateral Upper Extremity:  grossly 5/5     SENSATION:   WFL    ADL:   Grooming: Independent. wash hands at sink  Lower Extremity Dressing: Independent.   donning bilateral shoes sitting EOB Toileting: Independent. standing to urinate . BALANCE:  Sitting Balance:  Independent. Standing Balance: modified Independent. No LOB     BED MOBILITY:  Supine to Sit: Independent    Scooting: Independent      TRANSFERS:  Sit to Stand:  Independent. Stand to Sit: Independent. FUNCTIONAL MOBILITY:  Assistive Device: None  Assist Level:  Modified Independent. Distance: To and from bathroom and within room. Slow paced (baseline according pt and pt's children), No LOB          Activity Tolerance:  Patient tolerance of  treatment: good. Assessment:  Assessment: Pt is at [facility] and is indep with ADLs/IADLs; pt does not require further OT services at this time  Prognosis: Good  REQUIRES OT FOLLOW UP: No  No Skilled OT: Independent with functional mobility, Independent with ADL's, At baseline function, Safe to return home  Decision Making: Low Complexity    Treatment Initiated: Treatment and education initiated within context of evaluation. Evaluation time included review of current medical information, gathering information related to past medical, social and functional history, completion of standardized testing, formal and informal observation of tasks, assessment of data and development of plan of care and goals. Treatment time included skilled education and facilitation of tasks to increase safety and independence with ADL's for improved functional independence and quality of life. Discharge Recommendations:  Home with assist PRN    Patient Education:  OT Education: OT Role, Plan of Care, IADL Safety    Equipment Recommendations:  Equipment Needed: No    Plan:  Times per week: n/a. Goals:  Patient goals : return home  Short term goals  Time Frame for Short term goals: n/a         Following session, patient left in safe position with all fall risk precautions in place.

## 2021-10-27 NOTE — PROGRESS NOTES
Echo done bedside.      No bubble study done due to testing coming back negative for stroke per Dr. Sebastián Carrera

## 2021-10-27 NOTE — PROGRESS NOTES
Pt admitted to  4 from ED. Complaints: Stroke like symptoms. IV site free of s/s of infection or infiltration. Vital signs obtained. Assessment and data collection initiated. Two nurse skin assessment performed by Hermes Calvin RN and Starla Giordano. Oriented to room. Policies and procedures for  explained. All questions answered with no further questions at this time. Fall prevention and safety brochure discussed with patient. Bed alarm on. Call light in reach.

## 2021-10-27 NOTE — PROGRESS NOTES
Utilize [facility] alcohol withdrawal scale (Based on [place] Modified Alcohol Withdrawal Scale). Tabulate score based on classifications including Tremor, Sweating, Hallucination, Orientation, and Agitation. Tremor: 0  Sweatin  Hallucinations: 0  Orientation: 0  Agitation: 1  Total Score: 1  Action perform as described below     Tremor:  No tremor is 0 points. Tremor on movement is 1 point. Tremor at rest is 2 points. Sweating: No Sweat 0 points. Moist is 1 point. Drenching sweats is 2 points. Hallucinations: No present 0 points. Dissuadable is 1 point. Not dissuadable is 2 points. Orientation: Oriented 0 points. Vague/detached 1 point. Disoriented/no contact 2 points. Agitation: Calm 0 points. Anxious 1 point. Panicky 2 points. Check scale every 2 hours. Discontinue scoring with 4 consecutive scorings of 0. Scale 0: No phenobarbital given. Re-assess every 60 minutes as needed. Scale 1-3: Phenobarbital 130 mg IV over 3 minutes. Re-assess every 60 minutes as needed. May administer every 60 minutes to a maximum dose of phenobarbital 1040 mg in 24 hours! Scale 4-8: Phenobarbital 260 mg IV over 5 minutes. Re-assess every 60 minutes as needed. May administer every 60 minutes to a maximum dose of phenobarbital 1040mg in 24 hours! Scale 9-10: Transfer to ICU (if not already in ICU). Administer 10mg/kg phenobarbital IV over 60 minutes. Maximum dose phenobarbital is 1040mg in 24 hours!

## 2021-10-27 NOTE — CARE COORDINATION
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 Procedure:   10/27 MRI Brain/ECHO planned  Barriers to Discharge:  Slurred Speech/Unsteady gait. History: cerebral hemorrahge, fall, RCE stent. IVF continued  PCP: MANE Romero CNP  Readmission Risk Score: 7.6%    Patient Goals/Plan/Treatment Preferences: plans home alone; prefers [facility], therapy) if needed as had in past after choices offered; has nebulizer; await therapy recommendations; has nebulizer; son attentive to needs  Transportation/Food Security/Housekeeping Addressed:  No issues identified.

## 2021-10-27 NOTE — PLAN OF CARE
Name: Yvan Chacko   : 1950   Date: 10/27/21  MRN: [id number]    Plan of Care      Yvan Chacko is a 78 y.o. male with a PMH as seen below who presents to to [facility] ED with complaints of a fall with dysarthria and gait disturbance suspicious for CVA. Patient has history of CVAs status post right carotid stent placement in 2018 as well as traumatic brain hemorrhage in 2018. Patient past medical history also significant for alcohol abuse. Per patient states his last drink was 5 years ago however daughter is in room states patient was drinking on Saturday. After conversing with the patient he did admit that he did drink on Saturday however he states he is a social drinker now does not binge drink like he used to. Ordered an etoh level as well as phenobarb protocol. CT the head without contrast performed in the ED showed no acute intracranial abnormality. CTA of the head and neck did demonstrate redemonstration of the right carotid stent extending to the distal common carotid artery to the proximal internal carotid artery. There does appear to be some in-stent stenosis however severity cannot be ascertained. Also noted to have stable mild mural calcification of the left carotid bulb without hemodynamically significant stenosis by NASCET criteria. MRI of the brain performed on 10/27 showed no evidence of acute intracranial abnormality. Stable lacunar infarcts in the bilateral basal ganglia, thalami and right cerebellar hemisphere. Pending speech eval and PT/OT, Echo. Neurology was consulted in ED. They are assessing risk factor control and will continue to follow. 1. Dysarthria: Unclear if patient's slurred speech, which as per son is not his baseline, is due to an acute stroke/TIA. CT head negative. CTA head and neck negative. No focal deficit on exam. No gait abnormality when testing patient's ambulation.  Could be due to medication interaction as patient took Xanax prior to his last known well time. Unclear if he was also drinking at that time. Electrolytes are WNL, no hypoglycemia on admission. Serum ammonia WNL. TSH WNL. Patient reports daily compliance with Plavix and ASA which he takes due to his right carotid stent. Smokes 1 PPD. ? MRI brain shows no acute intracranial pathology  ? Will continue with Plavix and ASA daily. No evidence of bleeding. No hemorrhage on CT head. ? High dose statin added with Lipitor 80 mg. Lipid panel performed. ? Continue with IV NS at 75 cc/h.  ? Avoid use of adding antihypertensives to allow for permissive hypertension. Labetalol PRN for SBP > 220 mmHg. ? Aspiration, seizure and fall precautions. NIHSS checks qshift as all imaging is negative currently. ? PT/OT ordered  ? NPO. SLP to perform swallow eval  ? Provide smoking cessation education. EtOH abstinence. 2. History of CVA s/p Right Carotid Stent: Placed in 2018. CTA head and neck does not demonstrate stent abnormalities. Continue with Plavix and ASA daily. 3. Ethanol Abuse: Son reports that patient used to be a heavy drinker in the past. Patient states he last had a drink on Saturday, however only drinks socially and quit drinking heavy 5 years ago. No signs of withdrawal on exam. Continue to hold and consider decreasing or discontinuing due to suspected combined use with EtOH. 4. Anxiety Disorder with Insomnia: Xanax on hold as above. 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 Appearance: Normal appearance. He is obese. He is ill-appearing (Chronically). HENT:      Head: Normocephalic and atraumatic. Right Ear: Tympanic membrane normal.      Left Ear: Tympanic membrane normal.      Nose: Nose normal.      Mouth/Throat:      Mouth: Mucous membranes are moist.      Pharynx: Oropharynx is clear. Eyes:      Extraocular Movements: Extraocular movements intact. Conjunctiva/sclera: Conjunctivae normal.      Pupils: Pupils are equal, round, and reactive to light. Cardiovascular:      Rate and Rhythm: Normal rate. Pulses: Normal pulses. Heart sounds: Normal heart sounds. No murmur heard. No gallop. Pulmonary:      Effort: Pulmonary effort is normal. No respiratory distress. Breath sounds: Normal breath sounds. No wheezing, rhonchi or rales. Abdominal:      General: Bowel sounds are normal. There is no distension. Palpations: Abdomen is soft. Tenderness: There is no abdominal tenderness. There is no guarding. Musculoskeletal:         General: No swelling or tenderness. Normal range of motion. Cervical back: Normal range of motion and neck supple. Skin:     General: Skin is warm and dry. Capillary Refill: Capillary refill takes less than 2 seconds. Neurological:      Mental Status: He is alert. Psychiatric:         Mood and Affect: Mood normal.         Behavior: Behavior normal.         Thought Content:  Thought content normal.         Judgment: Judgment normal.          Electronically signed by Terrence Dong DO on 10/27/2021 at 1:32 PM

## 2021-10-28 LAB
ANION GAP SERPL CALCULATED.3IONS-SCNC: 10 MEQ/L (ref 8–16)
BUN BLDV-MCNC: 14 MG/DL (ref 7–22)
CALCIUM SERPL-MCNC: 9.3 MG/DL (ref 8.5–10.5)
CHLORIDE BLD-SCNC: 101 MEQ/L (ref 98–111)
CO2: 23 MEQ/L (ref 23–33)
CREAT SERPL-MCNC: 0.7 MG/DL (ref 0.4–1.2)
ERYTHROCYTE [DISTWIDTH] IN BLOOD BY AUTOMATED COUNT: 13.8 % (ref 11.5–14.5)
ERYTHROCYTE [DISTWIDTH] IN BLOOD BY AUTOMATED COUNT: 48.8 FL (ref 35–45)
GFR SERPL CREATININE-BSD FRML MDRD: > 90 ML/MIN/1.73M2
GLUCOSE BLD-MCNC: 112 MG/DL (ref 70–108)
HCT VFR BLD CALC: 46.9 % (ref 42–52)
HEMOGLOBIN: 15.6 GM/DL (ref 14–18)
MCH RBC QN AUTO: 31.8 PG (ref 26–33)
MCHC RBC AUTO-ENTMCNC: 33.3 GM/DL (ref 32.2–35.5)
MCV RBC AUTO: 95.5 FL (ref 80–94)
PLATELET # BLD: 308 THOU/MM3 (ref 130–400)
PMV BLD AUTO: 9.9 FL (ref 9.4–12.4)
POTASSIUM SERPL-SCNC: 4.5 MEQ/L (ref 3.5–5.2)
RBC # BLD: 4.91 MILL/MM3 (ref 4.7–6.1)
SODIUM BLD-SCNC: 134 MEQ/L (ref 135–145)
WBC # BLD: 8.4 THOU/MM3 (ref 4.8–10.8)

## 2021-10-28 PROCEDURE — 96372 THER/PROPH/DIAG INJ SC/IM: CPT

## 2021-10-28 PROCEDURE — 36415 COLL VENOUS BLD VENIPUNCTURE: CPT

## 2021-10-28 PROCEDURE — 99232 SBSQ HOSP IP/OBS MODERATE 35: CPT | Performed by: HOSPITALIST

## 2021-10-28 PROCEDURE — 2060000000 HC ICU INTERMEDIATE R&B

## 2021-10-28 PROCEDURE — 6370000000 HC RX 637 (ALT 250 FOR IP): Performed by: STUDENT IN AN ORGANIZED HEALTH CARE EDUCATION/TRAINING PROGRAM

## 2021-10-28 PROCEDURE — 99223 1ST HOSP IP/OBS HIGH 75: CPT | Performed by: INTERNAL MEDICINE

## 2021-10-28 PROCEDURE — 80048 BASIC METABOLIC PNL TOTAL CA: CPT

## 2021-10-28 PROCEDURE — 2580000003 HC RX 258: Performed by: STUDENT IN AN ORGANIZED HEALTH CARE EDUCATION/TRAINING PROGRAM

## 2021-10-28 PROCEDURE — 6360000002 HC RX W HCPCS: Performed by: STUDENT IN AN ORGANIZED HEALTH CARE EDUCATION/TRAINING PROGRAM

## 2021-10-28 PROCEDURE — 92523 SPEECH SOUND LANG COMPREHEN: CPT

## 2021-10-28 PROCEDURE — G0378 HOSPITAL OBSERVATION PER HR: HCPCS

## 2021-10-28 PROCEDURE — 96361 HYDRATE IV INFUSION ADD-ON: CPT

## 2021-10-28 PROCEDURE — 99232 SBSQ HOSP IP/OBS MODERATE 35: CPT | Performed by: PHYSICIAN ASSISTANT

## 2021-10-28 PROCEDURE — 85027 COMPLETE CBC AUTOMATED: CPT

## 2021-10-28 RX ORDER — LISINOPRIL 5 MG/1
5 TABLET ORAL DAILY
Status: DISCONTINUED | OUTPATIENT
Start: 2021-10-28 | End: 2021-10-29 | Stop reason: HOSPADM

## 2021-10-28 RX ADMIN — CLOPIDOGREL BISULFATE 75 MG: 75 TABLET ORAL at 08:31

## 2021-10-28 RX ADMIN — ENOXAPARIN SODIUM 40 MG: 40 INJECTION SUBCUTANEOUS at 20:47

## 2021-10-28 RX ADMIN — ATORVASTATIN CALCIUM 80 MG: 80 TABLET, FILM COATED ORAL at 20:48

## 2021-10-28 RX ADMIN — LISINOPRIL 5 MG: 5 TABLET ORAL at 11:45

## 2021-10-28 RX ADMIN — ASPIRIN 81 MG CHEWABLE TABLET 81 MG: 81 TABLET CHEWABLE at 08:31

## 2021-10-28 RX ADMIN — SODIUM CHLORIDE: 9 INJECTION, SOLUTION INTRAVENOUS at 03:43

## 2021-10-28 RX ADMIN — PANTOPRAZOLE SODIUM 40 MG: 40 TABLET, DELAYED RELEASE ORAL at 06:12

## 2021-10-28 ASSESSMENT — PAIN SCALES - GENERAL
PAINLEVEL_OUTOF10: 0

## 2021-10-28 NOTE — PROGRESS NOTES
[address] ICU STEPDOWN TELEMETRY 4K  Speech - Language - Cognitive Evaluation    SLP Individual Minutes  Time In: 7552  Time Out: 1866  Minutes: 21  Timed Code Treatment Minutes: 0 Minutes       Date: 10/28/2021  Patient Name: Dalring Bland      CSN: [id number]   : 1950  (78 y.o.)  Gender: male   Referring Physician:  Sunny Rao MD  Diagnosis: Stroke-like-symptoms  Secondary Diagnosis: Dysphagia, cognitive deficits  Precautions: Fall risk, aspiration precautions  History of Present Illness/Injury: Patient admitted to [facility] with above medical dx. Per chart review, \"patient admitted to [facility] with above med dx; please refer to physician H&P for full details. Per chart review, \"78year-old male, current everyday smoker, with PMH of multiple CVAs s/p right carotid stent placement in 2018 as well as a traumatic brain hemorrhage in 2018 after a fall who is being admitted for dysarthria and gait disturbances suspicious for acute CVA. Last known well time 7:30 PM on the night prior. Patient had called into work did call off due to lower back pain. Patient son, who also works with his father, was told by his coworkers that the patient sounded drunk when he called. Patient's son had gone to  the patient at the chiropractor's office when he noticed that the patient had an unsteady gait. Also reports that patient's speech was noticeably different than baseline. Due to his history of strokes, patient's son immediately brought the patient to the ED for further evaluation. \"     ST consulted to complete cognition evaluation to further POC. MRI results:    Impression       1. No evidence of acute intracranial abnormality. 2. Stable old lacunar infarcts in the bilateral basal ganglia, thalami and right cerebellar hemisphere compared to prior MRI. 3. Mild to moderate chronic sinus mucosal inflammation, decreased compared to prior MRI. and complex daily communication. COGNITION:  Topping Cognitive Assessment [facility]) version 7.1 completed. Pt scored 24/30. Normal is greater than or equal to 26/30. Inclusion of +1 point given highest level of education achieved less than/equal to 12th grade or GED with limited-0 post-secondary schooling   Orientation:   Immediate Recall: 5  Short-Term Recall:   Divergent Namin items named in given 1 minute time frame  Problem Solving: 3/3  Reasonin/2  Sequencin/2  Thought Organization: Adequate for basic communication, unable to r/o deficits with complex tasks  Insight: Adequate for basic communication, unable to r/o deficits with complex tasks  Attention:   Math Computation: 1/3  Executive Functionin/5    SWALLOWING:  Current Diet: Easy to chew diet+thin liquids     RECOMMENDATIONS/ASSESSMENT:  DIAGNOSTIC IMPRESSIONS:  Patient presents with a mild cognitive impairment evidenced by deficits in immediate memory, divergent naming, reasoning, sequencing, thought organization and insight, and math computation. Receptive and expressive language appears grossly intact for basic communication. Speech intelligibility approximates 100% in conversation. Patient previously living at home alone completing all ADLS and IADLS independently. ST recommends patient receive skilled services to address deficits mentioned above at functional levels to permit return to ADLS, IADLS, multi-tasking, and driving. Rehabilitation Potential: Good    EDUCATION:  Learner: Patient and Family  Education:  Reviewed results and recommendations of this evaluation, Reviewed ST goals and Plan of Care and Reviewed recommendations for follow-up  Evaluation of Education: Renetta Murrieta understanding and Demonstrates with assistance    PLAN:  Skilled SLP intervention on acute care 3-5 x per week or until goals met and/or pt plateaus in function.   Specific interventions for next session may include: dysphagia management and cognitive tasks. PATIENT GOAL:    Did not state. Will further assess during treatment. SHORT TERM GOALS:  Short-term Goals  Timeframe for Short-term Goals: 2 weeks  Goal 1: Patient will safely consume easy to chew diet with thin liquids without overt s/s aspiration to assist with nutrition/hydration measures. Goal 2: Consider completion of formal instrumentation via MBS should acute changes with pulmonary status be conveyed. Goal 3: Patient will complete functional immediate, delayed recall, and working memory tasks with 80% accuracy mod cues to improve retention of pertinent information. Goal 4: Patient will complete functional thought organization (divergent thinking) and executive functioning (time, money/math/finances, and medication managment) tasks with 80% accuracy mod cues to improve mental flexibilty within IADLS. Goal 5: Patient will complete functional reasoning and sequencing tasks with 80% accuracy mod cues to contribute to return to ADLS. Goal 6: Patient will complete structured attention tasks with no more than 3 errors until task completion to permit return to ADLS, IADLS, multi-tasking, and driving. LONG TERM GOALS:  No LTG established due to short ELOS.     Jackie Marrufo, Student Intern

## 2021-10-28 NOTE — CONSULTS
[facility]    Patient's Name/Date of Birth: Sheryle Kell / 1950 (55 y.o.)    Date: October 28, 2021     Referring Provider: Sunshine Roe MD    CHIEF COMPLAINT: Low EF      HPI: This is a pleasant 78 y.o. male who we are asked to see for drop in EF and evaluation for LHC. Patient is a current smoker w/ PMH CVA, Rt Carotid artery stenosis s/p stent, ETOH abuse (clean 5 years), depression, GERD. Patient presented on 10/26/21 with dysarthria. Last known well time 7:30 10/26/21. Noted to have balance issues and dysarthria after going to chiropracter's office. NIHSS 0 on arrival. CTA head and neck showed no acute process. Of note had Rt carotid artery stents placed 2018 and is on DAPT ASA/Plavix. Patient is on Xanax TID and had taken dose prior to onset of symptoms. EKG NSR. TTE 10/27/21 EF 45-50% w/ G1DD w/ no evidence of valvular dysfunction. This is consistent with 10-15% drop in EF from previous TTE on 4/04/18. Patient did have previous event monitor which showed no evidence of afib. Echo: Results for orders placed during the hospital encounter of 10/26/21    ECHO Complete 2D W Doppler W Color    Narrative  Transthoracic Echocardiography Report (TTE)    Demographics    Patient Name   [place]  Gender              Male  R    MR #           [id number]       Race                    Ethnicity    Account #      [de-identified]       Room Number         0009    Accession      [id number]      Date of Study       10/27/2021  Number    Date of Birth  1950      Referring Physician HANK Jurado MD    Age            78 year(s)      Sonographer         Dong Santana, MORGAN,  RVT    Interpreting        Echo reader of the week  Physician           Gomez Floyd MD    Procedure    Type of Study    TTE procedure:ECHOCARDIOGRAM COMPLETE 2D W DOPPLER W COLOR.     Procedure Date  Date: 10/27/2021 Start: 01:25 PM    Study Location: Bedside  Technical Quality: Limited visualization due to lung interface. Indications:Possible stroke. Additional Medical History:smoker, altered mental status, hypertension, ETOH  abuse, carotid stenosis, GERD, hyperlipidemia, chest pain, bradycardia,  obesity, stroke    Patient Status: Routine    Height: 73 inches Weight: 237.01 pounds BSA: 2.31 m^2 BMI: 31.27 kg/m^2    BP: 129/79 mmHg    Allergies  - See Epic. Conclusions    Summary  Technically difficult examination. Left ventricle size is normal.  Normal left ventricular wall thickness. There was mild global hypokinesis of the left ventricle. Systolic function was mildly reduced. Ejection fraction is visually estimated in the range of 45% to 50%. Doppler parameters were consistent with abnormal left ventricular  relaxation (grade 1 diastolic dysfunction). Signature    ----------------------------------------------------------------  Electronically signed by Manuela Jean MD (Interpreting  physician) on 10/27/2021 at 05:08 PM  ----------------------------------------------------------------    Findings    Mitral Valve  The mitral valve structure was normal with normal leaflet separation. DOPPLER: The transmitral velocity was within the normal range with no  evidence for mitral stenosis. Trace mitral regurgitation is present. Aortic Valve  The aortic valve was trileaflet with normal thickness and cuspal  separation. DOPPLER: Transaortic velocity was within the normal range with  no evidence of aortic stenosis. There was no evidence of aortic  regurgitation. Tricuspid Valve  The tricuspid valve structure was normal with normal leaflet separation. DOPPLER: There was no evidence of tricuspid stenosis. There was no  evidence of tricuspid regurgitation. Pulmonic Valve  The pulmonic valve leaflets exhibited normal thickness, no calcification,  and normal cuspal separation.  DOPPLER: The transpulmonic velocity was  within the normal range with no evidence for regurgitation. Left Atrium  Left atrial size was normal.    Left Ventricle  Left ventricle size is normal.  Normal left ventricular wall thickness. There was mild global hypokinesis of the left ventricle. Systolic function was mildly reduced. Ejection fraction is visually estimated in the range of 45% to 50%. Doppler parameters were consistent with abnormal left ventricular  relaxation (grade 1 diastolic dysfunction). Right Atrium  Right atrial size was normal.    Right Ventricle  The right ventricular size was normal with normal systolic function and  wall thickness. Pericardial Effusion  The pericardium was normal in appearance with no evidence of a pericardial  effusion. Pleural Effusion  No evidence of pleural effusion. Aorta / Great Vessels  -Aortic root dimension within normal limits.  -The Pulmonary artery is within normal limits. -IVC size is within normal limits with normal respiratory phasic changes.     M-Mode/2D Measurements & Calculations    LV Diastolic   LV Systolic Dimension:    AV Cusp Separation: 2.1 cmLA  Dimension: 4.3 3.7 cm                    Dimension: 2.9 cmAO Root  cm             LV Volume Diastolic: 86.6 Dimension: 4.1 cmLA Area: 14.8  LV FS:14 %     ml                        cm^2  LV PW          LV Volume Systolic: 53.0  Diastolic: 1.3 ml  cm             LV EDV/LV EDV Index: 72.1  Septum         ml/31 m^2LV ESV/LV ESV    RV Diastolic Dimension: 3.5 cm  Diastolic: 1.2 Index: 45.8 ml/18 m^2  cm             EF Calculated: 41.3 %     LA/Aorta: 0.71    LV Length: 7.7 cm         LA volume/Index: 36.1 ml /16m^2    LV Area  Diastolic:  45.8 cm^2  LV Area  Systolic: 18  cm^2    Doppler Measurements & Calculations    MV Peak E-Wave: 78.2 cm/s AV Peak Velocity: 117   LVOT Peak Velocity: 107  MV Peak A-Wave: 78.8 cm/s cm/s                    cm/s  MV E/A Ratio: 0.99        AV Peak Gradient: 5.48  LVOT Peak Gradient: 5  MV Peak Gradient: 2.45    mmHg mmHg  mmHg  TV Peak E-Wave: 32.8  MV Deceleration Time: 208                         cm/s  msec                                              TV Peak A-Wave: 70 cm/s  MV P1/2t: 61 msec         IVRT: 106 msec  MVA by PHT:3.61 cm^2                              TV Peak Gradient: 0.43  mmHg  AV DVI (Vmax):0.91  PV Peak Velocity: 106  cm/s  MR Velocity: 447 cm/s                             PV Peak Gradient: 4.49  mmHg    [email or web address] 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 Alen Higgins     Current Facility-Administered Medications   Medication Dose Route Frequency Provider Last Rate Last Admin    lisinopril (PRINIVIL;ZESTRIL) tablet 5 mg  5 mg Oral Daily Odilia Pollard DO        thiamine (B-1) injection 100 mg  100 mg IntraMUSCular Daily Valencia Hernandez MD   100 mg at 10/27/21 1449    PHENobarbital (LUMINAL) injection 130 mg  130 mg IntraVENous Q1H PRN Valencia Hernandez MD   130 mg at 10/27/21 1258    Or    PHENobarbital (LUMINAL) injection 260 mg  260 mg IntraVENous Q1H PRN Valencia Hernandez MD        Or    PHENobarbital (LUMINAL) 798.85 mg in sodium chloride 0.9 % 100 mL IVPB  10 mg/kg (Ideal) IntraVENous Daily PRN Tiffani Gleason MD        folic acid injection 1 mg  1 mg IntraMUSCular Daily Tiffani Gleason MD   1 mg at 10/27/21 1449    ondansetron (ZOFRAN-ODT) disintegrating tablet 4 mg  4 mg Oral Q8H PRN Maximo Jean MD        Or    ondansetron (ZOFRAN) injection 4 mg  4 mg IntraVENous Q6H PRN Manpreet Kumar MD        polyethylene glycol (GLYCOLAX) packet 17 g  17 g Oral Daily PRN Maximo Jean MD        enoxaparin (LOVENOX) injection 40 mg  40 mg SubCUTAneous Q24H Maximo Jean MD   40 mg at 10/27/21 2058    [Held by provider] 0.9 % sodium chloride infusion   IntraVENous Continuous Manpreet Kumar MD   Stopped at 10/28/21 1004    0.9 % sodium chloride infusion  25 mL IntraVENous PRN Maximo Jean MD        atorvastatin (LIPITOR) tablet 80 mg  80 mg Oral Nightly Maximo Jean MD   80 mg at 10/27/21 2058    labetalol (NORMODYNE;TRANDATE) injection syringe 10 mg  10 mg IntraVENous Q10 Min PRN Maximo Jean MD        albuterol (PROVENTIL) nebulizer solution 2.5 mg  2.5 mg Nebulization Q6H PRN Manpreet Kumar MD        [Held by provider] ALPRAZolam Sueanne Matthew) tablet 0.5 mg  0.5 mg Oral TID PRN Maximo Jean MD        aspirin chewable tablet 81 mg  81 mg Oral Daily Maximo Jean MD   81 mg at 10/28/21 0831    clopidogrel (PLAVIX) tablet 75 mg  75 mg Oral Daily Maximo Jean MD   75 mg at 10/28/21 0831    pantoprazole (PROTONIX) tablet 40 mg  40 mg Oral QAM  Manpreet Kumar MD   40 mg at 10/28/21 7831     Prior to Admission medications    Medication Sig Start Date End Date Taking? Authorizing Provider   zolpidem (AMBIEN) 10 MG tablet Take 10 mg by mouth nightly as needed for Sleep.    Yes Historical Provider, MD   ezetimibe (ZETIA) 10 MG tablet take 1 tablet by mouth once daily 10/21/21  Yes Bonita Burt, APRN - HANK   ALPRAZolam Jayy Castro) 0.5 MG tablet take 1-2 tablet by mouth 3 times daily  As needed for anxiety 10/21/21 11/21/21 Yes MANE Olvera CNP   traMADol (ULTRAM) 50 MG tablet Take 1 tablet by mouth every 4 hours as needed for Pain for up to 30 days. Take lowest dose possible to manage pain 10/21/21 11/20/21 Yes MANE Olvera CNP   pantoprazole (PROTONIX) 40 MG tablet take 1 tablet by mouth once daily 10/4/21  Yes MANE Olvera CNP   clopidogrel (PLAVIX) 75 MG tablet take 1 tablet by mouth once daily 8/4/21  Yes MANE Cowan CNP   albuterol sulfate HFA (PROAIR HFA) 108 (90 Base) MCG/ACT inhaler Inhale 2 puffs into the lungs every 6 hours as needed for Wheezing 7/21/21  Yes MANE Olvera CNP   albuterol (PROVENTIL) (2.5 MG/3ML) 0.083% nebulizer solution Take 3 mLs by nebulization every 6 hours as needed for Wheezing 10/7/19  Yes MANE Olvera CNP   aspirin 81 MG chewable tablet Take 81 mg by mouth daily   Yes Historical Provider, MD   Respiratory Therapy Supplies (NEBULIZER/TUBING/MOUTHPIECE) KIT 1 kit by Does not apply route daily as needed (cough, wheeze) Diagnosis:RAD and Bronchitis 10/7/19   MANE Olvera CNP   Scheduled Meds:   lisinopril  5 mg Oral Daily    thiamine  100 mg IntraMUSCular Daily    folic acid  1 mg IntraMUSCular Daily    enoxaparin  40 mg SubCUTAneous Q24H    atorvastatin  80 mg Oral Nightly    aspirin  81 mg Oral Daily    clopidogrel  75 mg Oral Daily    pantoprazole  40 mg Oral QAM AC     Continuous Infusions:   [Held by provider] sodium chloride Stopped (10/28/21 1004)    sodium chloride       PRN Meds:. PHENobarbital **OR** PHENobarbital **OR** PHENobarbital IVPB, ondansetron **OR** ondansetron, polyethylene glycol, sodium chloride, labetalol, albuterol, [Held by provider] ALPRAZolam    Allergies   Allergen Reactions    Tape Ernst Dalton Tape]      Family History   Problem Relation Age of Onset    Dementia Mother     Heart Disease Father     High Blood Pressure Sister     Cancer Brother         testicular cancer     Social History Socioeconomic History    Marital status:      Spouse name: Not on file    Number of children: 3    Years of education: Not on file    Highest education level: Not on file   Occupational History    Not on file   Tobacco Use    Smoking status: Current Every Day Smoker     Packs/day: 0.50     Years: 30.00     Pack years: 15.00     Types: Cigarettes     Start date: 7/16/1968    Smokeless tobacco: Never Used    Tobacco comment: printed to avs   Vaping Use    Vaping Use: Never used   Substance and Sexual Activity    Alcohol use: Not Currently     Alcohol/week: 42.0 standard drinks     Types: 42 Cans of beer per week     Comment: States last beer was in 3/2018    Drug use: No    Sexual activity: Not Currently     Comment:  in 2013   Other Topics Concern    Not on file   Social History Narrative    Not on file     Social Determinants of Health     Financial Resource Strain: Low Risk     Difficulty of Paying Living Expenses: Not hard at all   Food Insecurity: No Food Insecurity    Worried About Running Out of Food in the Last Year: Never true    Anna of Food in the Last Year: Never true   Transportation Needs:     Lack of Transportation (Medical):  Lack of Transportation (Non-Medical):    Physical Activity:     Days of Exercise per Week:     Minutes of Exercise per Session:    Stress:     Feeling of Stress :    Social Connections:     Frequency of Communication with Friends and Family:     Frequency of Social Gatherings with Friends and Family:     Attends [employer] Services:     Active Member of Clubs or Organizations:     Attends Club or Organization Meetings:     Marital Status:    Intimate Partner Violence:     Fear of Current or Ex-Partner:     Emotionally Abused:     Physically Abused:     Sexually Abused:      ROS:   Constitutional: Denies any recent wt change.   Eyes:  Denies any blurring or double vision, no glaucoma  Ears/Nose/Mouth/Throat:  Denies any chronic sinus/rhinitis, bleeding gums  Cardiovascular:  As described above. Respiratory:  Denies any frequent cough, wheezing or coughing up blood  Genitourinary:  Denies difficulty with urination and kidney stones  Gastrointestinal:  Denies any chronic problems with abdominal pain, nausea, vomiting or diarrhea  Musculoskeletal:  Denies any joint pain, back pain, or difficulty walking  Integumentary:  Denies any rash  Neurological:  Per HPI. No numbness or tingling  Endocrine:  Denies any polydipsia. Hematologic/Lymphatic:  Denies any hemorrhage or lymphatic drainage problems. Labs:  CBC:   Recent Labs     10/26/21  1725 10/27/21  0504 10/28/21  1004   WBC 10.2 8.8 8.4   HGB 15.0 15.0 15.6   HCT 44.8 44.7 46.9   MCV 95.7* 95.5* 95.5*    283 308     BMP:   Recent Labs     10/26/21  1700 10/27/21  0504   * 140   K 3.9 4.4   CL 99 105   CO2 24 25   BUN 11 11   CREATININE 0.9 0.8   MG  --  2.2     Accucheck Glucoses:   Recent Labs     10/26/21  1656   POCGLU 94     Cardiac Enzymes: No results for input(s): CKTOTAL, CKMB, CKMBINDEX, TROPONINI in the last 72 hours. PT/INR: No results for input(s): PROTIME, INR in the last 72 hours. APTT: No results for input(s): APTT in the last 72 hours.   Liver Profile:  Lab Results   Component Value Date    AST 13 10/26/2021    ALT 16 10/26/2021    BILITOT 0.5 10/26/2021    ALKPHOS 87 10/26/2021     Lab Results   Component Value Date    CHOL 154 10/27/2021    HDL 32 10/27/2021    HDL 66 05/01/2010    TRIG 214 10/27/2021     TSH:   Lab Results   Component Value Date    TSH 3.270 10/26/2021     UA:   Lab Results   Component Value Date    NITRITE neg 01/29/2013    COLORU Yellow 06/09/2015    PHUR 6.5 01/29/2013    WBCUA 0-5 06/09/2015    RBCUA 0-2 06/09/2015    CLARITYU clear 01/29/2013    SPECGRAV 1.025 01/29/2013    LEUKOCYTESUR Negative 06/09/2015    LEUKOCYTESUR neg 01/29/2013    UROBILINOGEN <2.0 E.U./dL 06/09/2015    BILIRUBINUR Negative 06/09/2015 BILIRUBINUR neg 01/29/2013    BLOODU neg 01/29/2013    GLUCOSEU Negative 06/09/2015         Physical Exam:  Vitals:    10/28/21 0824   BP: 105/60   Pulse: 73   Resp: 17   Temp: 97.7 °F (36.5 °C)   SpO2: 96%        Intake/Output Summary (Last 24 hours) at 10/28/2021 1040  Last data filed at 10/28/2021 0346  Gross per 24 hour   Intake 589.64 ml   Output 700 ml   Net -110.36 ml      General:  Elderly white male  Neck: Supple, no JVD, no carotid bruits  Heart: Regular rhythm normal S1 and S2, no rubs, murmurs or gallops  Lungs: clear to ascultation no rales, wheezes, or rhonchi  Abdomen: positive bowel sounds, soft, non-tender, non-distended, no bruits, no masses  Extremities:no clubbing, cyanosis or edema  Neurologic: Rhomberg negative, alert and oriented x 3, cranial nerves 2-12 grossly intact, motor and sensory intact, moving all extremities  Skin: No rashes  Psych: AO x 3, no depression/zac, no pressured speech, normal affect  Lymph: No obvious LAD      Assessment:  Cardiomyopathy w/ drop in EF  Midrange EF 45-50% TTE 10/26/21  Dysarthria  PAD s/p Rt carotid stent  Tobacco Abuse  Hx of EtOH abuse  HTN    Plan:  1. Lexiscan  2. Continue DAPT  3. Statin  4. Smoking Cessation  5. Abstinence from EtOH  6. Further recommendations as case develops    Patient asymptomatic with no evidence of ACS, currently tolerating DAPT. However drop in EF suggests worsening cardiomyopathy. Suspect nonischemic causes related to chronic EtOH use per hx provided by family. Multiple risk factors for CAD including peripheral vascular disease and smoking. Will obtain Lexiscan to evaluated for ischemia. Further recommendations as case develops. Thank you for allowing us to participate in the care of this patient. Please do not hesitate to call us with questions.     Electronically signed by Emily Jack, DO PGY-2 on 10/28/2021 at 10:40 AM    Attending Supervising Physician's [address] Statement  I performed a history and physical examination on the patient and discussed the management with the resident physician. I reviewed and agree with the findings and plan as documented in the resident's note. Thuan Aguilar for low EF, and GDMT afterwards for carotid stent, CVA, and cardiomyopathy.   Further recommendations based on results and clinical course    Electronically signed by Leandro Barrios MD on 10/28/21 at 2:49 PM EDT      Interventional Cardiology - [facility]

## 2021-10-28 NOTE — PROGRESS NOTES
Hospitalist Progress Note    Patient:  Maryan Gay      Unit/Bed:4K-09/009-A    YOB: 1950    MRN: [id number]       Acct: [de-identified]     PCP: MANE Alvarez CNP    Date of Admission: 10/26/2021    Assessment/Plan:    Chronic Diastolic Heart Failure with EF 45-50% on 10/28/21, NYHA Class II: Stable  Diagnosed in 10/28/21. and further supported by the physical exam findings of. GDMT at home: Lisinopril, . Diuretics at home: NONE. Follows in HF Clinic: No.  Previous echo in 2018 showed EF of 60-65% with no regioinal left ventricular wall motion abnormalities. Echo on 10/26/21 showed EF of 45-50% with mild global hypokinesis of the LV and G1DD. Plan:   - Consult cardio to evaluate need for ischemic workup.   - Start on Lisinopril 5mg for GDMT. - Will ultimately need BB and diuretic for GDMT as his pressure tolerates. Dysarthria: Unclear of patients  CT head negative. CTA head and neck negative. No focal deficit on exam. He does admit to social drinking on Saturday prior to event. Electrolytes are WNL, no hypoglycemia on admission. Serum ammonia WNL. TSH WNL. Patient reports daily compliance with Plavix and ASA which he takes due to his right carotid stent. Smokes 1 PPD. MRI brain shows no acute intracranial pathology  Will continue with Plavix and ASA daily. No evidence of bleeding. No hemorrhage on CT head. High dose statin added with Lipitor 80 mg. Lipid panel performed. Hold IVF  Aspiration, seizure and fall precautions. NIHSS checks qshift as all imaging is negative currently. PT/OT ordered  SLP recommends easy to chew, thin liquids. Provide smoking cessation education. EtOH abstinence.     History of CVA s/p Right Carotid Stent: Placed in 2018. CTA head and neck does not demonstrate stent abnormalities.  Continue with Plavix and ASA daily.     Ethanol Abuse: Son reports that patient used to be a heavy drinker in the past. Patient states he last had a drink on Saturday, however only drinks socially and quit drinking heavy 5 years ago. No signs of withdrawal on exam. Continue to hold and consider decreasing or discontinuing due to suspected combined use with EtOH.     Anxiety Disorder with Insomnia: Xanax on hold as above. Expected discharge date:  1-2 Days    Disposition:    [x] Home       [] TCU       [] Rehab       [] Psych       [] SNF       [] [place]       [] Other-    Chief Complaint: Stroke Like Symptoms    Hospital Course: Shaun Chandra is a 78 y.o. male with a PMH as seen below who presents to to [facility] ED with complaints of a fall with dysarthria and gait disturbance suspicious for CVA. Patient has history of CVAs status post right carotid stent placement in 2018 as well as traumatic brain hemorrhage in 2018. Patient past medical history also significant for alcohol abuse. Per patient states his last drink was 5 years ago however daughter is in room states patient was drinking on Saturday. After conversing with the patient he did admit that he did drink on Saturday however he states he is a social drinker now does not binge drink like he used to. Ordered an etoh level as well as phenobarb protocol.       CT the head without contrast performed in the ED showed no acute intracranial abnormality. CTA of the head and neck did demonstrate redemonstration of the right carotid stent extending to the distal common carotid artery to the proximal internal carotid artery. There does appear to be some in-stent stenosis however severity cannot be ascertained. Also noted to have stable mild mural calcification of the left carotid bulb without hemodynamically significant stenosis by NASCET criteria.     MRI of the brain performed on 10/27 showed no evidence of acute intracranial abnormality. Stable lacunar infarcts in the bilateral basal ganglia, thalami and right cerebellar hemisphere.     Pending speech eval and PT/OT, Echo. Neurology was consulted in ED. They are assessing risk factor control and will continue to follow. Subjective (past 24 hours): Patient evaluated at bedside. No acute events overnight. No acute distress. She states that he feels 100% better from his initial presentation. He has currently no complaints. And he wants to go home. Informed patient of new findings on echo that he reduced ejection fraction with mild global hypokinesis of left tracheal wall. Consult cardiology for evaluation for ischemic work-up. If patient can be followed up by cardiology as an outpatient will likely discharge tomorrow. Neurology evaluated patient and states that they would like them to monitor symptoms. If he has any increase in symptoms or worsening symptoms such as diplopia, dizziness, blurred vision that he is to report to the ED.    ROS (12 point review of systems completed. Pertinent positives noted. Otherwise ROS is negative). Medications:  Reviewed    Infusion Medications    [Held by provider] sodium chloride Stopped (10/28/21 1004)    sodium chloride       Scheduled Medications    lisinopril  5 mg Oral Daily    thiamine  100 mg IntraMUSCular Daily    folic acid  1 mg IntraMUSCular Daily    enoxaparin  40 mg SubCUTAneous Q24H    atorvastatin  80 mg Oral Nightly    aspirin  81 mg Oral Daily    clopidogrel  75 mg Oral Daily    pantoprazole  40 mg Oral QAM AC     PRN Meds:  PHENobarbital **OR** PHENobarbital **OR** PHENobarbital IVPB, ondansetron **OR** ondansetron, polyethylene glycol, sodium chloride, labetalol, albuterol, [Held by provider] ALPRAZolam      Intake/Output Summary (Last 24 hours) at 10/28/2021 1359  Last data filed at 10/28/2021 1305  Gross per 24 hour   Intake 1069.64 ml   Output 1300 ml   Net -230.36 ml       Diet:  ADULT DIET; Easy to Chew    Exam:  /78   Pulse 78   Temp 98 °F (36.7 °C) (Oral)   Resp 18   Ht 6' 1\" (1.854 m)   Wt 233 lb 12.8 oz (106.1 kg)   SpO2 97%   BMI 30.85 kg/m²     General appearance: No apparent distress, appears stated age and cooperative. Obese. Margaret ill-appearing  HEENT: Normocephalic, atraumatic. External ears normal nose normal.  PERRLA, EOMI  Neck: Supple, with full range of motion. No jugular venous distention. Trachea midline. Respiratory:  Normal respiratory effort. Clear to auscultation, bilaterally without Rales/Wheezes/Rhonchi. Cardiovascular: Regular rate and rhythm with normal S1/S2 without murmurs, rubs or gallops. Abdomen: Soft, non-tender, non-distended with normal bowel sounds. Musculoskeletal: passive and active ROM x 4 extremities. Skin: Skin color, texture, turgor normal.  No rashes or lesions. Neurologic:  Neurovascularly intact without any focal sensory/motor deficits. Cranial nerves: II-XII intact, grossly non-focal.  Psychiatric: Alert and oriented, thought content appropriate, normal insight  Capillary Refill: Brisk,< 3 seconds   Peripheral Pulses: +2 palpable, equal bilaterally       Labs:   Recent Labs     10/26/21  1725 10/27/21  0504 10/28/21  1004   WBC 10.2 8.8 8.4   HGB 15.0 15.0 15.6   HCT 44.8 44.7 46.9    283 308     Recent Labs     10/26/21  1700 10/27/21  0504 10/28/21  1004   * 140 134*   K 3.9 4.4 4.5   CL 99 105 101   CO2 24 25 23   BUN 11 11 14   CREATININE 0.9 0.8 0.7   CALCIUM 9.2 9.0 9.3     Recent Labs     10/26/21  1700   AST 13   ALT 16   BILITOT 0.5   ALKPHOS 87     No results for input(s): INR in the last 72 hours. No results for input(s): Aditya Massed in the last 72 hours. Microbiology:      Urinalysis:      Lab Results   Component Value Date    NITRU Negative 06/09/2015    WBCUA 0-5 06/09/2015    RBCUA 0-2 06/09/2015    BLOODU neg 01/29/2013    SPECGRAV 1.025 01/29/2013    GLUCOSEU Negative 06/09/2015       Radiology:  MRI BRAIN WO CONTRAST   Final Result       1. No evidence of acute intracranial abnormality.    2. Stable old lacunar infarcts in the bilateral basal ganglia, thalami and right cerebellar hemisphere compared to prior MRI. 3. Mild to moderate chronic sinus mucosal inflammation, decreased compared to prior MRI. **This report has been created using voice recognition software. It may contain minor errors which are inherent in voice recognition technology. **      Final report electronically signed by Dr. Alissa Leslie MD on 10/27/2021 9:35 AM      CT Head WO Contrast   Final Result   1. No acute intracranial abnormality. 2. Sequela of chronic microvascular changes. **This report has been created using voice recognition software. It may contain minor errors which are inherent in voice recognition technology. **      Final report electronically signed by Dr Zay Oglesby on 10/26/2021 7:10 PM      CTA NECK W WO CONTRAST   Final Result       1. Mild mural calcification in the cavernous and clinoid segments of internal carotid arteries without flow-limiting stenosis or aneurysm in the intracranial circulation. 2. Redemonstration of a right carotid stent extending from the distal common carotid artery into the proximal internal carotid artery which appears patent with some in-stent stenosis the severity of which can't be well evaluated secondary to streak    artifact from the stent. 3. Stable mild mural calcification of left carotid bulb without hemodynamically significant stenosis by NASCET criteria. **This report has been created using voice recognition software. It may contain minor errors which are inherent in voice recognition technology. **      Final report electronically signed by Dr. Alissa Leslie MD on 10/27/2021 8:45 AM      CTA HEAD W WO CONTRAST   Final Result       1. Mild mural calcification in the cavernous and clinoid segments of internal carotid arteries without flow-limiting stenosis or aneurysm in the intracranial circulation.    2. Redemonstration of a right carotid stent extending from the distal common carotid artery into the proximal internal carotid artery which appears patent with some in-stent stenosis the severity of which can't be well evaluated secondary to streak    artifact from the stent. 3. Stable mild mural calcification of left carotid bulb without hemodynamically significant stenosis by NASCET criteria. **This report has been created using voice recognition software. It may contain minor errors which are inherent in voice recognition technology. **      Final report electronically signed by Dr. Noris Hernandez MD on 10/27/2021 8:45 AM          DVT prophylaxis: [x] Lovenox                                 [] SCDs                                 [] SQ Heparin                                 [] Encourage ambulation           [] Already on Anticoagulation     Code Status: Full Code    PT/OT Eval Status: Ordered    Tele:   [x] yes             [] no     Electronically signed by Colin Arciniega DO on 10/28/2021 at 1:59 PM

## 2021-10-28 NOTE — FLOWSHEET NOTE
10/28/21 1537   Encounter Summary   Services provided to: Patient   Referral/Consult From: Rounding   Continue Visiting Yes  (10/28)   Complexity of Encounter Moderate   Length of Encounter 15 minutes   Routine   Type Follow up   Assessment Approachable   Intervention Nurtured hope   Outcome Comfort   Spiritual/Mormon   Type Spiritual support   Assessment: In my encounter with the 78 yr old patient, while rounding  the unit 4K,  I provided spiritual care to patient through conversation, I also came to assess the patients spiritual needs present. The pt was admitted due to stroke-like symptoms. Interventions:  I provided prayer, emotional support and words of comfort.  provided a listening presence and encouraged pt to share their beliefs and how they support him during their hospitalization. Outcomes: The patient was encouraged and didnt share any further spiritual needs at this time. Plan:  Chaplains will follow-up at a later time for assessment of any spiritual care needs present.

## 2021-10-28 NOTE — CARE COORDINATION
10/28/21, 9:20 AM EDT    Patient goals/plan/ treatment preferences discussed by  and . Patient goals/plan/ treatment preferences reviewed with patient/ family. Patient/ family verbalize understanding of discharge plan and are in agreement with goal/plan/treatment preferences. Understanding was demonstrated using the teach back method. AVS provided by RN at time of discharge, which includes all necessary medical information pertaining to the patients current course of illness, treatment, post-discharge goals of care, and treatment preferences.

## 2021-10-29 ENCOUNTER — APPOINTMENT (OUTPATIENT)
Dept: NON INVASIVE DIAGNOSTICS | Age: 71
DRG: 092 | End: 2021-10-29
Payer: COMMERCIAL

## 2021-10-29 VITALS
BODY MASS INDEX: 30.57 KG/M2 | DIASTOLIC BLOOD PRESSURE: 60 MMHG | RESPIRATION RATE: 17 BRPM | TEMPERATURE: 98.6 F | HEART RATE: 66 BPM | WEIGHT: 230.7 LBS | SYSTOLIC BLOOD PRESSURE: 113 MMHG | HEIGHT: 73 IN | OXYGEN SATURATION: 93 %

## 2021-10-29 LAB — MRSA SCREEN: NORMAL

## 2021-10-29 PROCEDURE — 3430000000 HC RX DIAGNOSTIC RADIOPHARMACEUTICAL: Performed by: HOSPITALIST

## 2021-10-29 PROCEDURE — 78452 HT MUSCLE IMAGE SPECT MULT: CPT | Performed by: INTERNAL MEDICINE

## 2021-10-29 PROCEDURE — 6360000002 HC RX W HCPCS

## 2021-10-29 PROCEDURE — G0378 HOSPITAL OBSERVATION PER HR: HCPCS

## 2021-10-29 PROCEDURE — 93018 CV STRESS TEST I&R ONLY: CPT | Performed by: INTERNAL MEDICINE

## 2021-10-29 PROCEDURE — 93016 CV STRESS TEST SUPVJ ONLY: CPT | Performed by: INTERNAL MEDICINE

## 2021-10-29 PROCEDURE — A9500 TC99M SESTAMIBI: HCPCS | Performed by: HOSPITALIST

## 2021-10-29 PROCEDURE — 99239 HOSP IP/OBS DSCHRG MGMT >30: CPT | Performed by: HOSPITALIST

## 2021-10-29 PROCEDURE — 78452 HT MUSCLE IMAGE SPECT MULT: CPT

## 2021-10-29 PROCEDURE — 6370000000 HC RX 637 (ALT 250 FOR IP): Performed by: STUDENT IN AN ORGANIZED HEALTH CARE EDUCATION/TRAINING PROGRAM

## 2021-10-29 PROCEDURE — 93017 CV STRESS TEST TRACING ONLY: CPT | Performed by: INTERNAL MEDICINE

## 2021-10-29 RX ORDER — LISINOPRIL 5 MG/1
5 TABLET ORAL DAILY
Qty: 30 TABLET | Refills: 3 | Status: SHIPPED | OUTPATIENT
Start: 2021-10-30 | End: 2022-10-14 | Stop reason: SDUPTHER

## 2021-10-29 RX ORDER — ATORVASTATIN CALCIUM 80 MG/1
80 TABLET, FILM COATED ORAL NIGHTLY
Qty: 30 TABLET | Refills: 3 | Status: SHIPPED | OUTPATIENT
Start: 2021-10-29 | End: 2022-03-11 | Stop reason: SDUPTHER

## 2021-10-29 RX ADMIN — LISINOPRIL 5 MG: 5 TABLET ORAL at 11:29

## 2021-10-29 RX ADMIN — Medication 34 MILLICURIE: at 09:32

## 2021-10-29 RX ADMIN — ASPIRIN 81 MG CHEWABLE TABLET 81 MG: 81 TABLET CHEWABLE at 11:30

## 2021-10-29 RX ADMIN — PANTOPRAZOLE SODIUM 40 MG: 40 TABLET, DELAYED RELEASE ORAL at 11:29

## 2021-10-29 RX ADMIN — Medication 9.8 MILLICURIE: at 08:30

## 2021-10-29 RX ADMIN — CLOPIDOGREL BISULFATE 75 MG: 75 TABLET ORAL at 11:30

## 2021-10-29 ASSESSMENT — PAIN SCALES - GENERAL
PAINLEVEL_OUTOF10: 0

## 2021-10-29 NOTE — DISCHARGE SUMMARY
Hospital Medicine Discharge Summary      Patient Identification:   Shan Davis   : 1950  MRN: [id number]   Account: [de-identified]      Patient's PCP: MANE Giraldo CNP    Admit Date: 10/26/2021     Discharge Date:   10/29/21     Admitting Physician: Felipa Pleitez MD     Discharge Physician: Yemi Sung DO     Discharge Diagnoses:    Chronic Diastolic Heart Failure with EF 45-50% on 10/28/21, NYHA Class II: Stable  Diagnosed in 10/28/21. and further supported by the physical exam findings of. GDMT at home: Lisinopril, . Diuretics at home: NONE. Follows in HF Clinic: No.    Referral placed. previous echo in 2018 showed EF of 60-65% with no regioinal left ventricular wall motion abnormalities. Echo on 10/26/21 showed EF of 45-50% with mild global hypokinesis of the LV and G1DD. Plan:    - Start on Lisinopril 5mg and Lipitor 80 mg..  Continue asa, plavix for GDMT. Patient will likely not  tolerate BB due to lower BP and HR however this can be reevaluated during follow-up as  outpatient. -HF Clinic referral     Dysarthria: Unclear of patients  CT head negative. CTA head and neck negative. No focal deficit on exam. He does admit to social drinking on Saturday prior to event. Electrolytes are WNL, no hypoglycemia on admission. Serum ammonia WNL. TSH WNL. Patient reports daily compliance with Plavix and ASA which he takes due to his right carotid stent. Smokes 1 PPD. Plan:  - MRI brain shows no acute intracranial pathology. CT images showed no acute intracranial pathology  - Neurology signing off and counseled patient to monitor symptoms.  - Encourage patient for tobacco cessation and alcohol abstinence.     History of CVA s/p Right Carotid Stent: Placed in 2018. CTA head and neck does not demonstrate stent abnormalities.  Continue with Plavix and ASA daily.     Ethanol Abuse: Son reports that patient used to be a heavy drinker in the past. Patient states he last had a drink on Saturday, however only drinks socially and quit drinking heavy 5 years ago. No signs of withdrawal on exam. Continue to hold and consider decreasing or discontinuing due to suspected combined use with EtOH.     Anxiety Disorder with Insomnia: Xanax on hold as above. The patient was seen and examined on day of discharge and this discharge summary is in conjunction with any daily progress note from day of discharge. Hospital Course:   Irina MERIDA 19 y. o. male with a PMH as seen below who presents to to [facility] ED with complaints of a fall with dysarthria and gait disturbance suspicious for CVA.  Patient has history of CVAs status post right carotid stent placement in 2018 as well as traumatic brain hemorrhage in 2018.  Patient past medical history also significant for alcohol abuse.  Per patient states his last drink was 5 years ago however daughter is in room states patient was drinking on Saturday.  After conversing with the patient he did admit that he did drink on Saturday however he states he is a social drinker now does not binge drink like he used to.  Ordered an etoh level as well as phenobarb protocol.       CT the head without contrast performed in the ED showed no acute intracranial abnormality.  CTA of the head and neck did demonstrate redemonstration of the right carotid stent extending to the distal common carotid artery to the proximal internal carotid artery.  There does appear to be some in-stent stenosis however severity cannot be ascertained.  Also noted to have stable mild mural calcification of the left carotid bulb without hemodynamically significant stenosis by NASCET criteria.     MRI of the brain performed on 10/27 showed no evidence of acute intracranial abnormality.  Stable lacunar infarcts in the bilateral basal ganglia, thalami and right cerebellar hemisphere.     Neurology assessed patient concerns of patient may have myopathic process given his intermittent slurred speech.   He and family were told to continue to monitor for symptoms and if symptoms worsened or new symptoms arise such as diplopia, dizziness difficulty swallowing dysphagia choking or droopy eye that he should report to the ED. Patient okay for discharge from neurology perspective. Cardiology was consulted in the setting of new onset reduced ejection fraction with mild global hypokinesis of the left ventricular wall. Say Kuster showed mild inferior defect of the base of the apex (improved to stress) suggestive of infarction in the RCA and/or LCx territory. There was no definite ischemia. Consulted with Yumiko Vazquez CNP from cardiology. Patient is stable from a cardiology perspective and is plans to follow with the clinic in 4 weeks. Heart failure clinic referral has been placed. Exam:     Vitals:  Vitals:    10/28/21 2044 10/29/21 0413 10/29/21 0745 10/29/21 1155   BP:  (!) 117/59 103/64 113/60   Pulse: 65 69 60 66   Resp:  16 16 17   Temp:  97.7 °F (36.5 °C) 98.3 °F (36.8 °C) 98.6 °F (37 °C)   TempSrc:  Oral Oral Oral   SpO2:  97% 96% 93%   Weight:  230 lb 11.2 oz (104.6 kg)     Height:         Weight: Weight: 230 lb 11.2 oz (104.6 kg)     24 hour intake/output:No intake or output data in the 24 hours ending 10/29/21 1506      General appearance:  No apparent distress, appears stated age and cooperative. Appears Chronically ill. Obese. HEENT: Normocephalic, atraumatic. External ears and nose WNL. PERRLA, EOMI. Neck: Supple, with full range of motion. No jugular venous distention. Trachea midline. Respiratory:  Normal respiratory effort. Clear to auscultation, bilaterally without Rales/Wheezes/Rhonchi. Cardiovascular:  Regular rate and rhythm with normal S1/S2 without murmurs, rubs or gallops. Abdomen: Soft, non-tender, non-distended with normal bowel sounds. Musculoskeletal:  No clubbing, cyanosis or edema bilaterally. Full range of motion without deformity.   Skin: Skin color, texture, turgor normal. without flow-limiting stenosis or aneurysm in the intracranial circulation. 2. Redemonstration of a right carotid stent extending from the distal common carotid artery into the proximal internal carotid artery which appears patent with some in-stent stenosis the severity of which can't be well evaluated secondary to streak    artifact from the stent. 3. Stable mild mural calcification of left carotid bulb without hemodynamically significant stenosis by NASCET criteria. **This report has been created using voice recognition software. It may contain minor errors which are inherent in voice recognition technology. **      Final report electronically signed by Dr. Glenn Longoria MD on 10/27/2021 8:45 AM      CTA HEAD W WO CONTRAST   Final Result       1. Mild mural calcification in the cavernous and clinoid segments of internal carotid arteries without flow-limiting stenosis or aneurysm in the intracranial circulation. 2. Redemonstration of a right carotid stent extending from the distal common carotid artery into the proximal internal carotid artery which appears patent with some in-stent stenosis the severity of which can't be well evaluated secondary to streak    artifact from the stent. 3. Stable mild mural calcification of left carotid bulb without hemodynamically significant stenosis by NASCET criteria. **This report has been created using voice recognition software. It may contain minor errors which are inherent in voice recognition technology. **      Final report electronically signed by Dr. Glenn Longoria MD on 10/27/2021 8:45 AM             Consults:     IP CONSULT TO NEUROLOGY  IP CONSULT TO CARDIOLOGY    Disposition: Home  Condition at Discharge: Stable    Code Status:  Full Code     Patient Instructions:    Discharge lab work: N/a  Activity: activity as tolerated  Diet: ADULT DIET;  Regular; Low Fat/Low Chol/High Fiber/EDDIE      Follow-up visits:   Werner Koch APRN - CNP  [address]  [phone]    On 11/3/2021  Your appointment time is at 11:00, Arrive 15 minutes early, please bring photo ID and medications    MD Petra Dover  [address]  [phone]      please call office on Monday to be seen in 4 weeks          Discharge Medications:      [facility] Medication Instructions [facility]:[id number]    Printed on:10/29/21 0752   Medication Information                      albuterol (PROVENTIL) (2.5 MG/3ML) 0.083% nebulizer solution  Take 3 mLs by nebulization every 6 hours as needed for Wheezing             albuterol sulfate HFA (PROAIR HFA) 108 (90 Base) MCG/ACT inhaler  Inhale 2 puffs into the lungs every 6 hours as needed for Wheezing             ALPRAZolam (XANAX) 0.5 MG tablet  take 1-2 tablet by mouth 3 times daily  As needed for anxiety             aspirin 81 MG chewable tablet  Take 81 mg by mouth daily             atorvastatin (LIPITOR) 80 MG tablet  Take 1 tablet by mouth nightly             clopidogrel (PLAVIX) 75 MG tablet  take 1 tablet by mouth once daily             ezetimibe (ZETIA) 10 MG tablet  take 1 tablet by mouth once daily             lisinopril (PRINIVIL;ZESTRIL) 5 MG tablet  Take 1 tablet by mouth daily             pantoprazole (PROTONIX) 40 MG tablet  take 1 tablet by mouth once daily             Respiratory Therapy Supplies (NEBULIZER/TUBING/MOUTHPIECE) KIT  1 kit by Does not apply route daily as needed (cough, wheeze) Diagnosis:RAD and Bronchitis             traMADol (ULTRAM) 50 MG tablet  Take 1 tablet by mouth every 4 hours as needed for Pain for up to 30 days. Take lowest dose possible to manage pain             zolpidem (AMBIEN) 10 MG tablet  Take 10 mg by mouth nightly as needed for Sleep. Time Spent on discharge is more than 30 minutes in the examination, evaluation, counseling and review of medications and discharge plan.     Case discussed with  Franky Kelly.    Signed: Thank you MANE Mejia CNP for the opportunity to be involved in this patient's care.     Electronically signed by Tl Whitehead DO on 10/29/2021 at 3:06 PM

## 2021-10-29 NOTE — PROGRESS NOTES
Neurology Progress Note    Date:10/28/2021       YIFO:[id number]  Patient Segun Thoams     YOB: 1950     Age:75 y.o. Requesting Physician: Gayathri Conteh MD     Reason for Consult:  Evaluate for expressive aphasia       Chief Complaint: slurred speech, gait concern    Subjective     Briseida Antis is a 71-year-old male with a PMH of HLD, multiple lacunar infarcts, right carotid stent placement in 2018, prior brain bleed after a fall in 2018, and current half a pack per day smoker who presents to [facility] ED with his son with complaints of slurred speech. The patient went to bed around 7:30pm last night, around 6 am today, he called into work to call off due to recent back pain issues, for which he went to a chiropractor appointment today. He works with his son for the [address] [place] [address],[address] and his coworkers told his son that his dad sounded drunk when he called in to work this morning. His son then tried to call around lunch and the patient did not answer because he was at the chiropractor, but around 1:30 this afternoon his son stopped over. Per his son the patient went outside to meet him and was walking funny at this time, was noted to have slurred speech. The patient himself does not feel he has slurred speech. He is currently on aspirin and Plavix for secondary stroke prevention. CT head without any acute intracranial abnormality. Of note he does mention that he has had a lot of stress and anxiety lately. He denies any headache, vision difficulty, lightheadedness, dizziness, paresthesias, or weakness. Interval history 10/27/2021:  Patient had a good night. Still having some slurred speech intermittently. Denies any headache, numbness, tingling, weakness, dizziness, lightheadedness, or vision difficulty.     Interval Hx 10/28/21:  - NASIM        Review of Systems   Neuro: Denies dysarthria, vision disturbance, weakness  Lungs: Denies cough, dyspnea  Heart: Denies chest pain, palpitations  MSK: Denies pain, discomfort         Medications     No current facility-administered medications on file prior to encounter. Current Outpatient Medications on File Prior to Encounter   Medication Sig Dispense Refill    zolpidem (AMBIEN) 10 MG tablet Take 10 mg by mouth nightly as needed for Sleep.  ezetimibe (ZETIA) 10 MG tablet take 1 tablet by mouth once daily 30 tablet 5    ALPRAZolam (XANAX) 0.5 MG tablet take 1-2 tablet by mouth 3 times daily  As needed for anxiety 120 tablet 0    traMADol (ULTRAM) 50 MG tablet Take 1 tablet by mouth every 4 hours as needed for Pain for up to 30 days. Take lowest dose possible to manage pain 120 tablet 0    pantoprazole (PROTONIX) 40 MG tablet take 1 tablet by mouth once daily 90 tablet 2    clopidogrel (PLAVIX) 75 MG tablet take 1 tablet by mouth once daily 30 tablet 11    albuterol sulfate HFA (PROAIR HFA) 108 (90 Base) MCG/ACT inhaler Inhale 2 puffs into the lungs every 6 hours as needed for Wheezing 1 Inhaler 3    albuterol (PROVENTIL) (2.5 MG/3ML) 0.083% nebulizer solution Take 3 mLs by nebulization every 6 hours as needed for Wheezing 120 each 3    aspirin 81 MG chewable tablet Take 81 mg by mouth daily      Respiratory Therapy Supplies (NEBULIZER/TUBING/MOUTHPIECE) KIT 1 kit by Does not apply route daily as needed (cough, wheeze) Diagnosis:RAD and Bronchitis 1 kit 0     Scheduled Meds:   Continuous Infusions:   PRN Meds:     Past History    Past Medical History:   has a past medical history of 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 Social History:   reports that he has been smoking cigarettes. He started smoking about 53 years ago. He has a 15.00 pack-year smoking history. He has never used smokeless tobacco. He reports previous alcohol use of about 42.0 standard drinks of alcohol per week. He reports that he does not use drugs. Family History:   Family History   Problem Relation Age of Onset    Dementia Mother     Heart Disease Father     High Blood Pressure Sister     Cancer Brother         testicular cancer       Physical Examination      Vitals:  /69   Pulse 67   Temp 98 °F (36.7 °C) (Oral)   Resp 16   Ht 6' 1\" (1.854 m)   Wt 233 lb 12.8 oz (106.1 kg)   SpO2 95%   BMI 30.85 kg/m²   Temp (24hrs), Av.8 °F (36.6 °C), Min:97.5 °F (36.4 °C), Max:98 °F (36.7 °C)      I/O (24Hr): Intake/Output Summary (Last 24 hours) at 10/28/2021 2201  Last data filed at 10/28/2021 1305  Gross per 24 hour   Intake 480 ml   Output 900 ml   Net -420 ml         Physical Exam  Constitutional:       Appearance: He is ill-appearing. HENT:      Head: Normocephalic and atraumatic. Right Ear: External ear normal.      Left Ear: External ear normal.      Nose: Nose normal.      Mouth/Throat:      Mouth: Mucous membranes are moist.   Eyes:      Extraocular Movements: EOM normal.      Pupils: Pupils are equal, round, and reactive to light. Cardiovascular:      Rate and Rhythm: Normal rate and regular rhythm. Pulses: Normal pulses. Heart sounds: Normal heart sounds. Pulmonary:      Effort: Pulmonary effort is normal.      Breath sounds: Normal breath sounds. Abdominal:      General: Bowel sounds are normal.      Palpations: Abdomen is soft. Musculoskeletal:         General: Normal range of motion. Cervical back: Normal range of motion and neck supple. Skin:     General: Skin is warm and dry. Neurological:      General: No focal deficit present. Mental Status: He is alert and oriented to person, place, and time. Coordination: Finger-Nose-Finger Test and Heel to [facility] Test normal.      Gait: Gait is intact.       Deep Tendon Reflexes:      Reflex Scores:       Brachioradialis reflexes are 2+ on the right side and 2+ on the left side. Patellar reflexes are 1+ on the right side and 1+ on the left side. Achilles reflexes are 2+ on the right side and 2+ on the left side. Psychiatric:         Mood and Affect: Mood normal.         Behavior: Behavior normal.         Thought Content: Thought content normal.         Judgment: Judgment normal.       Neurologic Exam     Mental Status   Oriented to person, place, and time. Follows 1 step commands. Attention: normal. Concentration: normal.   Speech: (Muffled at times  )  Level of consciousness: alert  Knowledge: good. Able to name object. Able to read. Able to repeat. Cranial Nerves     CN II   Visual fields full to confrontation. CN III, IV, VI   Pupils are equal, round, and reactive to light. Extraocular motions are normal.   Right pupil: Size: 3 mm. Shape: regular. Reactivity: brisk. Left pupil: Size: 3 mm. Shape: regular. Reactivity: brisk. CN V   Facial sensation intact. CN VII   Facial expression full, symmetric.      CN VIII   CN VIII normal.     CN IX, X   CN IX normal.   CN X normal.   Palate: symmetric    CN XI   CN XI normal.   Right trapezius strength: normal  Left trapezius strength: normal    CN XII   CN XII normal.   Tongue deviation: none    Motor Exam   Muscle bulk: normal  Overall muscle tone: normal  Right arm pronator drift: absent  Left arm pronator drift: absent    BUE: 5/5  BLE: 5/5     Sensory Exam   Light touch normal.     Gait, Coordination, and Reflexes     Gait  Gait: normal    Coordination   Finger to nose coordination: normal  Heel to shin coordination: normal    Tremor   Resting tremor: absent  Intention tremor: absent  Action tremor: absent    Reflexes   Right brachioradialis: 2+  Left brachioradialis: 2+  Right patellar: 1+  Left patellar: 1+  Right achilles: 2+  Left achilles: 2+  Gait intact          Labs/Imaging/Diagnostics   Labs:  CBC:  Recent Labs     10/26/21  1725 10/27/21  0504 10/28/21  1004   WBC 10.2 8.8 8.4   RBC 4.68* 4.68* 4.91   HGB 15.0 15.0 15.6   HCT 44.8 44.7 46.9   MCV 95.7* 95.5* 95.5*    283 308     CHEMISTRIES:  Recent Labs     10/26/21  1700 10/27/21  0504 10/28/21  1004   * 140 134*   K 3.9 4.4 4.5   CL 99 105 101   CO2 24 25 23   BUN 11 11 14   CREATININE 0.9 0.8 0.7   GLUCOSE 97 100 112*   MG  --  2.2  --      PT/INR:No results for input(s): PROTIME, INR in the last 72 hours. APTT:No results for input(s): APTT in the last 72 hours. LIVER PROFILE:  Recent Labs     10/26/21  1700   AST 13   ALT 16   BILITOT 0.5   ALKPHOS 87       Imaging Last 24 Hours:  CT Head WO Contrast    Result Date: 10/26/2021  PROCEDURE: CT HEAD WO CONTRAST CLINICAL INFORMATION:TIA symptoms COMPARISON: MRI brain 7/27/2020 TECHNIQUE: 5 mm axial imaging through the head without IV contrast. All CT scans at this facility use dose modulation, iterative reconstruction, and/or weight based dosing when appropriate to reduce the radiation dose to as low as reasonably achievable. FINDINGS: Old lacunar infarcts are seen in the right basal ganglion, and right thalamus. Periventricular  and subcortical white matter hypodensities that likely relate to chronic microangiopathic disease. Mild cerebral volume loss. No ventriculomegaly. No midline shift or mass effect. No acute intracranial hemorrhage. No intracranial collection. Gray-white differentiation is unremarkable. The posterior fossa is unremarkable. The craniocervical junction is unremarkable. No acute bony abnormality. Mild paranasal sinus mucosal disease. The  mastoid air cells are clear. The orbits are unremarkable. 1. No acute intracranial abnormality. 2. Sequela of chronic microvascular changes. **This report has been created using voice recognition software. It may contain minor errors which are inherent in voice recognition technology. ** Final report electronically signed by Dr Tamar Clements Rosemago on 10/26/2021 7:10 PM      CTA  HEAD AND NECK  Impression       1. Mild mural calcification in the cavernous and clinoid segments of internal carotid arteries without flow-limiting stenosis or aneurysm in the intracranial circulation. 2. Redemonstration of a right carotid stent extending from the distal common carotid artery into the proximal internal carotid artery which appears patent with some in-stent stenosis the severity of which can't be well evaluated secondary to streak    artifact from the stent. 3. Stable mild mural calcification of left carotid bulb without hemodynamically significant stenosis by NASCET criteria.           MRI BRAIN  Impression       1. No evidence of acute intracranial abnormality. 2. Stable old lacunar infarcts in the bilateral basal ganglia, thalami and right cerebellar hemisphere compared to prior MRI. 3. Mild to moderate chronic sinus mucosal inflammation, decreased compared to prior MRI.             Assessment and Plan:        Hospital Problems         Last Modified POA    Stroke-like symptoms 10/26/2021 Yes    TIA (transient ischemic attack) 10/27/2021 Yes    Essential hypertension, malignant 47/21/0202 Yes    Systolic heart failure ([address] 10/28/2021 Yes          1. Dysarthria, thought to be related to myopathic process, such as myasthenia gravis, in the setting of negative MRI   - There is concern that patient may have a myopathic process given his intermittent slurred/mubbled speech. He and son were told to continue to monitor for other symptoms regarding this such as diplopia, difficulty swallowing, dysphagia / choking, droopy eye. If he notices more symptoms he was notified to come back to the emergency room to be evaluated  - MRI negative   - CT head with no acute intracranial abnormality   - CT angiogram and MRI brain results above. No acute infarct noted on MRI  - LDL 79, goal 45-70.  Continue with statin therapy  - A1C 6.1%  - Patient educated on prediabetes - Echo complete  - EF 45-50%, showed mild global hypokinesis of the L ventricle  - Agree w/ cardiology consult   - Continue with good hydration  - We're signing off, please call w/ questions         Discussed w/ Dr. Ray Donohue PA-C  Interventional Neurology

## 2021-10-29 NOTE — PROGRESS NOTES
Patient discharged at this time. Daughter and son at bedside. patient advised of new appointments that need made Monday. New medications educated on . Patient IV removed and tele removed. Patient walked down with his kids and stopped at [id number] [facility]. Patient provided with phone number to call if he has questions.

## 2021-10-29 NOTE — PROGRESS NOTES
CLINICAL PHARMACY: DISCHARGE MED RECONCILIATION/REVIEW    [facility] ([facility]) Select Patient?: No  Total # of Interventions Recommended: 0     Total # Interventions Accepted: 0  Intervention Severity:   - Level 1 Intervention Present?: No   - Level 2 #: 0   - Level 3 #: 0   Time Spent (min): 15    Additional Documentation:    Rj Chacko PharmD  10/29/2021  2:02 PM

## 2021-10-29 NOTE — CARE COORDINATION
10/29/21, 12:26 PM EDT  Plans home alone when medically cleared (stress test today); collaborated w Attending  Patient goals/plan/ treatment preferences discussed by  and . Patient goals/plan/ treatment preferences reviewed with patient/ family. Patient/ family verbalize understanding of discharge plan and are in agreement with goal/plan/treatment preferences. Understanding was demonstrated using the teach back method. AVS provided by RN at time of discharge, which includes all necessary medical information pertaining to the patients current course of illness, treatment, post-discharge goals of care, and treatment preferences.

## 2021-11-02 ENCOUNTER — TELEPHONE (OUTPATIENT)
Dept: FAMILY MEDICINE CLINIC | Age: 71
End: 2021-11-02

## 2021-11-03 ENCOUNTER — OFFICE VISIT (OUTPATIENT)
Dept: FAMILY MEDICINE CLINIC | Age: 71
End: 2021-11-03
Payer: COMMERCIAL

## 2021-11-03 VITALS
WEIGHT: 236 LBS | HEART RATE: 68 BPM | BODY MASS INDEX: 31.14 KG/M2 | DIASTOLIC BLOOD PRESSURE: 62 MMHG | SYSTOLIC BLOOD PRESSURE: 112 MMHG | TEMPERATURE: 97.8 F | RESPIRATION RATE: 18 BRPM

## 2021-11-03 DIAGNOSIS — Z86.73 HX OF ISCHEMIC VERTEBROBASILAR ARTERY THALAMIC STROKE: ICD-10-CM

## 2021-11-03 DIAGNOSIS — I50.9 CONGESTIVE HEART FAILURE, UNSPECIFIED HF CHRONICITY, UNSPECIFIED HEART FAILURE TYPE (HCC): Primary | ICD-10-CM

## 2021-11-03 DIAGNOSIS — E78.2 MIXED HYPERLIPIDEMIA: ICD-10-CM

## 2021-11-03 DIAGNOSIS — Z87.891 HISTORY OF TOBACCO USE: ICD-10-CM

## 2021-11-03 DIAGNOSIS — Z71.6 ENCOUNTER FOR SMOKING CESSATION COUNSELING: ICD-10-CM

## 2021-11-03 DIAGNOSIS — F10.11 HISTORY OF ETOH ABUSE: ICD-10-CM

## 2021-11-03 DIAGNOSIS — I25.10 CORONARY ARTERY DISEASE INVOLVING NATIVE CORONARY ARTERY OF NATIVE HEART WITHOUT ANGINA PECTORIS: ICD-10-CM

## 2021-11-03 DIAGNOSIS — F41.8 SITUATIONAL ANXIETY: ICD-10-CM

## 2021-11-03 PROCEDURE — 1111F DSCHRG MED/CURRENT MED MERGE: CPT | Performed by: NURSE PRACTITIONER

## 2021-11-03 PROCEDURE — 99495 TRANSJ CARE MGMT MOD F2F 14D: CPT | Performed by: NURSE PRACTITIONER

## 2021-11-03 RX ORDER — VARENICLINE TARTRATE
KIT
Qty: 42 TABLET | Refills: 0 | Status: SHIPPED | OUTPATIENT
Start: 2021-11-03 | End: 2022-01-20 | Stop reason: ALTCHOICE

## 2021-11-03 RX ORDER — VARENICLINE TARTRATE 1 MG/1
1 TABLET, FILM COATED ORAL 2 TIMES DAILY
Qty: 60 TABLET | Refills: 2 | Status: SHIPPED | OUTPATIENT
Start: 2021-11-03 | End: 2022-01-20 | Stop reason: ALTCHOICE

## 2021-11-03 NOTE — PATIENT INSTRUCTIONS
Patient Education        Learning About Cardiac Rehabilitation  What is it? Cardiac rehabilitation (rehab) is a program for people who have a heart problem. It teaches you how to be more active and make other lifestyle changes. These can lead to a stronger heart and better health. Why is cardiac rehab done? Cardiac rehab can help you get better after being in the hospital for a heart problem or heart surgery. It may help you to:  · Lower your risk of a heart attack or dying from heart disease. · Prevent future heart problems if you're at high risk for heart disease or a heart attack. · Stay out of the hospital.  · Build your strength. This can help increase the amount of activity you can do. · Manage your symptoms. These may include chest pain, shortness of breath, and fatigue. · Manage other health problems. These include high blood pressure and high cholesterol. Other benefits  Rehab may also help you to:  · Go back to work safely and sooner. · Feel more hopeful. You may feel less depressed, stressed, or worried. · Get support from your rehab team and other patients in rehab. · Have more energy and return to your usual activities. · Resume your sex life. · Have a heart-healthy lifestyle. This includes being active, eating healthy foods, losing weight, and not smoking. What are some types of cardiac rehab? Cardiac rehab programs can be done in an office, a clinic, or your home. These programs usually include:  · Close supervision. This happens during the early part of your exercise program.  · Education and counseling for you and your family. This can help you build healthy habits. These habits will lower your risk of having more heart problems. · Helping you prepare to get back to work and the activities you enjoyed before your heart problems. You may need to adjust your work or leisure activities. · Taking care of your emotional health.  You can get help for depression and improve your emotional well-being. · Making a plan to help you start a safe exercise program at home. What does a cardiac rehab team do? In cardiac rehab, you work with a team of health professionals. The team may include a doctor, a nurse specialist, a dietitian, an exercise therapist, a physical therapist, and a pharmacist. They design a program just for you, based on your health and goals. They also give you support to help you succeed. What happens before you start cardiac rehab? Before you start cardiac rehab, your doctor will check your heart health to see what types of exercises you can safely do. Tests may include electrocardiograms and echocardiograms. You may also have tests during rehab. They will help your doctor see how you are doing. Where can you learn more? Go to [email or web address] org and sign in to your Lenet account. Enter [id number] in the [place] box to learn more about \"Learning About Cardiac Rehabilitation. \"     If you do not have an account, please click on the \"Sign Up Now\" link. Current as of: April 29, 2021               Content Version: 13.0  © 4751-8427 [employer]. Care instructions adapted under license by [facility] ([facility]). If you have questions about a medical condition or this instruction, always ask your healthcare professional. [place] [address] any warranty or liability for your use of this information. Patient Education        Heart Failure: Care Instructions  Your Care Instructions     Heart failure occurs when your heart does not pump as much blood as the body needs. Failure does not mean that the heart has stopped pumping but rather that it is not pumping as well as it should. Over time, this causes fluid buildup in your lungs and other parts of your body. Fluid buildup can cause shortness of breath, fatigue, swollen ankles, and other problems.  By taking medicines regularly, reducing sodium (salt) in your diet, checking your weight every day, and making lifestyle changes, you can feel better and live longer. Follow-up care is a key part of your treatment and safety. Be sure to make and go to all appointments, and call your doctor if you are having problems. It's also a good idea to know your test results and keep a list of the medicines you take. How can you care for yourself at home? Medicines    · Be safe with medicines. Take your medicines exactly as prescribed. Call your doctor if you think you are having a problem with your medicine.     · Do not take any vitamins, over-the-counter medicine, or herbal products without talking to your doctor first. Wendie Cuellar not take ibuprofen (Advil or Motrin) and naproxen (Aleve) without talking to your doctor first. They could make your heart failure worse.     · You may take some of the following medicine. ? Angiotensin-converting enzyme inhibitors (ACEIs) or angiotensin II receptor blockers (ARBs) reduce the heart's workload, lower blood pressure, and reduce swelling. Taking an ACEI or ARB may lower your chance of needing to be hospitalized. ? Beta-blockers can slow heart rate, decrease blood pressure, and improve your condition. Taking a beta-blocker may lower your chance of needing to be hospitalized. ? Diuretics, also called water pills, reduce swelling. You will get more details on the specific medicines your doctor prescribes. Diet    · Your doctor may suggest that you limit sodium. Your doctor can tell you how much sodium is right for you. An example is less than 3,000 mg a day. This includes all the salt you eat in cooking or in packaged foods. People get most of their sodium from processed foods. Fast food and restaurant meals also tend to be very high in sodium.     · Ask your doctor how much liquid you can drink each day. You may have to limit liquids. Weight    · Weigh yourself without clothing at the same time each day. Record your weight.  Call your doctor if you have a sudden weight gain, such as more than 2 to 3 pounds in a day or 5 pounds in a week. (Your doctor may suggest a different range of weight gain.) A sudden weight gain may mean that your heart failure is getting worse. Activity level    · Start light exercise (if your doctor says it is okay). Even if you can only do a small amount, exercise will help you get stronger, have more energy, and manage your weight and your stress. Walking is an easy way to get exercise. Start out by walking a little more than you did before. Bit by bit, increase the amount you walk.     · When you exercise, watch for signs that your heart is working too hard. You are pushing yourself too hard if you cannot talk while you are exercising. If you become short of breath or dizzy or have chest pain, stop, sit down, and rest.     · If you feel \"wiped out\" the day after you exercise, walk slower or for a shorter distance until you can work up to a better pace.     · Get enough rest at night. Sleeping with 1 or 2 pillows under your upper body and head may help you breathe easier. Lifestyle changes    · Do not smoke. Smoking can make a heart condition worse. If you need help quitting, talk to your doctor about stop-smoking programs and medicines. These can increase your chances of quitting for good. Quitting smoking may be the most important step you can take to protect your heart.     · Limit alcohol to 2 drinks a day for men and 1 drink a day for women. Too much alcohol can cause health problems.     · Avoid getting sick from colds and the flu. Get a pneumococcal vaccine shot. If you have had one before, ask your doctor whether you need another dose. Get a flu shot each year. If you must be around people with colds or the flu, wash your hands often. When should you call for help?    Call 911  if you have symptoms of sudden heart failure such as:    · You have severe trouble breathing.     · You cough up pink, foamy mucus.     · You have a new irregular or rapid heartbeat. Call your doctor now or seek immediate medical care if:    · You have new or increased shortness of breath.     · You are dizzy or lightheaded, or you feel like you may faint.     · You have sudden weight gain, such as more than 2 to 3 pounds in a day or 5 pounds in a week. (Your doctor may suggest a different range of weight gain.)     · You have increased swelling in your legs, ankles, or feet.     · You are suddenly so tired or weak that you cannot do your usual activities. Watch closely for changes in your health, and be sure to contact your doctor if you develop new symptoms. Where can you learn more? Go to https://Bastille [email or web address].[email or web address] org and sign in to your Canlife account. Enter J467 in the [employer]Â® box to learn more about \"Heart Failure: Care Instructions. \"     If you do not have an account, please click on the \"Sign Up Now\" link. Current as of: April 29, 2021               Content Version: 13.0  © 5181-9563 [employer]. Care instructions adapted under license by [facility] ([facility]). If you have questions about a medical condition or this instruction, always ask your healthcare professional. [address] any warranty or liability for your use of this information. Patient Education        Learning About Low-Carbohydrate Foods  What foods are low in carbohydrate? The foods you eat contain nutrients, such as vitamins and minerals. Carbohydrate is a nutrient. Your body needs the right amount to stay healthy and work as it should. You can use the list below to help you make choices about which foods to eat.   Some foods that are lower in carbohydrate include:  Dairy and dairy alternatives  · Cheese  · Cottage cheese  · Cream cheese  · Nut milk (unsweetened)  · Soy milk (unsweetened)  · Yogurt (Greek, plain)  Fruits  · Avocado  · [employer] and other protein foods  · Almonds  · Beef  · Chicken  · Cod  · Eggs  · Halibut  · Peanut butter and other nut butters  · Pistachios  · Pork  · Pumpkin seeds  · Tofu  · Trout  · [place]  · [place] ([facility])  · Walnuts  Vegetables  · Broccoli  · Carrots  · Cauliflower  · Green beans  · Mushrooms  · Peppers  · Salad greens  · Spinach  · Tomatoes  Work with your doctor to find out how much of this nutrient you need. Depending on your health, you may need more or less of it in your diet. Where can you learn more? Go to https://[email or web address] and sign in to your VIPerks account. Enter [id number] in the [employer] box to learn more about \"Learning About Low-Carbohydrate Foods. \"     If you do not have an account, please click on the \"Sign Up Now\" link. Current as of: December 17, 2020               Content Version: 13.0  © 2006-2021 [employer]. Care instructions adapted under license by [facility] ([facility]). If you have questions about a medical condition or this instruction, always ask your healthcare professional. [place] [address] any warranty or liability for your use of this information. Patient Education        Learning About Low-Carbohydrate Diets  What is a low-carbohydrate diet? A low-carbohydrate (or \"low-carb\") diet limits foods and drinks that have carbohydrates. This includes grains, fruits, milk and yogurt, and starchy vegetables like potatoes, beans, and corn. It also avoids foods and drinks that have added sugar. Instead, low-carb diets include foods that are high in protein and fat. Why might you follow a low-carb diet? Low-carb diets may be used for a variety of reasons, such as for weight loss. People who have diabetes may use a low-carb diet to help manage their blood sugar levels. What should you do before you start the diet? Talk to your doctor before you try any diet.  This is even more important if you have health problems like kidney disease, heart disease, or diabetes. Your doctor may suggest that you meet with a registered dietitian. A dietitian can help you make an eating plan that works for you. What foods do you eat on a low-carb diet? On a low-carb diet, you choose foods that are high in protein and fat. Examples of these are:  · Meat, poultry, and fish. · Eggs. · Nuts, such as walnuts, pecans, almonds, and peanuts. · Peanut butter and other nut butters. · Tofu. · Avocado. · Chris Lie. · Non-starchy vegetables like broccoli, cauliflower, green beans, mushrooms, peppers, lettuce, and spinach. · Unsweetened non-dairy milks like almond milk and coconut milk. · Cheese, cottage cheese, and cream cheese. Current as of: December 17, 2020               Content Version: 13.0  © 2006-2021 [facility], [facility]. Care instructions adapted under license by [facility] ([facility]). If you have questions about a medical condition or this instruction, always ask your healthcare professional. [place] [address] any warranty or liability for your use of this information.

## 2021-11-04 NOTE — PROGRESS NOTES
Post-Discharge Transitional Care Management Services or Hospital Follow Up      Elsa Bernabe   YOB: 1950    Date of Office Visit:  11/3/2021  Date of Hospital Admission: 10/26/21  Date of Hospital Discharge: 10/29/21  Readmission Risk Score(high >=14%. Medium >=10%):Readmission Risk Score: 10.9      Care management risk score Rising risk (score 2-5) and Complex Care (Scores >=6): 2     Non faceto face  following discharge, date last encounter closed (first attempt may have been earlier): 11/2/2021  4:59 PM 11/2/2021  4:59 PM    Call initiated 2 business days ofdischarge: Yes     Patient Active Problem List   Diagnosis    Low back pain    ETOH abuse    Insomnia    Erectile dysfunction    Hyperlipidemia    Chest pain at rest    Cigarette smoker    Alcohol abuse    Bradycardia    Subarachnoid hemorrhage following injury ([address]    Closed head injury    Scalp laceration    Dizziness    Diplopia    Other headache syndromes    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 9)    Stenosis of right internal carotid artery    Stroke-like symptoms    TIA (transient ischemic attack)    Essential hypertension, malignant    Systolic heart failure (HCC)       Allergies   Allergen Reactions    Tape [Adhesive Tape]        Medications listed as ordered at the time of discharge from hospital   [address] Medication Instructions ADRIAN:    Printed on:11/04/21 8491   Medication Information                      albuterol (PROVENTIL) (2.5 MG/3ML) 0.083% nebulizer solution  Take 3 mLs by nebulization every 6 hours as needed for Wheezing             albuterol sulfate HFA mg by mouth nightly as needed for Sleep.  ezetimibe (ZETIA) 10 MG tablet take 1 tablet by mouth once daily 30 tablet 5    ALPRAZolam (XANAX) 0.5 MG tablet take 1-2 tablet by mouth 3 times daily  As needed for anxiety 120 tablet 0    traMADol (ULTRAM) 50 MG tablet Take 1 tablet by mouth every 4 hours as needed for Pain for up to 30 days. Take lowest dose possible to manage pain 120 tablet 0    pantoprazole (PROTONIX) 40 MG tablet take 1 tablet by mouth once daily 90 tablet 2    clopidogrel (PLAVIX) 75 MG tablet take 1 tablet by mouth once daily 30 tablet 11    albuterol sulfate HFA (PROAIR HFA) 108 (90 Base) MCG/ACT inhaler Inhale 2 puffs into the lungs every 6 hours as needed for Wheezing 1 Inhaler 3    albuterol (PROVENTIL) (2.5 MG/3ML) 0.083% nebulizer solution Take 3 mLs by nebulization every 6 hours as needed for Wheezing 120 each 3    Respiratory Therapy Supplies (NEBULIZER/TUBING/MOUTHPIECE) KIT 1 kit by Does not apply route daily as needed (cough, wheeze) Diagnosis:RAD and Bronchitis 1 kit 0    aspirin 81 MG chewable tablet Take 81 mg by mouth daily          Medications patient taking asof now reconciled against medications ordered at time of hospital discharge: Yes    Chief Complaint   Patient presents with    Follow-Up from [facility] 10/29/2021 for Stoke like symptoms, Chronic Diastolic Heart Failure with EF 45-50%        Patient presents for hospital follow-up regarding newly diagnosed CHF. Originally presented to ED with plaint dizziness, confusion, slurred speech. Patient denies any syncope, fever, chest pain, shortness of breath abdominal pain or edema. Found to have significant changes in his echo from previous. Scheduled follow-up with cardiology as well as cardiac rehab. Taking medications as prescribed. Requesting medication for smoking cessation.   History of tobacco abuse, EtOH abuse, CVA, hyperlipidemia, osteoarthritis/knee replacements, situational anxiety coronary artery disease. Patient reports that he routinely will abstain from alcohol but occasionally special circumstances will consume alcohol. Reports that he did have a few drinks with his brother celebrating occasion day before hospital admission. Inpatient course: Discharge summary reviewed- see chart. Interval history/Current status:stable    Review of Systems   Constitutional: Negative for activity change, appetite change, chills, fatigue and fever. Eyes: Negative for visual disturbance. Respiratory: Negative for cough, chest tightness, shortness of breath and wheezing. Cardiovascular: Negative for chest pain, palpitations and leg swelling. Gastrointestinal: Negative for abdominal distention and abdominal pain. Genitourinary: Negative for dysuria. Musculoskeletal: Positive for arthralgias and back pain. Negative for neck pain. Skin: Negative. Negative for rash. Neurological: Negative for dizziness, seizures, syncope, speech difficulty, light-headedness and headaches. Hematological: Negative for adenopathy. Psychiatric/Behavioral: Negative for decreased concentration and dysphoric mood. All other systems reviewed and are negative. Vitals:    11/03/21 1055   BP: 112/62   Pulse: 68   Resp: 18   Temp: 97.8 °F (36.6 °C)   TempSrc: Oral   Weight: 236 lb (107 kg)     Body mass index is 31.14 kg/m². Wt Readings from Last 3 Encounters:   11/03/21 236 lb (107 kg)   10/29/21 230 lb 11.2 oz (104.6 kg)   10/21/21 235 lb (106.6 kg)     BP Readings from Last 3 Encounters:   11/03/21 112/62   10/29/21 113/60   10/21/21 126/72       Physical Exam  Vitals and nursing note reviewed. Constitutional:       Appearance: Normal appearance. He is well-developed. He is not diaphoretic. HENT:      Head: Normocephalic and atraumatic. Not macrocephalic and not microcephalic. Right Ear: Hearing, tympanic membrane, ear canal and external ear normal. No drainage or tenderness.  No middle ear effusion. No hemotympanum. Tympanic membrane is not injected, scarred, perforated or bulging. Left Ear: Hearing, tympanic membrane, ear canal and external ear normal. No drainage or tenderness. No middle ear effusion. No hemotympanum. Tympanic membrane is not injected, scarred, perforated or bulging. Nose: No nasal deformity, septal deviation, mucosal edema or rhinorrhea. Right Sinus: No maxillary sinus tenderness or frontal sinus tenderness. Left Sinus: No maxillary sinus tenderness or frontal sinus tenderness. Mouth/Throat:      Mouth: No oral lesions. Dentition: Normal dentition. Does not have dentures. No dental caries or dental abscesses. Pharynx: No oropharyngeal exudate or posterior oropharyngeal erythema. Tonsils: No tonsillar abscesses. Eyes:      General: Lids are normal. No scleral icterus. Extraocular Movements: Extraocular movements intact. Right eye: Normal extraocular motion. Left eye: Normal extraocular motion. Conjunctiva/sclera: Conjunctivae normal.      Right eye: Right conjunctiva is not injected. Left eye: Left conjunctiva is not injected. Pupils: Pupils are equal, round, and reactive to light. Neck:      Thyroid: No thyroid mass or thyromegaly. Vascular: No carotid bruit or JVD. Trachea: Trachea normal.   Cardiovascular:      Rate and Rhythm: Normal rate and regular rhythm. Heart sounds: Normal heart sounds, S1 normal and S2 normal. No murmur heard. No friction rub. No gallop. Pulmonary:      Effort: Pulmonary effort is normal. No respiratory distress. Breath sounds: Normal breath sounds. No wheezing, rhonchi or rales. Chest:      Chest wall: No tenderness. Abdominal:      General: Bowel sounds are normal.      Palpations: Abdomen is soft. There is no hepatomegaly, splenomegaly or mass. Tenderness: There is no guarding or rebound. Hernia: No hernia is present.  There is no hernia in the ventral area or left inguinal area. Musculoskeletal:         General: No tenderness. Normal range of motion. Cervical back: Normal range of motion and neck supple. No edema or erythema. Normal range of motion. Lymphadenopathy:      Head:      Right side of head: No submental, submandibular, tonsillar, preauricular, posterior auricular or occipital adenopathy. Left side of head: No submental, submandibular, tonsillar, preauricular, posterior auricular or occipital adenopathy. Cervical: No cervical adenopathy. Right cervical: No superficial, deep or posterior cervical adenopathy. Left cervical: No superficial, deep or posterior cervical adenopathy. Upper Body:      Right upper body: No supraclavicular or pectoral adenopathy. Left upper body: No supraclavicular or pectoral adenopathy. Skin:     General: Skin is warm and dry. Coloration: Skin is not pale. Findings: No bruising, ecchymosis, laceration, lesion or rash. Nails: There is no clubbing. Neurological:      Mental Status: He is alert and oriented to person, place, and time. Cranial Nerves: No cranial nerve deficit. Motor: Motor function is intact. No abnormal muscle tone. Coordination: Coordination is intact. Romberg sign negative. Coordination normal.      Deep Tendon Reflexes: Reflexes normal.      Comments: Slightly slurred speech noted this appears to be a normal variant for patient   Psychiatric:         Speech: Speech normal.         Behavior: Behavior normal.         Thought Content: Thought content normal.         Judgment: Judgment normal.             Assessment/Plan:   Diagnosis Orders   1. Congestive heart failure, unspecified HF chronicity, unspecified heart failure type ([facility]     2. History of tobacco use     3. History of ETOH abuse     4. Hx of ischemic vertebrobasilar artery thalamic stroke     5.  Coronary artery disease involving native coronary artery of native heart without angina pectoris     6. Situational anxiety     7. Mixed hyperlipidemia     8. Encounter for smoking cessation counseling         Medical Decision Making: moderate complexity    Follow-up with cardiology and cardiac rehab as scheduled. Continue current medications. Smoking cessation information provided. Chantix prescribed. Follow-up with me in 1 month. Continue medications as prescribed. Educational information on above diagnosis  provided per AVS.  Outpatient Encounter Medications as of 11/3/2021   Medication Sig Dispense Refill    varenicline (CHANTIX CONTINUING MONTH PAK) 1 MG tablet Take 1 tablet by mouth 2 times daily 60 tablet 2    varenicline (CHANTIX STARTING MONTH ALLY) 0.5 MG X 11 & 1 MG X 42 tablet Take by mouth. 42 tablet 0    atorvastatin (LIPITOR) 80 MG tablet Take 1 tablet by mouth nightly 30 tablet 3    lisinopril (PRINIVIL;ZESTRIL) 5 MG tablet Take 1 tablet by mouth daily 30 tablet 3    zolpidem (AMBIEN) 10 MG tablet Take 10 mg by mouth nightly as needed for Sleep.  ezetimibe (ZETIA) 10 MG tablet take 1 tablet by mouth once daily 30 tablet 5    ALPRAZolam (XANAX) 0.5 MG tablet take 1-2 tablet by mouth 3 times daily  As needed for anxiety 120 tablet 0    traMADol (ULTRAM) 50 MG tablet Take 1 tablet by mouth every 4 hours as needed for Pain for up to 30 days.  Take lowest dose possible to manage pain 120 tablet 0    pantoprazole (PROTONIX) 40 MG tablet take 1 tablet by mouth once daily 90 tablet 2    clopidogrel (PLAVIX) 75 MG tablet take 1 tablet by mouth once daily 30 tablet 11    albuterol sulfate HFA (PROAIR HFA) 108 (90 Base) MCG/ACT inhaler Inhale 2 puffs into the lungs every 6 hours as needed for Wheezing 1 Inhaler 3    albuterol (PROVENTIL) (2.5 MG/3ML) 0.083% nebulizer solution Take 3 mLs by nebulization every 6 hours as needed for Wheezing 120 each 3    Respiratory Therapy Supplies (NEBULIZER/TUBING/MOUTHPIECE) KIT 1 kit by Does not apply route daily as needed (cough, wheeze) Diagnosis:RAD and Bronchitis 1 kit 0    aspirin 81 MG chewable tablet Take 81 mg by mouth daily       No facility-administered encounter medications on file as of 11/3/2021. Orders Placed This Encounter   Procedures    TN DISCHARGE MEDS RECONCILED W/ CURRENT OUTPATIENT MED LIST       Continue medications as prescribed. Educational information on above diagnosis  provided per AVS.  Outpatient Encounter Medications as of 11/3/2021   Medication Sig Dispense Refill    varenicline (CHANTIX CONTINUING MONTH PAK) 1 MG tablet Take 1 tablet by mouth 2 times daily 60 tablet 2    varenicline (CHANTIX STARTING MONTH ALLY) 0.5 MG X 11 & 1 MG X 42 tablet Take by mouth. 42 tablet 0    atorvastatin (LIPITOR) 80 MG tablet Take 1 tablet by mouth nightly 30 tablet 3    lisinopril (PRINIVIL;ZESTRIL) 5 MG tablet Take 1 tablet by mouth daily 30 tablet 3    zolpidem (AMBIEN) 10 MG tablet Take 10 mg by mouth nightly as needed for Sleep.  ezetimibe (ZETIA) 10 MG tablet take 1 tablet by mouth once daily 30 tablet 5    ALPRAZolam (XANAX) 0.5 MG tablet take 1-2 tablet by mouth 3 times daily  As needed for anxiety 120 tablet 0    traMADol (ULTRAM) 50 MG tablet Take 1 tablet by mouth every 4 hours as needed for Pain for up to 30 days.  Take lowest dose possible to manage pain 120 tablet 0    pantoprazole (PROTONIX) 40 MG tablet take 1 tablet by mouth once daily 90 tablet 2    clopidogrel (PLAVIX) 75 MG tablet take 1 tablet by mouth once daily 30 tablet 11    albuterol sulfate HFA (PROAIR HFA) 108 (90 Base) MCG/ACT inhaler Inhale 2 puffs into the lungs every 6 hours as needed for Wheezing 1 Inhaler 3    albuterol (PROVENTIL) (2.5 MG/3ML) 0.083% nebulizer solution Take 3 mLs by nebulization every 6 hours as needed for Wheezing 120 each 3    Respiratory Therapy Supplies (NEBULIZER/TUBING/MOUTHPIECE) KIT 1 kit by Does not apply route daily as needed (cough, wheeze) Diagnosis:RAD and Bronchitis 1 kit 0    aspirin 81 MG chewable tablet Take 81 mg by mouth daily       No facility-administered encounter medications on file as of 11/3/2021. Orders Placed This Encounter   Procedures    NC DISCHARGE MEDS RECONCILED W/ CURRENT OUTPATIENT MED LIST     Follow-up cardiology . Follow-up routinely with cardiac rehab. Smoking cessation information provided.   Follow-up 1 month

## 2021-11-05 ASSESSMENT — ENCOUNTER SYMPTOMS
CHEST TIGHTNESS: 0
BACK PAIN: 1
SHORTNESS OF BREATH: 0
COUGH: 0
ABDOMINAL DISTENTION: 0
ABDOMINAL PAIN: 0
WHEEZING: 0

## 2021-11-09 ENCOUNTER — OFFICE VISIT (OUTPATIENT)
Dept: CARDIOLOGY CLINIC | Age: 71
End: 2021-11-09
Payer: COMMERCIAL

## 2021-11-09 VITALS
WEIGHT: 240 LBS | SYSTOLIC BLOOD PRESSURE: 118 MMHG | HEART RATE: 81 BPM | DIASTOLIC BLOOD PRESSURE: 68 MMHG | OXYGEN SATURATION: 99 % | BODY MASS INDEX: 31.66 KG/M2

## 2021-11-09 DIAGNOSIS — I10 ESSENTIAL HYPERTENSION: ICD-10-CM

## 2021-11-09 DIAGNOSIS — Z72.0 TOBACCO ABUSE: ICD-10-CM

## 2021-11-09 DIAGNOSIS — I10 PRIMARY HYPERTENSION: Primary | ICD-10-CM

## 2021-11-09 DIAGNOSIS — I50.32 CHRONIC DIASTOLIC CHF (CONGESTIVE HEART FAILURE), NYHA CLASS 1 (HCC): ICD-10-CM

## 2021-11-09 PROCEDURE — 99203 OFFICE O/P NEW LOW 30 MIN: CPT | Performed by: PHYSICIAN ASSISTANT

## 2021-11-09 RX ORDER — METOPROLOL SUCCINATE 25 MG/1
12.5 TABLET, EXTENDED RELEASE ORAL DAILY
Qty: 30 TABLET | Refills: 3 | Status: SHIPPED | OUTPATIENT
Start: 2021-11-09 | End: 2022-06-28 | Stop reason: SDUPTHER

## 2021-11-09 NOTE — PROGRESS NOTES
Heart Failure Clinic       Visit Date: 11/9/2021  Cardiologist:  Dr. Felipa Krishnamurthy  Primary Care Physician: Dr. Kenneth Mendez, APRN - CNP    Andrew Lawton is a 73 y.o. male who presents today for:  Chief Complaint   Patient presents with    Congestive Heart Failure     New Patient        HPI:   Andrew Lawton is a 73 y.o. male who presents to the office for a new patient visit in the heart failure clinic. Accompanied by self    TYPE HF: chronic diastolic CHF   Device: none  HX: smoker, CVA, right carotid stent, ETOH abuse (clean 5 years)      Hospitalization:  10/26-10/29  Hospital Course:   Karmen Wilson Y 53 y. o. male with a PMH as seen below who presents to to [address] [place] [address] ED with complaints of a fall with dysarthria and gait disturbance suspicious for CVA.  Patient has history of CVAs status post right carotid stent placement in 2018 as well as traumatic brain hemorrhage in 2018.  Patient past medical history also significant for alcohol abuse.  Per patient states his last drink was 5 years ago however daughter is in room states patient was drinking on Saturday.  After conversing with the patient he did admit that he did drink on Saturday however he states he is a social drinker now does not binge drink like he used to.  Ordered an etoh level as well as phenobarb protocol.       CT the head without contrast performed in the ED showed no acute intracranial abnormality.   CTA of the head and neck did demonstrate redemonstration of the right carotid stent extending to the distal common carotid artery to the proximal internal carotid artery.  There does appear to be some in-stent stenosis however severity cannot be ascertained.  Also noted to have stable mild mural calcification of the left carotid bulb without hemodynamically significant stenosis by NASCET criteria.     MRI of the brain performed on 10/27 showed no evidence of acute intracranial abnormality.  Stable lacunar infarcts in the bilateral basal ganglia, thalami and right cerebellar hemisphere.     Neurology assessed patient concerns of patient may have myopathic process given his intermittent slurred speech. He and family were told to continue to monitor for symptoms and if symptoms worsened or new symptoms arise such as diplopia, dizziness difficulty swallowing dysphagia choking or droopy eye that he should report to the ED. Patient okay for discharge from neurology perspective.     Cardiology was consulted in the setting of new onset reduced ejection fraction with mild global hypokinesis of the left ventricular wall. Karina Pillion showed mild inferior defect of the base of the apex (improved to stress) suggestive of infarction in the RCA and/or LCx territory. There was no definite ischemia. Consulted with Leni Magana CNP from cardiology. Patient is stable from a cardiology perspective and is plans to follow with the clinic in 4 weeks. Heart failure clinic referral has been placed. Today: overall patient feeling well. Denies cp, sob, edema, orthopnea and weight gain.   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)     Erectile dysfunction     Essential hypertension, malignant     ETOH abuse     GERD (gastroesophageal reflux disease)     Insomnia     Low back pain     Major depressive disorder, recurrent ([facility] 4/4/2018    Other headache syndromes 12/25/2016    Sciatica     right side strain     Past Surgical History:   Procedure Laterality Date    APPENDECTOMY      COLONOSCOPY hours as needed for Wheezing 1 Inhaler 3    albuterol (PROVENTIL) (2.5 MG/3ML) 0.083% nebulizer solution Take 3 mLs by nebulization every 6 hours as needed for Wheezing 120 each 3    aspirin 81 MG chewable tablet Take 81 mg by mouth daily      varenicline (CHANTIX CONTINUING MONTH ALLY) 1 MG tablet Take 1 tablet by mouth 2 times daily (Patient not taking: Reported on 11/9/2021) 60 tablet 2    Respiratory Therapy Supplies (NEBULIZER/TUBING/MOUTHPIECE) KIT 1 kit by Does not apply route daily as needed (cough, wheeze) Diagnosis:RAD and Bronchitis 1 kit 0     No current facility-administered medications for this visit. Allergies   Allergen Reactions    Tape Electa Jevon Tape]        SUBJECTIVE:   Review of Systems  General:   No fever, no chills, No fatigue or weight loss  Pulmonary:    No dyspnea, no wheezing  Cardiac:    Denies recent chest pain,   GI:     No nausea or vomiting, no abdominal pain  Neuro:    No dizziness or light headedness,   Musculoskeletal:  No recent active issues  Extremities:   No edema, no obvious claudication     OBJECTIVE:   Today's Vitals:  /68   Pulse 81   Wt 240 lb (108.9 kg)   SpO2 99%   BMI 31.66 kg/m²     Physical Exam  General:   Well developed, well nourished  Lungs:   Clear to auscultation  Heart:    Normal S1 S2, Slight murmur. no rubs, no gallops  Abdomen:   Soft, non tender, no organomegalies, positive bowel sounds  Extremities:   No edema, no cyanosis, good peripheral pulses  Neurological:   Awake, alert, oriented. No obvious focal deficits  Musculoskelatal:  No obvious deformities      Wt Readings from Last 3 Encounters:   11/09/21 240 lb (108.9 kg)   11/03/21 236 lb (107 kg)   10/29/21 230 lb 11.2 oz (104.6 kg)     BP Readings from Last 3 Encounters:   11/09/21 118/68   11/03/21 112/62   10/29/21 113/60     Pulse Readings from Last 3 Encounters:   11/09/21 81   11/03/21 68   10/29/21 66     Body mass index is 31.66 kg/m².     ECHO:   TTE 10/27/21  Summary Technically difficult examination. Left ventricle size is normal.   Normal left ventricular wall thickness. There was mild global hypokinesis of the left ventricle. Systolic function was mildly reduced. Ejection fraction is visually estimated in the range of 45% to 50%. Doppler parameters were consistent with abnormal left ventricular   relaxation (grade 1 diastolic dysfunction). Signature      ----------------------------------------------------------------   Electronically signed by Andreia Farr MD (Interpreting   physician) on 10/27/2021 at 05:08 PM    CATH/STRESS:   Stress test 10/29/21  Summary   Mild inferior defect from base to apex (improves with stress) suggestive   of infarction in the RCA and/or LCX territory. No definite ischemia. LVEF is preserved. Recommendation   Clinical correlation is recommended. Aggressive risk factor management.       Signatures      ----------------------------------------------------------------   Electronically signed by Esthela Case MD (Interpreting   Cardiologist) on 10/29/2021 at 13:12      Results reviewed:  BNP: No results found for: BNP  CBC:   Lab Results   Component Value Date    WBC 8.4 10/28/2021    RBC 4.91 10/28/2021    RBC 5.06 02/01/2021    HGB 15.6 10/28/2021    HCT 46.9 10/28/2021     10/28/2021     CMP:    Lab Results   Component Value Date     10/28/2021    K 4.5 10/28/2021    K 3.9 10/26/2021     10/28/2021    CO2 23 10/28/2021    BUN 14 10/28/2021    CREATININE 0.7 10/28/2021    LABGLOM >90 10/28/2021    GLUCOSE 112 10/28/2021    GLUCOSE 85 02/01/2021    CALCIUM 9.3 10/28/2021     Hepatic Function Panel:    Lab Results   Component Value Date    ALKPHOS 87 10/26/2021    ALT 16 10/26/2021    AST 13 10/26/2021    PROT 6.9 10/26/2021    BILITOT 0.5 10/26/2021    LABALBU 4.0 10/26/2021    LABALBU 4.6 05/11/2012     Magnesium:    Lab Results   Component Value Date    MG 2.2 10/27/2021     PT/INR:    Lab Results Component Value Date    INR 0.96 02/12/2021     Lipids:    Lab Results   Component Value Date    TRIG 214 10/27/2021    HDL 32 10/27/2021    HDL 66 05/01/2010    LDLCALC 79 10/27/2021    LABVLDL 37 02/01/2021       ASSESSMENT AND PLAN:   The patient's condition/symptoms are Stable: No clinical evidence of fluid overload today. Continue current medical regimen without changes at present time. Diagnosis Orders   1. Primary hypertension  Basic Metabolic Panel   2. Tobacco abuse     3. Essential hypertension     4. Chronic diastolic CHF (congestive heart failure), NYHA class 1 (HCC)          CMP - ef 45-50 with grade per TTE  HTN  PAD with hx right carotid stent  Hx CVA  Tobacco abuse  Hx ETOH abuse    Continue:  GDMT:   ACE/ARB/ARNI - lisinopril 5 daily   BB - no   Diuretic - no  AA - no  SGLT2 -  no  Vasodilator - no  Other - lipitor 80, plavix 75 daily, asa 81 dialy    Add toprol xl 12.5 po daily  BMP  F/up chf clinic 4 months  Keep appt with cardiology 11/24/21    Tolerating above noted HF meds, no ill side effects noted. Will continue to monitor kidney function and electrolytes. Will optimize as tolerated. Pt is compliant w/ medications. Total visit time of 30 minutes has been spent with patient on education of symptoms, management, medication, and plan of care; as well as review of chart: labs, ECHO, radiology reports, etc.   I personally spent more then 50% of the appt time face to face with the patient. · Daily weights  · Fluid restriction of 2 Liters per day  · Limit sodium in diet to around 2105-9119 mg/day  · Monitor BP  · Activity as tolerated     Patient was instructed to call the [facility] for any changes in symptoms as noted in AVS.      No follow-ups on file. or sooner if needed     Patient given educational materials - see patient instructions. We discussed the importance of weighing oneself and recording daily.  We also discussed the importance of a low sodium diet, higher sodium foods to avoid and better low sodium food options. Patient verbalizes understanding of plan of care using teach back method, and is agreeable to the treatment plan.        Electronically signed by Anitha Drummond PA-C on 11/9/2021 at 8:33 AM

## 2021-11-09 NOTE — PATIENT INSTRUCTIONS
You may receive a survey regarding the care you received during your visit. Your input is valuable to us. We encourage you to complete and return your survey. We hope you will choose us in the future for your healthcare needs.     Continue:  · Continue current medications  · Daily weights and record  · Fluid restriction of 2 Liters per day  · Limit sodium in diet to around 8382-2769 mg/day  · Monitor BP  · Activity as tolerated     Call the [facility] for any of the following symptoms: [phone]   Weight gain of 2-3 pounds in 1 day or 5 pounds in 1 week   Increased shortness of breath   Shortness of breath while laying down   Cough   Chest pain   Swelling in feet, ankles or legs   Tenderness or bloating in the abdomen   Fatigue    Decreased appetite or nausea    Confusion

## 2021-11-11 DIAGNOSIS — F41.9 ANXIETY: Chronic | ICD-10-CM

## 2021-11-11 DIAGNOSIS — G47.9 SLEEP DISTURBANCE: ICD-10-CM

## 2021-11-11 LAB
ALBUMIN SERPL-MCNC: 4.1 G/DL (ref 3.2–5.3)
ALK PHOSPHATASE: 79 U/L (ref 39–130)
ALT SERPL-CCNC: 18 U/L (ref 0–40)
ANION GAP SERPL CALCULATED.3IONS-SCNC: 8 MMOL/L (ref 5–15)
ANION GAP SERPL CALCULATED.3IONS-SCNC: 9 MMOL/L (ref 5–15)
AST SERPL-CCNC: 20 U/L (ref 0–41)
BILIRUB SERPL-MCNC: 0.7 MG/DL (ref 0.3–1.2)
BUN BLDV-MCNC: 12 MG/DL (ref 5–27)
BUN BLDV-MCNC: 12 MG/DL (ref 5–27)
CALCIUM SERPL-MCNC: 9.3 MG/DL (ref 8.5–10.5)
CALCIUM SERPL-MCNC: 9.4 MG/DL (ref 8.5–10.5)
CHLORIDE BLD-SCNC: 104 MMOL/L (ref 98–109)
CHLORIDE BLD-SCNC: 104 MMOL/L (ref 98–109)
CHOLESTEROL/HDL RATIO: 3.5 (ref 1–5)
CHOLESTEROL: 94 MG/DL (ref 150–200)
CO2: 27 MMOL/L (ref 22–32)
CO2: 28 MMOL/L (ref 22–32)
CREAT SERPL-MCNC: 0.96 MG/DL (ref 0.6–1.3)
CREAT SERPL-MCNC: 0.99 MG/DL (ref 0.6–1.3)
EGFR AFRICAN AMERICAN: >60 ML/MIN/1.73SQ.M
EGFR AFRICAN AMERICAN: >60 ML/MIN/1.73SQ.M
EGFR IF NONAFRICAN AMERICAN: >60 ML/MIN/1.73SQ.M
EGFR IF NONAFRICAN AMERICAN: >60 ML/MIN/1.73SQ.M
GLUCOSE: 94 MG/DL (ref 65–99)
GLUCOSE: 95 MG/DL (ref 65–99)
HDLC SERPL-MCNC: 27 MG/DL
LDL CHOLESTEROL CALCULATED: 33 MG/DL
LDL/HDL RATIO: 1.2
POTASSIUM SERPL-SCNC: 4 MMOL/L (ref 3.5–5)
POTASSIUM SERPL-SCNC: 4 MMOL/L (ref 3.5–5)
SODIUM BLD-SCNC: 140 MMOL/L (ref 134–146)
SODIUM BLD-SCNC: 140 MMOL/L (ref 134–146)
T4 FREE: 0.8 NG/DL (ref 0.61–1.6)
TOTAL PROTEIN: 7.1 G/DL (ref 6–8)
TRIGL SERPL-MCNC: 170 MG/DL (ref 27–150)
TSH SERPL DL<=0.05 MIU/L-ACNC: 2.62 UIU/ML (ref 0.49–4.67)
VLDLC SERPL CALC-MCNC: 34 MG/DL (ref 0–30)

## 2021-11-11 RX ORDER — ZOLPIDEM TARTRATE 10 MG/1
10 TABLET ORAL NIGHTLY PRN
Qty: 90 TABLET | Refills: 1 | Status: SHIPPED | OUTPATIENT
Start: 2021-11-11 | End: 2022-02-09

## 2021-11-23 PROBLEM — I50.32 CHRONIC DIASTOLIC CHF (CONGESTIVE HEART FAILURE) (HCC): Status: ACTIVE | Noted: 2021-11-23

## 2021-11-23 PROBLEM — I73.9 PAD (PERIPHERAL ARTERY DISEASE) (HCC): Status: ACTIVE | Noted: 2021-11-23

## 2021-11-23 NOTE — PROGRESS NOTES
[facility] PROFESSIONAL SERVICES  [facility]   [address] [facility]   [address] [address]   Dept: [phone]   Dept Fax: [phone]   Loc: [phone]      Chief Complaint   Patient presents with    Follow-up     4 week follow up from [facility] pt. Cardiologist:  Dr. Gloria Mcgraw  74 yo male presents for hfu, suspicious symptoms of CVA. Hx of chronic diastolic CHF, smoker, HTN CVA, right carotid stent, prior alc abuse (only drinks socially now). No chest pain, angina, PARIKH, orthopnea, PND, sob at rest, palpitations, LE edema, or syncope. Feeling well since being in the hospital. No further symptoms. No swelling. No cp, no sob. 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)     Chronic diastolic CHF (congestive heart failure) ([facility] 11/23/2021    Erectile dysfunction     Essential hypertension, malignant     ETOH abuse     GERD (gastroesophageal reflux disease)     Insomnia     Low back pain     Major depressive disorder, recurrent ([facility] 4/4/2018    Other headache syndromes 12/25/2016    Sciatica     right side strain       Allergies   Allergen Reactions    Tape Marylu Guerrero Tape]        Current Outpatient Medications   Medication Sig Dispense Refill    ALPRAZolam (XANAX) 0.5 MG tablet take 1-2 tablet by mouth 3 times daily  As needed for anxiety 120 tablet 0    zolpidem (AMBIEN) 10 MG tablet Take 1 tablet by mouth nightly as needed for Sleep for up to 90 days.  90 tablet 1    metoprolol succinate (TOPROL XL) 25 MG extended release tablet Take 0.5 tablets by mouth daily 30 tablet 3    varenicline (CHANTIX CONTINUING MONTH ALLY) 1 MG tablet Take 1 tablet by mouth 2 times daily 60 tablet 2    varenicline (CHANTIX STARTING MONTH ALLY) 0.5 MG X 11 & 1 MG X 42 tablet Take by mouth. 42 tablet 0    atorvastatin (LIPITOR) 80 MG tablet Take 1 tablet by mouth nightly 30 tablet 3    lisinopril (PRINIVIL;ZESTRIL) 5 MG tablet Take 1 tablet by mouth daily 30 tablet 3    zolpidem (AMBIEN) 10 MG tablet Take 10 mg by mouth nightly as needed for Sleep.  ezetimibe (ZETIA) 10 MG tablet take 1 tablet by mouth once daily 30 tablet 5    pantoprazole (PROTONIX) 40 MG tablet take 1 tablet by mouth once daily 90 tablet 2    clopidogrel (PLAVIX) 75 MG tablet take 1 tablet by mouth once daily 30 tablet 11    albuterol sulfate HFA (PROAIR HFA) 108 (90 Base) MCG/ACT inhaler Inhale 2 puffs into the lungs every 6 hours as needed for Wheezing 1 Inhaler 3    albuterol (PROVENTIL) (2.5 MG/3ML) 0.083% nebulizer solution Take 3 mLs by nebulization every 6 hours as needed for Wheezing 120 each 3    Respiratory Therapy Supplies (NEBULIZER/TUBING/MOUTHPIECE) KIT 1 kit by Does not apply route daily as needed (cough, wheeze) Diagnosis:RAD and Bronchitis 1 kit 0    aspirin 81 MG chewable tablet Take 81 mg by mouth daily       No current facility-administered medications for this visit.        Social History     Socioeconomic History    Marital status:      Spouse name: None    Number of children: 3    Years of education: None    Highest education level: None   Occupational History    None   Tobacco Use    Smoking status: Current Every Day Smoker     Packs/day: 0.50     Years: 30.00     Pack years: 15.00     Types: Cigarettes     Start date: 7/16/1968    Smokeless tobacco: Never Used    Tobacco comment: printed to avs   Vaping Use    Vaping Use: Never used   Substance and Sexual Activity    Alcohol use: Not Currently     Alcohol/week: 42.0 standard drinks     Types: 42 Cans of beer per week Comment: States last beer was in 3/2018    Drug use: No    Sexual activity: Not Currently     Comment:  in 2013   Other Topics Concern    None   Social History Narrative    None     Social Determinants of Health     Financial Resource Strain: Low Risk     Difficulty of Paying Living Expenses: Not hard at all   Food Insecurity: No Food Insecurity    Worried About Running Out of Food in the Last Year: Never true    Anna of Food in the Last Year: Never true   Transportation Needs:     Lack of Transportation (Medical): Not on file    Lack of Transportation (Non-Medical): Not on file   Physical Activity:     Days of Exercise per Week: Not on file    Minutes of Exercise per Session: Not on file   Stress:     Feeling of Stress : Not on file   Social Connections:     Frequency of Communication with Friends and Family: Not on file    Frequency of Social Gatherings with Friends and Family: Not on file    Attends [employer] Services: Not on file    Active Member of [address] [place] [address] or Organizations: Not on file    Attends Club or Organization Meetings: Not on file    Marital Status: Not on file   Intimate Partner Violence:     Fear of Current or Ex-Partner: Not on file    Emotionally Abused: Not on file    Physically Abused: Not on file    Sexually Abused: Not on file   Housing Stability:     Unable to Pay for Housing in the Last Year: Not on file    Number of [place] in the Last Year: Not on file    Unstable Housing in the Last Year: Not on file       Family History   Problem Relation Age of Onset    Dementia Mother     Heart Disease Father     High Blood Pressure Sister     Cancer Brother         testicular cancer       Blood pressure 109/72, pulse 77, height 6' 1\" (1.854 m), weight 236 lb 9.6 oz (107.3 kg).     General:   Well developed, well nourished  Lungs:   Clear to auscultation  Heart:    Normal S1 S2, No murmur, rubs, or gallops  Abdomen:   Soft, non tender, no organomegalies, positive bowel sounds  Extremities:   No edema, no cyanosis, good peripheral pulses  Neurological:   Awake, alert, oriented. No obvious focal deficits  Musculoskeletal:  No obvious deformities    EKG:     ST  Conclusions      Summary   Mild inferior defect from base to apex (improves with stress) suggestive   of infarction in the RCA and/or LCX territory. No definite ischemia. LVEF is preserved. Recommendation   Clinical correlation is recommended. Aggressive risk factor management. Signatures      ----------------------------------------------------------------   Electronically signed by Power No MD (Interpreting   Cardiologist) on 10/29/2021   TTE  Conclusions      Summary   Technically difficult examination. Left ventricle size is normal.   Normal left ventricular wall thickness. There was mild global hypokinesis of the left ventricle. Systolic function was mildly reduced. Ejection fraction is visually estimated in the range of 45% to 50%. Doppler parameters were consistent with abnormal left ventricular   relaxation (grade 1 diastolic dysfunction). Signature      ----------------------------------------------------------------   Electronically signed by Barbara Oseguera MD (Interpreting   physician) on 10/27/2021      Diagnosis Orders   1. Familial hypercholesterolemia     2. Essential hypertension, malignant     3. PAD (peripheral artery disease) ([address]     4. Chronic diastolic CHF (congestive heart failure) (HCC)         No orders of the defined types were placed in this encounter. Assessment/Plan:   Chronic D CHF- not on any diuretics. Hx of Pulm HTN. Now mild reduction in EF, no swelling no sob, no cp. Consider cath if patient develops any concerning symptoms for ACS. HTN-well controlled,  Lisinopril, toprol 25. PAD- no more dizziness since leaving hospital   HLD-on lipitor 80 mg daily. Continue current treatment plan and meds.  Patient tolerating and doing well on current

## 2021-11-24 ENCOUNTER — OFFICE VISIT (OUTPATIENT)
Dept: CARDIOLOGY CLINIC | Age: 71
End: 2021-11-24
Payer: COMMERCIAL

## 2021-11-24 VITALS
BODY MASS INDEX: 31.36 KG/M2 | WEIGHT: 236.6 LBS | DIASTOLIC BLOOD PRESSURE: 72 MMHG | SYSTOLIC BLOOD PRESSURE: 109 MMHG | HEART RATE: 77 BPM | HEIGHT: 73 IN

## 2021-11-24 DIAGNOSIS — I50.32 CHRONIC DIASTOLIC CHF (CONGESTIVE HEART FAILURE) (HCC): ICD-10-CM

## 2021-11-24 DIAGNOSIS — I10 ESSENTIAL HYPERTENSION, MALIGNANT: ICD-10-CM

## 2021-11-24 DIAGNOSIS — E78.01 FAMILIAL HYPERCHOLESTEROLEMIA: Primary | Chronic | ICD-10-CM

## 2021-11-24 DIAGNOSIS — F41.0 PANIC DISORDER: ICD-10-CM

## 2021-11-24 DIAGNOSIS — F41.8 SITUATIONAL ANXIETY: ICD-10-CM

## 2021-11-24 DIAGNOSIS — I73.9 PAD (PERIPHERAL ARTERY DISEASE) (HCC): ICD-10-CM

## 2021-11-24 PROCEDURE — 99213 OFFICE O/P EST LOW 20 MIN: CPT | Performed by: STUDENT IN AN ORGANIZED HEALTH CARE EDUCATION/TRAINING PROGRAM

## 2021-11-24 RX ORDER — ALPRAZOLAM 0.5 MG/1
TABLET ORAL
Qty: 120 TABLET | Refills: 0 | Status: SHIPPED | OUTPATIENT
Start: 2021-11-24 | End: 2021-12-23 | Stop reason: SDUPTHER

## 2021-11-24 NOTE — PROGRESS NOTES
Pt is here for: 4 week follow up from [facility] pt. Patient denies having any chest pain, shortness of breath, dizziness, lightheadedness or palpitations.   Last EKG was done on: 10/26/2021

## 2021-12-23 DIAGNOSIS — F41.8 SITUATIONAL ANXIETY: ICD-10-CM

## 2021-12-23 DIAGNOSIS — F41.0 PANIC DISORDER: ICD-10-CM

## 2021-12-23 RX ORDER — ALPRAZOLAM 0.5 MG/1
TABLET ORAL
Qty: 120 TABLET | Refills: 0 | Status: SHIPPED | OUTPATIENT
Start: 2021-12-23 | End: 2022-01-20 | Stop reason: SDUPTHER

## 2022-01-20 ENCOUNTER — OFFICE VISIT (OUTPATIENT)
Dept: FAMILY MEDICINE CLINIC | Age: 72
End: 2022-01-20
Payer: COMMERCIAL

## 2022-01-20 VITALS
TEMPERATURE: 97.8 F | HEART RATE: 70 BPM | SYSTOLIC BLOOD PRESSURE: 136 MMHG | WEIGHT: 245 LBS | OXYGEN SATURATION: 97 % | DIASTOLIC BLOOD PRESSURE: 64 MMHG | BODY MASS INDEX: 32.32 KG/M2 | RESPIRATION RATE: 18 BRPM

## 2022-01-20 DIAGNOSIS — Z87.891 HISTORY OF TOBACCO USE: ICD-10-CM

## 2022-01-20 DIAGNOSIS — F41.0 PANIC DISORDER: ICD-10-CM

## 2022-01-20 DIAGNOSIS — Z86.73 HX OF ISCHEMIC VERTEBROBASILAR ARTERY THALAMIC STROKE: ICD-10-CM

## 2022-01-20 DIAGNOSIS — E78.2 MIXED HYPERLIPIDEMIA: ICD-10-CM

## 2022-01-20 DIAGNOSIS — F10.11 HISTORY OF ETOH ABUSE: ICD-10-CM

## 2022-01-20 DIAGNOSIS — J41.8 MIXED SIMPLE AND MUCOPURULENT CHRONIC BRONCHITIS (HCC): ICD-10-CM

## 2022-01-20 DIAGNOSIS — M25.561 CHRONIC PAIN OF RIGHT KNEE: ICD-10-CM

## 2022-01-20 DIAGNOSIS — Z71.6 ENCOUNTER FOR SMOKING CESSATION COUNSELING: ICD-10-CM

## 2022-01-20 DIAGNOSIS — J06.9 VIRAL URI: ICD-10-CM

## 2022-01-20 DIAGNOSIS — R05.9 COUGH: Primary | ICD-10-CM

## 2022-01-20 DIAGNOSIS — G89.29 CHRONIC PAIN OF RIGHT KNEE: ICD-10-CM

## 2022-01-20 DIAGNOSIS — F41.8 SITUATIONAL ANXIETY: ICD-10-CM

## 2022-01-20 LAB
Lab: NORMAL
PERFORMING INSTRUMENT: NORMAL
QC PASS/FAIL: NORMAL
SARS-COV-2, POC: NORMAL

## 2022-01-20 PROCEDURE — 87426 SARSCOV CORONAVIRUS AG IA: CPT | Performed by: NURSE PRACTITIONER

## 2022-01-20 PROCEDURE — 99214 OFFICE O/P EST MOD 30 MIN: CPT | Performed by: NURSE PRACTITIONER

## 2022-01-20 RX ORDER — TRAMADOL HYDROCHLORIDE 50 MG/1
50 TABLET ORAL EVERY 4 HOURS PRN
Qty: 120 TABLET | Refills: 0 | Status: SHIPPED | OUTPATIENT
Start: 2022-01-20 | End: 2022-07-19 | Stop reason: SDUPTHER

## 2022-01-20 RX ORDER — ALBUTEROL SULFATE 90 UG/1
2 AEROSOL, METERED RESPIRATORY (INHALATION) EVERY 6 HOURS PRN
Qty: 1 EACH | Refills: 3 | Status: SHIPPED | OUTPATIENT
Start: 2022-01-20 | End: 2022-06-01 | Stop reason: SDUPTHER

## 2022-01-20 RX ORDER — ALBUTEROL SULFATE 2.5 MG/3ML
2.5 SOLUTION RESPIRATORY (INHALATION) EVERY 6 HOURS PRN
Qty: 90 EACH | Refills: 0 | Status: SHIPPED | OUTPATIENT
Start: 2022-01-20 | End: 2022-06-01 | Stop reason: SDUPTHER

## 2022-01-20 RX ORDER — ALPRAZOLAM 0.5 MG/1
TABLET ORAL
Qty: 120 TABLET | Refills: 0 | Status: SHIPPED | OUTPATIENT
Start: 2022-01-20 | End: 2022-02-21 | Stop reason: SDUPTHER

## 2022-01-20 ASSESSMENT — PATIENT HEALTH QUESTIONNAIRE - PHQ9
SUM OF ALL RESPONSES TO PHQ QUESTIONS 1-9: 1
SUM OF ALL RESPONSES TO PHQ QUESTIONS 1-9: 1
2. FEELING DOWN, DEPRESSED OR HOPELESS: 1
SUM OF ALL RESPONSES TO PHQ QUESTIONS 1-9: 1
1. LITTLE INTEREST OR PLEASURE IN DOING THINGS: 0
SUM OF ALL RESPONSES TO PHQ QUESTIONS 1-9: 1
SUM OF ALL RESPONSES TO PHQ9 QUESTIONS 1 & 2: 1

## 2022-01-23 ASSESSMENT — ENCOUNTER SYMPTOMS
COUGH: 0
BACK PAIN: 1
CHEST TIGHTNESS: 0
SHORTNESS OF BREATH: 0
WHEEZING: 0
ABDOMINAL DISTENTION: 0
ABDOMINAL PAIN: 0

## 2022-02-02 ENCOUNTER — OFFICE VISIT (OUTPATIENT)
Dept: FAMILY MEDICINE CLINIC | Age: 72
End: 2022-02-02
Payer: COMMERCIAL

## 2022-02-02 VITALS
BODY MASS INDEX: 32.47 KG/M2 | HEIGHT: 73 IN | OXYGEN SATURATION: 97 % | WEIGHT: 245 LBS | HEART RATE: 64 BPM | TEMPERATURE: 97.9 F

## 2022-02-02 DIAGNOSIS — J18.9 PNEUMONIA OF RIGHT LUNG DUE TO INFECTIOUS ORGANISM, UNSPECIFIED PART OF LUNG: ICD-10-CM

## 2022-02-02 DIAGNOSIS — J40 BRONCHITIS: Primary | ICD-10-CM

## 2022-02-02 DIAGNOSIS — Z20.822 ENCOUNTER FOR SCREENING LABORATORY TESTING FOR COVID-19 VIRUS: ICD-10-CM

## 2022-02-02 LAB
Lab: NORMAL
QC PASS/FAIL: NORMAL
SARS-COV-2 RDRP RESP QL NAA+PROBE: NEGATIVE

## 2022-02-02 PROCEDURE — 87635 SARS-COV-2 COVID-19 AMP PRB: CPT | Performed by: NURSE PRACTITIONER

## 2022-02-02 PROCEDURE — 99213 OFFICE O/P EST LOW 20 MIN: CPT | Performed by: NURSE PRACTITIONER

## 2022-02-02 RX ORDER — CEFUROXIME AXETIL 250 MG/1
250 TABLET ORAL 2 TIMES DAILY
Qty: 20 TABLET | Refills: 0 | Status: SHIPPED | OUTPATIENT
Start: 2022-02-02 | End: 2022-02-12

## 2022-02-02 RX ORDER — BENZONATATE 200 MG/1
200 CAPSULE ORAL 3 TIMES DAILY PRN
Qty: 30 CAPSULE | Refills: 0 | Status: SHIPPED | OUTPATIENT
Start: 2022-02-02 | End: 2022-02-09

## 2022-02-02 RX ORDER — GUAIFENESIN 600 MG/1
600 TABLET, EXTENDED RELEASE ORAL 2 TIMES DAILY
Qty: 30 TABLET | Refills: 0 | Status: SHIPPED | OUTPATIENT
Start: 2022-02-02 | End: 2022-02-17

## 2022-02-02 RX ORDER — PREDNISONE 1 MG/1
TABLET ORAL
Qty: 30 TABLET | Refills: 0 | Status: SHIPPED | OUTPATIENT
Start: 2022-02-02 | End: 2022-02-12

## 2022-02-02 NOTE — LETTER
[address]  [address]  [facility] [address]  Phone: [phone]  Fax: [phone]    MANE Mike CNP        February 2, 2022     Patient: Erik Street   YOB: 1950   Date of Visit: 2/2/2022       To Whom it May Concern:    Miguel Ventura was seen in my clinic on 2/2/2022. He may return to work on 02/08/2022. If you have any questions or concerns, please don't hesitate to call.     Sincerely,         MANE Mike CNP

## 2022-02-03 ASSESSMENT — ENCOUNTER SYMPTOMS
EYE PAIN: 1
WHEEZING: 1
COUGH: 1
ABDOMINAL PAIN: 0
CHEST TIGHTNESS: 1
SHORTNESS OF BREATH: 1
BACK PAIN: 0
EYE ITCHING: 1
ABDOMINAL DISTENTION: 0

## 2022-02-03 NOTE — PROGRESS NOTES
[address] [place] [facility] [address]  Dept: [phone]  Dept Fax: [phone]  Loc: [phone]  PROGRESS NOTE      VisitDate: 2/2/2022    Kayla Winkler is a 73 y.o. male who presents today for:     Chief Complaint   Patient presents with    Cough     Started a week ago     Congestion     eyes burning;          Subjective:  Patient presents with complaint of continued cough for the past 2 weeks, chest tightness mild shortness of breath with activity. Recent negative Covid test last week. Patient also complains of burning and irritation of his eyes. Denies fever, loss of taste smell. History of CVA, CHF ED EtOH abuse. Patient is current smoker. Denies any alcohol use. History of low back pain, osteoarthritis knees, sciatica, headaches, insomnia. Negative Covid in office today. Review of Systems   Constitutional: Positive for fatigue. Negative for activity change, appetite change, chills and fever. HENT: Positive for congestion. Eyes: Positive for pain and itching. Negative for visual disturbance. Respiratory: Positive for cough, chest tightness, shortness of breath and wheezing. Cardiovascular: Negative for chest pain, palpitations and leg swelling. Gastrointestinal: Negative for abdominal distention and abdominal pain. Genitourinary: Negative for dysuria. Musculoskeletal: Positive for arthralgias. Negative for back pain and neck pain. Skin: Negative. Negative for rash. Neurological: Negative for dizziness, light-headedness and headaches. Hematological: Negative for adenopathy. Psychiatric/Behavioral: Positive for decreased concentration and sleep disturbance. Negative for dysphoric mood. The patient is nervous/anxious. All other systems reviewed and are negative.     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 ([facility]     Chronic diastolic CHF (congestive heart failure) ([facility] 11/23/2021    Erectile dysfunction     Essential hypertension, malignant     ETOH abuse     GERD (gastroesophageal reflux disease)     Insomnia     Low back pain     Major depressive disorder, recurrent ([facility] 4/4/2018    Other headache syndromes 12/25/2016    Sciatica     right side strain      Past Surgical History:   Procedure Laterality Date    APPENDECTOMY      COLONOSCOPY      EKG 12-LEAD  7/17/2015         HERNIA REPAIR      umbilical    JOINT REPLACEMENT  08/03/2016    RTK       KNEE ARTHROSCOPY      Rt knee Dr. Waggoner Lat     Family History   Problem Relation Age of Onset    Dementia Mother     Heart Disease Father     High Blood Pressure Sister     Cancer Brother         testicular cancer     Social History     Tobacco Use    Smoking status: Current Every Day Smoker     Packs/day: 0.50     Years: 30.00     Pack years: 15.00     Types: Cigarettes     Start date: 7/16/1968    Smokeless tobacco: Never Used    Tobacco comment: printed to avs   Substance Use Topics    Alcohol use: Not Currently     Alcohol/week: 42.0 standard drinks     Types: 42 Cans of beer per week     Comment: States last beer was in 3/2018      Current Outpatient Medications   Medication Sig Dispense Refill    predniSONE (DELTASONE) 5 MG tablet 4 po qd for 3 days, then 3 po qd for 3 days, then 2 po qd for 3 days, then 1 po qd for 3 days 30 tablet 0    guaiFENesin (MUCINEX) 600 MG extended release tablet Take 1 tablet by mouth 2 times daily for 15 days 30 tablet 0    benzonatate (TESSALON) 200 MG capsule Take 1 capsule by mouth 3 times daily as needed for Cough 30 capsule 0    cefUROXime (CEFTIN) 250 MG tablet Take 1 tablet by mouth 2 times daily for 10 days 20 tablet 0    ALPRAZolam (XANAX) 0.5 MG tablet take 1-2 tablet by mouth 3 times daily  As needed for anxiety 120 tablet 0    albuterol sulfate HFA (PROAIR HFA) 108 (90 Base) MCG/ACT inhaler Inhale 2 puffs into the lungs every 6 hours as needed for Wheezing 1 each 3    traMADol (ULTRAM) 50 MG tablet Take 1 tablet by mouth every 4 hours as needed for Pain for up to 30 days. Take lowest dose possible to manage pain 120 tablet 0    albuterol (PROVENTIL) (2.5 MG/3ML) 0.083% nebulizer solution Take 3 mLs by nebulization every 6 hours as needed for Wheezing 90 each 0    zolpidem (AMBIEN) 10 MG tablet Take 1 tablet by mouth nightly as needed for Sleep for up to 90 days. 90 tablet 1    metoprolol succinate (TOPROL XL) 25 MG extended release tablet Take 0.5 tablets by mouth daily 30 tablet 3    atorvastatin (LIPITOR) 80 MG tablet Take 1 tablet by mouth nightly 30 tablet 3    lisinopril (PRINIVIL;ZESTRIL) 5 MG tablet Take 1 tablet by mouth daily 30 tablet 3    ezetimibe (ZETIA) 10 MG tablet take 1 tablet by mouth once daily 30 tablet 5    pantoprazole (PROTONIX) 40 MG tablet take 1 tablet by mouth once daily 90 tablet 2    clopidogrel (PLAVIX) 75 MG tablet take 1 tablet by mouth once daily 30 tablet 11    Respiratory Therapy Supplies (NEBULIZER/TUBING/MOUTHPIECE) KIT 1 kit by Does not apply route daily as needed (cough, wheeze) Diagnosis:RAD and Bronchitis 1 kit 0    aspirin 81 MG chewable tablet Take 81 mg by mouth daily       No current facility-administered medications for this visit.      Allergies   Allergen Reactions    Tape Richar Dryer [address] [facility]   Topic Date Due    AAA screen  Never done    Shingles Vaccine (1 of 2) Never done    COVID-19 Vaccine (3 - Booster for Pfizer series) 10/07/2021    A1C test (Diabetic or Prediabetic)  10/27/2022    Lipid screen  11/11/2022    Potassium monitoring  11/11/2022    Creatinine monitoring  11/11/2022    Depression Monitoring  01/20/2023    Colon cancer screen fecal DNA test (Cologuard)  02/09/2024    DTaP/Tdap/Td vaccine (2 - Td or Tdap) 10/01/2024    Flu vaccine  Completed    Pneumococcal 65+ years Vaccine  Completed    Hepatitis C screen  Completed    Hepatitis A vaccine  Aged Out    Hepatitis B vaccine  Aged Out    Hib vaccine  Aged Out    Meningococcal (ACWY) vaccine  Aged Out         Objective:     Physical Exam  Vitals and nursing note reviewed. Constitutional:       Appearance: Normal appearance. He is well-developed. He is not diaphoretic. HENT:      Head: Normocephalic and atraumatic. Not macrocephalic and not microcephalic. Right Ear: Hearing, tympanic membrane, ear canal and external ear normal. No drainage or tenderness. No middle ear effusion. No hemotympanum. Tympanic membrane is not injected, scarred, perforated or bulging. Left Ear: Hearing, tympanic membrane, ear canal and external ear normal. No drainage or tenderness. No middle ear effusion. No hemotympanum. Tympanic membrane is not injected, scarred, perforated or bulging. Nose: No nasal deformity, septal deviation, mucosal edema or rhinorrhea. Right Sinus: No maxillary sinus tenderness or frontal sinus tenderness. Left Sinus: No maxillary sinus tenderness or frontal sinus tenderness. Mouth/Throat:      Mouth: No oral lesions. Dentition: Normal dentition. Does not have dentures. No dental caries or dental abscesses. Pharynx: No oropharyngeal exudate or posterior oropharyngeal erythema. Tonsils: No tonsillar abscesses. Eyes:      General: Lids are normal. No scleral icterus. Extraocular Movements:      Right eye: Normal extraocular motion. Left eye: Normal extraocular motion. Conjunctiva/sclera: Conjunctivae normal.      Right eye: Right conjunctiva is not injected. Left eye: Left conjunctiva is not injected. Neck:      Thyroid: No thyroid mass or thyromegaly. Vascular: No carotid bruit or JVD. Trachea: Trachea normal.   Cardiovascular:      Rate and Rhythm: Normal rate and regular rhythm. Heart sounds: Normal heart sounds, S1 normal and S2 normal. No murmur heard.   No friction rub. No gallop. Pulmonary:      Effort: Pulmonary effort is normal. No respiratory distress. Breath sounds: Examination of the right-middle field reveals rales. Wheezing, rhonchi and rales present. Chest:      Chest wall: No tenderness. Breasts:      Right: No supraclavicular adenopathy. Left: No supraclavicular adenopathy. Abdominal:      General: Bowel sounds are normal.      Palpations: Abdomen is soft. There is no hepatomegaly, splenomegaly or mass. Tenderness: There is no guarding or rebound. Hernia: No hernia is present. There is no hernia in the ventral area or left inguinal area. Musculoskeletal:         General: No tenderness. Normal range of motion. Cervical back: Normal range of motion and neck supple. No edema or erythema. Normal range of motion. Lymphadenopathy:      Head:      Right side of head: No submental, submandibular, tonsillar, preauricular, posterior auricular or occipital adenopathy. Left side of head: No submental, submandibular, tonsillar, preauricular, posterior auricular or occipital adenopathy. Cervical: No cervical adenopathy. Right cervical: No superficial, deep or posterior cervical adenopathy. Left cervical: No superficial, deep or posterior cervical adenopathy. Upper Body:      Right upper body: No supraclavicular or pectoral adenopathy. Left upper body: No supraclavicular or pectoral adenopathy. Skin:     General: Skin is warm and dry. Coloration: Skin is not pale. Findings: No bruising, ecchymosis, laceration, lesion or rash. Nails: There is no clubbing. Neurological:      Mental Status: He is alert and oriented to person, place, and time. Cranial Nerves: No cranial nerve deficit. Motor: No abnormal muscle tone.       Coordination: Coordination normal.      Deep Tendon Reflexes: Reflexes normal.   Psychiatric:         Speech: Speech normal.         Behavior: Behavior normal. Thought Content: Thought content normal.         Judgment: Judgment normal.       Pulse 64   Temp 97.9 °F (36.6 °C) (Oral)   Ht 6' 1\" (1.854 m)   Wt 245 lb (111.1 kg)   SpO2 97%   BMI 32.32 kg/m²       Impression/Plan:  1. Bronchitis    2. Encounter for screening laboratory testing for COVID-19 virus    3. Pneumonia of right lung due to infectious organism, unspecified part of lung      Requested Prescriptions     Signed Prescriptions Disp Refills    predniSONE (DELTASONE) 5 MG tablet 30 tablet 0     Si po qd for 3 days, then 3 po qd for 3 days, then 2 po qd for 3 days, then 1 po qd for 3 days    guaiFENesin (MUCINEX) 600 MG extended release tablet 30 tablet 0     Sig: Take 1 tablet by mouth 2 times daily for 15 days    benzonatate (TESSALON) 200 MG capsule 30 capsule 0     Sig: Take 1 capsule by mouth 3 times daily as needed for Cough    cefUROXime (CEFTIN) 250 MG tablet 20 tablet 0     Sig: Take 1 tablet by mouth 2 times daily for 10 days     Orders Placed This Encounter   Procedures    POCT COVID-19 Rapid, NAAT     Order Specific Question:   Is this test for diagnosis or screening? Answer:   Diagnosis of ill patient     Order Specific Question:   Symptomatic for COVID-19 as defined by CDC? Answer:   Yes     Order Specific Question:   Date of Symptom Onset     Answer:   2022     Order Specific Question:   Hospitalized for COVID-19? Answer:   No     Order Specific Question:   Admitted to ICU for COVID-19? Answer:   No     Order Specific Question:   Employed in healthcare setting? Answer:   No     Order Specific Question:   Resident in a congregate (group) care setting? Answer:   No     Order Specific Question:   Pregnant: Answer:   No     Order Specific Question:   Previously tested for COVID-19? Answer:   Yes       Patient giveneducational materials - see patient instructions. Discussed use, benefit, and side effects of prescribed medications.   All patient questions answered. Pt voiced understanding. Reviewed health maintenance. Patient agreedwith treatment plan. Follow up as directed. Prednisone taper, Tessalon, Mucinex Ceftin prescribed. Follow-up next week if no improvement. Continue medications as prescribed. Educational information on above diagnosis  provided per AVS.  Daily deep breathing encouraged. Frequent ambulation encouraged. Fluids encouraged.     Electronically signed by MANE Bazan CNP on 2/3/2022 at 12:59 PM

## 2022-02-21 DIAGNOSIS — F41.0 PANIC DISORDER: ICD-10-CM

## 2022-02-21 DIAGNOSIS — F41.8 SITUATIONAL ANXIETY: ICD-10-CM

## 2022-02-21 RX ORDER — ALPRAZOLAM 0.5 MG/1
TABLET ORAL
Qty: 120 TABLET | Refills: 0 | Status: SHIPPED | OUTPATIENT
Start: 2022-02-21 | End: 2022-03-22 | Stop reason: SDUPTHER

## 2022-02-21 RX ORDER — LISINOPRIL 5 MG/1
TABLET ORAL
Qty: 90 TABLET | OUTPATIENT
Start: 2022-02-21

## 2022-02-21 RX ORDER — ATORVASTATIN CALCIUM 80 MG/1
TABLET, FILM COATED ORAL
Qty: 90 TABLET | OUTPATIENT
Start: 2022-02-21

## 2022-02-25 ENCOUNTER — OFFICE VISIT (OUTPATIENT)
Dept: NEUROLOGY | Age: 72
End: 2022-02-25
Payer: COMMERCIAL

## 2022-02-25 VITALS
BODY MASS INDEX: 33 KG/M2 | SYSTOLIC BLOOD PRESSURE: 110 MMHG | WEIGHT: 249 LBS | DIASTOLIC BLOOD PRESSURE: 70 MMHG | HEIGHT: 73 IN | HEART RATE: 67 BPM

## 2022-02-25 DIAGNOSIS — Z86.73 HISTORY OF STROKE: Primary | ICD-10-CM

## 2022-02-25 PROCEDURE — 99214 OFFICE O/P EST MOD 30 MIN: CPT | Performed by: PSYCHIATRY & NEUROLOGY

## 2022-02-25 NOTE — PROGRESS NOTES
NEUROLOGY OUT PATIENT FOLLOW UP NOTE:  2/25/202211:17 AM    Dilia Matta is here for follow up for history of stroke, history of right ICA stent. He is doing well. He denies any new symptoms. No new numbness or weakness. He is on aspirin, plavix, as well as lipid lowering medications. He comes in today by himself to discuss the plan of care going forward. Allergies   Allergen Reactions    Tape Saurav Comerer Tape]        Current Outpatient Medications:     ALPRAZolam (XANAX) 0.5 MG tablet, take 1-2 tablet by mouth 3 times daily  As needed for anxiety, Disp: 120 tablet, Rfl: 0    albuterol sulfate HFA (PROAIR HFA) 108 (90 Base) MCG/ACT inhaler, Inhale 2 puffs into the lungs every 6 hours as needed for Wheezing, Disp: 1 each, Rfl: 3    albuterol (PROVENTIL) (2.5 MG/3ML) 0.083% nebulizer solution, Take 3 mLs by nebulization every 6 hours as needed for Wheezing, Disp: 90 each, Rfl: 0    metoprolol succinate (TOPROL XL) 25 MG extended release tablet, Take 0.5 tablets by mouth daily, Disp: 30 tablet, Rfl: 3    atorvastatin (LIPITOR) 80 MG tablet, Take 1 tablet by mouth nightly, Disp: 30 tablet, Rfl: 3    lisinopril (PRINIVIL;ZESTRIL) 5 MG tablet, Take 1 tablet by mouth daily, Disp: 30 tablet, Rfl: 3    ezetimibe (ZETIA) 10 MG tablet, take 1 tablet by mouth once daily, Disp: 30 tablet, Rfl: 5    pantoprazole (PROTONIX) 40 MG tablet, take 1 tablet by mouth once daily, Disp: 90 tablet, Rfl: 2    clopidogrel (PLAVIX) 75 MG tablet, take 1 tablet by mouth once daily, Disp: 30 tablet, Rfl: 11    Respiratory Therapy Supplies (NEBULIZER/TUBING/MOUTHPIECE) KIT, 1 kit by Does not apply route daily as needed (cough, wheeze) Diagnosis:RAD and Bronchitis, Disp: 1 kit, Rfl: 0    aspirin 81 MG chewable tablet, Take 81 mg by mouth daily, Disp: , Rfl:     I reviewed the past medical history, allergies, medications, social history and family history.        PE:   Vitals:    02/25/22 1103   BP: 110/70   Pulse: 67   Weight: Test performed at [address] [facility]                  [address], [facility]                                         CLIA Number [id number]  ---------------------------------------------------------------------  Pathology [address] [place] [address], [address] [place] [address]  CLIA No. [id number]   CAP Accreditation No. [id number]  : Guerda Collado. Bk Mralow M.D. Office Visit on 11/03/2021   Component Date Value Ref Range Status    Glucose 11/11/2021 94  65 - 99 mg/dL Final    BUN 11/11/2021 12  5 - 27 mg/dL Final    CREATININE 11/11/2021 0.96  0.60 - 1.30 mg/dL Final    METHOD TRACEABLE TO IDMS STANDARD    eGFR  11/11/2021 >60  >59 ml/min/1.73sq. m Final    EGFR IF NonAfrican American 11/11/2021 >60  >59 ml/min/1.73sq. m Final    Calcium 11/11/2021 9.3  8.5 - 10.5 mg/dL Final    Sodium 11/11/2021 140  134 - 146 mmol/L Final    Potassium 11/11/2021 4.0  3.5 - 5.0 mmol/L Final    Chloride 11/11/2021 104  98 - 109 mmol/L Final    CO2 11/11/2021 27  22 - 32 mmol/L Final    Anion Gap 11/11/2021 9  5 - 15 mmol/L Final    Total Bilirubin 11/11/2021 0.7  0.3 - 1.2 mg/dL Final    Alk Phosphatase 11/11/2021 79  39 - 130 U/L Final    AST 11/11/2021 20  0 - 41 U/L Final    ALT 11/11/2021 18  0 - 40 U/L Final    Total Protein 11/11/2021 7.1  6.0 - 8.0 g/dL Final    Albumin 11/11/2021 4.1  3.2 - 5.3 g/dL Final    Comment:             Test performed at [facility] [facility]                  [address] [facility] [id number]  ---------------------------------------------------------------------      Cholesterol 11/11/2021 94* 150 - 200 mg/dL Final    Triglycerides 11/11/2021 170* 27 - 150 mg/dL Final    HDL 11/11/2021 27* >39 mg/dL Final    Comment:       HDL <40 mg/dL - High Risk  HDL > or = 40mg/dL- Desirable  HDL >60 mg/dL - Negative Risk  ---------------------------------------------      LDL Calculated 11/11/2021 33  <130 mg/dL Final    Comment:       LDL    <100 mg/dL - Desirable  LDL    >160 mg/dL - High Risk  ---------------------------------------------      VLDL Cholesterol Calculated 11/11/2021 34* 0 - 30 mg/dL Final    LDl/HDL Ratio 11/11/2021 1.2  <3.5 Final    Chol/HDL Ratio 11/11/2021 3.5  1.0 - 5.0 Final    Comment:             Test performed at [address] [place] [address]. [facility]                  [address] [place] [address]                                         CLIA Number [id number]  ---------------------------------------------------------------------      T4 Free 11/11/2021 0.80  0.61 - 1.60 ng/dL Final    Comment: Performed at [address] [place] [address]. [facility]  [address] [place] [id number]  Pathology [address]  CLIA No. [id number]   CAP Accreditation No. [id number]  : Eleonora Aragon. SAM Edmonds TSH 11/11/2021 2.62  0.49 - 4.67 uIU/mL Final    Comment: Performed at [address] [place] [address]. [facility]  [address] [place] [address], [facility] [place] [id number]     Admission on 10/26/2021, Discharged on 10/29/2021   Component Date Value Ref Range Status    Ventricular Rate 10/26/2021 81  BPM Final    Atrial Rate 10/26/2021 81  BPM Final    P-R Interval 10/26/2021 164  ms Final    QRS Duration 10/26/2021 80  ms Final    Q-T Interval 10/26/2021 368  ms Final    QTc Calculation (Bazett) 10/26/2021 427  ms Final    P Axis 10/26/2021 25  degrees Final    R Axis 10/26/2021 15  degrees Final    T Axis 10/26/2021 60  degrees Final    WBC 10/26/2021 10.2  4.8 - 10.8 thou/mm3 Final    RBC 10/26/2021 4.68* 4.70 - 6.10 mill/mm3 Final    Hemoglobin 10/26/2021 15.0  14.0 - 18.0 gm/dl Final    Hematocrit 10/26/2021 44.8  42.0 - 52.0 % Final    MCV 10/26/2021 95.7* 80.0 - 94.0 fL Final    MCH 10/26/2021 32.1  26.0 - 33.0 pg Final    MCHC 10/26/2021 33.5  32.2 - 35.5 gm/dl Final    RDW-CV 10/26/2021 14.0  11.5 - 14.5 % Final    RDW-SD 10/26/2021 48.9* 35.0 - 45.0 fL Final    Platelets 48/02/3177 284  130 - 400 thou/mm3 Final    MPV 10/26/2021 9.7  9.4 - 12.4 fL Final    Seg Neutrophils 10/26/2021 54.8  % Final    Lymphocytes 10/26/2021 32.4  % Final    Monocytes 10/26/2021 8.3  % Final    Eosinophils 10/26/2021 3.7  % Final    Basophils 10/26/2021 0.6  % Final    Immature Granulocytes 10/26/2021 0.2  % Final    Segs Absolute 10/26/2021 5.6  1.8 - 7.7 thou/mm3 Final    Lymphocytes Absolute 10/26/2021 3.3  1.0 - 4.8 thou/mm3 Final    Monocytes Absolute 10/26/2021 0.8  0.4 - 1.3 thou/mm3 Final    Eosinophils Absolute 10/26/2021 0.4  0.0 - 0.4 thou/mm3 Final    Basophils Absolute 10/26/2021 0.1  0.0 - 0.1 thou/mm3 Final    Immature Grans (Abs) 10/26/2021 0.02  0.00 - 0.07 thou/mm3 Final    nRBC 10/26/2021 0  /100 wbc Final    Performed at [address] [place] [address], [address]    Glucose 10/26/2021 97  70 - 108 mg/dL Final    CREATININE 10/26/2021 0.9  0.4 - 1.2 mg/dL Final    BUN 10/26/2021 11  7 - 22 mg/dL Final    Sodium 10/26/2021 133* 135 - 145 meq/L Final    Potassium reflex Magnesium 10/26/2021 3.9  3.5 - 5.2 meq/L Final    Chloride 10/26/2021 99  98 - 111 meq/L Final    CO2 10/26/2021 24  23 - 33 meq/L Final    Calcium 10/26/2021 9.2  8.5 - 10.5 mg/dL Final    AST 10/26/2021 13  5 - 40 U/L Final    Alkaline Phosphatase 10/26/2021 87  38 - 126 U/L Final    Total Protein 10/26/2021 6.9  6.1 - 8.0 g/dL Final    Albumin 10/26/2021 4.0  3.5 - 5.1 g/dL Final    Total Bilirubin 10/26/2021 0.5  0.3 - 1.2 mg/dL Final    ALT 10/26/2021 16  11 - 66 U/L Final    Performed at [address], [address]    Ammonia 10/26/2021 33  11 - 60 umol/L Final    Performed at [address], [address]    POC Glucose 10/26/2021 94  70 - 108 mg/dl Final    Performed at [address] [place] [address]    Anion Gap 10/26/2021 10.0  8.0 - 16.0 meq/L Final    Comment: ANION GAP = Sodium -(Chloride + CO2)  Performed at [address] [place] [address], [address]      Osmolality Calc 10/26/2021 265.7* 275.0 - 300.0 mOsmol/kg Final    Performed at [address] [place] [address], [address]    Est, Glom Filt Rate 10/26/2021 84* ml/min/1.73m2 Final    Comment: Stage Description                    GFR, ml/min/1.73 m2   -   At increased risk               > or = 60 (with chronic                                       kidney disease risk factors)   1   Normal or increased GFR         > or = 90   2   Mildly or decreased GFR         60 - 89   3   Moderately decreased GFR        30 - 59   4   Severely decreased GFR          15 - 29   5   Kidney failure                  <15 (or dialysis)  Estimated GFR calculated using abbreviated MDRD formula as  recommended by [employer]. Calculation based  upon serum creatinine and adjusted for age, gender & race. Lorie. Internal Med., Vol. 139 (2) pg 137-147.   Performed at [address] [facility], [facility] [address] WBC 10/27/2021 8.8  4.8 - 10.8 thou/mm3 Final    RBC 10/27/2021 4.68* 4.70 - 6.10 mill/mm3 Final    Hemoglobin 10/27/2021 15.0  14.0 - 18.0 gm/dl Final    Hematocrit 10/27/2021 44.7  42.0 - 52.0 % Final    MCV 10/27/2021 95.5* 80.0 - 94.0 fL Final    MCH 10/27/2021 32.1  26.0 - 33.0 pg Final    MCHC 10/27/2021 33.6  32.2 - 35.5 gm/dl Final    RDW-CV 10/27/2021 14.0  11.5 - 14.5 % Final    RDW-SD 10/27/2021 48.8* 35.0 - 45.0 fL Final    Platelets 13/48/9618 283  130 - 400 thou/mm3 Final    MPV 10/27/2021 9.5  9.4 - 12.4 fL Final    Performed at [address] [facility], [address]    Sodium 10/27/2021 140  135 - 145 meq/L Final    Potassium 10/27/2021 4.4  3.5 - 5.2 meq/L Final    Chloride 10/27/2021 105  98 - 111 meq/L Final    CO2 10/27/2021 25  23 - 33 meq/L Final    Glucose 10/27/2021 100  70 - 108 mg/dL Final    BUN 10/27/2021 11  7 - 22 mg/dL Final    CREATININE 10/27/2021 0.8  0.4 - 1.2 mg/dL Final    Calcium 10/27/2021 9.0  8.5 - 10.5 mg/dL Final    Performed at [address] [place] [address], [address]    Magnesium 10/27/2021 2.2  1.6 - 2.4 mg/dL Final    Performed at [address] [place] [address], [address]    Sed Rate 10/26/2021 7  0 - 10 mm/hr Final    Performed at [address] [place] [address], [address]    CRP 10/26/2021 5.71* 0.00 - 1.00 mg/dl Final    Performed at [address] [place] [address], [address]   Rodriguez TSH 10/26/2021 3.270  0.400 - 4.200 uIU/mL Final    Performed at [address] [place] [address], [address]    Cholesterol, Total 10/27/2021 154  100 - 199 mg/dL Final    Comment:      <200          Desirable       200 - 239     Borderline High       >239          High      Triglycerides 10/27/2021 214* 0 - 199 mg/dL Final    Comment:      <150          Desirable       150 - 199     Borderline High       200 - 499     High       >449          Very High       Ranges are based upon NCEP/ATP III guidelines.  HDL 10/27/2021 32  mg/dL Final    Comment:      Refer to General Chemistry for CHOL and TRIG results. HDL CLASSIFICATIONS FOR PATIENTS > 21YEARS OLD. <40               Undesirable (Major Risk Factor)       >60               Protective (Negative Risk Factor)      LDL Calculated 10/27/2021 79  mg/dL Final    Comment:      Refer to General Chemistry for CHOL and TRIG results.        LDL CLASSIFICATIONS FOR PATIENTS >21YEARS OLD:          ** Determination Invalid if TRIG >400 **       <100          Optimal       100 - 129     Near or Above Optimal       130 - 159     Borderline High       160 - 189     High Risk       >189          Very High Risk  Performed at [address] [place] [address], [facility]      Hemoglobin A1C 10/27/2021 6.1  4.4 - 6.4 % Final    AVERAGE GLUCOSE 10/27/2021 123  70 - 126 mg/dL Final    Performed at [address] [place] [address], [address]    MRSA SCREEN RT-PCR 10/26/2021 NEGATIVE   Final    Comment: No MRSA detected by Real Time - Polymerase Chain Reaction. Specimen Source:  Nares  Performed at [facility], [address]      MRSA SCREEN 10/26/2021 No MRSA isolated    Final    Vancomycin Resistant Enterococcus 10/26/2021 NEGATIVE   Final    Comment: No VRE Sebas Plant A gene) detected by Real Time - Polymerase Chain Reaction. Performed at [address] [place] [address], [address]      Anion Gap 10/27/2021 10.0  8.0 - 16.0 meq/L Final    Comment: ANION GAP = Sodium -(Chloride + CO2)  Performed at [address] [place] [address], [address] Clint Feinstein Filt Rate 10/27/2021 >90  ml/min/1.73m2 Final    Comment: Stage Description                    GFR, ml/min/1.73 m2   -   At increased risk               > or = 60 (with chronic                                       kidney disease risk factors)   1   Normal or increased GFR         > or = 90   2   Mildly or decreased GFR         60 - 89   3   Moderately decreased GFR        30 - 59   4   Severely decreased GFR          15 - 29   5   Kidney failure                  <15 (or dialysis)  Estimated GFR calculated using abbreviated MDRD formula as  recommended by [employer]. Calculation based  upon serum creatinine and adjusted for age, gender & race. Lorie. Internal Med., Vol. 139 (2) pg 137-147.   Performed at [address] [place] [address], [address]      Osmolality Calc 10/27/2021 278.9  275.0 - 300.0 mOsmol/kg Final    Performed at [address] [place] [address], [address]    Left Ventricular Ejection Fraction 10/27/2021 48   Final-Edited    LVEF MODALITY 10/27/2021 ECHO   Final-Edited    ETHYL ALCOHOL, SERUM 10/26/2021 < 0.01  0.00 % Final    Performed at [facility], [address]    Sodium 10/28/2021 134* 135 - 145 meq/L Final    Potassium 10/28/2021 4.5  3.5 - 5.2 meq/L Final    Comment: Low level specimen hemolysis is present as indicated by the interference  level index on the Roche analyzer. The reported K+ level may be falsely  increased. If clinically warranted, recollection of the specimen is  suggested.       Chloride 10/28/2021 101  98 - 111 meq/L Final    CO2 10/28/2021 23  23 - 33 meq/L Final    Glucose 10/28/2021 112* 70 - 108 mg/dL Final    BUN 10/28/2021 14  7 - 22 mg/dL Final    CREATININE 10/28/2021 0.7  0.4 - 1.2 mg/dL Final    Calcium 10/28/2021 9.3  8.5 - 10.5 mg/dL Final    Performed at [address] [place] [address], [address] [place] [address] WBC 10/28/2021 8.4  4.8 - 10.8 thou/mm3 Final    RBC 10/28/2021 4.91  4.70 - 6.10 mill/mm3 Final    Hemoglobin 10/28/2021 15.6  14.0 - 18.0 gm/dl Final    Hematocrit 10/28/2021 46.9  42.0 - 52.0 % Final    MCV 10/28/2021 95.5* 80.0 - 94.0 fL Final    MCH 10/28/2021 31.8  26.0 - 33.0 pg Final    MCHC 10/28/2021 33.3  32.2 - 35.5 gm/dl Final    RDW-CV 10/28/2021 13.8  11.5 - 14.5 % Final    RDW-SD 10/28/2021 48.8* 35.0 - 45.0 fL Final    Platelets 69/60/5766 308  130 - 400 thou/mm3 Final    MPV 10/28/2021 9.9  9.4 - 12.4 fL Final    Performed at [address] [place] [address], [address]    Anion Gap 10/28/2021 10.0  8.0 - 16.0 meq/L Final    Comment: ANION GAP = Sodium -(Chloride + CO2)  Performed at [address] [place] [address], [facility] [place] Filt Rate 10/28/2021 >90  ml/min/1.73m2 Final    Comment: Stage Description                    GFR, ml/min/1.73 m2   -   At increased risk               > or = 60 (with chronic                                       kidney disease risk factors)   1   Normal or increased GFR         > or = 90   2   Mildly or decreased GFR         60 - 89   3   Moderately decreased GFR        30 - 59   4   Severely decreased GFR          15 - 29   5   Kidney failure                  <15 (or dialysis)  Estimated GFR calculated using abbreviated MDRD formula as  recommended by [employer]. Calculation based  upon serum creatinine and adjusted for age, gender & race. Lorie. Internal Med., Vol. 139 (2) pg 137-147. Performed at [facility], [address]            Results for orders placed during the hospital encounter of 04/04/18   MRI CERVICAL SPINE WO CONTRAST    Narrative PROCEDURE: MRI CERVICAL SPINE WO CONTRAST    CLINICAL INFORMATION: COORDINATION CHANGES, NEW OR PROGRESSIVE,  . Additional history obtained from the electronic medical record indicates the patient is having some dizziness with right-sided numbness which is resolved. Some right leg weakness. COMPARISON: None available. Correlation is made to CT of the cervical spine dated December 18, 2016. TECHNIQUE: Sagittal T1, T2 and STIR sequences were obtained through the cervical spine. Axial fast and echo and gradient echo T2-weighted images were obtained. FINDINGS:    The cervical spine is imaged from the craniocervical junction to the superior aspect of T3. Hyperintense T2 signal is noted within the vashti which likely corresponds to a remote lacunar infarct. No suspicious abnormality is identified at the cervical   medullary junction. No abnormal signal or expansion is present within the cervical spinal cord. There is preservation of the expected cervical lordosis. There is minimal anterolisthesis of C3 on C4 and minimal retrolisthesis of C5 on C6. The vertebral body heights are maintained. No compression fracture deformity or suspicious, marrow replacing   lesion is identified. Bulky anterior osteophytes are noted from C4 to C6. Degenerative facet arthropathy is present at every level. With regards to the disc spaces, at C2-C3, uncovertebral joint spurring is present without significant spinal canal narrowing.  There is mild   neural foraminal narrowing on the right with mild to moderate neural foraminal narrowing on the left. At C3-C4, uncovertebral joint spurring is present. No significant spinal canal narrowing is seen. Mild neural foraminal narrowing is present bilaterally. At C4-C5, there is uncovertebral joint spurring and ligamentum flavum thickening resulting in mild spinal canal narrowing. Mild to moderate neural foraminal narrowing is present on the right without significant neural foraminal narrowing on the left. At C5-C6, there is a disc bulge and flattening of the ventral thecal sac resulting in mild spinal canal narrowing. Uncovertebral joint spurring is also present and there is mild to moderate neural foraminal narrowing on the right without significant   neural foraminal narrowing on the left. At C6-C7, there is a disc osteophyte complex resulting in mild spinal canal narrowing. Uncovertebral joint spurring is also present and there is mild bilateral neural foraminal narrowing. At C7-T1, no significant spinal canal or neural foraminal narrowing is present. No suspicious abnormality is identified within the visualized paraspinal soft tissues. Impression Multilevel degenerative changes are present throughout the cervical spine and are further discussed by level in the findings. These are most significant at C4-C5 with uncovertebral joint spurring and ligamentum flavum thickening resulting in mild spinal   canal narrowing. Mild to moderate neural foraminal narrowing is also present on the right secondary to uncovertebral joint spurring and facet arthropathy. There is also mild spinal canal narrowing at C5-C6 secondary to a disc bulge. Mild to moderate   neural foraminal narrowing is also noted on the right at this level secondary to facet arthropathy. **This report has been created using voice recognition software.  It may contain minor errors which are inherent in voice recognition technology. **    Final report electronically signed by Dr. Huynh Congress on 4/5/2018 10:08 AM       Results for orders placed during the hospital encounter of 07/27/20   CTA HEAD W WO CONTRAST    Narrative PROCEDURE: CTA HEAD W WO CONTRAST, CTA NECK W WO CONTRAST    CLINICAL INFORMATION: Blurred vision, History of stroke, Other headache syndrome . Worsening headaches and blurred vision for one month. COMPARISON: MR brain from the same date and CTA dated 5/15/2018. TECHNIQUE: Helical CT from the aortic arch through the head in arterial phase during intravenous administration of 85 mL  injected in the right [facility] with multiplanar reconstructions to include volumetric maximum intensity projection sequences. All CT scans at this facility use dose modulation, iterative reconstruction, and/or weight-based dosing when appropriate to reduce radiation dose to as low as reasonably achievable. FINDINGS:     CTA HEAD:  There is mural calcification in the clinoid segments of the right internal carotid artery without flow-limiting stenosis, stable compared to prior exam. Intracranial segments of internal carotid arteries otherwise patent without focal stenosis or   visualized aneurysm. The bilateral middle cerebral and anterior cerebral arteries are patent without focal abnormality identified. The basilar artery is patent without focal stenosis or visualized aneurysm. The bilateral posterior cerebral arteries are   patent without focal abnormality identified. There is a fetal origin of the left posterior cerebral artery incidentally noted. Superior cerebral arteries are patent. Dural venous sinuses appear patent without focal filling defect. No focal areas of   abnormal parenchymal enhancement are identified. CTA NECK:  There is a typical 3 vessel arch. The brachiocephalic and left subclavian arteries are patent without focal stenosis.  There is a carotid stent on the right extending from the distal common carotid artery into the mid cervical segment of the right   internal carotid artery. This appears patent without focal stenosis. The common carotid arteries are otherwise patent without focal stenosis. There is calcified mural plaque at the left carotid bulb without hemodynamically significant stenosis. Cervical   segments of internal carotid arteries are patent without focal stenosis. The vertebral arteries are codominant. They're patent throughout their course without focal stenosis. There is streak artifact from dental amalgam which partially obscures oral and perioral soft tissues. The vocal cords are closely apposed limiting evaluation of this area. Given these caveats, mucosal surfaces of the aerodigestive tract are symmetric   without focal nodular thickening or visualized mass. There are scattered cervical chain lymph nodes bilaterally without definite cervical lymphadenopathy identified. The parotid, submandibular and thyroid glands are unremarkable. There is calcified   granuloma in the right upper lobe with right hilar calcifications is evidence for old granulomatous disease. Visualized portions of lungs are otherwise clear. There are stable degenerative changes of the cervical spine. Impression    1. No flow-limiting stenosis or aneurysm identified in the intracranial circulation. 2. Redemonstration of right carotid stent which appears patent without significant in-stent stenosis. 3. Mild mural calcification at the left carotid bulb without hemodynamically significant stenosis by NASCET criteria. **This report has been created using voice recognition software. It may contain minor errors which are inherent in voice recognition technology. **    Final report electronically signed by Dr. Tess Dempsey MD on 7/27/2020 9:24 AM     Results for orders placed during the hospital encounter of 07/27/20   CTA NECK W WO CONTRAST    Narrative PROCEDURE: CTA HEAD W [address] CONTRAST    CLINICAL INFORMATION: Blurred vision, History of stroke, Other headache syndrome . Worsening headaches and blurred vision for one month. COMPARISON: MR brain from the same date and CTA dated 5/15/2018. TECHNIQUE: Helical CT from the aortic arch through the head in arterial phase during intravenous administration of 85 mL  injected in the right [facility] with multiplanar reconstructions to include volumetric maximum intensity projection sequences. All CT scans at this facility use dose modulation, iterative reconstruction, and/or weight-based dosing when appropriate to reduce radiation dose to as low as reasonably achievable. FINDINGS:     CTA HEAD:  There is mural calcification in the clinoid segments of the right internal carotid artery without flow-limiting stenosis, stable compared to prior exam. Intracranial segments of internal carotid arteries otherwise patent without focal stenosis or   visualized aneurysm. The bilateral middle cerebral and anterior cerebral arteries are patent without focal abnormality identified. The basilar artery is patent without focal stenosis or visualized aneurysm. The bilateral posterior cerebral arteries are   patent without focal abnormality identified. There is a fetal origin of the left posterior cerebral artery incidentally noted. Superior cerebral arteries are patent. Dural venous sinuses appear patent without focal filling defect. No focal areas of   abnormal parenchymal enhancement are identified. CTA NECK:  There is a typical 3 vessel arch. The brachiocephalic and left subclavian arteries are patent without focal stenosis. There is a carotid stent on the right extending from the distal common carotid artery into the mid cervical segment of the right   internal carotid artery. This appears patent without focal stenosis. The common carotid arteries are otherwise patent without focal stenosis.  There is calcified mural plaque at the left carotid bulb without hemodynamically significant stenosis. Cervical   segments of internal carotid arteries are patent without focal stenosis. The vertebral arteries are codominant. They're patent throughout their course without focal stenosis. There is streak artifact from dental amalgam which partially obscures oral and perioral soft tissues. The vocal cords are closely apposed limiting evaluation of this area. Given these caveats, mucosal surfaces of the aerodigestive tract are symmetric   without focal nodular thickening or visualized mass. There are scattered cervical chain lymph nodes bilaterally without definite cervical lymphadenopathy identified. The parotid, submandibular and thyroid glands are unremarkable. There is calcified   granuloma in the right upper lobe with right hilar calcifications is evidence for old granulomatous disease. Visualized portions of lungs are otherwise clear. There are stable degenerative changes of the cervical spine. Impression    1. No flow-limiting stenosis or aneurysm identified in the intracranial circulation. 2. Redemonstration of right carotid stent which appears patent without significant in-stent stenosis. 3. Mild mural calcification at the left carotid bulb without hemodynamically significant stenosis by NASCET criteria. **This report has been created using voice recognition software. It may contain minor errors which are inherent in voice recognition technology. **    Final report electronically signed by Dr. Devonte Champion MD on 7/27/2020 9:24 AM     Results for orders placed during the hospital encounter of 07/27/20   MRI BRAIN WO CONTRAST    Narrative PROCEDURE: MRI BRAIN WO CONTRAST    INDICATION:  History of stroke, Blurred vision, Other headache syndrome. Worsening headaches and blurred vision for one month. COMPARISON: MR brain dated 4/4/2018.     TECHNIQUE: Multiplanar and multiple spin echo MRI images were obtained of the brain without contrast.    FINDINGS:  There is stable mild parietal predominant volume loss. There are multiple small focal areas of encephalomalacia in the bilateral thalami and basal ganglia, stable compared to prior exam. There is stable small focal area of encephalomalacia in the left   corona radiata. There are mild scattered focal areas of T2/FLAIR prolongation elsewhere in the subcortical and deep white matter which have now the progressed in the interval. No intra or extra-axial mass is identified. No focal areas of restricted   diffusion are present. The major vascular flow voids appear patent. There is bilateral proptosis possibly on the basis of thyroid ophthalmopathy, stable compared to prior exam. Orbits are otherwise unremarkable. There is mild to moderate mucosal thickening in the paranasal   sinuses, similar to prior MRI. Mastoid air cells are clear. Impression    1. No evidence of acute intracranial abnormality. 2. Multiple chronic lacunar infarcts in the basal ganglia, thalami and left corona radiata, stable compared to prior MRI. 3. Mild chronic microvascular angiopathy which has mildly worsened in the interval.  4. Mild to moderate chronic sinusitis. **This report has been created using voice recognition software. It may contain minor errors which are inherent in voice recognition technology. **    Final report electronically signed by Dr. Onelia Lara MD on 7/27/2020 8:34 AM       Results for orders placed during the hospital encounter of 05/15/18   CT HEAD WO CONTRAST    Narrative PROCEDURE: CT HEAD WO CONTRAST    CLINICAL INFORMATION: TIA, . Sudden onset dizziness and lightheadedness. Recent carotid stent. COMPARISON: Head CT 4/4/2018. TECHNIQUE: Noncontrast 5 mm axial images were obtained through the brain.     All CT scans at this facility use dose modulation, iterative reconstruction, and/or weight-based dosing when appropriate to reduce radiation dose to as low as reasonably achievable. FINDINGS:        There is no hemorrhage. There are no intra-or extra-axial collections. There is no hydrocephalus, midline shift or mass effect. The gray-white matter differentiation is preserved. There is a stable old lacunar type infarct in the right basal ganglia. There is a tiny old infarct in the right cerebellum. No new areas of abnormal attenuation are noted. There is mucosal thickening in the ethmoid air cells. There is mild mucosal   thickening in the maxillary sinuses. There is no suspicious calvarial abnormality. There is no significant change in the appearance of the brain compared to the prior study. Impression  No evidence of an acute process. No change from prior. **This report has been created using voice recognition software. It may contain minor errors which are inherent in voice recognition technology. **    Final report electronically signed by Dr. Jose Brown on 5/15/2018 9:32 AM      CTA NECK 10/2021:  Impression       1. Mild mural calcification in the cavernous and clinoid segments of internal carotid arteries without flow-limiting stenosis or aneurysm in the intracranial circulation. 2. Redemonstration of a right carotid stent extending from the distal common carotid artery into the proximal internal carotid artery which appears patent with some in-stent stenosis the severity of which can't be well evaluated secondary to streak    artifact from the stent. 3. Stable mild mural calcification of left carotid bulb without hemodynamically significant stenosis by NASCET criteria.                   **This report has been created using voice recognition software. It may contain minor errors which are inherent in voice recognition technology. **       Final report electronically signed by Dr. Handy Cabello MD on 10/27/2021 8:45 AM         Assessment:     Diagnosis Orders   1. History of stroke          He is doing well.  He denies any new symptoms. No new numbness or weakness. He is on aspirin, plavix, as well as lipid lowering medications. The patient has history of right carotid stent, that appears patent on most recent CTA neck 10/2021. he has non focal exam, his right lower extremity power is 5/5, his gait is intact. I reviewed the patient pertinent labs and records in the EHR and from other providers. His most recent LDL is 33, cholesterol =94 11/2021. He is doing very well, he is compliant. After detailed discussion with patient we agreed on the following plan. Plan:  1. Continue with Aspirin and plavix. 2. Target LDL below 70, follow-up with your family physician  3. Modify risk factors for stroke (blood pressure, cholesterol, diabetes, smoking cessation etc.. Control)  4. Report any new symptoms  5. Follow up in 1 year or sooner if needed. 6. Call if any questions or concerns. Total time 33 min.     Radha Castillo MD

## 2022-02-25 NOTE — PATIENT INSTRUCTIONS
1. Continue with Aspirin and plavix. 2. Target LDL below 70, follow-up with your family physician  3. Modify risk factors for stroke (blood pressure, cholesterol, diabetes, smoking cessation etc.. Control)  4. Report any new symptoms  5. Follow up in 1 year or sooner if needed. 6. Call if any questions or concerns.

## 2022-03-11 ENCOUNTER — HOSPITAL ENCOUNTER (OUTPATIENT)
Age: 72
Discharge: HOME | End: 2022-03-11
Payer: COMMERCIAL

## 2022-03-11 ENCOUNTER — OFFICE VISIT (OUTPATIENT)
Dept: CARDIOLOGY CLINIC | Age: 72
End: 2022-03-11
Payer: COMMERCIAL

## 2022-03-11 ENCOUNTER — TELEPHONE (OUTPATIENT)
Dept: CARDIOLOGY CLINIC | Age: 72
End: 2022-03-11

## 2022-03-11 VITALS
HEART RATE: 76 BPM | DIASTOLIC BLOOD PRESSURE: 58 MMHG | SYSTOLIC BLOOD PRESSURE: 118 MMHG | BODY MASS INDEX: 33.17 KG/M2 | HEIGHT: 73 IN | OXYGEN SATURATION: 96 % | WEIGHT: 250.25 LBS

## 2022-03-11 DIAGNOSIS — I50.42 CHF (CONGESTIVE HEART FAILURE), NYHA CLASS I, CHRONIC, COMBINED (HCC): Primary | ICD-10-CM

## 2022-03-11 DIAGNOSIS — I50.42 CHF (CONGESTIVE HEART FAILURE), NYHA CLASS I, CHRONIC, COMBINED (HCC): ICD-10-CM

## 2022-03-11 LAB
ANION GAP SERPL CALCULATED.3IONS-SCNC: 11 MEQ/L (ref 8–16)
BUN BLDV-MCNC: 16 MG/DL (ref 7–22)
CALCIUM SERPL-MCNC: 9.3 MG/DL (ref 8.5–10.5)
CHLORIDE BLD-SCNC: 105 MEQ/L (ref 98–111)
CO2: 23 MEQ/L (ref 23–33)
CREAT SERPL-MCNC: 0.9 MG/DL (ref 0.4–1.2)
GFR SERPL CREATININE-BSD FRML MDRD: 84 ML/MIN/1.73M2
GLUCOSE BLD-MCNC: 119 MG/DL (ref 70–108)
MAGNESIUM: 2.1 MG/DL (ref 1.6–2.4)
POTASSIUM SERPL-SCNC: 4.2 MEQ/L (ref 3.5–5.2)
PRO-BNP: 13.4 PG/ML (ref 0–900)
SODIUM BLD-SCNC: 139 MEQ/L (ref 135–145)

## 2022-03-11 PROCEDURE — 80048 BASIC METABOLIC PNL TOTAL CA: CPT

## 2022-03-11 PROCEDURE — 99213 OFFICE O/P EST LOW 20 MIN: CPT | Performed by: NURSE PRACTITIONER

## 2022-03-11 PROCEDURE — 83735 ASSAY OF MAGNESIUM: CPT

## 2022-03-11 PROCEDURE — 83880 ASSAY OF NATRIURETIC PEPTIDE: CPT

## 2022-03-11 PROCEDURE — 36415 COLL VENOUS BLD VENIPUNCTURE: CPT

## 2022-03-11 RX ORDER — ATORVASTATIN CALCIUM 80 MG/1
80 TABLET, FILM COATED ORAL NIGHTLY
Qty: 30 TABLET | Refills: 3 | Status: SHIPPED | OUTPATIENT
Start: 2022-03-11 | End: 2022-05-11 | Stop reason: SDUPTHER

## 2022-03-11 ASSESSMENT — ENCOUNTER SYMPTOMS
SHORTNESS OF BREATH: 0
NAUSEA: 0
ABDOMINAL DISTENTION: 0
COUGH: 0
VOMITING: 0

## 2022-03-11 NOTE — TELEPHONE ENCOUNTER
Oscar Bonds called requesting a refill on the following medications:  Requested Prescriptions     Pending Prescriptions Disp Refills    atorvastatin (LIPITOR) 80 MG tablet 30 tablet 3     Sig: Take 1 tablet by mouth nightly     Pharmacy verified: [facility] [address]       Date of last visit:  today  Date of next visit (if applicable): 4/4/3626

## 2022-03-11 NOTE — PROGRESS NOTES
Heart Failure Clinic       Visit Date: 3/11/2022  Cardiologist:  Dr. Sailaja Portillo  Primary Care Physician: Dr. Yeyo Casey, APRN - CNP    Leanna Doctor is a 73 y.o. male who presents today for:  Chief Complaint   Patient presents with    Congestive Heart Failure       HPI:   Leanna Doctor is a 73 y.o. male who presents to the office for a follow up patient visit in the heart failure clinic. New patient 11/9/21. Accompanied by self    TYPE HF: HFmrEF 45-50 Grade 1 DD  Cause: ETOH  Device: none  HX: Smoker, CVA (right carotid stent) ETOH abuse (clean x5 yrs)    Dry Wt:  (236 on 11/24/21) (250 on 3/11/22)    Hospitalization:      Admit Date: 10/26/2021   Discharge Date:   10/29/21     Discharge Diagnoses:     Chronic Diastolic Heart Failure with EF 45-50% on 10/28/21, NYHA Class II: Stable  Diagnosed in 10/28/21.  and further supported by the physical exam findings of. GDMT at home: Lisinopril, . Diuretics at home: NONE. Follows in HF Clinic: No.    Referral placed. previous echo in 2018 showed EF of 60-65% with no regioinal left ventricular wall motion abnormalities. Echo on 10/26/21 showed EF of 45-50% with mild global hypokinesis of the LV and G1DD. Plan:               - Start on Lisinopril 5mg and Lipitor 80 mg..  Continue asa, plavix for GDMT. Patient will likely not   tolerate BB due to lower BP and HR however this can be reevaluated during follow-up as        outpatient. -HF Clinic referral     Dysarthria: Unclear of patients  CT head negative. CTA head and neck negative. No focal deficit on exam. He does admit to social drinking on Saturday prior to event. Electrolytes are WNL, no hypoglycemia on admission. Serum ammonia WNL. TSH WNL. Patient reports daily compliance with Plavix and ASA which he takes due to his right carotid stent. Smokes 1 PPD. Plan:  - MRI brain shows no acute intracranial pathology.   CT images showed no acute intracranial pathology  - Neurology signing off and counseled patient to monitor symptoms.  - Encourage patient for tobacco cessation and alcohol abstinence. Concerns today: no CHF concerns today. Down to a pack a week form a pack a day, weight gain is caloric up to 250 from 236.      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)     Chronic diastolic CHF (congestive heart failure) ([facility] [address] 11/23/2021    Erectile dysfunction     Essential hypertension, malignant     ETOH abuse     GERD (gastroesophageal reflux disease)     Insomnia     Low back pain     Major depressive disorder, recurrent ([facility] [address] 4/4/2018    Other headache syndromes 12/25/2016    Pneumonia     Pneumonia 02/2022    Sciatica     right side strain     Past Surgical History:   Procedure Laterality Date    APPENDECTOMY      COLONOSCOPY      EKG 12-LEAD  7/17/2015         HERNIA REPAIR      umbilical    JOINT REPLACEMENT  08/03/2016    RTK       KNEE ARTHROSCOPY      Rt knee Dr. Lore Aiken Right      Family History   Problem Relation Age of Onset    Dementia Mother     Heart Disease Father     High Blood Pressure Sister     Cancer Brother         testicular cancer     Social History     Tobacco Use    Smoking status: Current Every Day Smoker     Packs/day: 0.50     Years: 30.00     Pack years: 15.00     Types: Cigarettes     Start date: 7/16/1968    Smokeless tobacco: Never Used    Tobacco comment: printed to avs   Substance Use Topics    Alcohol use: Not Currently     Alcohol/week: 42.0 standard drinks     Types: 42 Cans of beer per week     Comment: States last beer was in 3/2018     Current Outpatient Medications   Medication Sig Dispense Refill    ALPRAZolam (XANAX) 0.5 MG tablet take 1-2 tablet by mouth 3 times daily  As needed for anxiety 120 tablet 0    albuterol sulfate HFA (PROAIR HFA) 108 (90 Base) MCG/ACT inhaler Inhale 2 puffs into the lungs every 6 hours as needed for Wheezing 1 each 3    albuterol (PROVENTIL) (2.5 MG/3ML) 0.083% nebulizer solution Take 3 mLs by nebulization every 6 hours as needed for Wheezing 90 each 0    metoprolol succinate (TOPROL XL) 25 MG extended release tablet Take 0.5 tablets by mouth daily 30 tablet 3    atorvastatin (LIPITOR) 80 MG tablet Take 1 tablet by mouth nightly 30 tablet 3    ezetimibe (ZETIA) 10 MG tablet take 1 tablet by mouth once daily 30 tablet 5    pantoprazole (PROTONIX) 40 MG tablet take 1 tablet by mouth once daily 90 tablet 2    clopidogrel (PLAVIX) 75 MG tablet take 1 tablet by mouth once daily 30 tablet 11    Respiratory Therapy Supplies (NEBULIZER/TUBING/MOUTHPIECE) KIT 1 kit by Does not apply route daily as needed (cough, wheeze) Diagnosis:RAD and Bronchitis 1 kit 0    aspirin 81 MG chewable tablet Take 81 mg by mouth daily      lisinopril (PRINIVIL;ZESTRIL) 5 MG tablet Take 1 tablet by mouth daily (Patient not taking: Reported on 3/11/2022) 30 tablet 3     No current facility-administered medications for this visit. Allergies   Allergen Reactions    Tape Aleksey Whitman Tape]        SUBJECTIVE:   Review of Systems   Constitutional: Negative for activity change, appetite change, diaphoresis and fatigue. Respiratory: Negative for cough and shortness of breath. Cardiovascular: Negative for chest pain, palpitations and leg swelling. Gastrointestinal: Negative for abdominal distention, nausea and vomiting. Neurological: Negative for weakness, light-headedness and headaches. Hematological: Negative for adenopathy.    Psychiatric/Behavioral: Negative for sleep disturbance. OBJECTIVE:   Today's Vitals:  BP (!) 118/58   Pulse 76   Ht 6' 1\" (1.854 m)   Wt 250 lb 4 oz (113.5 kg)   SpO2 96%   BMI 33.02 kg/m²     Physical Exam  Vitals reviewed. Constitutional:       General: He is not in acute distress. Appearance: Normal appearance. He is well-developed. He is not diaphoretic. HENT:      Head: Normocephalic and atraumatic. Eyes:      Conjunctiva/sclera: Conjunctivae normal.   Cardiovascular:      Rate and Rhythm: Normal rate and regular rhythm. Heart sounds: Normal heart sounds. No murmur heard. Pulmonary:      Effort: Pulmonary effort is normal. No respiratory distress. Breath sounds: Normal breath sounds. No wheezing or rales. Abdominal:      General: Bowel sounds are normal. There is no distension. Palpations: Abdomen is soft. Tenderness: There is no abdominal tenderness. Musculoskeletal:         General: Normal range of motion. Cervical back: Normal range of motion and neck supple. Right lower leg: No edema. Left lower leg: No edema. Skin:     General: Skin is warm and dry. Capillary Refill: Capillary refill takes less than 2 seconds. Neurological:      Mental Status: He is alert and oriented to person, place, and time. Coordination: Coordination normal.   Psychiatric:         Behavior: Behavior normal.           Wt Readings from Last 3 Encounters:   03/11/22 250 lb 4 oz (113.5 kg)   02/25/22 249 lb (112.9 kg)   02/02/22 245 lb (111.1 kg)     BP Readings from Last 3 Encounters:   03/11/22 (!) 118/58   02/25/22 110/70   01/20/22 136/64     Pulse Readings from Last 3 Encounters:   03/11/22 76   02/25/22 67   02/02/22 64     Body mass index is 33.02 kg/m². ECHO:    Summary   Technically difficult examination. Left ventricle size is normal.   Normal left ventricular wall thickness. There was mild global hypokinesis of the left ventricle. Systolic function was mildly reduced.    Ejection fraction is visually estimated in the range of 45% to 50%. Doppler parameters were consistent with abnormal left ventricular   relaxation (grade 1 diastolic dysfunction). Signature      ----------------------------------------------------------------   Electronically signed by Doyle Cartagena MD (Interpreting   physician) on 10/27/2021 at 05:08 PM   ----------------------------------------------------------------      CATH/STRESS:     Summary   Mild inferior defect from base to apex (improves with stress) suggestive   of infarction in the RCA and/or LCX territory.   No definite ischemia.    LVEF is preserved.      Recommendation   Clinical correlation is recommended.   Aggressive risk factor management.      Signatures      ----------------------------------------------------------------   Electronically signed by Lamar Her MD (Interpreting   Cardiologist) on 10/29/2021 at 13:12      Results reviewed:  BNP: No results found for: BNP  CBC:   Lab Results   Component Value Date    WBC 8.4 10/28/2021    RBC 4.91 10/28/2021    RBC 5.06 02/01/2021    HGB 15.6 10/28/2021    HCT 46.9 10/28/2021     10/28/2021     CMP:    Lab Results   Component Value Date     11/11/2021     11/11/2021    K 4.0 11/11/2021    K 4.0 11/11/2021    K 3.9 10/26/2021     11/11/2021     11/11/2021    CO2 28 11/11/2021    CO2 27 11/11/2021    BUN 12 11/11/2021    BUN 12 11/11/2021    CREATININE 0.99 11/11/2021    CREATININE 0.96 11/11/2021    LABGLOM >90 10/28/2021    GLUCOSE 95 11/11/2021    GLUCOSE 94 11/11/2021    CALCIUM 9.4 11/11/2021    CALCIUM 9.3 11/11/2021     Hepatic Function Panel:    Lab Results   Component Value Date    ALKPHOS 79 11/11/2021    ALKPHOS 87 10/26/2021    ALT 18 11/11/2021    AST 20 11/11/2021    PROT 7.1 11/11/2021    BILITOT 0.7 11/11/2021    LABALBU 4.1 11/11/2021    LABALBU 4.6 05/11/2012     Magnesium:    Lab Results   Component Value Date    MG 2.2 10/27/2021     PT/INR:    Lab Results   Component Value Date    INR 0.96 02/12/2021     Lipids:    Lab Results   Component Value Date    TRIG 170 11/11/2021    HDL 27 11/11/2021    HDL 66 05/01/2010    LDLCALC 33 11/11/2021    LABVLDL 34 11/11/2021       ASSESSMENT AND PLAN:   The patient's condition/symptoms are Stable: No clinical evidence of fluid overload today. Continue current medical regimen without changes at present time. Diagnosis Orders   1. CHF (congestive heart failure), NYHA class I, chronic, combined (HCC)  Basic Metabolic Panel    Magnesium    Brain Natriuretic Peptide     Continue:  GDMT:   ACE/ARB/ARNI - Lisinopril 5 daily   BB - Toprol 12.5 daily   Diuretic - no  AA - no  SGLT2 -  no  Vasodilator - no  Other - plavix 75 daily, asa    Tolerating above noted HF meds, no ill side effects noted. Will continue to monitor kidney function and electrolytes. Will optimize as tolerated. Pt is compliant w/ medications. Total visit time of 25 minutes has been spent with patient on education of symptoms, management, medication, and plan of care; as well as review of chart: labs, ECHO, radiology reports, etc.   I personally spent more then 50% of the appt time face to face with the patient. · Daily weights  · Fluid restriction of 2 Liters per day  · Limit sodium in diet to around 1936-0643 mg/day  · Monitor BP  · Activity as tolerated     Stable, appears Euvolemic  Lab reviewed from November- stable  Repeat blood work today  BP/HR stable   No med changes today   Continue diet/fluid adherence  Continue daily wts. F/U w/ Cardiology, do not cancel  F/U in clinic PRN      Patient was instructed to call the [facility] for any changes in symptoms as noted in AVS.      No follow-ups on file. or sooner if needed     Patient given educational materials - see patient instructions. We discussed the importance of weighing oneself and recording daily.  We also discussed the importance of a low sodium diet, higher sodium foods to avoid and better low sodium food options. Patient verbalizes understanding of plan of care using teach back method, and is agreeable to the treatment plan.        Electronically signed by MANE Palacio CNP on 3/11/2022 at 8:41 AM

## 2022-03-11 NOTE — TELEPHONE ENCOUNTER
----- Message from MANE Samuel CNP sent at 3/11/2022 10:28 AM EST -----  Labs reviewed, continue as is

## 2022-03-14 NOTE — TELEPHONE ENCOUNTER
Soloomn Llanos called requesting a refill on the following medications:  Requested Prescriptions     Pending Prescriptions Disp Refills    lisinopril (PRINIVIL;ZESTRIL) 5 MG tablet [Pharmacy Med Name: LISINOPRIL 5 MG TABLET] 90 tablet      Sig: take 1 tablet by mouth once daily       Date of last visit: Visit date not found  Date of next visit (if applicable):Visit date not found  Date of last refill: 10/30/21  Pharmacy Name: [facility] LPN

## 2022-03-18 RX ORDER — LISINOPRIL 5 MG/1
TABLET ORAL
Qty: 90 TABLET | OUTPATIENT
Start: 2022-03-18

## 2022-03-21 DIAGNOSIS — F41.0 PANIC DISORDER: ICD-10-CM

## 2022-03-21 DIAGNOSIS — F41.8 SITUATIONAL ANXIETY: ICD-10-CM

## 2022-03-21 NOTE — TELEPHONE ENCOUNTER
----- Message from Fina Langford MA sent at 3/21/2022 11:57 AM EDT -----  Subject: Refill Request    QUESTIONS  Name of Medication? ALPRAZolam (XANAX) 0.5 MG tablet  Patient-reported dosage and instructions? 0.5 MG, 4 times a day  How many days do you have left? 0  Preferred Pharmacy? [id number] phone number (if available)? [phone]  ---------------------------------------------------------------------------  --------------  CALL BACK INFO  What is the best way for the office to contact you? Do not leave any   message, patient will call back for answer  Preferred Call Back Phone Number?  [phone]

## 2022-03-22 RX ORDER — ALPRAZOLAM 0.5 MG/1
TABLET ORAL
Qty: 120 TABLET | Refills: 0 | Status: SHIPPED | OUTPATIENT
Start: 2022-03-22 | End: 2022-04-18 | Stop reason: SDUPTHER

## 2022-04-18 ENCOUNTER — OFFICE VISIT (OUTPATIENT)
Dept: FAMILY MEDICINE CLINIC | Age: 72
End: 2022-04-18
Payer: COMMERCIAL

## 2022-04-18 VITALS
WEIGHT: 253 LBS | SYSTOLIC BLOOD PRESSURE: 120 MMHG | RESPIRATION RATE: 24 BRPM | DIASTOLIC BLOOD PRESSURE: 72 MMHG | HEART RATE: 83 BPM | OXYGEN SATURATION: 96 % | TEMPERATURE: 98.3 F | BODY MASS INDEX: 33.38 KG/M2

## 2022-04-18 DIAGNOSIS — J40 BRONCHITIS: Primary | ICD-10-CM

## 2022-04-18 DIAGNOSIS — J01.90 ACUTE SINUSITIS, RECURRENCE NOT SPECIFIED, UNSPECIFIED LOCATION: ICD-10-CM

## 2022-04-18 DIAGNOSIS — F41.0 PANIC DISORDER: ICD-10-CM

## 2022-04-18 DIAGNOSIS — F10.11 HISTORY OF ETOH ABUSE: ICD-10-CM

## 2022-04-18 DIAGNOSIS — F41.8 SITUATIONAL ANXIETY: ICD-10-CM

## 2022-04-18 DIAGNOSIS — Z87.891 HISTORY OF TOBACCO USE: ICD-10-CM

## 2022-04-18 LAB
Lab: NORMAL
QC PASS/FAIL: NORMAL
SARS-COV-2 RDRP RESP QL NAA+PROBE: NEGATIVE

## 2022-04-18 PROCEDURE — 87635 SARS-COV-2 COVID-19 AMP PRB: CPT | Performed by: NURSE PRACTITIONER

## 2022-04-18 PROCEDURE — 99214 OFFICE O/P EST MOD 30 MIN: CPT | Performed by: NURSE PRACTITIONER

## 2022-04-18 RX ORDER — ALPRAZOLAM 0.5 MG/1
TABLET ORAL
Qty: 120 TABLET | Refills: 0 | Status: SHIPPED | OUTPATIENT
Start: 2022-04-18 | End: 2022-05-18 | Stop reason: SDUPTHER

## 2022-04-18 RX ORDER — CEFUROXIME AXETIL 250 MG/1
250 TABLET ORAL 2 TIMES DAILY
Qty: 20 TABLET | Refills: 0 | Status: SHIPPED | OUTPATIENT
Start: 2022-04-18 | End: 2022-04-28

## 2022-04-18 RX ORDER — BENZONATATE 200 MG/1
200 CAPSULE ORAL 3 TIMES DAILY PRN
Qty: 30 CAPSULE | Refills: 0 | Status: SHIPPED | OUTPATIENT
Start: 2022-04-18 | End: 2022-04-25

## 2022-04-18 RX ORDER — PREDNISONE 1 MG/1
TABLET ORAL
Qty: 30 TABLET | Refills: 0 | Status: SHIPPED | OUTPATIENT
Start: 2022-04-18 | End: 2022-04-28

## 2022-04-19 ENCOUNTER — HOSPITAL ENCOUNTER (OUTPATIENT)
Age: 72
Discharge: HOME | End: 2022-04-19
Payer: COMMERCIAL

## 2022-04-19 ENCOUNTER — HOSPITAL ENCOUNTER (OUTPATIENT)
Dept: GENERAL RADIOLOGY | Age: 72
Discharge: HOME | End: 2022-04-19
Payer: COMMERCIAL

## 2022-04-19 DIAGNOSIS — J40 BRONCHITIS: ICD-10-CM

## 2022-04-19 PROCEDURE — 71046 X-RAY EXAM CHEST 2 VIEWS: CPT

## 2022-04-19 ASSESSMENT — ENCOUNTER SYMPTOMS
SORE THROAT: 1
EYES NEGATIVE: 1
SINUS PAIN: 1
COUGH: 1
GASTROINTESTINAL NEGATIVE: 1
SINUS PRESSURE: 1
SHORTNESS OF BREATH: 0

## 2022-04-19 NOTE — PROGRESS NOTES
[address] [place] [facility] [address]  Dept: [phone]  Dept Fax: [phone]  Loc: [phone]  PROGRESS NOTE      VisitDate: 4/18/2022    Ana Paula Wharton is a 73 y.o. male who presents today for:     Chief Complaint   Patient presents with    Sinus Problem     Eyes are burning, sinuses hurt, headache. Subjective:  Patient presents with complaint of sinus congestion, headache, cough, chest congestion , postnasal drainage eye irritation past week. Patient did report fevers and chills earlier in the week. This is since resolved. History of CVA ED hypertension GERD insomnia. History of half pack a day smoker for 30 years. History of alcohol use. Has abstained from alcohol for several years. Mood stable. Review of Systems   Constitutional: Positive for chills, fatigue and fever. HENT: Positive for congestion, postnasal drip, sinus pressure, sinus pain and sore throat. Eyes: Negative. Respiratory: Positive for cough. Negative for shortness of breath. Gastrointestinal: Negative. Endocrine: Negative. Genitourinary: Negative. Musculoskeletal: Positive for arthralgias. Neurological: Negative. Psychiatric/Behavioral: Positive for decreased concentration, dysphoric mood and sleep disturbance. The patient is nervous/anxious.       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)     Chronic diastolic CHF (congestive heart failure) ([facility] [address] 11/23/2021    Erectile dysfunction     Essential hypertension, malignant     ETOH abuse     GERD (gastroesophageal reflux disease)     Insomnia     Low back pain     Major depressive disorder, recurrent ([address] 4/4/2018    Other headache syndromes 12/25/2016    Pneumonia     Pneumonia 02/2022    Sciatica     right side strain      Past Surgical History:   Procedure Laterality Date    APPENDECTOMY      COLONOSCOPY      EKG 12-LEAD  7/17/2015         HERNIA REPAIR      umbilical    JOINT REPLACEMENT  08/03/2016    RTK       KNEE ARTHROSCOPY      Rt knee Dr. Joel Solano Right      Family History   Problem Relation Age of Onset    Dementia Mother     Heart Disease Father     High Blood Pressure Sister     Cancer Brother         testicular cancer     Social History     Tobacco Use    Smoking status: Current Every Day Smoker     Packs/day: 0.50     Years: 30.00     Pack years: 15.00     Types: Cigarettes     Start date: 7/16/1968    Smokeless tobacco: Never Used    Tobacco comment: printed to avs   Substance Use Topics    Alcohol use: Not Currently     Alcohol/week: 42.0 standard drinks     Types: 42 Cans of beer per week     Comment: States last beer was in 3/2018      Current Outpatient Medications   Medication Sig Dispense Refill    ALPRAZolam (XANAX) 0.5 MG tablet take 1-2 tablet by mouth 3 times daily  As needed for anxiety 120 tablet 0    predniSONE (DELTASONE) 5 MG tablet 4 po qd for 3 days, then 3 po qd for 3 days, then 2 po qd for 3 days, then 1 po qd for 3 days 30 tablet 0    benzonatate (TESSALON) 200 MG capsule Take 1 capsule by mouth 3 times daily as needed for Cough 30 capsule 0    cefUROXime (CEFTIN) 250 MG tablet Take 1 tablet by mouth 2 times daily for 10 days 20 tablet 0    atorvastatin (LIPITOR) 80 MG tablet Take 1 tablet by mouth nightly 30 tablet 3    albuterol sulfate HFA (PROAIR HFA) 108 (90 Base) MCG/ACT inhaler Inhale 2 puffs into the lungs every 6 hours as needed for Wheezing 1 each 3    albuterol (PROVENTIL) (2.5 MG/3ML) 0.083% nebulizer solution Take 3 mLs by nebulization every 6 hours as needed for Wheezing 90 each 0    metoprolol succinate (TOPROL XL) 25 MG extended release tablet Take 0.5 tablets by mouth daily 30 tablet 3    lisinopril (PRINIVIL;ZESTRIL) 5 MG tablet Take 1 tablet by mouth daily 30 tablet 3    ezetimibe (ZETIA) 10 MG tablet take 1 tablet by mouth once daily 30 tablet 5    pantoprazole (PROTONIX) 40 MG tablet take 1 tablet by mouth once daily 90 tablet 2    clopidogrel (PLAVIX) 75 MG tablet take 1 tablet by mouth once daily 30 tablet 11    Respiratory Therapy Supplies (NEBULIZER/TUBING/MOUTHPIECE) KIT 1 kit by Does not apply route daily as needed (cough, wheeze) Diagnosis:RAD and Bronchitis 1 kit 0    aspirin 81 MG chewable tablet Take 81 mg by mouth daily       No current facility-administered medications for this visit. Allergies   Allergen Reactions    Tape Dewayne Patel [address]   Topic Date Due    Shingles Vaccine (1 of 2) Never done    AAA screen  Never done    COVID-19 Vaccine (3 - Booster for Pfizer series) 10/07/2021    A1C test (Diabetic or Prediabetic)  10/27/2022    Lipid screen  11/11/2022    Depression Monitoring  01/20/2023    Potassium monitoring  03/11/2023    Creatinine monitoring  03/11/2023    Colorectal Cancer Screen  02/09/2024    DTaP/Tdap/Td vaccine (2 - Td or Tdap) 10/01/2024    Flu vaccine  Completed    Pneumococcal 65+ years Vaccine  Completed    Hepatitis C screen  Completed    Hepatitis A vaccine  Aged Out    Hepatitis B vaccine  Aged Out    Hib vaccine  Aged Out    Meningococcal (ACWY) vaccine  Aged Out     Negative COVID    Objective:     Physical Exam  Vitals and nursing note reviewed. Constitutional:       Appearance: Normal appearance. He is well-developed. He is not diaphoretic. HENT:      Head: Normocephalic and atraumatic. Not macrocephalic and not microcephalic. Right Ear: Hearing, tympanic membrane, ear canal and external ear normal. No drainage or tenderness. No middle ear effusion. No hemotympanum. Tympanic membrane is not injected, scarred, perforated or bulging. Left Ear: Hearing, tympanic membrane, ear canal and external ear normal. No drainage or tenderness. No middle ear effusion. No hemotympanum. Tympanic membrane is not injected, scarred, perforated or bulging. Nose: Mucosal edema, congestion and rhinorrhea present. No nasal deformity or septal deviation. Right Sinus: Maxillary sinus tenderness present. No frontal sinus tenderness. Left Sinus: Maxillary sinus tenderness present. No frontal sinus tenderness. Mouth/Throat:      Mouth: Mucous membranes are moist. No oral lesions. Dentition: Normal dentition. Does not have dentures. No dental caries or dental abscesses. Pharynx: Oropharynx is clear. No oropharyngeal exudate or posterior oropharyngeal erythema. Tonsils: No tonsillar abscesses. Eyes:      General: Lids are normal. No scleral icterus. Extraocular Movements:      Right eye: Normal extraocular motion. Left eye: Normal extraocular motion. Conjunctiva/sclera: Conjunctivae normal.      Right eye: Right conjunctiva is not injected. Left eye: Left conjunctiva is not injected. Pupils: Pupils are equal, round, and reactive to light. Neck:      Thyroid: No thyroid mass or thyromegaly. Vascular: No carotid bruit or JVD. Trachea: Trachea normal.   Cardiovascular:      Rate and Rhythm: Normal rate and regular rhythm. Heart sounds: Normal heart sounds, S1 normal and S2 normal. No murmur heard. No friction rub. No gallop. Pulmonary:      Effort: Pulmonary effort is normal. No respiratory distress. Breath sounds: Wheezing and rhonchi present. No rales. Chest:      Chest wall: No tenderness. Breasts:      Right: No supraclavicular adenopathy. Left: No supraclavicular adenopathy. Abdominal:      General: Bowel sounds are normal.      Palpations: Abdomen is soft. There is no hepatomegaly, splenomegaly or mass. Tenderness: There is no guarding or rebound. Hernia: No hernia is present. There is no hernia in the ventral area or left inguinal area. Musculoskeletal:         General: No tenderness.  Normal range of motion. Cervical back: Normal range of motion and neck supple. No edema or erythema. Normal range of motion. Lymphadenopathy:      Head:      Right side of head: No submental, submandibular, tonsillar, preauricular, posterior auricular or occipital adenopathy. Left side of head: No submental, submandibular, tonsillar, preauricular, posterior auricular or occipital adenopathy. Cervical: No cervical adenopathy. Right cervical: No superficial, deep or posterior cervical adenopathy. Left cervical: No superficial, deep or posterior cervical adenopathy. Upper Body:      Right upper body: No supraclavicular or pectoral adenopathy. Left upper body: No supraclavicular or pectoral adenopathy. Skin:     General: Skin is warm and dry. Coloration: Skin is not pale. Findings: No bruising, ecchymosis, laceration, lesion or rash. Nails: There is no clubbing. Neurological:      Mental Status: He is alert and oriented to person, place, and time. Cranial Nerves: No cranial nerve deficit. Motor: No abnormal muscle tone. Coordination: Coordination normal.      Deep Tendon Reflexes: Reflexes normal.   Psychiatric:         Speech: Speech normal.         Behavior: Behavior normal.         Thought Content: Thought content normal.         Judgment: Judgment normal.       /72   Pulse 83   Temp 98.3 °F (36.8 °C) (Oral)   Resp 24   Wt 253 lb (114.8 kg)   SpO2 96%   BMI 33.38 kg/m²       Impression/Plan:  1. Bronchitis    2. Panic disorder    3. Situational anxiety    4. Acute sinusitis, recurrence not specified, unspecified location    5. History of tobacco use    6.  History of ETOH abuse      Requested Prescriptions     Signed Prescriptions Disp Refills    ALPRAZolam (XANAX) 0.5 MG tablet 120 tablet 0     Sig: take 1-2 tablet by mouth 3 times daily  As needed for anxiety    predniSONE (DELTASONE) 5 MG tablet 30 tablet 0     Si po qd for 3 days, then 3 po qd for 3 days, then 2 po qd for 3 days, then 1 po qd for 3 days    benzonatate (TESSALON) 200 MG capsule 30 capsule 0     Sig: Take 1 capsule by mouth 3 times daily as needed for Cough    cefUROXime (CEFTIN) 250 MG tablet 20 tablet 0     Sig: Take 1 tablet by mouth 2 times daily for 10 days     Orders Placed This Encounter   Procedures    XR CHEST STANDARD (2 VW)     Standing Status:   Future     Number of Occurrences:   1     Standing Expiration Date:   4/18/2023    POCT COVID-19 Rapid, NAAT     Order Specific Question:   Is this test for diagnosis or screening? Answer:   Diagnosis of ill patient     Order Specific Question:   Symptomatic for COVID-19 as defined by CDC? Answer:   Yes     Order Specific Question:   Date of Symptom Onset     Answer:   4/11/2022     Order Specific Question:   Hospitalized for COVID-19? Answer:   No     Order Specific Question:   Admitted to ICU for COVID-19? Answer:   No     Order Specific Question:   Employed in healthcare setting? Answer:   No     Order Specific Question:   Resident in a congregate (group) care setting? Answer:   No     Order Specific Question:   Pregnant: Answer:   No     Order Specific Question:   Previously tested for COVID-19? Answer:   Yes       Patient giveneducational materials - see patient instructions. Discussed use, benefit, and side effects of prescribed medications. All patient questions answered. Pt voiced understanding. Reviewed health maintenance. Patient agreedwith treatment plan. Follow up as directed. Chest x-ray ordered. Xanax 0.5 mg 1-2 every 8 as needed anxiety 120 ordered OARRS report reviewed. Ceftin 250 twice daily for 10 days. Tessalon 200 3 times daily as needed cough. Prednisone taper prescribed. Continue medications as prescribed.  Educational information on above diagnosis  provided per AVS.    Electronically signed by MANE Dhaliwal CNP on 4/19/2022 at 2:19 PM

## 2022-05-11 ENCOUNTER — OFFICE VISIT (OUTPATIENT)
Dept: FAMILY MEDICINE CLINIC | Age: 72
End: 2022-05-11
Payer: COMMERCIAL

## 2022-05-11 VITALS
BODY MASS INDEX: 33.7 KG/M2 | SYSTOLIC BLOOD PRESSURE: 128 MMHG | WEIGHT: 255.4 LBS | HEART RATE: 72 BPM | DIASTOLIC BLOOD PRESSURE: 70 MMHG | RESPIRATION RATE: 18 BRPM | TEMPERATURE: 98 F

## 2022-05-11 DIAGNOSIS — I25.10 CORONARY ARTERY DISEASE INVOLVING NATIVE CORONARY ARTERY OF NATIVE HEART WITHOUT ANGINA PECTORIS: ICD-10-CM

## 2022-05-11 DIAGNOSIS — M15.9 PRIMARY OSTEOARTHRITIS INVOLVING MULTIPLE JOINTS: ICD-10-CM

## 2022-05-11 DIAGNOSIS — Z86.73 HX OF ISCHEMIC VERTEBROBASILAR ARTERY THALAMIC STROKE: ICD-10-CM

## 2022-05-11 DIAGNOSIS — K21.9 GASTROESOPHAGEAL REFLUX DISEASE, UNSPECIFIED WHETHER ESOPHAGITIS PRESENT: ICD-10-CM

## 2022-05-11 DIAGNOSIS — F10.11 HISTORY OF ETOH ABUSE: ICD-10-CM

## 2022-05-11 DIAGNOSIS — S05.01XA ABRASION OF RIGHT CORNEA, INITIAL ENCOUNTER: ICD-10-CM

## 2022-05-11 DIAGNOSIS — F41.8 SITUATIONAL ANXIETY: ICD-10-CM

## 2022-05-11 DIAGNOSIS — E78.2 MIXED HYPERLIPIDEMIA: ICD-10-CM

## 2022-05-11 DIAGNOSIS — F51.01 PRIMARY INSOMNIA: ICD-10-CM

## 2022-05-11 DIAGNOSIS — I10 PRIMARY HYPERTENSION: ICD-10-CM

## 2022-05-11 DIAGNOSIS — Z87.891 HISTORY OF TOBACCO USE: Primary | ICD-10-CM

## 2022-05-11 PROCEDURE — 90715 TDAP VACCINE 7 YRS/> IM: CPT | Performed by: NURSE PRACTITIONER

## 2022-05-11 PROCEDURE — 99214 OFFICE O/P EST MOD 30 MIN: CPT | Performed by: NURSE PRACTITIONER

## 2022-05-11 PROCEDURE — 90471 IMMUNIZATION ADMIN: CPT | Performed by: NURSE PRACTITIONER

## 2022-05-11 RX ORDER — ZOLPIDEM TARTRATE 10 MG/1
10 TABLET, FILM COATED ORAL NIGHTLY PRN
Qty: 90 TABLET | Refills: 1 | Status: SHIPPED | OUTPATIENT
Start: 2022-05-11 | End: 2022-05-11 | Stop reason: CLARIF

## 2022-05-11 RX ORDER — ZOLPIDEM TARTRATE 10 MG/1
10 TABLET ORAL NIGHTLY PRN
Qty: 90 TABLET | Refills: 1 | Status: SHIPPED | OUTPATIENT
Start: 2022-05-11 | End: 2022-08-09

## 2022-05-11 RX ORDER — ERYTHROMYCIN 5 MG/G
OINTMENT OPHTHALMIC
Qty: 3.5 G | Refills: 0 | Status: SHIPPED | OUTPATIENT
Start: 2022-05-11 | End: 2022-05-21

## 2022-05-11 RX ORDER — ATORVASTATIN CALCIUM 80 MG/1
80 TABLET, FILM COATED ORAL NIGHTLY
Qty: 30 TABLET | Refills: 5 | Status: SHIPPED | OUTPATIENT
Start: 2022-05-11 | End: 2022-10-14 | Stop reason: SDUPTHER

## 2022-05-11 NOTE — PATIENT INSTRUCTIONS
THE MOST IMPORTANT ACTION YOU CAN TAKE TO IMPROVE YOUR CURRENT AND FUTURE HEALTH IS TO QUIT SMOKING. Call the [facility] ([phone])    Smoking harms nonsmokers. When nonsmokers are around people who smoke, they absorb nicotine, carbon monoxide, and other ingredients of tobacco smoke. DO NOT SMOKE AROUND CHILDREN    Children exposed to secondhand smoke are at an increased risk of:  Sudden Infant Death Syndrome (SIDS), acute respiratory infections, inflammation of the middle ear, and severe asthma. Over a longer time, it causes heart disease and lung cancer. There is no safe level of exposure to secondhand smoke. Patient Education        Corneal Scratches: Care Instructions  Your Care Instructions     The cornea is the clear surface that covers the front of the eye. When a speck of dirt, a wood chip, an insect, or another object flies into your eye, it can cause a painful scratch on the cornea. Wearing contact lenses too long or rubbing your eyes can also scratch the cornea. Small scratches usually heal domenica day or two. Deeper scratches may take longer. If you have had a foreign object removed from your eye or you have a corneal scratch, you will need to watch for infection and vision problems while youreye heals. Follow-up care is a key part of your treatment and safety. Be sure to make and go to all appointments, and call your doctor if you are having problems. It's also a good idea to know your test results and keep alist of the medicines you take. How can you care for yourself at home?  The doctor probably used a medicine during your exam to numb your eye. When it wears off in 30 to 60 minutes, your eye pain may come back. Take pain medicines exactly as directed. ? If the doctor gave you a prescription medicine for pain, take it as prescribed.   ? If you are not taking a prescription pain medicine, ask your doctor if you can take an Version: 13.2  © 4610-4798 [employer]. Care instructions adapted under license by [facility] ([facility]). If you have questions about a medical condition or this instruction, always ask your healthcare professional. [address] any warranty or liability for your use of this information.

## 2022-05-11 NOTE — PROGRESS NOTES
Immunizations Administered     Name Date Dose Route    Tdap (Boostrix, Adacel) 5/11/2022 0.5 mL Intramuscular    Site: Deltoid- Left    Lot: Ismael Alcantarahold    NDC: 27254-550-30

## 2022-05-16 ASSESSMENT — ENCOUNTER SYMPTOMS
WHEEZING: 0
EYE PAIN: 0
ABDOMINAL PAIN: 0
ABDOMINAL DISTENTION: 0
PHOTOPHOBIA: 0
SHORTNESS OF BREATH: 0
COUGH: 0
EYE ITCHING: 1
CHEST TIGHTNESS: 0
BACK PAIN: 1
EYE REDNESS: 1

## 2022-05-16 NOTE — PROGRESS NOTES
[address] [place] [id number]  Dept: [phone]  Dept Fax: [phone]  Loc: [phone]  PROGRESS NOTE      VisitDate: 5/11/2022    Mariya Neal is a 73 y.o. male who presents today for:     Chief Complaint   Patient presents with    Follow-up     med refills, no concerns         Subjective:  Routine 3-month/refill of Ambien. Patient complains of a right eye irritation the past 2 days. Reports reports small scratch to right eye from brush while cleaning up shrubbery. Denies any foreign body, visual disturbance photosensitivity. Otherwise patient reports doing well. Mood stable. Taking medications as prescribed. Denies any dizziness, syncope headaches chest pain shortness of breath abdominal pain or edema. History of CVA, CHF coronary artery disease, hyperlipidemia ED hypertension alcohol use,, anxiety, GERD, insomnia chronic knee pain chronic low back pain. Reports currently abstaining from alcohol. Current half pack a day smoker past 30 years. Patient currently works for the [email or web address] Review of Systems   Constitutional: Negative for activity change, appetite change, chills, fatigue and fever. Eyes: Positive for redness and itching. Negative for photophobia, pain and visual disturbance. Respiratory: Negative for cough, chest tightness, shortness of breath and wheezing. Cardiovascular: Negative for chest pain, palpitations and leg swelling. Gastrointestinal: Negative for abdominal distention and abdominal pain. Genitourinary: Negative for dysuria. Musculoskeletal: Positive for arthralgias and back pain. Negative for neck pain. Skin: Negative. Negative for rash. Neurological: Negative for dizziness, light-headedness and headaches. Hematological: Negative for adenopathy. Psychiatric/Behavioral: Positive for sleep disturbance. Negative for decreased concentration and dysphoric mood.  The patient is nervous/anxious. All other systems reviewed and are negative. 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)     Chronic diastolic CHF (congestive heart failure) ([facility] [address] 11/23/2021    Erectile dysfunction     Essential hypertension, malignant     ETOH abuse     GERD (gastroesophageal reflux disease)     Insomnia     Low back pain     Major depressive disorder, recurrent ([facility] [address] 4/4/2018    Other headache syndromes 12/25/2016    Pneumonia     Pneumonia 02/2022    Sciatica     right side strain      Past Surgical History:   Procedure Laterality Date    APPENDECTOMY      COLONOSCOPY      EKG 12-LEAD  7/17/2015         HERNIA REPAIR      umbilical    JOINT REPLACEMENT  08/03/2016    RTK       KNEE ARTHROSCOPY      Rt knee Dr. Wolfe Primer Right      Family History   Problem Relation Age of Onset    Dementia Mother     Heart Disease Father     High Blood Pressure Sister     Cancer Brother         testicular cancer     Social History     Tobacco Use    Smoking status: Current Every Day Smoker     Packs/day: 0.50     Years: 30.00     Pack years: 15.00     Types: Cigarettes     Start date: 7/16/1968    Smokeless tobacco: Never Used    Tobacco comment: printed to avs   Substance Use Topics    Alcohol use: Not Currently     Alcohol/week: 42.0 standard drinks     Types: 42 Cans of beer per week     Comment: States last beer was in 3/2018      Current Outpatient Medications   Medication Sig Dispense Refill    atorvastatin (LIPITOR) 80 MG tablet Take 1 tablet by mouth nightly 30 tablet 5    erythromycin (ROMYCIN) 5 MG/GM ophthalmic ointment Apply to right eye 3 times daily for 5 days 3.5 g 0    zolpidem (AMBIEN) 10 MG tablet Take 1 tablet by mouth nightly as needed for Sleep for up to 90 days.  90 tablet 1    ALPRAZolam (XANAX) 0.5 MG tablet take 1-2 tablet by mouth 3 times daily As needed for anxiety 120 tablet 0    albuterol sulfate HFA (PROAIR HFA) 108 (90 Base) MCG/ACT inhaler Inhale 2 puffs into the lungs every 6 hours as needed for Wheezing 1 each 3    albuterol (PROVENTIL) (2.5 MG/3ML) 0.083% nebulizer solution Take 3 mLs by nebulization every 6 hours as needed for Wheezing 90 each 0    metoprolol succinate (TOPROL XL) 25 MG extended release tablet Take 0.5 tablets by mouth daily 30 tablet 3    lisinopril (PRINIVIL;ZESTRIL) 5 MG tablet Take 1 tablet by mouth daily 30 tablet 3    ezetimibe (ZETIA) 10 MG tablet take 1 tablet by mouth once daily 30 tablet 5    pantoprazole (PROTONIX) 40 MG tablet take 1 tablet by mouth once daily 90 tablet 2    clopidogrel (PLAVIX) 75 MG tablet take 1 tablet by mouth once daily 30 tablet 11    Respiratory Therapy Supplies (NEBULIZER/TUBING/MOUTHPIECE) KIT 1 kit by Does not apply route daily as needed (cough, wheeze) Diagnosis:RAD and Bronchitis 1 kit 0    aspirin 81 MG chewable tablet Take 81 mg by mouth daily       No current facility-administered medications for this visit. Allergies   Allergen Reactions    T[facility] [facility]   Topic Date Due    Shingles vaccine (1 of 2) Never done    AAA screen  Never done    COVID-19 Vaccine (3 - Booster for Pfizer series) 10/07/2021    A1C test (Diabetic or Prediabetic)  10/27/2022    Lipids  11/11/2022    Depression Monitoring  01/20/2023    Colorectal Cancer Screen  02/09/2024    DTaP/Tdap/Td vaccine (3 - Td or Tdap) 05/11/2032    Flu vaccine  Completed    Pneumococcal 65+ years Vaccine  Completed    Hepatitis C screen  Completed    Hepatitis A vaccine  Aged Out    Hepatitis B vaccine  Aged Out    Hib vaccine  Aged Out    Meningococcal (ACWY) vaccine  Aged Out         Objective:     Physical Exam  Vitals and nursing note reviewed. Constitutional:       Appearance: Normal appearance. He is well-developed. He is not diaphoretic.    HENT:      Head: Normocephalic and atraumatic. Not macrocephalic and not microcephalic. Right Ear: Hearing, tympanic membrane, ear canal and external ear normal. No drainage or tenderness. No middle ear effusion. No hemotympanum. Tympanic membrane is not injected, scarred, perforated or bulging. Left Ear: Hearing, tympanic membrane, ear canal and external ear normal. No drainage or tenderness. No middle ear effusion. No hemotympanum. Tympanic membrane is not injected, scarred, perforated or bulging. Nose: Nose normal. No nasal deformity, septal deviation, mucosal edema or rhinorrhea. Right Sinus: No maxillary sinus tenderness or frontal sinus tenderness. Left Sinus: No maxillary sinus tenderness or frontal sinus tenderness. Mouth/Throat:      Mouth: No oral lesions. Dentition: Normal dentition. Does not have dentures. No dental caries or dental abscesses. Pharynx: Oropharynx is clear. No oropharyngeal exudate or posterior oropharyngeal erythema. Tonsils: No tonsillar abscesses. Eyes:      General: Lids are normal. No scleral icterus. Right eye: No foreign body. Extraocular Movements: Extraocular movements intact. Right eye: Normal extraocular motion. Left eye: Normal extraocular motion. Conjunctiva/sclera:      Right eye: Right conjunctiva is injected. Left eye: Left conjunctiva is not injected. Pupils: Pupils are equal, round, and reactive to light. Comments: Redness noted lateral aspect right conjunctive a. Does appear to be a subtle corneal abrasion at the 9 o'clock position. No foreign bodies noted   Neck:      Thyroid: No thyroid mass or thyromegaly. Vascular: No carotid bruit or JVD. Trachea: Trachea normal.   Cardiovascular:      Rate and Rhythm: Normal rate and regular rhythm. Pulses: Normal pulses. Heart sounds: Normal heart sounds, S1 normal and S2 normal. No murmur heard. No friction rub. No gallop. Pulmonary:      Effort: Pulmonary effort is normal. No respiratory distress. Breath sounds: Normal breath sounds. No wheezing, rhonchi or rales. Chest:      Chest wall: No tenderness. Breasts:      Right: No supraclavicular adenopathy. Left: No supraclavicular adenopathy. Abdominal:      General: Bowel sounds are normal.      Palpations: Abdomen is soft. There is no hepatomegaly, splenomegaly or mass. Tenderness: There is no guarding or rebound. Hernia: No hernia is present. There is no hernia in the ventral area or left inguinal area. Musculoskeletal:         General: No tenderness. Normal range of motion. Cervical back: Normal range of motion and neck supple. No edema or erythema. Normal range of motion. Lymphadenopathy:      Head:      Right side of head: No submental, submandibular, tonsillar, preauricular, posterior auricular or occipital adenopathy. Left side of head: No submental, submandibular, tonsillar, preauricular, posterior auricular or occipital adenopathy. Cervical: No cervical adenopathy. Right cervical: No superficial, deep or posterior cervical adenopathy. Left cervical: No superficial, deep or posterior cervical adenopathy. Upper Body:      Right upper body: No supraclavicular or pectoral adenopathy. Left upper body: No supraclavicular or pectoral adenopathy. Skin:     General: Skin is warm and dry. Coloration: Skin is not pale. Findings: No bruising, ecchymosis, laceration, lesion or rash. Nails: There is no clubbing. Neurological:      General: No focal deficit present. Mental Status: He is alert and oriented to person, place, and time. Cranial Nerves: No cranial nerve deficit. Motor: No abnormal muscle tone.       Coordination: Coordination normal.      Deep Tendon Reflexes: Reflexes normal.   Psychiatric:         Mood and Affect: Mood normal.         Speech: Speech normal.         Behavior: Behavior normal.         Thought Content: Thought content normal.         Judgment: Judgment normal.       /70 (Site: Right Upper Arm)   Pulse 72   Temp 98 °F (36.7 °C) (Oral)   Resp 18   Wt 255 lb 6.4 oz (115.8 kg)   BMI 33.70 kg/m²       Impression/Plan:  1. History of tobacco use    2. Primary insomnia    3. History of ETOH abuse    4. Situational anxiety    5. Hx of ischemic vertebrobasilar artery thalamic stroke    6. Mixed hyperlipidemia    7. Gastroesophageal reflux disease, unspecified whether esophagitis present    8. Primary osteoarthritis involving multiple joints    9. Primary hypertension    10. Abrasion of right cornea, initial encounter    11. Coronary artery disease involving native coronary artery of native heart without angina pectoris      Requested Prescriptions     Signed Prescriptions Disp Refills    atorvastatin (LIPITOR) 80 MG tablet 30 tablet 5     Sig: Take 1 tablet by mouth nightly    erythromycin (ROMYCIN) 5 MG/GM ophthalmic ointment 3.5 g 0     Sig: Apply to right eye 3 times daily for 5 days    zolpidem (AMBIEN) 10 MG tablet 90 tablet 1     Sig: Take 1 tablet by mouth nightly as needed for Sleep for up to 90 days. Orders Placed This Encounter   Procedures    Tdap (age 6y and older) IM ([facility])    CBC with Auto Differential     Standing Status:   Future     Standing Expiration Date:   5/11/2023    Comprehensive Metabolic Panel     Standing Status:   Future     Standing Expiration Date:   5/11/2023    Lipid Panel     Standing Status:   Future     Standing Expiration Date:   5/11/2023     Order Specific Question:   Is Patient Fasting?/# of Hours     Answer:   yes/12       Patient giveneducational materials - see patient instructions. Discussed use, benefit, and side effects of prescribed medications. All patient questions answered. Pt voiced understanding. Reviewed health maintenance. Patient agreedwith treatment plan. Follow up as directed.    Tdap updated in office today. CBC CMP FLP ordered for follow-up 3 months. Erythromycin ophthalmic ointment 3 times daily right eye. Any abrasion information provided patient. Ambien refilled 10 mg p.o. nightly #90. OARRS report reviewed. Lipitor also refilled 80 mg 1 p.o. daily. Continue medications as prescribed.  Educational information on above diagnosis  provided per AVS.       30 min  Electronically signed by MANE Ernst CNP on 5/16/2022 at 8:15 AM

## 2022-05-18 ENCOUNTER — TELEPHONE (OUTPATIENT)
Dept: FAMILY MEDICINE CLINIC | Age: 72
End: 2022-05-18

## 2022-05-18 DIAGNOSIS — F41.8 SITUATIONAL ANXIETY: ICD-10-CM

## 2022-05-18 DIAGNOSIS — F41.0 PANIC DISORDER: ICD-10-CM

## 2022-05-18 RX ORDER — ALPRAZOLAM 0.5 MG/1
TABLET ORAL
Qty: 120 TABLET | Refills: 0 | Status: SHIPPED | OUTPATIENT
Start: 2022-05-18 | End: 2022-06-17 | Stop reason: SDUPTHER

## 2022-05-23 RX ORDER — EZETIMIBE 10 MG/1
TABLET ORAL
Qty: 30 TABLET | Refills: 5 | Status: SHIPPED | OUTPATIENT
Start: 2022-05-23 | End: 2022-10-14 | Stop reason: SDUPTHER

## 2022-06-01 DIAGNOSIS — J41.8 MIXED SIMPLE AND MUCOPURULENT CHRONIC BRONCHITIS (HCC): ICD-10-CM

## 2022-06-01 RX ORDER — ALBUTEROL SULFATE 90 UG/1
2 AEROSOL, METERED RESPIRATORY (INHALATION) EVERY 6 HOURS PRN
Qty: 1 EACH | Refills: 3 | Status: SHIPPED | OUTPATIENT
Start: 2022-06-01

## 2022-06-01 RX ORDER — ALBUTEROL SULFATE 2.5 MG/3ML
2.5 SOLUTION RESPIRATORY (INHALATION) EVERY 6 HOURS PRN
Qty: 90 EACH | Refills: 0 | Status: SHIPPED | OUTPATIENT
Start: 2022-06-01 | End: 2022-09-15 | Stop reason: SDUPTHER

## 2022-06-07 LAB
ABSOLUTE BASO #: 0.08 K/UL (ref 0–0.2)
ABSOLUTE EOS #: 0.66 K/UL (ref 0–0.5)
ABSOLUTE LYMPH #: 2.75 K/UL (ref 1–4)
ABSOLUTE MONO #: 0.87 K/UL (ref 0.2–1)
ABSOLUTE NEUT #: 5.11 K/UL (ref 1.5–7.5)
ALBUMIN SERPL-MCNC: 3.9 G/DL (ref 3.5–5.2)
ALK PHOSPHATASE: 83 U/L (ref 40–125)
ALT SERPL-CCNC: 20 U/L (ref 5–50)
ANION GAP SERPL CALCULATED.3IONS-SCNC: 6 MEQ/L (ref 10–19)
AST SERPL-CCNC: 12 U/L (ref 9–50)
BASOPHILS RELATIVE PERCENT: 0.8 %
BILIRUB SERPL-MCNC: 0.7 MG/DL
BUN BLDV-MCNC: 14 MG/DL (ref 8–23)
CALCIUM SERPL-MCNC: 9.1 MG/DL (ref 8.5–10.5)
CHLORIDE BLD-SCNC: 107 MEQ/L (ref 95–107)
CHOLESTEROL/HDL RATIO: 2.6 RATIO
CHOLESTEROL: 76 MG/DL
CO2: 26 MEQ/L (ref 19–31)
CREAT SERPL-MCNC: 0.94 MG/DL (ref 0.8–1.4)
EGFR IF NONAFRICAN AMERICAN: 82 ML/MIN/1.73
EOSINOPHILS RELATIVE PERCENT: 6.9 %
GLUCOSE: 112 MG/DL (ref 70–99)
HCT VFR BLD CALC: 45.1 % (ref 40–51)
HDLC SERPL-MCNC: 29 MG/DL
HEMOGLOBIN: 14.8 G/DL (ref 13.5–17)
LDL CHOLESTEROL CALCULATED: 20 MG/DL
LDL/HDL RATIO: 0.7 RATIO
LYMPHOCYTE %: 28.9 %
MCH RBC QN AUTO: 30.5 PG (ref 25–33)
MCHC RBC AUTO-ENTMCNC: 32.8 G/DL (ref 31–36)
MCV RBC AUTO: 92.8 FL (ref 80–99)
MONOCYTES # BLD: 9.1 %
NEUTROPHILS RELATIVE PERCENT: 53.9 %
PDW BLD-RTO: 12.9 % (ref 11.5–15)
PLATELETS: 279 K/UL (ref 130–400)
PMV BLD AUTO: 11.1 FL (ref 9.3–13)
POTASSIUM SERPL-SCNC: 4.7 MEQ/L (ref 3.5–5.4)
RBC: 4.86 M/UL (ref 4.5–6.1)
SODIUM BLD-SCNC: 139 MEQ/L (ref 133–146)
TOTAL PROTEIN: 6.2 G/DL (ref 6.1–8.3)
TRIGL SERPL-MCNC: 133 MG/DL
VLDLC SERPL CALC-MCNC: 27 MG/DL
WBC: 9.5 K/UL (ref 3.5–11)

## 2022-06-09 ENCOUNTER — OFFICE VISIT (OUTPATIENT)
Dept: CARDIOLOGY CLINIC | Age: 72
End: 2022-06-09
Payer: COMMERCIAL

## 2022-06-09 VITALS
WEIGHT: 262 LBS | HEART RATE: 80 BPM | HEIGHT: 73 IN | SYSTOLIC BLOOD PRESSURE: 124 MMHG | DIASTOLIC BLOOD PRESSURE: 62 MMHG | BODY MASS INDEX: 34.72 KG/M2

## 2022-06-09 DIAGNOSIS — Z72.0 TOBACCO ABUSE: ICD-10-CM

## 2022-06-09 DIAGNOSIS — I42.9 CARDIOMYOPATHY, UNSPECIFIED TYPE (HCC): Primary | ICD-10-CM

## 2022-06-09 PROCEDURE — 1123F ACP DISCUSS/DSCN MKR DOCD: CPT | Performed by: INTERNAL MEDICINE

## 2022-06-09 PROCEDURE — 99213 OFFICE O/P EST LOW 20 MIN: CPT | Performed by: INTERNAL MEDICINE

## 2022-06-09 NOTE — PROGRESS NOTES
[address] [place] [address]  Dept: [phone]  Dept Fax: [phone]  Loc: [phone]    Visit Date: 6/9/2022    Mr. Bailey Leal is a 73 y.o. male  who presented for:  Chief Complaint   Patient presents with    Follow-up       HPI:   HPI   78 y.o. male who presents for follow-up after prior admission for low EF. Patient is a current smoker w/ PMH CVA, Rt Carotid artery stenosis s/p stent, ETOH abuse (clean 5 years), depression, GERD. Hettie Rosie was done for mildly reduced EF and showed possible infarction in the RCA and/or LCX territory. He found out his sob was related to mold in an air conditioner at work, they fixed this [facility] unit and now is better. He is on ACEi/BB, and ASA/Plavix for CVA. No chest pain, angina, PARIKH, orthopnea, PND, sob at rest, palpitations, LE edema, or syncope. No bleeding. He feels much better than prior. Current Outpatient Medications:     albuterol sulfate HFA (PROAIR HFA) 108 (90 Base) MCG/ACT inhaler, Inhale 2 puffs into the lungs every 6 hours as needed for Wheezing, Disp: 1 each, Rfl: 3    albuterol (PROVENTIL) (2.5 MG/3ML) 0.083% nebulizer solution, Take 3 mLs by nebulization every 6 hours as needed for Wheezing, Disp: 90 each, Rfl: 0    ezetimibe (ZETIA) 10 MG tablet, take 1 tablet by mouth once daily, Disp: 30 tablet, Rfl: 5    ALPRAZolam (XANAX) 0.5 MG tablet, take 1-2 tablet by mouth 3 times daily  As needed for anxiety, Disp: 120 tablet, Rfl: 0    atorvastatin (LIPITOR) 80 MG tablet, Take 1 tablet by mouth nightly, Disp: 30 tablet, Rfl: 5    zolpidem (AMBIEN) 10 MG tablet, Take 1 tablet by mouth nightly as needed for Sleep for up to 90 days. , Disp: 90 tablet, Rfl: 1    metoprolol succinate (TOPROL XL) 25 MG extended release tablet, Take 0.5 tablets by mouth daily, Disp: 30 tablet, Rfl: 3    lisinopril (PRINIVIL;ZESTRIL) 5 MG tablet, Take 1 tablet by mouth daily, Disp: 30 tablet, Rfl: 3    pantoprazole (PROTONIX) 40 MG tablet, take 1 tablet by mouth once daily, Disp: 90 tablet, Rfl: 2    clopidogrel (PLAVIX) 75 MG tablet, take 1 tablet by mouth once daily, Disp: 30 tablet, Rfl: 11    Respiratory Therapy Supplies (NEBULIZER/TUBING/MOUTHPIECE) KIT, 1 kit by Does not apply route daily as needed (cough, wheeze) Diagnosis:RAD and Bronchitis, Disp: 1 kit, Rfl: 0    aspirin 81 MG chewable tablet, Take 81 mg by mouth daily, Disp: , Rfl:     Past Medical History  Sandra Mon  has a past medical history of 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 Social History  Sandra Mon  reports that he has been smoking cigarettes. He started smoking about 53 years ago. He has a 15.00 pack-year smoking history. He has never used smokeless tobacco. He reports previous alcohol use of about 42.0 standard drinks of alcohol per week. He reports that he does not use drugs. Family History  Sandra Mon family history includes Cancer in his brother; Dementia in his mother; Heart Disease in his father; High Blood Pressure in his sister. There is no family history of bicuspid aortic valve, aneurysms, heart transplant, pacemakers, defibrillators, or sudden cardiac death.       Past Surgical History   Past Surgical History:   Procedure Laterality Date    APPENDECTOMY      COLONOSCOPY      EKG 12-LEAD  7/17/2015         HERNIA REPAIR      umbilical    JOINT REPLACEMENT  08/03/2016    RTK       KNEE ARTHROSCOPY      Rt knee Dr. Gilles Lanes Right        Review of Systems   Constitutional: Negative for chills and fever  HENT: Negative for congestion, sinus pressure, sneezing and sore throat. Eyes: Negative for pain, discharge, redness and itching. Respiratory: Negative for apnea, cough  Gastrointestinal: Negative for blood in stool, constipation, diarrhea   Endocrine: Negative for cold intolerance, heat intolerance, polydipsia. Genitourinary: Negative for dysuria, enuresis, flank pain and hematuria. Musculoskeletal: Negative for arthralgias, joint swelling and neck pain. Neurological: Negative for numbness and headaches. Psychiatric/Behavioral: Negative for agitation, confusion, decreased concentration and dysphoric mood. Objective:     /62   Pulse 80   Ht 6' 1\" (1.854 m)   Wt 262 lb (118.8 kg)   BMI 34.57 kg/m²     Wt Readings from Last 3 Encounters:   06/09/22 262 lb (118.8 kg)   05/11/22 255 lb 6.4 oz (115.8 kg)   04/18/22 253 lb (114.8 kg)     BP Readings from Last 3 Encounters:   06/09/22 124/62   05/11/22 128/70   04/18/22 120/72       Nursing note and vitals reviewed. Physical Exam   Constitutional: Oriented to person, place, and time. Appears well-developed and well-nourished. HENT:   Head: Normocephalic and atraumatic. Eyes: EOM are normal. Pupils are equal, round, and reactive to light. Neck: Normal range of motion. Neck supple. No JVD present. Cardiovascular: Normal rate, regular rhythm, normal heart sounds and intact distal pulses. No murmur heard. Pulmonary/Chest: Effort normal and breath sounds normal. No respiratory distress. No wheezes. No rales. Abdominal: Soft. Bowel sounds are normal. No distension. There is no tenderness. Musculoskeletal: Normal range of motion. No edema. Neurological: Alert and oriented to person, place, and time. No cranial nerve deficit. Coordination normal.   Skin: Skin is warm and dry. Psychiatric: Normal mood and affect.        Lab Results   Component Value Date    CKTOTAL 134 07/16/2015    CKMB 2.1 07/16/2015       Lab Results   Component Value Date    WBC 9.5 06/06/2022    WBC 8.4 10/28/2021    RBC 4.86 06/06/2022    HGB 14.8 06/06/2022    HCT 45.1 06/06/2022    MCV 92.8 06/06/2022    MCH 30.5 06/06/2022    MCHC 32.8 06/06/2022    RDW 12.9 06/06/2022     06/06/2022     10/28/2021    MPV 11.1 06/06/2022       Lab Results   Component Value Date     06/06/2022    K 4.7 06/06/2022    K 3.9 10/26/2021     06/06/2022    CO2 26 06/06/2022    BUN 14 06/06/2022    LABALBU 3.9 06/06/2022    LABALBU 4.6 05/11/2012    CREATININE 0.94 06/06/2022    CALCIUM 9.1 06/06/2022    LABGLOM 84 03/11/2022    GLUCOSE 112 06/06/2022       Lab Results   Component Value Date    ALKPHOS 83 06/06/2022    ALKPHOS 87 10/26/2021    ALT 20 06/06/2022    AST 12 06/06/2022    PROT 6.2 06/06/2022    BILITOT 0.7 06/06/2022    LABALBU 3.9 06/06/2022    LABALBU 4.6 05/11/2012       Lab Results   Component Value Date    MG 2.1 03/11/2022       Lab Results   Component Value Date    INR 0.96 02/12/2021    INR 1.03 05/15/2018    INR 1.07 04/06/2018         Lab Results   Component Value Date    LABA1C 6.1 10/27/2021       Lab Results   Component Value Date    TRIG 133 06/06/2022    HDL 29 06/06/2022    HDL 66 05/01/2010    LDLCALC 20 06/06/2022    LABVLDL 27 06/06/2022       Lab Results   Component Value Date    TSH 2.62 11/11/2021         Testing Reviewed:      I have individually reviewed the cardiac test below:    ECHO: Results for orders placed during the hospital encounter of 10/26/21    ECHO Complete 2D W Doppler W Color    Narrative  Transthoracic Echocardiography Report (TTE)    Demographics    Patient Name   Ova Wilkinson  Gender              Male  R    MR #           [id number]       Race                    Ethnicity    Account #      [de-identified]       Room Number         0009    Accession      [id number]      Date of Study       10/27/2021  Number    Date of Birth  1950      Referring HANK Fregoso MD 78 year(s)      Sonographer         [facility], RDCS,  RVT    Interpreting        Echo reader of the week  Physician           Luc Bauer MD    Procedure    Type of Study    TTE procedure:ECHOCARDIOGRAM COMPLETE 2D W DOPPLER W COLOR. Procedure Date  Date: 10/27/2021 Start: 01:25 PM    Study Location: Bedside  Technical Quality: Limited visualization due to lung interface. Indications:Possible stroke. Additional Medical History:smoker, altered mental status, hypertension, ETOH  abuse, carotid stenosis, GERD, hyperlipidemia, chest pain, bradycardia,  obesity, stroke    Patient Status: Routine    Height: 73 inches Weight: 237.01 pounds BSA: 2.31 m^2 BMI: 31.27 kg/m^2    BP: 129/79 mmHg    Allergies  - See Epic. Conclusions    Summary  Technically difficult examination. Left ventricle size is normal.  Normal left ventricular wall thickness. There was mild global hypokinesis of the left ventricle. Systolic function was mildly reduced. Ejection fraction is visually estimated in the range of 45% to 50%. Doppler parameters were consistent with abnormal left ventricular  relaxation (grade 1 diastolic dysfunction). Signature    ----------------------------------------------------------------  Electronically signed by Luc Bauer MD (Interpreting  physician) on 10/27/2021 at 05:08 PM  ----------------------------------------------------------------    Findings    Mitral Valve  The mitral valve structure was normal with normal leaflet separation. DOPPLER: The transmitral velocity was within the normal range with no  evidence for mitral stenosis. Trace mitral regurgitation is present. Aortic Valve  The aortic valve was trileaflet with normal thickness and cuspal  separation. DOPPLER: Transaortic velocity was within the normal range with  no evidence of aortic stenosis. There was no evidence of aortic  regurgitation.     Tricuspid Valve  The tricuspid valve structure was normal with normal leaflet separation. DOPPLER: There was no evidence of tricuspid stenosis. There was no  evidence of tricuspid regurgitation. Pulmonic Valve  The pulmonic valve leaflets exhibited normal thickness, no calcification,  and normal cuspal separation. DOPPLER: The transpulmonic velocity was  within the normal range with no evidence for regurgitation. Left Atrium  Left atrial size was normal.    Left Ventricle  Left ventricle size is normal.  Normal left ventricular wall thickness. There was mild global hypokinesis of the left ventricle. Systolic function was mildly reduced. Ejection fraction is visually estimated in the range of 45% to 50%. Doppler parameters were consistent with abnormal left ventricular  relaxation (grade 1 diastolic dysfunction). Right Atrium  Right atrial size was normal.    Right Ventricle  The right ventricular size was normal with normal systolic function and  wall thickness. Pericardial Effusion  The pericardium was normal in appearance with no evidence of a pericardial  effusion. Pleural Effusion  No evidence of pleural effusion. Aorta / Great Vessels  -Aortic root dimension within normal limits.  -The Pulmonary artery is within normal limits. -IVC size is within normal limits with normal respiratory phasic changes.     M-Mode/2D Measurements & Calculations    LV Diastolic   LV Systolic Dimension:    AV Cusp Separation: 2.1 cmLA  Dimension: 4.3 3.7 cm                    Dimension: 2.9 cmAO Root  cm             LV Volume Diastolic: 50.2 Dimension: 4.1 cmLA Area: 14.8  LV FS:14 %     ml                        cm^2  LV PW          LV Volume Systolic: 90.1  Diastolic: 1.3 ml  cm             LV EDV/LV EDV Index: 72.1  Septum         ml/31 m^2LV ESV/LV ESV    RV Diastolic Dimension: 3.5 cm  Diastolic: 1.2 Index: 81.9 ml/18 m^2  cm             EF Calculated: 41.3 %     LA/Aorta: 0.71    LV Length: 7.7 cm         LA volume/Index: 36.1 ml /16m^2    LV Area  Diastolic:  65.7 cm^2  LV Area  Systolic: 18  cm^2    Doppler Measurements & Calculations    MV Peak E-Wave: 78.2 cm/s AV Peak Velocity: 117   LVOT Peak Velocity: 107  MV Peak A-Wave: 78.8 cm/s cm/s                    cm/s  MV E/A Ratio: 0.99        AV Peak Gradient: 5.48  LVOT Peak Gradient: 5  MV Peak Gradient: 2.45    mmHg                    mmHg  mmHg  TV Peak E-Wave: 32.8  MV Deceleration Time: 208                         cm/s  msec                                              TV Peak A-Wave: 70 cm/s  MV P1/2t: 61 msec         IVRT: 106 msec  MVA by PHT:3.61 cm^2                              TV Peak Gradient: 0.43  mmHg  AV DVI (Vmax):0.91  PV Peak Velocity: 106  cm/s  MR Velocity: 447 cm/s                             PV Peak Gradient: 4.49  mmHg    [id number]       Assessment/Plan   Cardiomyopathy w/ drop in EF  Midrange EF 45-50% TTE 10/26/21  Hx of CVA  s/p Rt carotid stent  Tobacco Abuse - trying to quit  Hx of EtOH abuse  HTN  Possible infarction in the RCA/LCX territory  Mold allergy  He has been OMT, re-check TTE, if EF normalized, continue  current management. BP and HR well controlled. LDL goal < 70. Allergies better since [facility] unit changed at work. Discussed diet/exercise/BP/weight loss/health lifestyle choices/lipids; the patient understands the goals and will try to comply.      Disposition:  6-12 months     Electronically signed by Shilpi Dempsey MD   6/9/2022 at 10:34 AM EDT

## 2022-06-15 ENCOUNTER — HOSPITAL ENCOUNTER (OUTPATIENT)
Dept: NON INVASIVE DIAGNOSTICS | Age: 72
Discharge: HOME | End: 2022-06-15
Payer: COMMERCIAL

## 2022-06-15 DIAGNOSIS — I42.9 CARDIOMYOPATHY, UNSPECIFIED TYPE (HCC): ICD-10-CM

## 2022-06-15 LAB
LV EF: 53 %
LVEF MODALITY: NORMAL

## 2022-06-15 PROCEDURE — 93306 TTE W/DOPPLER COMPLETE: CPT

## 2022-06-16 RX ORDER — PANTOPRAZOLE SODIUM 40 MG/1
TABLET, DELAYED RELEASE ORAL
Qty: 90 TABLET | Refills: 3 | Status: SHIPPED | OUTPATIENT
Start: 2022-06-16

## 2022-06-17 DIAGNOSIS — F41.0 PANIC DISORDER: ICD-10-CM

## 2022-06-17 DIAGNOSIS — F41.8 SITUATIONAL ANXIETY: ICD-10-CM

## 2022-06-17 RX ORDER — ALPRAZOLAM 0.5 MG/1
TABLET ORAL
Qty: 120 TABLET | Refills: 0 | Status: SHIPPED | OUTPATIENT
Start: 2022-06-17 | End: 2022-07-14 | Stop reason: SDUPTHER

## 2022-06-17 NOTE — TELEPHONE ENCOUNTER
Pt request xanax refill be sent to [facility]. [facility]. Last seen 5/11/22  Last Xanax Rx 5/18/22 for #120.

## 2022-06-28 RX ORDER — METOPROLOL SUCCINATE 25 MG/1
12.5 TABLET, EXTENDED RELEASE ORAL DAILY
Qty: 30 TABLET | Refills: 3 | Status: SHIPPED | OUTPATIENT
Start: 2022-06-28 | End: 2022-10-14 | Stop reason: SDUPTHER

## 2022-07-14 ENCOUNTER — OFFICE VISIT (OUTPATIENT)
Dept: FAMILY MEDICINE CLINIC | Age: 72
End: 2022-07-14
Payer: COMMERCIAL

## 2022-07-14 VITALS
HEIGHT: 73 IN | BODY MASS INDEX: 34.06 KG/M2 | WEIGHT: 257 LBS | SYSTOLIC BLOOD PRESSURE: 116 MMHG | HEART RATE: 72 BPM | DIASTOLIC BLOOD PRESSURE: 68 MMHG | RESPIRATION RATE: 24 BRPM | OXYGEN SATURATION: 95 %

## 2022-07-14 DIAGNOSIS — M15.9 PRIMARY OSTEOARTHRITIS INVOLVING MULTIPLE JOINTS: ICD-10-CM

## 2022-07-14 DIAGNOSIS — I10 PRIMARY HYPERTENSION: ICD-10-CM

## 2022-07-14 DIAGNOSIS — F41.8 SITUATIONAL ANXIETY: ICD-10-CM

## 2022-07-14 DIAGNOSIS — E78.2 MIXED HYPERLIPIDEMIA: ICD-10-CM

## 2022-07-14 DIAGNOSIS — M25.561 CHRONIC PAIN OF RIGHT KNEE: ICD-10-CM

## 2022-07-14 DIAGNOSIS — Z86.73 HX OF ISCHEMIC VERTEBROBASILAR ARTERY THALAMIC STROKE: ICD-10-CM

## 2022-07-14 DIAGNOSIS — F41.0 PANIC DISORDER: ICD-10-CM

## 2022-07-14 DIAGNOSIS — Z12.5 SCREENING FOR PROSTATE CANCER: ICD-10-CM

## 2022-07-14 DIAGNOSIS — F51.01 PRIMARY INSOMNIA: Primary | ICD-10-CM

## 2022-07-14 DIAGNOSIS — G89.29 CHRONIC PAIN OF RIGHT KNEE: ICD-10-CM

## 2022-07-14 DIAGNOSIS — F10.11 HISTORY OF ETOH ABUSE: ICD-10-CM

## 2022-07-14 DIAGNOSIS — Z87.891 HISTORY OF TOBACCO USE: ICD-10-CM

## 2022-07-14 PROCEDURE — 1123F ACP DISCUSS/DSCN MKR DOCD: CPT | Performed by: NURSE PRACTITIONER

## 2022-07-14 PROCEDURE — 99214 OFFICE O/P EST MOD 30 MIN: CPT | Performed by: NURSE PRACTITIONER

## 2022-07-14 RX ORDER — TRAMADOL HYDROCHLORIDE 50 MG/1
50 TABLET ORAL EVERY 6 HOURS PRN
Qty: 28 TABLET | Refills: 0 | Status: SHIPPED | OUTPATIENT
Start: 2022-07-14 | End: 2022-07-21

## 2022-07-14 RX ORDER — ALPRAZOLAM 0.5 MG/1
TABLET ORAL
Qty: 120 TABLET | Refills: 0 | Status: SHIPPED | OUTPATIENT
Start: 2022-07-14 | End: 2022-08-15 | Stop reason: SDUPTHER

## 2022-07-19 RX ORDER — TRAMADOL HYDROCHLORIDE 50 MG/1
50 TABLET ORAL EVERY 4 HOURS PRN
Qty: 120 TABLET | Refills: 0 | Status: SHIPPED | OUTPATIENT
Start: 2022-07-19 | End: 2022-08-18

## 2022-07-19 ASSESSMENT — ENCOUNTER SYMPTOMS
ABDOMINAL DISTENTION: 0
SHORTNESS OF BREATH: 0
COUGH: 0
WHEEZING: 0
ABDOMINAL PAIN: 0
CHEST TIGHTNESS: 0
BACK PAIN: 1

## 2022-07-19 NOTE — PROGRESS NOTES
Essential hypertension, malignant     ETOH abuse     GERD (gastroesophageal reflux disease)     Insomnia     Low back pain     Major depressive disorder, recurrent ([facility] [address] 4/4/2018    Other headache syndromes 12/25/2016    Pneumonia     Pneumonia 02/2022    Sciatica     right side strain      Past Surgical History:   Procedure Laterality Date    APPENDECTOMY      COLONOSCOPY      EKG 12-LEAD  7/17/2015         HERNIA REPAIR      umbilical    JOINT REPLACEMENT  08/03/2016    RTK       KNEE ARTHROSCOPY      Rt knee Dr. Kay Zamorano Right      Family History   Problem Relation Age of Onset    Dementia Mother     Heart Disease Father     High Blood Pressure Sister     Cancer Brother         testicular cancer     Social History     Tobacco Use    Smoking status: Every Day     Packs/day: 0.50     Years: 30.00     Pack years: 15.00     Types: Cigarettes     Start date: 7/16/1968    Smokeless tobacco: Never    Tobacco comments:     printed to avs   Substance Use Topics    Alcohol use: Not Currently     Alcohol/week: 42.0 standard drinks     Types: 42 Cans of beer per week     Comment: States last beer was in 3/2018      Current Outpatient Medications   Medication Sig Dispense Refill    ALPRAZolam (XANAX) 0.5 MG tablet take 1-2 tablet by mouth 3 times daily  As needed for anxiety 120 tablet 0    traMADol (ULTRAM) 50 MG tablet Take 1 tablet by mouth every 6 hours as needed for Pain for up to 7 days. Intended supply: 7 days.  Take lowest dose possible to manage pain 28 tablet 0    metoprolol succinate (TOPROL XL) 25 MG extended release tablet Take 0.5 tablets by mouth daily 30 tablet 3    pantoprazole (PROTONIX) 40 MG tablet take 1 tablet by mouth once daily 90 tablet 3    albuterol sulfate HFA (PROAIR HFA) 108 (90 Base) MCG/ACT inhaler Inhale 2 puffs into the lungs every 6 hours as needed for Wheezing 1 each 3    albuterol (PROVENTIL) (2.5 MG/3ML) 0.083% nebulizer solution Take 3 mLs by nebulization every 6 hours as needed for Wheezing 90 each 0    ezetimibe (ZETIA) 10 MG tablet take 1 tablet by mouth once daily 30 tablet 5    atorvastatin (LIPITOR) 80 MG tablet Take 1 tablet by mouth nightly 30 tablet 5    zolpidem (AMBIEN) 10 MG tablet Take 1 tablet by mouth nightly as needed for Sleep for up to 90 days. 90 tablet 1    lisinopril (PRINIVIL;ZESTRIL) 5 MG tablet Take 1 tablet by mouth daily 30 tablet 3    clopidogrel (PLAVIX) 75 MG tablet take 1 tablet by mouth once daily 30 tablet 11    Respiratory Therapy Supplies (NEBULIZER/TUBING/MOUTHPIECE) KIT 1 kit by Does not apply route daily as needed (cough, wheeze) Diagnosis:RAD and Bronchitis 1 kit 0    aspirin 81 MG chewable tablet Take 81 mg by mouth daily       No current facility-administered medications for this visit. Allergies   Allergen Reactions    Tape Jaime Kevin [address] [facility]   Topic Date Due    Shingles vaccine (1 of 2) Never done    AAA screen  Never done    COVID-19 Vaccine (3 - Booster for Pfizer series) 10/07/2021    Prostate Specific Antigen (PSA) Screening or Monitoring  02/01/2022    Flu vaccine (1) 09/01/2022    A1C test (Diabetic or Prediabetic)  10/27/2022    Depression Monitoring  01/20/2023    Lipids  06/06/2023    Colorectal Cancer Screen  02/09/2024    DTaP/Tdap/Td vaccine (3 - Td or Tdap) 05/11/2032    Pneumococcal 65+ years Vaccine  Completed    Hepatitis C screen  Completed    Hepatitis A vaccine  Aged Out    Hepatitis B vaccine  Aged Out    Hib vaccine  Aged Out    Meningococcal (ACWY) vaccine  Aged Out         Objective:     Physical Exam  Vitals and nursing note reviewed. Psychiatric:         Mood and Affect: Mood is anxious. Affect is blunt and tearful. Speech: Speech normal.         Behavior: Behavior normal. Behavior is cooperative. Thought Content:  Thought content normal.         Cognition and Memory: Cognition normal.         Judgment: Judgment normal.     /68   Pulse 72   Resp 24   Ht 6' 1\" (1.854 m)   Wt 257 lb (116.6 kg)   SpO2 95%   BMI 33.91 kg/m²       Impression/Plan:  1. Primary insomnia    2. Panic disorder    3. Situational anxiety    4. History of tobacco use    5. History of ETOH abuse    6. Mixed hyperlipidemia    7. Hx of ischemic vertebrobasilar artery thalamic stroke    8. Primary hypertension    9. Primary osteoarthritis involving multiple joints    10. Screening for prostate cancer      Requested Prescriptions     Signed Prescriptions Disp Refills    ALPRAZolam (XANAX) 0.5 MG tablet 120 tablet 0     Sig: take 1-2 tablet by mouth 3 times daily  As needed for anxiety    traMADol (ULTRAM) 50 MG tablet 28 tablet 0     Sig: Take 1 tablet by mouth every 6 hours as needed for Pain for up to 7 days. Intended supply: 7 days. Take lowest dose possible to manage pain     Orders Placed This Encounter   Procedures    Comprehensive Metabolic Panel     Standing Status:   Future     Standing Expiration Date:   7/14/2023    Lipid Panel     Standing Status:   Future     Standing Expiration Date:   7/14/2023     Order Specific Question:   Is Patient Fasting?/# of Hours     Answer:   yes/12    PSA Screening     Standing Status:   Future     Standing Expiration Date:   7/14/2023       Patient giveneducational materials - see patient instructions. Discussed use, benefit, and side effects of prescribed medications. All patient questions answered. Pt voiced understanding. Reviewed health maintenance. Patient agreedwith treatment plan. Follow up as directed. CMP FLP PSA ordered. Xanax refilled 0.5 mg 1 to 2 tablets 3 times daily as needed #120 OARRS report reviewed. Tramadol 50 mg every 6 hours #120. Follow-up 3 months. Continue medications as prescribed.  Educational information on above diagnosis  provided per AVS.  30 min       Electronically signed by MANE Ibarra CNP on 7/19/2022 at 4:05 PM

## 2022-07-20 DIAGNOSIS — Z86.73 HISTORY OF STROKE: ICD-10-CM

## 2022-07-20 RX ORDER — CLOPIDOGREL BISULFATE 75 MG/1
TABLET ORAL
Qty: 30 TABLET | Refills: 11 | Status: SHIPPED | OUTPATIENT
Start: 2022-07-20

## 2022-07-29 LAB
ALBUMIN SERPL-MCNC: 4.5 G/DL (ref 3.5–5.2)
ALK PHOSPHATASE: 93 U/L (ref 40–125)
ALT SERPL-CCNC: 19 U/L (ref 5–50)
ANION GAP SERPL CALCULATED.3IONS-SCNC: 11 MEQ/L (ref 10–19)
AST SERPL-CCNC: 14 U/L (ref 9–50)
BILIRUB SERPL-MCNC: 1 MG/DL
BUN BLDV-MCNC: 10 MG/DL (ref 8–23)
CALCIUM SERPL-MCNC: 9.4 MG/DL (ref 8.5–10.5)
CHLORIDE BLD-SCNC: 104 MEQ/L (ref 95–107)
CHOLESTEROL/HDL RATIO: 3.3 RATIO
CHOLESTEROL: 90 MG/DL
CO2: 25 MEQ/L (ref 19–31)
CREAT SERPL-MCNC: 0.99 MG/DL (ref 0.8–1.4)
EGFR IF NONAFRICAN AMERICAN: 82 ML/MIN/1.73
GLUCOSE: 106 MG/DL (ref 70–99)
HDLC SERPL-MCNC: 27 MG/DL
LDL CHOLESTEROL CALCULATED: 37 MG/DL
LDL/HDL RATIO: 1.4 RATIO
POTASSIUM SERPL-SCNC: 4.2 MEQ/L (ref 3.5–5.4)
PSA, ULTRASENSITIVE: 0.69 NG/ML
SODIUM BLD-SCNC: 140 MEQ/L (ref 133–146)
TOTAL PROTEIN: 6.3 G/DL (ref 6.1–8.3)
TRIGL SERPL-MCNC: 129 MG/DL
VLDLC SERPL CALC-MCNC: 26 MG/DL

## 2022-08-12 ENCOUNTER — APPOINTMENT (OUTPATIENT)
Dept: GENERAL RADIOLOGY | Age: 72
End: 2022-08-12
Payer: COMMERCIAL

## 2022-08-12 ENCOUNTER — HOSPITAL ENCOUNTER (EMERGENCY)
Age: 72
Discharge: HOME | End: 2022-08-12
Attending: EMERGENCY MEDICINE
Payer: COMMERCIAL

## 2022-08-12 ENCOUNTER — APPOINTMENT (OUTPATIENT)
Dept: CT IMAGING | Age: 72
End: 2022-08-12
Payer: COMMERCIAL

## 2022-08-12 VITALS
HEART RATE: 65 BPM | RESPIRATION RATE: 15 BRPM | TEMPERATURE: 98.4 F | HEIGHT: 73 IN | WEIGHT: 240 LBS | OXYGEN SATURATION: 95 % | BODY MASS INDEX: 31.81 KG/M2 | DIASTOLIC BLOOD PRESSURE: 90 MMHG | SYSTOLIC BLOOD PRESSURE: 151 MMHG

## 2022-08-12 DIAGNOSIS — J01.90 ACUTE SINUSITIS, RECURRENCE NOT SPECIFIED, UNSPECIFIED LOCATION: ICD-10-CM

## 2022-08-12 DIAGNOSIS — G47.00 INSOMNIA, UNSPECIFIED TYPE: ICD-10-CM

## 2022-08-12 DIAGNOSIS — R41.0 CONFUSION: Primary | ICD-10-CM

## 2022-08-12 LAB
ALBUMIN SERPL-MCNC: 4 G/DL (ref 3.5–5.1)
ALP BLD-CCNC: 98 U/L (ref 38–126)
ALT SERPL-CCNC: 13 U/L (ref 11–66)
AMPHETAMINE+METHAMPHETAMINE URINE SCREEN: NEGATIVE
ANION GAP SERPL CALCULATED.3IONS-SCNC: 9 MEQ/L (ref 8–16)
AST SERPL-CCNC: 12 U/L (ref 5–40)
BARBITURATE QUANTITATIVE URINE: NEGATIVE
BASOPHILS # BLD: 0.5 %
BASOPHILS ABSOLUTE: 0 THOU/MM3 (ref 0–0.1)
BENZODIAZEPINE QUANTITATIVE URINE: NEGATIVE
BILIRUB SERPL-MCNC: 0.5 MG/DL (ref 0.3–1.2)
BILIRUBIN URINE: NEGATIVE
BLOOD, URINE: NEGATIVE
BUN BLDV-MCNC: 10 MG/DL (ref 7–22)
C-REACTIVE PROTEIN: 2.44 MG/DL (ref 0–1)
CALCIUM SERPL-MCNC: 9.1 MG/DL (ref 8.5–10.5)
CANNABINOID QUANTITATIVE URINE: NEGATIVE
CHARACTER, URINE: CLEAR
CHLORIDE BLD-SCNC: 104 MEQ/L (ref 98–111)
CO2: 23 MEQ/L (ref 23–33)
COCAINE METABOLITE QUANTITATIVE URINE: NEGATIVE
COLOR: YELLOW
CREAT SERPL-MCNC: 0.9 MG/DL (ref 0.4–1.2)
EOSINOPHIL # BLD: 4.7 %
EOSINOPHILS ABSOLUTE: 0.5 THOU/MM3 (ref 0–0.4)
ERYTHROCYTE [DISTWIDTH] IN BLOOD BY AUTOMATED COUNT: 13.8 % (ref 11.5–14.5)
ERYTHROCYTE [DISTWIDTH] IN BLOOD BY AUTOMATED COUNT: 46.5 FL (ref 35–45)
ETHYL ALCOHOL, SERUM: < 0.01 %
GFR SERPL CREATININE-BSD FRML MDRD: 83 ML/MIN/1.73M2
GLUCOSE BLD-MCNC: 143 MG/DL (ref 70–108)
GLUCOSE BLD-MCNC: 168 MG/DL (ref 70–108)
GLUCOSE URINE: NEGATIVE MG/DL
HCT VFR BLD CALC: 41.1 % (ref 42–52)
HEMOGLOBIN: 13.8 GM/DL (ref 14–18)
IMMATURE GRANS (ABS): 0.03 THOU/MM3 (ref 0–0.07)
IMMATURE GRANULOCYTES: 0.3 %
KETONES, URINE: NEGATIVE
LEUKOCYTE ESTERASE, URINE: NEGATIVE
LYMPHOCYTES # BLD: 31.5 %
LYMPHOCYTES ABSOLUTE: 3.1 THOU/MM3 (ref 1–4.8)
MCH RBC QN AUTO: 31.3 PG (ref 26–33)
MCHC RBC AUTO-ENTMCNC: 33.6 GM/DL (ref 32.2–35.5)
MCV RBC AUTO: 93.2 FL (ref 80–94)
MONOCYTES # BLD: 8.6 %
MONOCYTES ABSOLUTE: 0.9 THOU/MM3 (ref 0.4–1.3)
NITRITE, URINE: NEGATIVE
NUCLEATED RED BLOOD CELLS: 0 /100 WBC
OPIATES, URINE: POSITIVE
OSMOLALITY CALCULATION: 273.5 MOSMOL/KG (ref 275–300)
OXYCODONE: NEGATIVE
PH UA: 6.5 (ref 5–9)
PHENCYCLIDINE QUANTITATIVE URINE: NEGATIVE
PLATELET # BLD: 282 THOU/MM3 (ref 130–400)
PMV BLD AUTO: 10.1 FL (ref 9.4–12.4)
POTASSIUM REFLEX MAGNESIUM: 4 MEQ/L (ref 3.5–5.2)
PRO-BNP: 74.4 PG/ML (ref 0–900)
PROTEIN UA: NEGATIVE
RBC # BLD: 4.41 MILL/MM3 (ref 4.7–6.1)
SEG NEUTROPHILS: 54.4 %
SEGMENTED NEUTROPHILS ABSOLUTE COUNT: 5.4 THOU/MM3 (ref 1.8–7.7)
SODIUM BLD-SCNC: 136 MEQ/L (ref 135–145)
SPECIFIC GRAVITY, URINE: 1.02 (ref 1–1.03)
TOTAL PROTEIN: 6.7 G/DL (ref 6.1–8)
TROPONIN T: < 0.01 NG/ML
UROBILINOGEN, URINE: 1 EU/DL (ref 0–1)
WBC # BLD: 9.9 THOU/MM3 (ref 4.8–10.8)

## 2022-08-12 PROCEDURE — 70450 CT HEAD/BRAIN W/O DYE: CPT

## 2022-08-12 PROCEDURE — 82077 ASSAY SPEC XCP UR&BREATH IA: CPT

## 2022-08-12 PROCEDURE — 80307 DRUG TEST PRSMV CHEM ANLYZR: CPT

## 2022-08-12 PROCEDURE — 99285 EMERGENCY DEPT VISIT HI MDM: CPT

## 2022-08-12 PROCEDURE — 71045 X-RAY EXAM CHEST 1 VIEW: CPT

## 2022-08-12 PROCEDURE — 85025 COMPLETE CBC W/AUTO DIFF WBC: CPT

## 2022-08-12 PROCEDURE — 80053 COMPREHEN METABOLIC PANEL: CPT

## 2022-08-12 PROCEDURE — 82948 REAGENT STRIP/BLOOD GLUCOSE: CPT

## 2022-08-12 PROCEDURE — 83880 ASSAY OF NATRIURETIC PEPTIDE: CPT

## 2022-08-12 PROCEDURE — 93005 ELECTROCARDIOGRAM TRACING: CPT | Performed by: EMERGENCY MEDICINE

## 2022-08-12 PROCEDURE — 86140 C-REACTIVE PROTEIN: CPT

## 2022-08-12 PROCEDURE — 81003 URINALYSIS AUTO W/O SCOPE: CPT

## 2022-08-12 PROCEDURE — 84484 ASSAY OF TROPONIN QUANT: CPT

## 2022-08-12 RX ORDER — AMOXICILLIN AND CLAVULANATE POTASSIUM 875; 125 MG/1; MG/1
1 TABLET, FILM COATED ORAL 2 TIMES DAILY
Qty: 20 TABLET | Refills: 0 | Status: SHIPPED | OUTPATIENT
Start: 2022-08-12 | End: 2022-08-22

## 2022-08-12 ASSESSMENT — PAIN - FUNCTIONAL ASSESSMENT
PAIN_FUNCTIONAL_ASSESSMENT: NONE - DENIES PAIN

## 2022-08-12 ASSESSMENT — ENCOUNTER SYMPTOMS
COUGH: 0
BACK PAIN: 0
VOMITING: 0
EYE REDNESS: 0
SHORTNESS OF BREATH: 0
TROUBLE SWALLOWING: 0
NAUSEA: 0
ABDOMINAL PAIN: 0

## 2022-08-12 NOTE — ED TRIAGE NOTES
SON STATES PATIENT IS CONFUSED, SAYS SPEECH IS SLURRIED. STARTED THIS AM.  HISTORY OF TIA'S IN PAST.

## 2022-08-12 NOTE — ED NOTES
Pt assisted to standing position. Pt gave urine sample. Urine labeled and sent to lab. Pt VSS. Pt remains only oriented to place and self. Call light in reach, pt son at bedside.      Dawn Luna RN  08/12/22 0148

## 2022-08-12 NOTE — DISCHARGE INSTRUCTIONS
You were seen for confusion. No evidence of intracranial abnormality, ischemia, or significant infection were found. Incidentally you were found to have a sinus infection on your head CT. Please take antibiotics as prescribed for this. Make sure you are getting plenty of rest and taking your home medications. Follow-up with your primary care physician as needed.

## 2022-08-12 NOTE — ED NOTES
Patient is resting in bed with easy and unlabored respirations. Call light in reach. Side rails up x2. Patient denies further complaints or concerns. Will monitor.         Emmanuel Howell RN  08/12/22 [id number]

## 2022-08-12 NOTE — ED PROVIDER NOTES
[facility] [address] [address] EMERGENCY DEPT    EMERGENCY MEDICINE     Pt Name: Yvan Chacko  MRN: [id number]  [place] 1950  Date of evaluation: 8/12/2022  Provider: Ling Davis MD,    [address] [place] [address]       Chief Complaint   Patient presents with    Altered Mental Status       HISTORY OF PRESENT ILLNESS    Yvan Chacko is a pleasant 73 y.o. male who presents to the emergency department from home evaluation of confusion. History provided by patient's son who is at the bedside. Son states that he was called by the patient's place of work stating that he arrived for shift and appeared confused. His son states that his father was not scheduled for the morning shift and he typically works second shift so showing up to work would have been unusual for him. He states that his dad has a history of TIAs and he feels that his speech was slurred. Patient states that he is unsure why he is here. He denies any chest pain, difficulty breathing, nausea, vomiting, or abdominal pain. He does state that he is very tired and has had difficulty sleeping. His son states that he does have a history of insomnia. They deny any alcohol or drug use. Triage notes and Nursing notes were reviewed by myself. Any discrepancies are addressed above.     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)     Chronic diastolic CHF (congestive heart failure) ([facility] [address] 11/23/2021    Erectile dysfunction     Essential hypertension, malignant     ETOH abuse     GERD (gastroesophageal reflux disease)     Insomnia     Low back pain     Major depressive disorder, recurrent ([address] 4/4/2018    Other headache syndromes 12/25/2016    Pneumonia     Pneumonia 02/2022    Sciatica     right side strain       SURGICAL HISTORY       Past Surgical History:   Procedure Laterality Date    APPENDECTOMY      COLONOSCOPY      EKG 12-LEAD  7/17/2015 HERNIA REPAIR      umbilical    JOINT REPLACEMENT  08/03/2016    RTK       KNEE ARTHROSCOPY      Rt knee Dr. Tom Vaughn       Discharge Medication List as of 8/12/2022  1:05 PM        CONTINUE these medications which have NOT CHANGED    Details   clopidogrel (PLAVIX) 75 MG tablet take 1 tablet by mouth once daily, Disp-30 tablet, R-11Normal      traMADol (ULTRAM) 50 MG tablet Take 1 tablet by mouth every 4 hours as needed for Pain for up to 30 days.  Take lowest dose possible to manage pain, Disp-120 tablet, R-0Normal      ALPRAZolam (XANAX) 0.5 MG tablet take 1-2 tablet by mouth 3 times daily  As needed for anxiety, Disp-120 tablet, R-0Normal      metoprolol succinate (TOPROL XL) 25 MG extended release tablet Take 0.5 tablets by mouth daily, Disp-30 tablet, R-3Normal      pantoprazole (PROTONIX) 40 MG tablet take 1 tablet by mouth once daily, Disp-90 tablet, R-3Normal      albuterol sulfate HFA (PROAIR HFA) 108 (90 Base) MCG/ACT inhaler Inhale 2 puffs into the lungs every 6 hours as needed for Wheezing, Disp-1 each, R-3Normal      albuterol (PROVENTIL) (2.5 MG/3ML) 0.083% nebulizer solution Take 3 mLs by nebulization every 6 hours as needed for Wheezing, Disp-90 each, R-0Normal      ezetimibe (ZETIA) 10 MG tablet take 1 tablet by mouth once daily, Disp-30 tablet, R-5Normal      atorvastatin (LIPITOR) 80 MG tablet Take 1 tablet by mouth nightly, Disp-30 tablet, R-5Normal      lisinopril (PRINIVIL;ZESTRIL) 5 MG tablet Take 1 tablet by mouth daily, Disp-30 tablet, R-3Normal      Respiratory Therapy Supplies (NEBULIZER/TUBING/MOUTHPIECE) KIT DAILY PRN Starting Mon 10/7/2019, Disp-1 kit, R-0, PrintDiagnosis:RAD and Bronchitis      aspirin 81 MG chewable tablet Take 81 mg by mouth dailyHistorical Med             ALLERGIES     Tape Arn Screen tape]    FAMILY HISTORY       Family History   Problem Relation Age of Onset    Dementia Mother     Heart Disease Father High Blood Pressure Sister     Cancer Brother         testicular cancer        SOCIAL HISTORY       Social History     Socioeconomic History    Marital status:      Spouse name: None    Number of children: 3    Years of education: None    Highest education level: None   Tobacco Use    Smoking status: Every Day     Packs/day: 0.50     Years: 30.00     Pack years: 15.00     Types: Cigarettes     Start date: 7/16/1968    Smokeless tobacco: Never    Tobacco comments:     printed to avs   Vaping Use    Vaping Use: Never used   Substance and Sexual Activity    Alcohol use: Not Currently     Alcohol/week: 42.0 standard drinks     Types: 42 Cans of beer per week     Comment: States last beer was in 3/2018    Drug use: No    Sexual activity: Not Currently     Comment:  in 2013       REVIEW OF SYSTEMS     Review of Systems   Constitutional:  Positive for fatigue. Negative for fever. HENT:  Negative for congestion and trouble swallowing. Eyes:  Negative for redness. Respiratory:  Negative for cough and shortness of breath. Cardiovascular:  Negative for chest pain. Gastrointestinal:  Negative for abdominal pain, nausea and vomiting. Genitourinary:  Negative for difficulty urinating. Musculoskeletal:  Negative for back pain. Skin:  Negative for rash. Allergic/Immunologic: Negative for immunocompromised state. Neurological:  Negative for light-headedness and headaches. Hematological:  Does not bruise/bleed easily. Psychiatric/Behavioral:  Positive for confusion. Except as noted above the remainder of the review of systems was reviewed and is.    PHYSICAL EXAM    (up to 7 for level 4, 8 or more for level 5)     ED Triage Vitals   BP Temp Temp Source Heart Rate Resp SpO2 Height Weight   08/12/22 0810 08/12/22 0810 08/12/22 0810 08/12/22 0810 08/12/22 0810 08/12/22 0836 08/12/22 0810 08/12/22 0810   133/71 98.4 °F (36.9 °C) Oral 77 16 95 % 6' 1\" (1.854 m) 240 lb (108.9 kg)       Physical Exam  Vitals and nursing note reviewed. Constitutional:       General: He is not in acute distress. Appearance: He is normal weight. He is not ill-appearing or toxic-appearing. HENT:      Head: Normocephalic and atraumatic. Mouth/Throat:      Mouth: Mucous membranes are moist.      Pharynx: Oropharynx is clear. Eyes:      General: No scleral icterus. Extraocular Movements: Extraocular movements intact. Right eye: Normal extraocular motion and no nystagmus. Left eye: Normal extraocular motion and no nystagmus. Pupils: Pupils are equal, round, and reactive to light. Cardiovascular:      Rate and Rhythm: Normal rate and regular rhythm. Heart sounds: Normal heart sounds. No murmur heard. Pulmonary:      Effort: Pulmonary effort is normal. No respiratory distress. Breath sounds: Normal breath sounds. No decreased breath sounds. Abdominal:      General: Bowel sounds are normal.      Palpations: Abdomen is soft. Tenderness: There is no abdominal tenderness. There is no guarding or rebound. Musculoskeletal:      Cervical back: Neck supple. Right lower leg: No edema. Left lower leg: No edema. Skin:     General: Skin is warm and dry. Capillary Refill: Capillary refill takes less than 2 seconds. Neurological:      General: No focal deficit present. Mental Status: He is alert. GCS: GCS eye subscore is 4. GCS verbal subscore is 5. GCS motor subscore is 6. Cranial Nerves: No cranial nerve deficit, dysarthria or facial asymmetry. Sensory: No sensory deficit. Motor: No weakness. Comments:       Psychiatric:         Mood and Affect: Mood normal. Mood is not anxious. Speech: Speech normal.         Behavior: Behavior is not agitated.        DIAGNOSTIC RESULTS     EKG:(none if blank)  All EKG's are interpreted [facility] who either signs or Co-signs this chart in the absence of a cardiologist.        RADIOLOGY: (none if blank)   Interpretation per the Radiologistbelow, if available at the time of this note:    CT Head WO Contrast   Final Result   There is acute ethmoid and maxillary sinusitis. There is no acute intracranial pathology. **This report has been created using voice recognition software. It may contain minor errors which are inherent in voice recognition technology. **      Final report electronically signed by Dr. Liat Wells on 8/12/2022 9:46 AM      XR CHEST PORTABLE   Final Result   1. No acute cardiopulmonary finding. **This report has been created using voice recognition software. It may contain minor errors which are inherent in voice recognition technology. **      Final report electronically signed by Dr Karl Bennett on 8/12/2022 9:08 AM          LABS:  Labs Reviewed   CBC WITH AUTO DIFFERENTIAL - Abnormal; Notable for the following components:       Result Value    RBC 4.41 (*)     Hemoglobin 13.8 (*)     Hematocrit 41.1 (*)     RDW-SD 46.5 (*)     Eosinophils Absolute 0.5 (*)     All other components within normal limits   COMPREHENSIVE METABOLIC PANEL W/ REFLEX TO MG FOR LOW K - Abnormal; Notable for the following components:    Glucose 143 (*)     All other components within normal limits   C-REACTIVE PROTEIN - Abnormal; Notable for the following components:    CRP 2.44 (*)     All other components within normal limits   GLOMERULAR FILTRATION RATE, ESTIMATED - Abnormal; Notable for the following components:    Est, Glom Filt Rate 83 (*)     All other components within normal limits   OSMOLALITY - Abnormal; Notable for the following components:    Osmolality Calc 273.5 (*)     All other components within normal limits   POCT GLUCOSE - Abnormal; Notable for the following components:    POC Glucose 168 (*)     All other components within normal limits   TROPONIN   URINALYSIS WITH REFLEX TO CULTURE   ETHANOL   URINE DRUG SCREEN   BRAIN NATRIURETIC

## 2022-08-14 LAB
EKG ATRIAL RATE: 74 BPM
EKG P AXIS: 72 DEGREES
EKG P-R INTERVAL: 172 MS
EKG Q-T INTERVAL: 390 MS
EKG QRS DURATION: 86 MS
EKG QTC CALCULATION (BAZETT): 432 MS
EKG R AXIS: 31 DEGREES
EKG T AXIS: 54 DEGREES
EKG VENTRICULAR RATE: 74 BPM

## 2022-08-14 PROCEDURE — 93010 ELECTROCARDIOGRAM REPORT: CPT | Performed by: INTERNAL MEDICINE

## 2022-08-15 DIAGNOSIS — F41.8 SITUATIONAL ANXIETY: ICD-10-CM

## 2022-08-15 DIAGNOSIS — F41.0 PANIC DISORDER: ICD-10-CM

## 2022-08-15 RX ORDER — ALPRAZOLAM 0.5 MG/1
TABLET ORAL
Qty: 120 TABLET | Refills: 0 | Status: SHIPPED | OUTPATIENT
Start: 2022-08-15 | End: 2022-09-15 | Stop reason: SDUPTHER

## 2022-09-15 DIAGNOSIS — F41.0 PANIC DISORDER: ICD-10-CM

## 2022-09-15 DIAGNOSIS — J41.8 MIXED SIMPLE AND MUCOPURULENT CHRONIC BRONCHITIS (HCC): ICD-10-CM

## 2022-09-15 DIAGNOSIS — F41.8 SITUATIONAL ANXIETY: ICD-10-CM

## 2022-09-15 RX ORDER — ALBUTEROL SULFATE 2.5 MG/3ML
2.5 SOLUTION RESPIRATORY (INHALATION) EVERY 6 HOURS PRN
Qty: 90 EACH | Refills: 5 | Status: SHIPPED | OUTPATIENT
Start: 2022-09-15

## 2022-09-15 RX ORDER — ALPRAZOLAM 0.5 MG/1
TABLET ORAL
Qty: 120 TABLET | Refills: 0 | Status: SHIPPED | OUTPATIENT
Start: 2022-09-15 | End: 2022-10-14 | Stop reason: SDUPTHER

## 2022-10-14 ENCOUNTER — OFFICE VISIT (OUTPATIENT)
Dept: FAMILY MEDICINE CLINIC | Age: 72
End: 2022-10-14
Payer: COMMERCIAL

## 2022-10-14 VITALS
DIASTOLIC BLOOD PRESSURE: 70 MMHG | BODY MASS INDEX: 32.6 KG/M2 | OXYGEN SATURATION: 96 % | WEIGHT: 246 LBS | RESPIRATION RATE: 20 BRPM | HEIGHT: 73 IN | HEART RATE: 68 BPM | SYSTOLIC BLOOD PRESSURE: 138 MMHG

## 2022-10-14 DIAGNOSIS — K21.9 GASTROESOPHAGEAL REFLUX DISEASE, UNSPECIFIED WHETHER ESOPHAGITIS PRESENT: ICD-10-CM

## 2022-10-14 DIAGNOSIS — F51.01 PRIMARY INSOMNIA: ICD-10-CM

## 2022-10-14 DIAGNOSIS — F41.0 PANIC DISORDER: ICD-10-CM

## 2022-10-14 DIAGNOSIS — E78.2 MIXED HYPERLIPIDEMIA: ICD-10-CM

## 2022-10-14 DIAGNOSIS — F10.11 HISTORY OF ETOH ABUSE: ICD-10-CM

## 2022-10-14 DIAGNOSIS — I10 PRIMARY HYPERTENSION: ICD-10-CM

## 2022-10-14 DIAGNOSIS — Z86.73 HX OF ISCHEMIC VERTEBROBASILAR ARTERY THALAMIC STROKE: ICD-10-CM

## 2022-10-14 DIAGNOSIS — Z23 NEED FOR INFLUENZA VACCINATION: Primary | ICD-10-CM

## 2022-10-14 DIAGNOSIS — Z87.891 HISTORY OF TOBACCO USE: ICD-10-CM

## 2022-10-14 DIAGNOSIS — F41.8 SITUATIONAL ANXIETY: ICD-10-CM

## 2022-10-14 DIAGNOSIS — M15.9 PRIMARY OSTEOARTHRITIS INVOLVING MULTIPLE JOINTS: ICD-10-CM

## 2022-10-14 DIAGNOSIS — I25.10 CORONARY ARTERY DISEASE INVOLVING NATIVE CORONARY ARTERY OF NATIVE HEART WITHOUT ANGINA PECTORIS: ICD-10-CM

## 2022-10-14 PROCEDURE — 90471 IMMUNIZATION ADMIN: CPT | Performed by: NURSE PRACTITIONER

## 2022-10-14 PROCEDURE — 1123F ACP DISCUSS/DSCN MKR DOCD: CPT | Performed by: NURSE PRACTITIONER

## 2022-10-14 PROCEDURE — 99214 OFFICE O/P EST MOD 30 MIN: CPT | Performed by: NURSE PRACTITIONER

## 2022-10-14 PROCEDURE — 90694 VACC AIIV4 NO PRSRV 0.5ML IM: CPT | Performed by: NURSE PRACTITIONER

## 2022-10-14 RX ORDER — METOPROLOL SUCCINATE 25 MG/1
12.5 TABLET, EXTENDED RELEASE ORAL DAILY
Qty: 90 TABLET | Refills: 3 | Status: SHIPPED | OUTPATIENT
Start: 2022-10-14

## 2022-10-14 RX ORDER — ATORVASTATIN CALCIUM 80 MG/1
80 TABLET, FILM COATED ORAL NIGHTLY
Qty: 90 TABLET | Refills: 3 | Status: SHIPPED | OUTPATIENT
Start: 2022-10-14

## 2022-10-14 RX ORDER — ZOLPIDEM TARTRATE 10 MG/1
TABLET ORAL NIGHTLY PRN
COMMUNITY

## 2022-10-14 RX ORDER — LISINOPRIL 5 MG/1
5 TABLET ORAL DAILY
Qty: 90 TABLET | Refills: 3 | Status: SHIPPED | OUTPATIENT
Start: 2022-10-14

## 2022-10-14 RX ORDER — EZETIMIBE 10 MG/1
TABLET ORAL
Qty: 90 TABLET | Refills: 3 | Status: SHIPPED | OUTPATIENT
Start: 2022-10-14

## 2022-10-14 RX ORDER — ALPRAZOLAM 0.5 MG/1
TABLET ORAL
Qty: 120 TABLET | Refills: 0 | Status: SHIPPED | OUTPATIENT
Start: 2022-10-14 | End: 2022-11-13

## 2022-10-14 SDOH — ECONOMIC STABILITY: FOOD INSECURITY: WITHIN THE PAST 12 MONTHS, YOU WORRIED THAT YOUR FOOD WOULD RUN OUT BEFORE YOU GOT MONEY TO BUY MORE.: NEVER TRUE

## 2022-10-14 SDOH — ECONOMIC STABILITY: FOOD INSECURITY: WITHIN THE PAST 12 MONTHS, THE FOOD YOU BOUGHT JUST DIDN'T LAST AND YOU DIDN'T HAVE MONEY TO GET MORE.: NEVER TRUE

## 2022-10-14 ASSESSMENT — SOCIAL DETERMINANTS OF HEALTH (SDOH): HOW HARD IS IT FOR YOU TO PAY FOR THE VERY BASICS LIKE FOOD, HOUSING, MEDICAL CARE, AND HEATING?: NOT HARD AT ALL

## 2022-10-14 NOTE — PROGRESS NOTES
Immunizations Administered       Name Date Dose Route    Influenza, FLUAD, (age 72 y+), Adjuvanted, 0.5mL 10/14/2022 0.5 mL Intramuscular    Site: Deltoid- Left    Lot: [id number]    NDC: 93529-078-26

## 2022-10-20 ASSESSMENT — ENCOUNTER SYMPTOMS
BACK PAIN: 0
CHEST TIGHTNESS: 0
ABDOMINAL DISTENTION: 0
SHORTNESS OF BREATH: 0
ABDOMINAL PAIN: 0
COUGH: 0
WHEEZING: 0

## 2022-10-20 NOTE — PROGRESS NOTES
[address] [address]  Dept: [phone]  Dept Fax: [phone]  Loc: [phone]  PROGRESS NOTE      VisitDate: 10/14/2022    Shannan Mahmood is a 73 y.o. male who presents today for:     Chief Complaint   Patient presents with    3 Month Follow-Up         Subjective:  Routine 3-month follow-up med refills. Previous CVA CHF ED hypertension EtOH abuse GERD insomnia tobacco use, insomnia, GERD arthritis. Patient reports currently no use of tobacco or alcohol. Mood stable. Reports increased family stressors. Denies any fever illness headaches dizziness chest pain shortness of breath abdominal pain or edema. Review of Systems   Constitutional:  Negative for activity change, appetite change, chills, fatigue and fever. Eyes:  Negative for visual disturbance. Respiratory:  Negative for cough, chest tightness, shortness of breath and wheezing. Cardiovascular:  Negative for chest pain, palpitations and leg swelling. Gastrointestinal:  Negative for abdominal distention and abdominal pain. Genitourinary:  Negative for dysuria. Musculoskeletal:  Positive for arthralgias. Negative for back pain and neck pain. Skin: Negative. Negative for rash. Neurological:  Negative for dizziness, light-headedness and headaches. Hematological:  Negative for adenopathy. Psychiatric/Behavioral:  Positive for decreased concentration, dysphoric mood and sleep disturbance. The patient is nervous/anxious. All other systems reviewed and are negative.   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)     [facility] 11/23/2021    Erectile dysfunction     Essential hypertension, malignant     ETOH abuse     GERD (gastroesophageal reflux disease)     Insomnia     Low back pain     Major depressive disorder, recurrent ([facility] [address] 4/4/2018    Other headache syndromes 12/25/2016    Pneumonia     Pneumonia 02/2022    Sciatica     right side strain      Past Surgical History:   Procedure Laterality Date    APPENDECTOMY      COLONOSCOPY      EKG 12-LEAD  7/17/2015         HERNIA REPAIR      umbilical    JOINT REPLACEMENT  08/03/2016    RTK       KNEE ARTHROSCOPY      Rt knee Dr. Lynne Reagan Right      Family History   Problem Relation Age of Onset    Dementia Mother     Heart Disease Father     High Blood Pressure Sister     Cancer Brother         testicular cancer     Social History     Tobacco Use    Smoking status: Every Day     Packs/day: 0.50     Years: 30.00     Pack years: 15.00     Types: Cigarettes     Start date: 7/16/1968    Smokeless tobacco: Never    Tobacco comments:     printed to avs   Substance Use Topics    Alcohol use: Not Currently     Alcohol/week: 42.0 standard drinks     Types: 42 Cans of beer per week     Comment: States last beer was in 3/2018      Current Outpatient Medications   Medication Sig Dispense Refill    zolpidem (AMBIEN) 10 MG tablet Take by mouth nightly as needed for Sleep.      metoprolol succinate (TOPROL XL) 25 MG extended release tablet Take 0.5 tablets by mouth daily 90 tablet 3    ezetimibe (ZETIA) 10 MG tablet Take 1 tablet by mouth once daily 90 tablet 3    atorvastatin (LIPITOR) 80 MG tablet Take 1 tablet by mouth nightly 90 tablet 3    lisinopril (PRINIVIL;ZESTRIL) 5 MG tablet Take 1 tablet by mouth daily 90 tablet 3    ALPRAZolam (XANAX) 0.5 MG tablet take 1-2 tablet by mouth 3 times daily  As needed for anxiety 120 tablet 0    albuterol (PROVENTIL) (2.5 MG/3ML) 0.083% nebulizer solution Take 3 mLs by nebulization every 6 hours as needed for Wheezing 90 each 5    clopidogrel (PLAVIX) 75 MG tablet take 1 tablet by mouth once daily 30 tablet 11    pantoprazole (PROTONIX) 40 MG tablet take 1 tablet by mouth once daily 90 tablet 3    albuterol sulfate HFA (PROAIR HFA) 108 (90 Base) MCG/ACT inhaler Inhale 2 puffs into the lungs every 6 hours as needed for Wheezing 1 each 3    Respiratory Therapy Supplies (NEBULIZER/TUBING/MOUTHPIECE) KIT 1 kit by Does not apply route daily as needed (cough, wheeze) Diagnosis:RAD and Bronchitis 1 kit 0    aspirin 81 MG chewable tablet Take 81 mg by mouth daily       No current facility-administered medications for this visit. Allergies   Allergen Reactions    Tape [place] [id number] [facility]   Topic Date Due    Shingles vaccine (1 of 2) Never done    AAA screen  Never done    COVID-19 Vaccine (3 - Booster for Pfizer series) 10/07/2021    A1C test (Diabetic or Prediabetic)  10/27/2022    Depression Monitoring  01/20/2023    Lipids  07/28/2023    Colorectal Cancer Screen  02/09/2024    DTaP/Tdap/Td vaccine (3 - Td or Tdap) 05/11/2032    Flu vaccine  Completed    Pneumococcal 65+ years Vaccine  Completed    Hepatitis C screen  Completed    Hepatitis A vaccine  Aged Out    Hib vaccine  Aged Out    Meningococcal (ACWY) vaccine  Aged Out         Objective:     Physical Exam  Vitals and nursing note reviewed. Constitutional:       Appearance: Normal appearance. He is well-developed. He is not diaphoretic. HENT:      Head: Normocephalic and atraumatic. Not macrocephalic and not microcephalic. Right Ear: Hearing, tympanic membrane, ear canal and external ear normal. No drainage or tenderness. No middle ear effusion. No hemotympanum. Tympanic membrane is not injected, scarred, perforated or bulging. Left Ear: Hearing, tympanic membrane, ear canal and external ear normal. No drainage or tenderness. No middle ear effusion. No hemotympanum. Tympanic membrane is not injected, scarred, perforated or bulging. Nose: No nasal deformity, septal deviation, mucosal edema or rhinorrhea. Right Sinus: No maxillary sinus tenderness or frontal sinus tenderness.       Left Sinus: No maxillary sinus tenderness or frontal sinus tenderness. Mouth/Throat:      Mouth: No oral lesions. Dentition: Normal dentition. Does not have dentures. No dental caries or dental abscesses. Pharynx: No oropharyngeal exudate or posterior oropharyngeal erythema. Tonsils: No tonsillar abscesses. Eyes:      General: Lids are normal. No scleral icterus. Extraocular Movements:      Right eye: Normal extraocular motion. Left eye: Normal extraocular motion. Conjunctiva/sclera: Conjunctivae normal.      Right eye: Right conjunctiva is not injected. Left eye: Left conjunctiva is not injected. Neck:      Thyroid: No thyroid mass or thyromegaly. Vascular: No carotid bruit or JVD. Trachea: Trachea normal.   Cardiovascular:      Rate and Rhythm: Normal rate and regular rhythm. Heart sounds: Normal heart sounds, S1 normal and S2 normal. No murmur heard. No friction rub. No gallop. Pulmonary:      Effort: Pulmonary effort is normal. No respiratory distress. Breath sounds: Normal breath sounds. No wheezing, rhonchi or rales. Chest:      Chest wall: No tenderness. Abdominal:      General: Bowel sounds are normal.      Palpations: Abdomen is soft. There is no hepatomegaly, splenomegaly or mass. Tenderness: There is no guarding or rebound. Hernia: No hernia is present. There is no hernia in the ventral area or left inguinal area. Musculoskeletal:         General: No tenderness. Normal range of motion. Cervical back: Normal range of motion and neck supple. No edema or erythema. Normal range of motion. Lymphadenopathy:      Head:      Right side of head: No submental, submandibular, tonsillar, preauricular, posterior auricular or occipital adenopathy. Left side of head: No submental, submandibular, tonsillar, preauricular, posterior auricular or occipital adenopathy. Cervical: No cervical adenopathy.       Right cervical: No superficial, deep or posterior cervical adenopathy. Left cervical: No superficial, deep or posterior cervical adenopathy. Upper Body:      Right upper body: No supraclavicular or pectoral adenopathy. Left upper body: No supraclavicular or pectoral adenopathy. Skin:     General: Skin is warm and dry. Coloration: Skin is not pale. Findings: No bruising, ecchymosis, laceration, lesion or rash. Nails: There is no clubbing. Neurological:      Mental Status: He is alert and oriented to person, place, and time. Cranial Nerves: No cranial nerve deficit. Motor: No abnormal muscle tone. Coordination: Coordination normal.      Deep Tendon Reflexes: Reflexes normal.   Psychiatric:         Attention and Perception: Attention normal.         Mood and Affect: Affect is blunt. Speech: Speech normal.         Behavior: Behavior normal.         Thought Content: Thought content normal.         Cognition and Memory: Cognition normal.         Judgment: Judgment normal.     /70   Pulse 68   Resp 20   Ht 6' 1\" (1.854 m)   Wt 246 lb (111.6 kg)   SpO2 96%   BMI 32.46 kg/m²       Impression/Plan:  1. Need for influenza vaccination    2. Situational anxiety    3. Primary insomnia    4. History of tobacco use    5. History of ETOH abuse    6. Mixed hyperlipidemia    7. Hx of ischemic vertebrobasilar artery thalamic stroke    8. Primary hypertension    9. Primary osteoarthritis involving multiple joints    10. Gastroesophageal reflux disease, unspecified whether esophagitis present    11. Coronary artery disease involving native coronary artery of native heart without angina pectoris    12.  Panic disorder      Requested Prescriptions     Signed Prescriptions Disp Refills    metoprolol succinate (TOPROL XL) 25 MG extended release tablet 90 tablet 3     Sig: Take 0.5 tablets by mouth daily    ezetimibe (ZETIA) 10 MG tablet 90 tablet 3     Sig: Take 1 tablet by mouth once daily    atorvastatin (LIPITOR) 80 MG tablet 90 tablet 3     Sig: Take 1 tablet by mouth nightly    lisinopril (PRINIVIL;ZESTRIL) 5 MG tablet 90 tablet 3     Sig: Take 1 tablet by mouth daily    ALPRAZolam (XANAX) 0.5 MG tablet 120 tablet 0     Sig: take 1-2 tablet by mouth 3 times daily  As needed for anxiety     Orders Placed This Encounter   Procedures    Influenza, FLUAD, (age 72 y+), IM, Preservative Free, 0.5 mL       Patient giveneducational materials - see patient instructions. Discussed use, benefit, and side effects of prescribed medications. All patient questions answered. Pt voiced understanding. Reviewed health maintenance. Patient agreedwith treatment plan. Follow up as directed. Refills provided. flu shot updated. OARRS report reviewed. Continue medications as prescribed.  Educational information on above diagnosis  provided per AVS.  30 min         Electronically signed by MANE Salgado CNP on 10/20/2022 at 9:57 AM

## 2022-11-15 DIAGNOSIS — F41.0 PANIC DISORDER: ICD-10-CM

## 2022-11-15 DIAGNOSIS — F51.01 PRIMARY INSOMNIA: ICD-10-CM

## 2022-11-15 DIAGNOSIS — F41.8 SITUATIONAL ANXIETY: ICD-10-CM

## 2022-11-15 NOTE — TELEPHONE ENCOUNTER
Pt request Xanax and and Ambien- [facility]    Last seen 10/14/22  Last Xanax 10/14/22 for #120  Last Ambien 5/11/22 for 90/1rf.   WCP

## 2022-11-16 RX ORDER — ALPRAZOLAM 0.5 MG/1
TABLET ORAL
Qty: 120 TABLET | Refills: 0 | Status: SHIPPED | OUTPATIENT
Start: 2022-11-16 | End: 2022-12-15

## 2022-11-16 RX ORDER — ZOLPIDEM TARTRATE 10 MG/1
10 TABLET ORAL NIGHTLY PRN
Qty: 90 TABLET | Refills: 1 | Status: SHIPPED | OUTPATIENT
Start: 2022-11-16 | End: 2023-05-15

## 2022-12-19 DIAGNOSIS — F41.8 SITUATIONAL ANXIETY: ICD-10-CM

## 2022-12-19 DIAGNOSIS — F41.0 PANIC DISORDER: ICD-10-CM

## 2022-12-19 RX ORDER — ALPRAZOLAM 0.5 MG/1
TABLET ORAL
Qty: 120 TABLET | Refills: 0 | Status: SHIPPED | OUTPATIENT
Start: 2022-12-19 | End: 2023-01-18

## 2023-01-13 ENCOUNTER — OFFICE VISIT (OUTPATIENT)
Dept: FAMILY MEDICINE CLINIC | Age: 73
End: 2023-01-13
Payer: MEDICARE

## 2023-01-13 VITALS
HEART RATE: 87 BPM | TEMPERATURE: 97.8 F | BODY MASS INDEX: 32.59 KG/M2 | WEIGHT: 247 LBS | SYSTOLIC BLOOD PRESSURE: 128 MMHG | DIASTOLIC BLOOD PRESSURE: 70 MMHG | OXYGEN SATURATION: 97 % | RESPIRATION RATE: 16 BRPM

## 2023-01-13 DIAGNOSIS — M15.9 PRIMARY OSTEOARTHRITIS INVOLVING MULTIPLE JOINTS: ICD-10-CM

## 2023-01-13 DIAGNOSIS — Z86.73 HX OF ISCHEMIC VERTEBROBASILAR ARTERY THALAMIC STROKE: ICD-10-CM

## 2023-01-13 DIAGNOSIS — F41.0 PANIC DISORDER: ICD-10-CM

## 2023-01-13 DIAGNOSIS — E78.2 MIXED HYPERLIPIDEMIA: ICD-10-CM

## 2023-01-13 DIAGNOSIS — F10.11 HISTORY OF ETOH ABUSE: ICD-10-CM

## 2023-01-13 DIAGNOSIS — K21.9 GASTROESOPHAGEAL REFLUX DISEASE, UNSPECIFIED WHETHER ESOPHAGITIS PRESENT: ICD-10-CM

## 2023-01-13 DIAGNOSIS — I10 PRIMARY HYPERTENSION: ICD-10-CM

## 2023-01-13 DIAGNOSIS — F41.8 SITUATIONAL ANXIETY: ICD-10-CM

## 2023-01-13 DIAGNOSIS — F51.01 PRIMARY INSOMNIA: Primary | ICD-10-CM

## 2023-01-13 DIAGNOSIS — Z87.891 HISTORY OF TOBACCO USE: ICD-10-CM

## 2023-01-13 PROCEDURE — 3017F COLORECTAL CA SCREEN DOC REV: CPT | Performed by: NURSE PRACTITIONER

## 2023-01-13 PROCEDURE — G8484 FLU IMMUNIZE NO ADMIN: HCPCS | Performed by: NURSE PRACTITIONER

## 2023-01-13 PROCEDURE — 1123F ACP DISCUSS/DSCN MKR DOCD: CPT | Performed by: NURSE PRACTITIONER

## 2023-01-13 PROCEDURE — 99214 OFFICE O/P EST MOD 30 MIN: CPT | Performed by: NURSE PRACTITIONER

## 2023-01-13 PROCEDURE — 3078F DIAST BP <80 MM HG: CPT | Performed by: NURSE PRACTITIONER

## 2023-01-13 PROCEDURE — G8427 DOCREV CUR MEDS BY ELIG CLIN: HCPCS | Performed by: NURSE PRACTITIONER

## 2023-01-13 PROCEDURE — G8417 CALC BMI ABV UP PARAM F/U: HCPCS | Performed by: NURSE PRACTITIONER

## 2023-01-13 PROCEDURE — 4004F PT TOBACCO SCREEN RCVD TLK: CPT | Performed by: NURSE PRACTITIONER

## 2023-01-13 PROCEDURE — 3074F SYST BP LT 130 MM HG: CPT | Performed by: NURSE PRACTITIONER

## 2023-01-13 RX ORDER — ALPRAZOLAM 0.5 MG/1
TABLET ORAL
Qty: 120 TABLET | Refills: 0 | Status: SHIPPED | OUTPATIENT
Start: 2023-01-13 | End: 2023-02-12

## 2023-01-13 ASSESSMENT — ENCOUNTER SYMPTOMS
ABDOMINAL DISTENTION: 0
SHORTNESS OF BREATH: 0
BACK PAIN: 0
COUGH: 0
CHEST TIGHTNESS: 0
ABDOMINAL PAIN: 0
WHEEZING: 0

## 2023-01-13 ASSESSMENT — PATIENT HEALTH QUESTIONNAIRE - PHQ9
2. FEELING DOWN, DEPRESSED OR HOPELESS: 1
SUM OF ALL RESPONSES TO PHQ QUESTIONS 1-9: 2
SUM OF ALL RESPONSES TO PHQ QUESTIONS 1-9: 2
7. TROUBLE CONCENTRATING ON THINGS, SUCH AS READING THE NEWSPAPER OR WATCHING TELEVISION: 0
SUM OF ALL RESPONSES TO PHQ QUESTIONS 1-9: 2
4. FEELING TIRED OR HAVING LITTLE ENERGY: 0
9. THOUGHTS THAT YOU WOULD BE BETTER OFF DEAD, OR OF HURTING YOURSELF: 0
6. FEELING BAD ABOUT YOURSELF - OR THAT YOU ARE A FAILURE OR HAVE LET YOURSELF OR YOUR FAMILY DOWN: 0
8. MOVING OR SPEAKING SO SLOWLY THAT OTHER PEOPLE COULD HAVE NOTICED. OR THE OPPOSITE, BEING SO FIGETY OR RESTLESS THAT YOU HAVE BEEN MOVING AROUND A LOT MORE THAN USUAL: 0
3. TROUBLE FALLING OR STAYING ASLEEP: 0
5. POOR APPETITE OR OVEREATING: 0
SUM OF ALL RESPONSES TO PHQ9 QUESTIONS 1 & 2: 2
SUM OF ALL RESPONSES TO PHQ QUESTIONS 1-9: 2
10. IF YOU CHECKED OFF ANY PROBLEMS, HOW DIFFICULT HAVE THESE PROBLEMS MADE IT FOR YOU TO DO YOUR WORK, TAKE CARE OF THINGS AT HOME, OR GET ALONG WITH OTHER PEOPLE: 0
1. LITTLE INTEREST OR PLEASURE IN DOING THINGS: 1

## 2023-01-13 NOTE — PATIENT INSTRUCTIONS
[facility]. COM  WWW. [place]. [facility]

## 2023-01-13 NOTE — PROGRESS NOTES
[address] [place] [id number]  Dept: [phone]  Dept Fax: [phone]  Loc: [phone]  PROGRESS NOTE      VisitDate: 1/13/2023    Hari Caceres is a 81 y.o. male who presents today for:     Chief Complaint   Patient presents with    3 Month Follow-Up     Insomnia, anxiety, arthritis, interested in obtaining medical marijuana card    Other     Dentist pulled teeth in Nov instead of cutting out, c/o 2 broken teeth, had an abscess-tx with antibiotics, painful, sensitive to cold         Subjective:  Routine 3-month follow-up. Medication refills. History of insomnia and anxiety, osteoarthritis CVA GERD. History of EtOH abuse tobacco use. Patient currently abstaining from alcohol and no longer smokes. Recently retired from the city. Denies any fever or illness chest pain shortness of breath headaches dizziness syncope falls abdominal pain rash numbness or edema. Reports mood stable. Review of Systems   Constitutional:  Negative for activity change, appetite change, chills, fatigue and fever. Eyes:  Negative for visual disturbance. Respiratory:  Negative for cough, chest tightness, shortness of breath and wheezing. Cardiovascular:  Negative for chest pain, palpitations and leg swelling. Gastrointestinal:  Negative for abdominal distention and abdominal pain. Genitourinary:  Negative for dysuria. Musculoskeletal:  Positive for arthralgias. Negative for back pain and neck pain. Skin: Negative. Negative for rash. Neurological:  Negative for dizziness, light-headedness and headaches. Hematological:  Negative for adenopathy. Psychiatric/Behavioral:  Positive for decreased concentration, dysphoric mood and sleep disturbance. The patient is nervous/anxious. All other systems reviewed and are negative.   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)     Chronic diastolic CHF (congestive heart failure) ([facility] [address] 11/23/2021    Erectile dysfunction     Essential hypertension, malignant     ETOH abuse     GERD (gastroesophageal reflux disease)     Insomnia     Low back pain     Major depressive disorder, recurrent ([facility] 4/4/2018    Other headache syndromes 12/25/2016    Pneumonia     Pneumonia 02/2022    Sciatica     right side strain      Past Surgical History:   Procedure Laterality Date    APPENDECTOMY      COLONOSCOPY      EKG 12-LEAD  7/17/2015         HERNIA REPAIR      umbilical    JOINT REPLACEMENT  08/03/2016    RTK       KNEE ARTHROSCOPY      Rt knee Dr. Zaheer Boyd Right      Family History   Problem Relation Age of Onset    Dementia Mother     Heart Disease Father     High Blood Pressure Sister     Cancer Brother         testicular cancer     Social History     Tobacco Use    Smoking status: Every Day     Packs/day: 0.50     Years: 30.00     Pack years: 15.00     Types: Cigarettes     Start date: 7/16/1968    Smokeless tobacco: Never    Tobacco comments:     printed to avs   Substance Use Topics    Alcohol use: Not Currently     Alcohol/week: 42.0 standard drinks     Types: 42 Cans of beer per week     Comment: States last beer was in 3/2018      Current Outpatient Medications   Medication Sig Dispense Refill    ALPRAZolam (XANAX) 0.5 MG tablet take 1-2 tablets by mouth three times a day if needed for anxiety 120 tablet 0    zolpidem (AMBIEN) 10 MG tablet Take 1 tablet by mouth nightly as needed for Sleep for up to 180 days.  90 tablet 1    metoprolol succinate (TOPROL XL) 25 MG extended release tablet Take 0.5 tablets by mouth daily 90 tablet 3    ezetimibe (ZETIA) 10 MG tablet Take 1 tablet by mouth once daily 90 tablet 3    atorvastatin (LIPITOR) 80 MG tablet Take 1 tablet by mouth nightly 90 tablet 3    lisinopril (PRINIVIL;ZESTRIL) 5 MG tablet Take 1 tablet by mouth daily 90 tablet 3    albuterol (PROVENTIL) (2.5 MG/3ML) 0.083% nebulizer solution Take 3 mLs by nebulization every 6 hours as needed for Wheezing 90 each 5    clopidogrel (PLAVIX) 75 MG tablet take 1 tablet by mouth once daily 30 tablet 11    pantoprazole (PROTONIX) 40 MG tablet take 1 tablet by mouth once daily 90 tablet 3    albuterol sulfate HFA (PROAIR HFA) 108 (90 Base) MCG/ACT inhaler Inhale 2 puffs into the lungs every 6 hours as needed for Wheezing 1 each 3    Respiratory Therapy Supplies (NEBULIZER/TUBING/MOUTHPIECE) KIT 1 kit by Does not apply route daily as needed (cough, wheeze) Diagnosis:RAD and Bronchitis 1 kit 0    aspirin 81 MG chewable tablet Take 81 mg by mouth daily       No current facility-administered medications for this visit. Allergies   Allergen Reactions    Tape Rommie Pear [address] [facility] Maintenance   Topic Date Due    Shingles vaccine (1 of 2) Never done    AAA screen  Never done    COVID-19 Vaccine (3 - Booster for Pfizer series) 07/02/2021    A1C test (Diabetic or Prediabetic)  10/27/2022    Depression Monitoring  01/20/2023    Lipids  07/28/2023    Colorectal Cancer Screen  02/09/2024    DTaP/Tdap/Td vaccine (3 - Td or Tdap) 05/11/2032    Flu vaccine  Completed    Pneumococcal 65+ years Vaccine  Completed    Hepatitis C screen  Completed    Hepatitis A vaccine  Aged Out    Hib vaccine  Aged Out    Meningococcal (ACWY) vaccine  Aged Out         Objective:     Physical Exam  Vitals and nursing note reviewed. Constitutional:       Appearance: Normal appearance. He is well-developed and normal weight. He is not diaphoretic. HENT:      Head: Normocephalic and atraumatic. Not macrocephalic and not microcephalic. Right Ear: Hearing, tympanic membrane, ear canal and external ear normal. No drainage or tenderness. No middle ear effusion. No hemotympanum. Tympanic membrane is not injected, scarred, perforated or bulging.       Left Ear: Hearing, tympanic membrane, ear canal and external ear normal. No drainage or tenderness. No middle ear effusion. No hemotympanum. Tympanic membrane is not injected, scarred, perforated or bulging. Nose: No nasal deformity, septal deviation, mucosal edema or rhinorrhea. Right Sinus: No maxillary sinus tenderness or frontal sinus tenderness. Left Sinus: No maxillary sinus tenderness or frontal sinus tenderness. Mouth/Throat:      Mouth: No oral lesions. Dentition: Abnormal dentition. Does not have dentures. Dental tenderness present. No dental caries or dental abscesses. Pharynx: No oropharyngeal exudate or posterior oropharyngeal erythema. Tonsils: No tonsillar abscesses. Comments: Appears to be 2 fragmented teeth/ lower incisors. Eyes:      General: Lids are normal. No scleral icterus. Extraocular Movements:      Right eye: Normal extraocular motion. Left eye: Normal extraocular motion. Conjunctiva/sclera: Conjunctivae normal.      Right eye: Right conjunctiva is not injected. Left eye: Left conjunctiva is not injected. Neck:      Thyroid: No thyroid mass or thyromegaly. Vascular: No carotid bruit or JVD. Trachea: Trachea normal.   Cardiovascular:      Rate and Rhythm: Normal rate and regular rhythm. Heart sounds: Normal heart sounds, S1 normal and S2 normal. No murmur heard. No friction rub. No gallop. Pulmonary:      Effort: Pulmonary effort is normal. No respiratory distress. Breath sounds: Normal breath sounds. No wheezing, rhonchi or rales. Chest:      Chest wall: No tenderness. Abdominal:      General: Bowel sounds are normal.      Palpations: Abdomen is soft. There is no hepatomegaly, splenomegaly or mass. Tenderness: There is no guarding or rebound. Hernia: No hernia is present. There is no hernia in the ventral area or left inguinal area. Musculoskeletal:         General: No tenderness. Normal range of motion. Cervical back: Normal range of motion and neck supple.  No edema or erythema. Normal range of motion. Lymphadenopathy:      Head:      Right side of head: No submental, submandibular, tonsillar, preauricular, posterior auricular or occipital adenopathy. Left side of head: No submental, submandibular, tonsillar, preauricular, posterior auricular or occipital adenopathy. Cervical: No cervical adenopathy. Right cervical: No superficial, deep or posterior cervical adenopathy. Left cervical: No superficial, deep or posterior cervical adenopathy. Upper Body:      Right upper body: No supraclavicular or pectoral adenopathy. Left upper body: No supraclavicular or pectoral adenopathy. Skin:     General: Skin is warm and dry. Coloration: Skin is not pale. Findings: No bruising, ecchymosis, laceration, lesion or rash. Nails: There is no clubbing. Neurological:      Mental Status: He is alert and oriented to person, place, and time. Cranial Nerves: No cranial nerve deficit. Motor: No abnormal muscle tone. Coordination: Coordination normal.      Deep Tendon Reflexes: Reflexes normal.   Psychiatric:         Speech: Speech normal.         Behavior: Behavior normal.         Thought Content: Thought content normal.         Judgment: Judgment normal.     /70 (Site: Right Upper Arm)   Pulse 87   Temp 97.8 °F (36.6 °C) (Oral)   Resp 16   Wt 247 lb (112 kg)   SpO2 97%   BMI 32.59 kg/m²       Impression/Plan:  1. Primary insomnia    2. Situational anxiety    3. Panic disorder    4. Hx of ischemic vertebrobasilar artery thalamic stroke    5. Primary hypertension    6. Primary osteoarthritis involving multiple joints    7. Gastroesophageal reflux disease, unspecified whether esophagitis present    8. Mixed hyperlipidemia    9. History of tobacco use    10.  History of ETOH abuse      Requested Prescriptions     Signed Prescriptions Disp Refills    ALPRAZolam (XANAX) 0.5 MG tablet 120 tablet 0     Sig: take 1-2 tablets by mouth three times a day if needed for anxiety     No orders of the defined types were placed in this encounter. Xanax refilled 0.5 mg 1-2 Q8 as needed #120. OARRS report reviewed. Continue medications as prescribed. Educational information on above diagnosis  provided per AVS.  30 min  Patient giveneducational materials - see patient instructions. Discussed use, benefit, and side effects of prescribed medications. All patient questions answered. Pt voiced understanding. Reviewed health maintenance. Patient agreedwith treatment plan. Follow up as directed. Follow-up with oral surgeon as scheduled to remove remaining teeth.        Electronically signed by Lynnwood Brunner, APRN - CNP on 1/13/2023 at 12:19 PM

## 2023-02-14 DIAGNOSIS — F41.8 SITUATIONAL ANXIETY: ICD-10-CM

## 2023-02-14 DIAGNOSIS — F41.0 PANIC DISORDER: ICD-10-CM

## 2023-02-14 RX ORDER — ALPRAZOLAM 0.5 MG/1
TABLET ORAL
Qty: 120 TABLET | Refills: 0 | Status: SHIPPED | OUTPATIENT
Start: 2023-02-14 | End: 2023-03-16

## 2023-02-14 NOTE — TELEPHONE ENCOUNTER
Patient is requesting a prescription called in to [facility] on [facility] for dry socket. Patient recently had teeth pulled.      Patient is also requesting a refill of ALPRAZolam (XANAX) 0.5 MG tablet,take 1-2 tablets by mouth three times a day if needed for anxiety--LR:1/13/23 #120/0    Last OV: 1/13/23

## 2023-02-24 ENCOUNTER — OFFICE VISIT (OUTPATIENT)
Dept: NEUROLOGY | Age: 73
End: 2023-02-24
Payer: MEDICARE

## 2023-02-24 VITALS
DIASTOLIC BLOOD PRESSURE: 70 MMHG | HEIGHT: 73 IN | HEART RATE: 64 BPM | SYSTOLIC BLOOD PRESSURE: 127 MMHG | OXYGEN SATURATION: 97 % | BODY MASS INDEX: 31.28 KG/M2 | WEIGHT: 236 LBS

## 2023-02-24 DIAGNOSIS — Z95.828 HISTORY OF RIGHT COMMON CAROTID ARTERY STENT PLACEMENT: ICD-10-CM

## 2023-02-24 DIAGNOSIS — Z98.890 HISTORY OF RIGHT COMMON CAROTID ARTERY STENT PLACEMENT: ICD-10-CM

## 2023-02-24 DIAGNOSIS — Z86.73 HISTORY OF STROKE: Primary | ICD-10-CM

## 2023-02-24 PROCEDURE — G8484 FLU IMMUNIZE NO ADMIN: HCPCS | Performed by: NURSE PRACTITIONER

## 2023-02-24 PROCEDURE — 1123F ACP DISCUSS/DSCN MKR DOCD: CPT | Performed by: NURSE PRACTITIONER

## 2023-02-24 PROCEDURE — 3078F DIAST BP <80 MM HG: CPT | Performed by: NURSE PRACTITIONER

## 2023-02-24 PROCEDURE — 4004F PT TOBACCO SCREEN RCVD TLK: CPT | Performed by: NURSE PRACTITIONER

## 2023-02-24 PROCEDURE — G8417 CALC BMI ABV UP PARAM F/U: HCPCS | Performed by: NURSE PRACTITIONER

## 2023-02-24 PROCEDURE — G8427 DOCREV CUR MEDS BY ELIG CLIN: HCPCS | Performed by: NURSE PRACTITIONER

## 2023-02-24 PROCEDURE — 99213 OFFICE O/P EST LOW 20 MIN: CPT | Performed by: NURSE PRACTITIONER

## 2023-02-24 PROCEDURE — 3074F SYST BP LT 130 MM HG: CPT | Performed by: NURSE PRACTITIONER

## 2023-02-24 PROCEDURE — 3017F COLORECTAL CA SCREEN DOC REV: CPT | Performed by: NURSE PRACTITIONER

## 2023-02-24 NOTE — PROGRESS NOTES
NEUROLOGY OUT PATIENT FOLLOW UP NOTE:  2/24/202311:18 AM    Sol Holguin is here for follow up for history of stroke, history of right ICA stent. Allergies   Allergen Reactions    Tape Raffi Dame Tape]     Augmentin [Amoxicillin-Pot Clavulanate] Rash       Current Outpatient Medications:     mupirocin (BACTROBAN) 2 % ointment, Apply topically, Disp: , Rfl:     clindamycin (CLEOCIN) 300 MG capsule, Take 1 capsule by mouth 3 times daily for 10 days, Disp: 30 capsule, Rfl: 0    zolpidem (AMBIEN) 10 MG tablet, Take 1 tablet by mouth nightly as needed for Sleep for up to 180 days. , Disp: 90 tablet, Rfl: 1    metoprolol succinate (TOPROL XL) 25 MG extended release tablet, Take 0.5 tablets by mouth daily, Disp: 90 tablet, Rfl: 3    ezetimibe (ZETIA) 10 MG tablet, Take 1 tablet by mouth once daily, Disp: 90 tablet, Rfl: 3    atorvastatin (LIPITOR) 80 MG tablet, Take 1 tablet by mouth nightly, Disp: 90 tablet, Rfl: 3    lisinopril (PRINIVIL;ZESTRIL) 5 MG tablet, Take 1 tablet by mouth daily, Disp: 90 tablet, Rfl: 3    albuterol (PROVENTIL) (2.5 MG/3ML) 0.083% nebulizer solution, Take 3 mLs by nebulization every 6 hours as needed for Wheezing, Disp: 90 each, Rfl: 5    clopidogrel (PLAVIX) 75 MG tablet, take 1 tablet by mouth once daily, Disp: 30 tablet, Rfl: 11    pantoprazole (PROTONIX) 40 MG tablet, take 1 tablet by mouth once daily, Disp: 90 tablet, Rfl: 3    albuterol sulfate HFA (PROAIR HFA) 108 (90 Base) MCG/ACT inhaler, Inhale 2 puffs into the lungs every 6 hours as needed for Wheezing, Disp: 1 each, Rfl: 3    Respiratory Therapy Supplies (NEBULIZER/TUBING/MOUTHPIECE) KIT, 1 kit by Does not apply route daily as needed (cough, wheeze) Diagnosis:RAD and Bronchitis, Disp: 1 kit, Rfl: 0    aspirin 81 MG chewable tablet, Take 81 mg by mouth daily, Disp: , Rfl:     ALPRAZolam (XANAX) 0.5 MG tablet, take 1-2 tablets by mouth three times a day if needed for anxiety (Patient not taking: Reported on 2/24/2023), Disp: 120 tablet, Rfl: 0    I reviewed the past medical history, allergies, medications, social history and family history. PE:   Vitals:    02/24/23 1059   BP: 127/70   Site: Right Upper Arm   Position: Sitting   Cuff Size: Large Adult   Pulse: 64   SpO2: 97%   Weight: 236 lb (107 kg)   Height: 6' 1\" (1.854 m)     General Appearance:  awake, alert, oriented, in no  distress,   Gen: NAD, Language is Intact. Skin: no rash, lesion, dry  to touch. warm  Head: no rash, no icterus  Neck: There is no carotid bruits. Neuro: CN 2-12 grossly intact with no focal deficits. Power 5/5 in the bilateral upper and left lower extremity, 5/5 in right lower extremity (normalpower). , Reflexes are  symmetric. Long tracts are intact. Cerebellar exam is Intact. Sensory exam is intact to light touch. Gait is intact. Musculoskeletal:  Has no hand arthritis, no limitation of ROM in any of the four extremities.   Lower extremities no edema          DATA:         Results for orders placed or performed during the hospital encounter of 08/12/22   CBC with Auto Differential   Result Value Ref Range    WBC 9.9 4.8 - 10.8 thou/mm3    RBC 4.41 (L) 4.70 - 6.10 mill/mm3    Hemoglobin 13.8 (L) 14.0 - 18.0 gm/dl    Hematocrit 41.1 (L) 42.0 - 52.0 %    MCV 93.2 80.0 - 94.0 fL    MCH 31.3 26.0 - 33.0 pg    MCHC 33.6 32.2 - 35.5 gm/dl    RDW-CV 13.8 11.5 - 14.5 %    RDW-SD 46.5 (H) 35.0 - 45.0 fL    Platelets 349 542 - 526 thou/mm3    MPV 10.1 9.4 - 12.4 fL    Seg Neutrophils 54.4 %    Lymphocytes 31.5 %    Monocytes 8.6 %    Eosinophils 4.7 %    Basophils 0.5 %    Immature Granulocytes 0.3 %    Segs Absolute 5.4 1.8 - 7.7 thou/mm3    Lymphocytes Absolute 3.1 1.0 - 4.8 thou/mm3    Monocytes Absolute 0.9 0.4 - 1.3 thou/mm3    Eosinophils Absolute 0.5 (H) 0.0 - 0.4 thou/mm3    Basophils Absolute 0.0 0.0 - 0.1 thou/mm3    Immature Grans (Abs) 0.03 0.00 - 0.07 thou/mm3    nRBC 0 /100 wbc   Comprehensive Metabolic Panel w/ Reflex to MG   Result Value Ref Range    Glucose 143 (H) 70 - 108 mg/dL    Creatinine 0.9 0.4 - 1.2 mg/dL    BUN 10 7 - 22 mg/dL    Sodium 136 135 - 145 meq/L    Potassium reflex Magnesium 4.0 3.5 - 5.2 meq/L    Chloride 104 98 - 111 meq/L    CO2 23 23 - 33 meq/L    Calcium 9.1 8.5 - 10.5 mg/dL    AST 12 5 - 40 U/L    Alkaline Phosphatase 98 38 - 126 U/L    Total Protein 6.7 6.1 - 8.0 g/dL    Albumin 4.0 3.5 - 5.1 g/dL    Total Bilirubin 0.5 0.3 - 1.2 mg/dL    ALT 13 11 - 66 U/L   Troponin   Result Value Ref Range    Troponin T < 0.010 ng/ml   Urinalysis with Reflex to Culture    Specimen: Urine, clean catch   Result Value Ref Range    Glucose, Ur NEGATIVE NEGATIVE mg/dl    Bilirubin Urine NEGATIVE NEGATIVE    Ketones, Urine NEGATIVE NEGATIVE    Specific Gravity, Urine 1.017 1.002 - 1.030    Blood, Urine NEGATIVE NEGATIVE    pH, UA 6.5 5.0 - 9.0    Protein, UA NEGATIVE NEGATIVE    Urobilinogen, Urine 1.0 0.0 - 1.0 eu/dl    Nitrite, Urine NEGATIVE NEGATIVE    Leukocyte Esterase, Urine NEGATIVE NEGATIVE    Color, UA YELLOW STRAW-YELLOW    Character, Urine CLEAR CLEAR-SL CLOUD   Ethanol   Result Value Ref Range    ETHYL ALCOHOL, SERUM < 0.01 0.00 %   Urine Drug Screen   Result Value Ref Range    Amphetamine+Methamphetamine Urine Screen Negative NEGATIVE    Barbiturate Quant, Ur Negative NEGATIVE    Benzodiazepine Quant, Ur Negative NEGATIVE    Cannabinoid Quant, Ur Negative NEGATIVE    Cocaine Metab Quant, Ur Negative NEGATIVE    Opiates, Urine POSITIVE NEGATIVE    Oxycodone Negative NEGATIVE    PCP Quant, Ur Negative NEGATIVE   C-Reactive Protein   Result Value Ref Range    CRP 2.44 (H) 0.00 - 1.00 mg/dl   Brain Natriuretic Peptide   Result Value Ref Range    Pro-BNP 74.4 0.0 - 900.0 pg/mL   Anion Gap   Result Value Ref Range    Anion Gap 9.0 8.0 - 16.0 meq/L   Glomerular Filtration Rate, Estimated   Result Value Ref Range    Est, Glom Filt Rate 83 (A) ml/min/1.73m2   Osmolality   Result Value Ref Range    Osmolality Calc 273.5 (L) 275.0 - 300.0 mOsmol/kg   POCT Glucose   Result Value Ref Range    POC Glucose 168 (H) 70 - 108 mg/dl   EKG 12 Lead   Result Value Ref Range    Ventricular Rate 74 BPM    Atrial Rate 74 BPM    P-R Interval 172 ms    QRS Duration 86 ms    Q-T Interval 390 ms    QTc Calculation (Bazett) 432 ms    P Axis 72 degrees    R Axis 31 degrees    T Axis 54 degrees          Results for orders placed during the hospital encounter of 04/04/18    MRI CERVICAL SPINE WO CONTRAST    Narrative  PROCEDURE: MRI CERVICAL SPINE WO CONTRAST    CLINICAL INFORMATION: COORDINATION CHANGES, NEW OR PROGRESSIVE,  . Additional history obtained from the electronic medical record indicates the patient is having some dizziness with right-sided numbness which is resolved. Some right leg weakness. COMPARISON: None available. Correlation is made to CT of the cervical spine dated December 18, 2016. TECHNIQUE: Sagittal T1, T2 and STIR sequences were obtained through the cervical spine. Axial fast and echo and gradient echo T2-weighted images were obtained. FINDINGS:    The cervical spine is imaged from the craniocervical junction to the superior aspect of T3. Hyperintense T2 signal is noted within the vashti which likely corresponds to a remote lacunar infarct. No suspicious abnormality is identified at the cervical  medullary junction. No abnormal signal or expansion is present within the cervical spinal cord. There is preservation of the expected cervical lordosis. There is minimal anterolisthesis of C3 on C4 and minimal retrolisthesis of C5 on C6. The vertebral body heights are maintained. No compression fracture deformity or suspicious, marrow replacing  lesion is identified. Bulky anterior osteophytes are noted from C4 to C6. Degenerative facet arthropathy is present at every level. With regards to the disc spaces, at C2-C3, uncovertebral joint spurring is present without significant spinal canal narrowing.  There is mild  neural foraminal narrowing on the right with mild to moderate neural foraminal narrowing on the left. At C3-C4, uncovertebral joint spurring is present. No significant spinal canal narrowing is seen. Mild neural foraminal narrowing is present bilaterally. At C4-C5, there is uncovertebral joint spurring and ligamentum flavum thickening resulting in mild spinal canal narrowing. Mild to moderate neural foraminal narrowing is present on the right without significant neural foraminal narrowing on the left. At C5-C6, there is a disc bulge and flattening of the ventral thecal sac resulting in mild spinal canal narrowing. Uncovertebral joint spurring is also present and there is mild to moderate neural foraminal narrowing on the right without significant  neural foraminal narrowing on the left. At C6-C7, there is a disc osteophyte complex resulting in mild spinal canal narrowing. Uncovertebral joint spurring is also present and there is mild bilateral neural foraminal narrowing. At C7-T1, no significant spinal canal or neural foraminal narrowing is present. No suspicious abnormality is identified within the visualized paraspinal soft tissues. Impression  Multilevel degenerative changes are present throughout the cervical spine and are further discussed by level in the findings. These are most significant at C4-C5 with uncovertebral joint spurring and ligamentum flavum thickening resulting in mild spinal  canal narrowing. Mild to moderate neural foraminal narrowing is also present on the right secondary to uncovertebral joint spurring and facet arthropathy. There is also mild spinal canal narrowing at C5-C6 secondary to a disc bulge. Mild to moderate  neural foraminal narrowing is also noted on the right at this level secondary to facet arthropathy. **This report has been created using voice recognition software. It may contain minor errors which are inherent in voice recognition technology. **    Final report electronically signed by Dr. Sreedhar Malik on 4/5/2018 10:08 AM    No results found for this or any previous visit. Results for orders placed during the hospital encounter of 10/26/21    CTA HEAD W WO CONTRAST    Narrative  WET READ:    No hemodynamically significant stenosis is seen in the extracranial and intracranial carotids system. No significant hemodynamically significant stenosis is seen in the intracranial arteries. A fetal origin of the left posterior cerebral artery is seen. This examination will be formally read by one of the neuro radiologists tomorrow. Please refer to that report. Gela Quinonez ON 10/26/2021 9:45 PM.    **This report has been created using voice recognition software. It may contain minor errors which are inherent in voice recognition technology. **    FINAL REPORT:    PROCEDURE: CTA HEAD W WO CONTRAST, CTA NECK W WO CONTRAST    CLINICAL INFORMATION: tia. Slurred speech and headache. COMPARISON: CT head from the same date and CTA head and neck dated 7/27/2020. TECHNIQUE: 1 mm axial images were obtained from the aortic arch through the head in arterial phase during fast bolus administration of 80 mL Isovue-370 injected in the right forearm. A noncontrast localizer was obtained. 3-D reconstructions were created  on a dedicated 3-D workstation. These include multiplanar MPR images, multiplanar MIP images and centerline reconstructions. All CT scans at this facility use dose modulation, iterative reconstruction, and/or weight-based dosing when appropriate to reduce radiation dose to as low as reasonably achievable. FINDINGS:  CTA head: There are mural calcifications in the cavernous and clinoid segments of internal carotid arteries without flow-limiting stenosis or aneurysm. The bilateral middle cerebral and anterior cerebral arteries are patent without focal abnormality identified.   The basilar artery is patent without focal stenosis or visualized aneurysm. The bilateral posterior cerebral and superior cerebellar arteries are patent without focal abnormality identified. There is a fetal origin of the left posterior cerebral artery  incidentally noted. Dural venous sinuses appear patent without focal filling defect. No focal areas of abnormal parenchymal enhancement are identified. CTA NECK:  There is a typical 3 vessel arch. The brachiocephalic and left subclavian arteries are patent without focal stenosis. Redemonstration of a carotid stent on the right extending from the distal common carotid artery into the proximal internal carotid  artery. There appears to be some in-stent stenosis of the severity is not well evaluated secondary to streak artifact from the stent. There is preserved flow in the internal carotid artery distal to the stent. The left common carotid artery is patent  without focal stenosis. There is calcified mural plaque at the left carotid bulb without hemodynamically significant stenosis. Cervical segments of the left internal carotid artery otherwise patent without focal stenosis. The vertebral arteries are  codominant. They are patent throughout their course without focal stenosis. Mucosal surfaces of the aerodigestive tract are symmetric without focal nodular thickening or visualized mass. No cervical lymphadenopathy is identified. The parotid, submandibular and thyroid glands are unremarkable. Visualized portions of the lungs are  clear. There are stable degenerative changes of the cervical spine. Impression  1. Mild mural calcification in the cavernous and clinoid segments of internal carotid arteries without flow-limiting stenosis or aneurysm in the intracranial circulation.   2. Redemonstration of a right carotid stent extending from the distal common carotid artery into the proximal internal carotid artery which appears patent with some in-stent stenosis the severity of which can't be well evaluated secondary to streak  artifact from the stent. 3. Stable mild mural calcification of left carotid bulb without hemodynamically significant stenosis by NASCET criteria. **This report has been created using voice recognition software. It may contain minor errors which are inherent in voice recognition technology. **    Final report electronically signed by Dr. Vannessa Roa MD on 10/27/2021 8:45 AM    Results for orders placed during the hospital encounter of 10/26/21    CTA NECK W WO CONTRAST    Narrative  WET READ:    No hemodynamically significant stenosis is seen in the extracranial and intracranial carotids system. No significant hemodynamically significant stenosis is seen in the intracranial arteries. A fetal origin of the left posterior cerebral artery is seen. This examination will be formally read by one of the neuro radiologists tomorrow. Please refer to that report. Angelo Quinonez ON 10/26/2021 9:45 PM.    **This report has been created using voice recognition software. It may contain minor errors which are inherent in voice recognition technology. **    FINAL REPORT:    PROCEDURE: CTA HEAD W WO CONTRAST, CTA NECK W WO CONTRAST    CLINICAL INFORMATION: tia. Slurred speech and headache. COMPARISON: CT head from the same date and CTA head and neck dated 7/27/2020. TECHNIQUE: 1 mm axial images were obtained from the aortic arch through the head in arterial phase during fast bolus administration of 80 mL Isovue-370 injected in the right forearm. A noncontrast localizer was obtained. 3-D reconstructions were created  on a dedicated 3-D workstation. These include multiplanar MPR images, multiplanar MIP images and centerline reconstructions.     All CT scans at this facility use dose modulation, iterative reconstruction, and/or weight-based dosing when appropriate to reduce radiation dose to as low as reasonably achievable. FINDINGS:  CTA head: There are mural calcifications in the cavernous and clinoid segments of internal carotid arteries without flow-limiting stenosis or aneurysm. The bilateral middle cerebral and anterior cerebral arteries are patent without focal abnormality identified. The basilar artery is patent without focal stenosis or visualized aneurysm. The bilateral posterior cerebral and superior cerebellar arteries are patent without focal abnormality identified. There is a fetal origin of the left posterior cerebral artery  incidentally noted. Dural venous sinuses appear patent without focal filling defect. No focal areas of abnormal parenchymal enhancement are identified. CTA NECK:  There is a typical 3 vessel arch. The brachiocephalic and left subclavian arteries are patent without focal stenosis. Redemonstration of a carotid stent on the right extending from the distal common carotid artery into the proximal internal carotid  artery. There appears to be some in-stent stenosis of the severity is not well evaluated secondary to streak artifact from the stent. There is preserved flow in the internal carotid artery distal to the stent. The left common carotid artery is patent  without focal stenosis. There is calcified mural plaque at the left carotid bulb without hemodynamically significant stenosis. Cervical segments of the left internal carotid artery otherwise patent without focal stenosis. The vertebral arteries are  codominant. They are patent throughout their course without focal stenosis. Mucosal surfaces of the aerodigestive tract are symmetric without focal nodular thickening or visualized mass. No cervical lymphadenopathy is identified. The parotid, submandibular and thyroid glands are unremarkable. Visualized portions of the lungs are  clear. There are stable degenerative changes of the cervical spine. Impression  1.  Mild mural calcification in the cavernous and clinoid segments of internal carotid arteries without flow-limiting stenosis or aneurysm in the intracranial circulation. 2. Redemonstration of a right carotid stent extending from the distal common carotid artery into the proximal internal carotid artery which appears patent with some in-stent stenosis the severity of which can't be well evaluated secondary to streak  artifact from the stent. 3. Stable mild mural calcification of left carotid bulb without hemodynamically significant stenosis by NASCET criteria. **This report has been created using voice recognition software. It may contain minor errors which are inherent in voice recognition technology. **    Final report electronically signed by Dr. Author Jaguar MD on 10/27/2021 8:45 AM    Results for orders placed during the hospital encounter of 10/26/21    MRI BRAIN WO CONTRAST    Narrative  PROCEDURE: MRI BRAIN WO CONTRAST    INDICATION:  dysarthria. COMPARISON: CT head dated 10/26/2021 and MR brain dated 7/27/2020. TECHNIQUE: Multiplanar and multiple spin echo MRI images were obtained of the brain without contrast.    FINDINGS:  There is stable mild volume loss. There are multiple small focal areas of encephalomalacia in the bilateral basal ganglia and bilateral thalami and left corona radiata, stable compared to prior MRI. There are stable small focal areas of encephalomalacia  in the right cerebellar hemisphere. There are mild scattered focal areas of T2/FLAIR hyperintensity in the subcortical and deep white matter. These are stable compared to prior MRI. No intra or extra-axial mass is identified. No focal areas of restricted  diffusion are present. The major vascular flow voids appear patent. Orbits are unremarkable. There is moderate mucosal thickening in the ethmoid air cells and mild mucosal thickening in the frontal sinuses, decreased compared to prior MRI. Impression  1. No evidence of acute intracranial abnormality.   2. Stable old lacunar infarcts in the bilateral basal ganglia, thalami and right cerebellar hemisphere compared to prior MRI. 3. Mild to moderate chronic sinus mucosal inflammation, decreased compared to prior MRI. **This report has been created using voice recognition software. It may contain minor errors which are inherent in voice recognition technology. **    Final report electronically signed by Dr. Brooke Street MD on 10/27/2021 9:35 AM    No results found for this or any previous visit. No results found for this or any previous visit. Results for orders placed during the hospital encounter of 08/12/22    CT Head WO Contrast    Narrative  PROCEDURE: CT HEAD WO CONTRAST    CLINICAL INFORMATION: AMS . TECHNIQUE: 2-D multiplanar noncontrast CT brain  All CT scans at this facility use dose modulation, iterative reconstruction, and/or weight-based dosing when appropriate to reduce radiation dose to as low as reasonably achievable. COMPARISON: 10/26/2021      FINDINGS: There is no hemorrhage. There is no intra or extra-axial fluid collection. No acute infarction is seen. Gray-white differentiation is maintained. There is a remote lacunar infarct in the right internal capsule. That appearance is stable. Ventricles are normal.  No mass effect. Postsurgical changes maxillary sinuses. Acute maxillary sinus disease in the right cyst on the left. Acute Ethmoid sinus disease. Impression  There is acute ethmoid and maxillary sinusitis. There is no acute intracranial pathology. **This report has been created using voice recognition software. It may contain minor errors which are inherent in voice recognition technology. **    Final report electronically signed by Dr. Venkatesh Ingram on 8/12/2022 9:46 AM         Assessment:     Diagnosis Orders   1. History of stroke             He is doing well. He denies any new symptoms. No new numbness or weakness.  He is on aspirin, plavix, as well as lipid lowering medications. His most recent LDL done 7/28/22=37. He is doing very well, he is compliant. The patient has history of right carotid stent. We will obtain CTA head and neck W/WO contrast as follow up. After detailed discussion with patient we agreed on the following plan. Plan:  CTA head and neck W/WO contrast  Continue with Aspirin and plavix. Target LDL below 70, follow-up with your family physician  Modify risk factors for stroke (blood pressure, cholesterol, diabetes, smoking cessation etc.. Control)  Report any new symptoms  Follow up in 1 year or sooner if needed. Call if any questions or concerns.          Total time 26 min    Nakita Hunter, MANE - CNP

## 2023-02-24 NOTE — PATIENT INSTRUCTIONS
CTA head and neck W/WO contrast  Continue with Aspirin and plavix. Target LDL below 70, follow-up with your family physician  Modify risk factors for stroke (blood pressure, cholesterol, diabetes, smoking cessation etc.. Control)  Report any new symptoms  Follow up in 1 year or sooner if needed. Call if any questions or concerns.

## 2023-03-13 ENCOUNTER — OFFICE VISIT (OUTPATIENT)
Dept: FAMILY MEDICINE CLINIC | Age: 73
End: 2023-03-13

## 2023-03-13 VITALS
SYSTOLIC BLOOD PRESSURE: 104 MMHG | HEART RATE: 78 BPM | HEIGHT: 74 IN | WEIGHT: 235 LBS | BODY MASS INDEX: 30.16 KG/M2 | DIASTOLIC BLOOD PRESSURE: 76 MMHG | RESPIRATION RATE: 9 BRPM

## 2023-03-13 DIAGNOSIS — F10.11 HISTORY OF ETOH ABUSE: ICD-10-CM

## 2023-03-13 DIAGNOSIS — Z86.73 HX OF ISCHEMIC VERTEBROBASILAR ARTERY THALAMIC STROKE: ICD-10-CM

## 2023-03-13 DIAGNOSIS — I10 PRIMARY HYPERTENSION: ICD-10-CM

## 2023-03-13 DIAGNOSIS — K21.9 GASTROESOPHAGEAL REFLUX DISEASE, UNSPECIFIED WHETHER ESOPHAGITIS PRESENT: ICD-10-CM

## 2023-03-13 DIAGNOSIS — F51.01 PRIMARY INSOMNIA: ICD-10-CM

## 2023-03-13 DIAGNOSIS — F41.0 PANIC DISORDER: ICD-10-CM

## 2023-03-13 DIAGNOSIS — E78.2 MIXED HYPERLIPIDEMIA: ICD-10-CM

## 2023-03-13 DIAGNOSIS — Z87.891 HISTORY OF TOBACCO USE: ICD-10-CM

## 2023-03-13 DIAGNOSIS — F33.41 RECURRENT MAJOR DEPRESSIVE DISORDER, IN PARTIAL REMISSION (HCC): ICD-10-CM

## 2023-03-13 DIAGNOSIS — M54.31 SCIATICA OF RIGHT SIDE: Primary | ICD-10-CM

## 2023-03-13 DIAGNOSIS — F41.8 SITUATIONAL ANXIETY: ICD-10-CM

## 2023-03-13 DIAGNOSIS — M15.9 PRIMARY OSTEOARTHRITIS INVOLVING MULTIPLE JOINTS: ICD-10-CM

## 2023-03-13 DIAGNOSIS — I73.9 PAD (PERIPHERAL ARTERY DISEASE) (HCC): ICD-10-CM

## 2023-03-13 DIAGNOSIS — J41.8 MIXED SIMPLE AND MUCOPURULENT CHRONIC BRONCHITIS (HCC): ICD-10-CM

## 2023-03-13 RX ORDER — ALPRAZOLAM 0.5 MG/1
TABLET ORAL
Qty: 120 TABLET | Refills: 0 | Status: SHIPPED | OUTPATIENT
Start: 2023-03-13 | End: 2023-03-14 | Stop reason: CLARIF

## 2023-03-13 RX ORDER — METHYLPREDNISOLONE ACETATE 80 MG/ML
120 INJECTION, SUSPENSION INTRA-ARTICULAR; INTRALESIONAL; INTRAMUSCULAR; SOFT TISSUE ONCE
Status: COMPLETED | OUTPATIENT
Start: 2023-03-13 | End: 2023-03-13

## 2023-03-13 RX ORDER — PREDNISONE 10 MG/1
TABLET ORAL
Qty: 30 TABLET | Refills: 0 | Status: SHIPPED | OUTPATIENT
Start: 2023-03-13 | End: 2023-03-23

## 2023-03-13 RX ORDER — TIZANIDINE 4 MG/1
4 TABLET ORAL EVERY 8 HOURS PRN
Qty: 60 TABLET | Refills: 0 | Status: SHIPPED | OUTPATIENT
Start: 2023-03-13

## 2023-03-13 RX ORDER — LIDOCAINE 50 MG/G
1 PATCH TOPICAL DAILY
Qty: 30 PATCH | Refills: 0 | Status: SHIPPED | OUTPATIENT
Start: 2023-03-13 | End: 2023-04-12

## 2023-03-13 RX ADMIN — METHYLPREDNISOLONE ACETATE 120 MG: 80 INJECTION, SUSPENSION INTRA-ARTICULAR; INTRALESIONAL; INTRAMUSCULAR; SOFT TISSUE at 12:28

## 2023-03-13 SDOH — ECONOMIC STABILITY: HOUSING INSECURITY
IN THE LAST 12 MONTHS, WAS THERE A TIME WHEN YOU DID NOT HAVE A STEADY PLACE TO SLEEP OR SLEPT IN A SHELTER (INCLUDING NOW)?: NO

## 2023-03-13 SDOH — ECONOMIC STABILITY: FOOD INSECURITY: WITHIN THE PAST 12 MONTHS, THE FOOD YOU BOUGHT JUST DIDN'T LAST AND YOU DIDN'T HAVE MONEY TO GET MORE.: NEVER TRUE

## 2023-03-13 SDOH — ECONOMIC STABILITY: INCOME INSECURITY: HOW HARD IS IT FOR YOU TO PAY FOR THE VERY BASICS LIKE FOOD, HOUSING, MEDICAL CARE, AND HEATING?: NOT HARD AT ALL

## 2023-03-13 SDOH — ECONOMIC STABILITY: FOOD INSECURITY: WITHIN THE PAST 12 MONTHS, YOU WORRIED THAT YOUR FOOD WOULD RUN OUT BEFORE YOU GOT MONEY TO BUY MORE.: NEVER TRUE

## 2023-03-13 ASSESSMENT — ENCOUNTER SYMPTOMS
COUGH: 0
WHEEZING: 0
BACK PAIN: 1
ABDOMINAL PAIN: 0
CHEST TIGHTNESS: 0
SHORTNESS OF BREATH: 0
ABDOMINAL DISTENTION: 0

## 2023-03-13 NOTE — PROGRESS NOTES
[address] [address] [address] [place] [address]  [facility] [address]  Dept: [phone]  Dept Fax: [phone]  Loc: [phone]  PROGRESS NOTE      VisitDate: 3/13/2023    Ashvin Washburn is a 81 y.o. male who presents today for:     Chief Complaint   Patient presents with    Lower Back Pain     He  has been seeing Dr Edmundo Cunha and he recommends an xray of his lumbosacral spine. He states he bent over to  his keys with groceries in his hand and it flared up his back. Pain is in the right buttock. Subjective:  Patient presents with complaint of right sided sciatica pain which radiates into buttock and thigh for the past week. Reports he is received chiropractic care minimal relief. Chiropractor requesting x-ray. Reports the symptoms developed while bending over to  keys. Patient also requesting routine refill of Xanax. History reviewed. Review of Systems   Constitutional:  Negative for activity change, appetite change, chills, fatigue and fever. Eyes:  Negative for visual disturbance. Respiratory:  Negative for cough, chest tightness, shortness of breath and wheezing. Cardiovascular:  Negative for chest pain, palpitations and leg swelling. Gastrointestinal:  Negative for abdominal distention and abdominal pain. Genitourinary:  Negative for dysuria. Musculoskeletal:  Positive for arthralgias and back pain. Negative for neck pain. Skin: Negative. Negative for rash. Neurological:  Negative for dizziness, light-headedness and headaches. Hematological:  Negative for adenopathy. Psychiatric/Behavioral:  Negative for decreased concentration and dysphoric mood. The patient is nervous/anxious. All other systems reviewed and are negative.   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)     Chronic diastolic CHF (congestive heart failure) ([facility] [address] 11/23/2021    Erectile dysfunction     Essential hypertension, malignant     ETOH abuse     GERD (gastroesophageal reflux disease)     Insomnia     Low back pain     Major depressive disorder, recurrent ([facility] [address] 4/4/2018    Other headache syndromes 12/25/2016    Pneumonia     Pneumonia 02/2022    Sciatica     right side strain      Past Surgical History:   Procedure Laterality Date    APPENDECTOMY      COLONOSCOPY      EKG 12-LEAD  7/17/2015         HERNIA REPAIR      umbilical    JOINT REPLACEMENT  08/03/2016    RTK       KNEE ARTHROSCOPY      Rt knee Dr. Yael Desai Right      Family History   Problem Relation Age of Onset    Dementia Mother     Heart Disease Father     High Blood Pressure Sister     Cancer Brother         testicular cancer     Social History     Tobacco Use    Smoking status: Every Day     Packs/day: 0.50     Years: 30.00     Pack years: 15.00     Types: Cigarettes     Start date: 7/16/1968    Smokeless tobacco: Never    Tobacco comments:     printed to avs   Substance Use Topics    Alcohol use: Not Currently     Alcohol/week: 42.0 standard drinks     Types: 42 Cans of beer per week     Comment: States last beer was in 3/2018      Current Outpatient Medications   Medication Sig Dispense Refill    ALPRAZolam (XANAX) 0.5 MG tablet take 1-2 tablets by mouth three times a day if needed for anxiety 120 tablet 0    predniSONE (DELTASONE) 10 MG tablet 4 po qd for 3 days, then 3 po qd for 3 days, then 2 po qd for 3 days, then 1 po qd for 3 days 30 tablet 0    lidocaine (LIDODERM) 5 % Place 1 patch onto the skin daily 12 hours on, 12 hours off. 30 patch 0    tiZANidine (ZANAFLEX) 4 MG tablet Take 1 tablet by mouth every 8 hours as needed (spasm) 60 tablet 0    mupirocin (BACTROBAN) 2 % ointment Apply topically      zolpidem (AMBIEN) 10 MG tablet Take 1 tablet by mouth nightly as needed for Sleep for up to 180 days.  90 tablet 1    metoprolol succinate (TOPROL XL) 25 MG extended release tablet Take 0.5 tablets by mouth daily 90 tablet 3    ezetimibe (ZETIA) 10 MG tablet Take 1 tablet by mouth once daily 90 tablet 3    atorvastatin (LIPITOR) 80 MG tablet Take 1 tablet by mouth nightly 90 tablet 3    lisinopril (PRINIVIL;ZESTRIL) 5 MG tablet Take 1 tablet by mouth daily 90 tablet 3    albuterol (PROVENTIL) (2.5 MG/3ML) 0.083% nebulizer solution Take 3 mLs by nebulization every 6 hours as needed for Wheezing 90 each 5    clopidogrel (PLAVIX) 75 MG tablet take 1 tablet by mouth once daily 30 tablet 11    pantoprazole (PROTONIX) 40 MG tablet take 1 tablet by mouth once daily 90 tablet 3    albuterol sulfate HFA (PROAIR HFA) 108 (90 Base) MCG/ACT inhaler Inhale 2 puffs into the lungs every 6 hours as needed for Wheezing 1 each 3    Respiratory Therapy Supplies (NEBULIZER/TUBING/MOUTHPIECE) KIT 1 kit by Does not apply route daily as needed (cough, wheeze) Diagnosis:RAD and Bronchitis 1 kit 0    aspirin 81 MG chewable tablet Take 81 mg by mouth daily       No current facility-administered medications for this visit. Allergies   Allergen Reactions    Tape Marinus Fleming Tape]     Augmentin [Amoxicillin-Pot Clavulanate] Rash     Health Maintenance   Topic Date Due    Shingles vaccine (1 of 2) Never done    AAA screen  Never done    COVID-19 Vaccine (3 - Booster for Pfizer series) 07/02/2021    A1C test (Diabetic or Prediabetic)  10/27/2022    Annual Wellness Visit (AWV)  Never done    Lipids  07/28/2023    Depression Monitoring  01/13/2024    Colorectal Cancer Screen  02/09/2024    DTaP/Tdap/Td vaccine (3 - Td or Tdap) 05/11/2032    Flu vaccine  Completed    Pneumococcal 65+ years Vaccine  Completed    Hepatitis C screen  Completed    Hepatitis A vaccine  Aged Out    Hib vaccine  Aged Out    Meningococcal (ACWY) vaccine  Aged Out         Objective:     Physical Exam  Vitals and nursing note reviewed. Constitutional:       Appearance: Normal appearance. He is well-developed. He is not diaphoretic.    HENT: Head: Normocephalic and atraumatic. Not macrocephalic and not microcephalic. Right Ear: Hearing, tympanic membrane, ear canal and external ear normal. No drainage or tenderness. No middle ear effusion. No hemotympanum. Tympanic membrane is not injected, scarred, perforated or bulging. Left Ear: Hearing, tympanic membrane, ear canal and external ear normal. No drainage or tenderness. No middle ear effusion. No hemotympanum. Tympanic membrane is not injected, scarred, perforated or bulging. Nose: No nasal deformity, septal deviation, mucosal edema or rhinorrhea. Right Sinus: No maxillary sinus tenderness or frontal sinus tenderness. Left Sinus: No maxillary sinus tenderness or frontal sinus tenderness. Mouth/Throat:      Mouth: No oral lesions. Dentition: Normal dentition. Does not have dentures. No dental caries or dental abscesses. Pharynx: No oropharyngeal exudate or posterior oropharyngeal erythema. Tonsils: No tonsillar abscesses. Eyes:      General: Lids are normal. No scleral icterus. Extraocular Movements:      Right eye: Normal extraocular motion. Left eye: Normal extraocular motion. Conjunctiva/sclera: Conjunctivae normal.      Right eye: Right conjunctiva is not injected. Left eye: Left conjunctiva is not injected. Neck:      Thyroid: No thyroid mass or thyromegaly. Vascular: No carotid bruit or JVD. Trachea: Trachea normal.   Cardiovascular:      Rate and Rhythm: Normal rate and regular rhythm. Heart sounds: Normal heart sounds, S1 normal and S2 normal. No murmur heard. No friction rub. No gallop. Pulmonary:      Effort: Pulmonary effort is normal. No respiratory distress. Breath sounds: Normal breath sounds. No wheezing, rhonchi or rales. Chest:      Chest wall: No tenderness. Abdominal:      General: Bowel sounds are normal.      Palpations: Abdomen is soft. There is no hepatomegaly, splenomegaly or mass. Tenderness: There is no guarding or rebound. Hernia: No hernia is present. There is no hernia in the ventral area or left inguinal area. Musculoskeletal:      Cervical back: Normal range of motion and neck supple. No edema or erythema. Normal range of motion. Lumbar back: Tenderness and bony tenderness present. No deformity. Decreased range of motion. Positive right straight leg raise test. Negative left straight leg raise test.        Back:       Comments: Equal strength lower extremities. Lymphadenopathy:      Head:      Right side of head: No submental, submandibular, tonsillar, preauricular, posterior auricular or occipital adenopathy. Left side of head: No submental, submandibular, tonsillar, preauricular, posterior auricular or occipital adenopathy. Cervical: No cervical adenopathy. Right cervical: No superficial, deep or posterior cervical adenopathy. Left cervical: No superficial, deep or posterior cervical adenopathy. Upper Body:      Right upper body: No supraclavicular or pectoral adenopathy. Left upper body: No supraclavicular or pectoral adenopathy. Skin:     General: Skin is warm and dry. Coloration: Skin is not pale. Findings: No bruising, ecchymosis, laceration, lesion or rash. Nails: There is no clubbing. Neurological:      Mental Status: He is alert and oriented to person, place, and time. Cranial Nerves: No cranial nerve deficit. Motor: No abnormal muscle tone. Coordination: Coordination normal.      Deep Tendon Reflexes: Reflexes normal.   Psychiatric:         Speech: Speech normal.         Behavior: Behavior normal.         Thought Content: Thought content normal.         Judgment: Judgment normal.     /76   Pulse 78   Resp (!) 9   Ht 6' 1.5\" (1.867 m)   Wt 235 lb (106.6 kg)   BMI 30.58 kg/m²       Impression/Plan:  1. Sciatica of right side    2. Situational anxiety    3. Panic disorder    4.  Mixed simple and mucopurulent chronic bronchitis ([facility] [address]    5. Recurrent major depressive disorder, in partial remission ([facility] [address]    6. PAD (peripheral artery disease) ([facility] [address]    7. Gastroesophageal reflux disease, unspecified whether esophagitis present    8. Primary hypertension    9. Mixed hyperlipidemia    10. Primary osteoarthritis involving multiple joints    11. Primary insomnia    12. History of tobacco use    13. History of ETOH abuse    14. Hx of ischemic vertebrobasilar artery thalamic stroke      Requested Prescriptions     Signed Prescriptions Disp Refills    ALPRAZolam (XANAX) 0.5 MG tablet 120 tablet 0     Sig: take 1-2 tablets by mouth three times a day if needed for anxiety    predniSONE (DELTASONE) 10 MG tablet 30 tablet 0     Si po qd for 3 days, then 3 po qd for 3 days, then 2 po qd for 3 days, then 1 po qd for 3 days    lidocaine (LIDODERM) 5 % 30 patch 0     Sig: Place 1 patch onto the skin daily 12 hours on, 12 hours off.    tiZANidine (ZANAFLEX) 4 MG tablet 60 tablet 0     Sig: Take 1 tablet by mouth every 8 hours as needed (spasm)     Orders Placed This Encounter   Procedures    XR LUMBAR SPINE (2-3 VIEWS)     Standing Status:   Future     Standing Expiration Date:   3/13/2024     Xanax refilled 0.5 1-2 tabs 3 times daily as needed 120. OARRS report reviewed. Depo-Medrol 120 IM provided in office today. Prednisone taper prescribed Lidoderm patches prescribed Zanaflex 4 mg every 8 as needed #60. Sciatica exercises provided x-ray lumbar spine ordered. Patient giveneducational materials - see patient instructions. Discussed use, benefit, and side effects of prescribed medications. All patient questions answered. Pt voiced understanding. Reviewed health maintenance. Patient agreedwith treatment plan. Follow up as directed.    30min       Electronically signed by MANE Atkinson CNP on 3/13/2023 at 12:57 PM

## 2023-03-13 NOTE — PROGRESS NOTES
Administrations This Visit       methylPREDNISolone acetate (DEPO-MEDROL) injection 120 mg       Admin Date  03/13/2023  12:28 Action  Given Dose  120 mg Route  IntraMUSCular Site  Dorsogluteal Right Administered By  Rose Mary Hauser CMA ([address] [place] [address])    Ordering Provider: MANE Smith CNP    NDC: 9867-7271-41    Lot#: [id number]    : Vijaya Messina.     Patient Supplied?: No

## 2023-03-14 ENCOUNTER — HOSPITAL ENCOUNTER (OUTPATIENT)
Dept: GENERAL RADIOLOGY | Age: 73
Discharge: HOME | End: 2023-03-14
Payer: MEDICARE

## 2023-03-14 ENCOUNTER — HOSPITAL ENCOUNTER (OUTPATIENT)
Age: 73
Discharge: HOME | End: 2023-03-14
Payer: MEDICARE

## 2023-03-14 DIAGNOSIS — F41.0 PANIC DISORDER: ICD-10-CM

## 2023-03-14 DIAGNOSIS — F41.8 SITUATIONAL ANXIETY: ICD-10-CM

## 2023-03-14 DIAGNOSIS — M54.31 SCIATICA OF RIGHT SIDE: ICD-10-CM

## 2023-03-14 PROCEDURE — 72100 X-RAY EXAM L-S SPINE 2/3 VWS: CPT

## 2023-03-14 RX ORDER — ALPRAZOLAM 0.5 MG/1
TABLET ORAL
Qty: 120 TABLET | Refills: 0 | Status: SHIPPED | OUTPATIENT
Start: 2023-03-14 | End: 2023-04-13

## 2023-03-14 NOTE — TELEPHONE ENCOUNTER
Joyce Casillas pharmacist at [facility] states the xanax Rx from yesterday did not have a days supply and to send over a new Rx. Xanax 0.5mg 1-2 tid prn anxiety #120 for 30 days is pending.

## 2023-03-23 ENCOUNTER — OFFICE VISIT (OUTPATIENT)
Dept: FAMILY MEDICINE CLINIC | Age: 73
End: 2023-03-23
Payer: COMMERCIAL

## 2023-03-23 VITALS
HEART RATE: 65 BPM | SYSTOLIC BLOOD PRESSURE: 124 MMHG | RESPIRATION RATE: 16 BRPM | WEIGHT: 233 LBS | OXYGEN SATURATION: 96 % | DIASTOLIC BLOOD PRESSURE: 58 MMHG | BODY MASS INDEX: 30.32 KG/M2 | TEMPERATURE: 97.6 F

## 2023-03-23 DIAGNOSIS — M54.31 SCIATICA OF RIGHT SIDE: Primary | ICD-10-CM

## 2023-03-23 PROCEDURE — 3074F SYST BP LT 130 MM HG: CPT | Performed by: NURSE PRACTITIONER

## 2023-03-23 PROCEDURE — 3017F COLORECTAL CA SCREEN DOC REV: CPT | Performed by: NURSE PRACTITIONER

## 2023-03-23 PROCEDURE — 1123F ACP DISCUSS/DSCN MKR DOCD: CPT | Performed by: NURSE PRACTITIONER

## 2023-03-23 PROCEDURE — G8417 CALC BMI ABV UP PARAM F/U: HCPCS | Performed by: NURSE PRACTITIONER

## 2023-03-23 PROCEDURE — 3078F DIAST BP <80 MM HG: CPT | Performed by: NURSE PRACTITIONER

## 2023-03-23 PROCEDURE — G8427 DOCREV CUR MEDS BY ELIG CLIN: HCPCS | Performed by: NURSE PRACTITIONER

## 2023-03-23 PROCEDURE — G8484 FLU IMMUNIZE NO ADMIN: HCPCS | Performed by: NURSE PRACTITIONER

## 2023-03-23 PROCEDURE — 4004F PT TOBACCO SCREEN RCVD TLK: CPT | Performed by: NURSE PRACTITIONER

## 2023-03-23 PROCEDURE — 99213 OFFICE O/P EST LOW 20 MIN: CPT | Performed by: NURSE PRACTITIONER

## 2023-03-23 ASSESSMENT — ENCOUNTER SYMPTOMS
CHEST TIGHTNESS: 0
ABDOMINAL DISTENTION: 0
BACK PAIN: 1
WHEEZING: 0
COUGH: 0
ABDOMINAL PAIN: 0
SHORTNESS OF BREATH: 0

## 2023-03-23 NOTE — PROGRESS NOTES
edema or erythema. Normal range of motion. Comments: Slight right SI joint tenderness noted with palpation. Otherwise normal lumbar exam   Lymphadenopathy:      Head:      Right side of head: No submental, submandibular, tonsillar, preauricular, posterior auricular or occipital adenopathy. Left side of head: No submental, submandibular, tonsillar, preauricular, posterior auricular or occipital adenopathy. Cervical: No cervical adenopathy. Right cervical: No superficial, deep or posterior cervical adenopathy. Left cervical: No superficial, deep or posterior cervical adenopathy. Upper Body:      Right upper body: No supraclavicular or pectoral adenopathy. Left upper body: No supraclavicular or pectoral adenopathy. Skin:     General: Skin is warm and dry. Coloration: Skin is not pale. Findings: No bruising, ecchymosis, laceration, lesion or rash. Nails: There is no clubbing. Neurological:      Mental Status: He is alert and oriented to person, place, and time. Cranial Nerves: No cranial nerve deficit. Motor: No abnormal muscle tone. Coordination: Coordination normal.      Deep Tendon Reflexes: Reflexes normal.      Comments: Strength lower extremities. much improved from previous exam   Psychiatric:         Speech: Speech normal.         Behavior: Behavior normal.         Thought Content: Thought content normal.         Judgment: Judgment normal.     BP (!) 124/58 (Site: Right Upper Arm)   Pulse 65   Temp 97.6 °F (36.4 °C) (Oral)   Resp 16   Wt 233 lb (105.7 kg)   SpO2 96%   BMI 30.32 kg/m²     understanding. He wanted results sent to his chiropracter Dr. Sudheer Lira- faxed to [phone]. MANE Jurado - CNP   3/14/2023  3:01 PM EDT       Lumbar spine indicates worsening degeneration throughout lumbar spine compared to previous x-ray. Disc spaces appear well-maintained. Osteophyte formations throughout.   Degenerative changes noted to

## 2023-03-25 ENCOUNTER — HOSPITAL ENCOUNTER (OUTPATIENT)
Dept: CT IMAGING | Age: 73
Discharge: HOME | End: 2023-03-25
Payer: MEDICARE

## 2023-03-25 DIAGNOSIS — Z98.890 HISTORY OF RIGHT COMMON CAROTID ARTERY STENT PLACEMENT: ICD-10-CM

## 2023-03-25 DIAGNOSIS — Z95.828 HISTORY OF RIGHT COMMON CAROTID ARTERY STENT PLACEMENT: ICD-10-CM

## 2023-03-25 DIAGNOSIS — Z86.73 HISTORY OF STROKE: ICD-10-CM

## 2023-03-25 LAB — POC CREATININE WHOLE BLOOD: 0.9 MG/DL (ref 0.5–1.2)

## 2023-03-25 PROCEDURE — 82565 ASSAY OF CREATININE: CPT

## 2023-03-25 PROCEDURE — 70496 CT ANGIOGRAPHY HEAD: CPT

## 2023-03-25 PROCEDURE — 6360000004 HC RX CONTRAST MEDICATION: Performed by: NURSE PRACTITIONER

## 2023-03-25 PROCEDURE — 70498 CT ANGIOGRAPHY NECK: CPT

## 2023-03-25 RX ADMIN — IOPAMIDOL 80 ML: 755 INJECTION, SOLUTION INTRAVENOUS at 11:47

## 2023-05-09 DIAGNOSIS — G89.29 CHRONIC PAIN OF RIGHT KNEE: ICD-10-CM

## 2023-05-09 DIAGNOSIS — M25.561 CHRONIC PAIN OF RIGHT KNEE: ICD-10-CM

## 2023-05-10 RX ORDER — TRAMADOL HYDROCHLORIDE 50 MG/1
TABLET ORAL
Qty: 120 TABLET | Refills: 0 | Status: SHIPPED | OUTPATIENT
Start: 2023-05-10 | End: 2023-06-09

## 2023-05-15 DIAGNOSIS — F41.0 PANIC DISORDER: ICD-10-CM

## 2023-05-15 DIAGNOSIS — F51.01 PRIMARY INSOMNIA: ICD-10-CM

## 2023-05-15 DIAGNOSIS — F41.8 SITUATIONAL ANXIETY: ICD-10-CM

## 2023-05-15 RX ORDER — ZOLPIDEM TARTRATE 10 MG/1
10 TABLET ORAL NIGHTLY PRN
Qty: 90 TABLET | Refills: 1 | Status: SHIPPED | OUTPATIENT
Start: 2023-05-15 | End: 2023-11-11

## 2023-05-15 RX ORDER — ALPRAZOLAM 0.5 MG/1
TABLET ORAL
Qty: 120 TABLET | Refills: 0 | Status: SHIPPED | OUTPATIENT
Start: 2023-05-15 | End: 2023-06-14

## 2023-06-15 PROBLEM — I50.32 CHRONIC DIASTOLIC CHF (CONGESTIVE HEART FAILURE) (HCC): Status: RESOLVED | Noted: 2021-11-23 | Resolved: 2023-06-15

## 2023-06-19 ENCOUNTER — HOSPITAL ENCOUNTER (EMERGENCY)
Age: 73
Discharge: HOME | End: 2023-06-19
Payer: MEDICARE

## 2023-06-19 ENCOUNTER — APPOINTMENT (OUTPATIENT)
Dept: CT IMAGING | Age: 73
End: 2023-06-19
Payer: MEDICARE

## 2023-06-19 VITALS
HEART RATE: 64 BPM | SYSTOLIC BLOOD PRESSURE: 105 MMHG | TEMPERATURE: 98.3 F | DIASTOLIC BLOOD PRESSURE: 64 MMHG | OXYGEN SATURATION: 95 % | RESPIRATION RATE: 18 BRPM

## 2023-06-19 DIAGNOSIS — R10.11 POSTPRANDIAL ABDOMINAL PAIN IN RIGHT UPPER QUADRANT: ICD-10-CM

## 2023-06-19 DIAGNOSIS — R11.2 NAUSEA AND VOMITING, UNSPECIFIED VOMITING TYPE: Primary | ICD-10-CM

## 2023-06-19 LAB
ALBUMIN SERPL BCG-MCNC: 3.9 G/DL (ref 3.5–5.1)
ALP SERPL-CCNC: 88 U/L (ref 38–126)
ALT SERPL W/O P-5'-P-CCNC: 10 U/L (ref 11–66)
ANION GAP SERPL CALC-SCNC: 11 MEQ/L (ref 8–16)
AST SERPL-CCNC: 11 U/L (ref 5–40)
BASOPHILS ABSOLUTE: 0 THOU/MM3 (ref 0–0.1)
BASOPHILS NFR BLD AUTO: 0.5 %
BILIRUB SERPL-MCNC: 0.8 MG/DL (ref 0.3–1.2)
BUN SERPL-MCNC: 9 MG/DL (ref 7–22)
CALCIUM SERPL-MCNC: 8.8 MG/DL (ref 8.5–10.5)
CHLORIDE SERPL-SCNC: 106 MEQ/L (ref 98–111)
CO2 SERPL-SCNC: 21 MEQ/L (ref 23–33)
CREAT SERPL-MCNC: 0.9 MG/DL (ref 0.4–1.2)
DEPRECATED RDW RBC AUTO: 51.4 FL (ref 35–45)
EOSINOPHIL NFR BLD AUTO: 4 %
EOSINOPHILS ABSOLUTE: 0.4 THOU/MM3 (ref 0–0.4)
ERYTHROCYTE [DISTWIDTH] IN BLOOD BY AUTOMATED COUNT: 14.4 % (ref 11.5–14.5)
GFR SERPL CREATININE-BSD FRML MDRD: > 60 ML/MIN/1.73M2
GLUCOSE SERPL-MCNC: 110 MG/DL (ref 70–108)
HCT VFR BLD AUTO: 40.1 % (ref 42–52)
HGB BLD-MCNC: 13.6 GM/DL (ref 14–18)
IMM GRANULOCYTES # BLD AUTO: 0.03 THOU/MM3 (ref 0–0.07)
IMM GRANULOCYTES NFR BLD AUTO: 0.3 %
LIPASE SERPL-CCNC: 19 U/L (ref 5.6–51.3)
LYMPHOCYTES ABSOLUTE: 4.8 THOU/MM3 (ref 1–4.8)
LYMPHOCYTES NFR BLD AUTO: 49.9 %
MCH RBC QN AUTO: 32.9 PG (ref 26–33)
MCHC RBC AUTO-ENTMCNC: 33.9 GM/DL (ref 32.2–35.5)
MCV RBC AUTO: 97.1 FL (ref 80–94)
MONOCYTES ABSOLUTE: 0.6 THOU/MM3 (ref 0.4–1.3)
MONOCYTES NFR BLD AUTO: 6.6 %
NEUTROPHILS NFR BLD AUTO: 38.7 %
NRBC BLD AUTO-RTO: 0 /100 WBC
NT-PROBNP SERPL IA-MCNC: 76.9 PG/ML (ref 0–124)
OSMOLALITY SERPL CALC.SUM OF ELEC: 275 MOSMOL/KG (ref 275–300)
PLATELET # BLD AUTO: 305 THOU/MM3 (ref 130–400)
PMV BLD AUTO: 10.6 FL (ref 9.4–12.4)
POTASSIUM SERPL-SCNC: 3.6 MEQ/L (ref 3.5–5.2)
PROT SERPL-MCNC: 6.2 G/DL (ref 6.1–8)
RBC # BLD AUTO: 4.13 MILL/MM3 (ref 4.7–6.1)
SEGMENTED NEUTROPHILS ABSOLUTE COUNT: 3.8 THOU/MM3 (ref 1.8–7.7)
SODIUM SERPL-SCNC: 138 MEQ/L (ref 135–145)
TROPONIN T: < 0.01 NG/ML
WBC # BLD AUTO: 9.7 THOU/MM3 (ref 4.8–10.8)

## 2023-06-19 PROCEDURE — 36415 COLL VENOUS BLD VENIPUNCTURE: CPT

## 2023-06-19 PROCEDURE — 83880 ASSAY OF NATRIURETIC PEPTIDE: CPT

## 2023-06-19 PROCEDURE — 93005 ELECTROCARDIOGRAM TRACING: CPT | Performed by: PHYSICIAN ASSISTANT

## 2023-06-19 PROCEDURE — 6360000004 HC RX CONTRAST MEDICATION: Performed by: PHYSICIAN ASSISTANT

## 2023-06-19 PROCEDURE — 2580000003 HC RX 258: Performed by: PHYSICIAN ASSISTANT

## 2023-06-19 PROCEDURE — 99285 EMERGENCY DEPT VISIT HI MDM: CPT

## 2023-06-19 PROCEDURE — 6370000000 HC RX 637 (ALT 250 FOR IP): Performed by: PHYSICIAN ASSISTANT

## 2023-06-19 PROCEDURE — 80053 COMPREHEN METABOLIC PANEL: CPT

## 2023-06-19 PROCEDURE — 74177 CT ABD & PELVIS W/CONTRAST: CPT

## 2023-06-19 PROCEDURE — 85025 COMPLETE CBC W/AUTO DIFF WBC: CPT

## 2023-06-19 PROCEDURE — 84484 ASSAY OF TROPONIN QUANT: CPT

## 2023-06-19 PROCEDURE — 83690 ASSAY OF LIPASE: CPT

## 2023-06-19 RX ORDER — 0.9 % SODIUM CHLORIDE 0.9 %
500 INTRAVENOUS SOLUTION INTRAVENOUS ONCE
Status: COMPLETED | OUTPATIENT
Start: 2023-06-19 | End: 2023-06-19

## 2023-06-19 RX ORDER — ALBUTEROL SULFATE 90 UG/1
2 AEROSOL, METERED RESPIRATORY (INHALATION) EVERY 6 HOURS PRN
Qty: 1 EACH | Refills: 3 | Status: SHIPPED | OUTPATIENT
Start: 2023-06-19

## 2023-06-19 RX ORDER — ONDANSETRON 4 MG/1
4 TABLET, ORALLY DISINTEGRATING ORAL 3 TIMES DAILY PRN
Qty: 20 TABLET | Refills: 0 | Status: SHIPPED | OUTPATIENT
Start: 2023-06-19

## 2023-06-19 RX ORDER — ONDANSETRON 4 MG/1
4 TABLET, ORALLY DISINTEGRATING ORAL ONCE
Status: COMPLETED | OUTPATIENT
Start: 2023-06-19 | End: 2023-06-19

## 2023-06-19 RX ORDER — SUCRALFATE ORAL 1 G/10ML
1 SUSPENSION ORAL 4 TIMES DAILY
Qty: 1200 ML | Refills: 0 | Status: SHIPPED | OUTPATIENT
Start: 2023-06-19

## 2023-06-19 RX ADMIN — IOPAMIDOL 80 ML: 755 INJECTION, SOLUTION INTRAVENOUS at 20:20

## 2023-06-19 RX ADMIN — ONDANSETRON 4 MG: 4 TABLET, ORALLY DISINTEGRATING ORAL at 19:32

## 2023-06-19 RX ADMIN — SODIUM CHLORIDE 500 ML: 9 INJECTION, SOLUTION INTRAVENOUS at 19:42

## 2023-06-19 RX ADMIN — Medication: at 19:32

## 2023-06-19 ASSESSMENT — PAIN - FUNCTIONAL ASSESSMENT
PAIN_FUNCTIONAL_ASSESSMENT: NONE - DENIES PAIN
PAIN_FUNCTIONAL_ASSESSMENT: NONE - DENIES PAIN

## 2023-06-19 NOTE — ED NOTES
Pt to ED c/o upset stomach and emesis. Pt states he has felt like this for months. Pt denies any pain at this time. Ekg complete.  SLIM Whiteside RN  06/19/23 2026

## 2023-06-19 NOTE — ED PROVIDER NOTES
Vitals:    06/19/23 1845 06/19/23 1932 06/19/23 1938   BP: 121/75  105/64   Pulse: 64     Resp: 18 18    Temp: 98.3 °F (36.8 °C)     TempSrc: Oral     SpO2: 97% 97% 95%       The patient was seen and examined. Appropriate diagnostic testing was performed and results reviewed with the patient. The results of pertinent diagnostic studies and exam findings were discussed. The patients provisional diagnosis and plan of care were discussed with the patient and present family who expressed understanding. Any medications were reviewed and indications and risks of medications were discussed with the patient /family present. Strict verbal and written return precautions, instructions and appropriate follow-up provided to  the patient. ED Medications administered this visit:  (None if blank)  Medications   0.9 % sodium chloride bolus (500 mLs IntraVENous New Bag 6/19/23 1942)   ondansetron (ZOFRAN-ODT) disintegrating tablet 4 mg (4 mg Oral Given 6/19/23 1932)   aluminum & magnesium hydroxide-simethicone (MAALOX) 30 mL, lidocaine viscous hcl (XYLOCAINE) 5 mL (GI COCKTAIL) ( Oral Given 6/19/23 1932)   iopamidol (ISOVUE-370) 76 % injection 80 mL (80 mLs IntraVENous Given 6/19/23 2020)         PROCEDURES: (None if blank)  Procedures:     CRITICAL CARE: (None if blank)      DISCHARGE PRESCRIPTIONS: (None if blank)  New Prescriptions    ONDANSETRON (ZOFRAN-ODT) 4 MG DISINTEGRATING TABLET    Take 1 tablet by mouth 3 times daily as needed for Nausea or Vomiting    SUCRALFATE (CARAFATE) 1 GM/10ML SUSPENSION    Take 10 mLs by mouth 4 times daily       FINAL IMPRESSION      1. Nausea and vomiting, unspecified vomiting type    2.  Postprandial abdominal pain in right upper quadrant          DISPOSITION/PLAN   DISPOSITION Decision To Discharge 06/19/2023 08:46:36 PM      OUTPATIENT FOLLOW UP THE PATIENT:  MANE Brower - CNP  [address] [place] [address]  [phone]    In 1 week      [address] [facility] [address] [address] EMERGENCY DEPT  [phone]

## 2023-06-20 LAB
EKG ATRIAL RATE: 58 BPM
EKG P AXIS: 83 DEGREES
EKG P-R INTERVAL: 166 MS
EKG Q-T INTERVAL: 432 MS
EKG QRS DURATION: 86 MS
EKG QTC CALCULATION (BAZETT): 424 MS
EKG R AXIS: 16 DEGREES
EKG T AXIS: 67 DEGREES
EKG VENTRICULAR RATE: 58 BPM

## 2023-06-20 PROCEDURE — 93010 ELECTROCARDIOGRAM REPORT: CPT | Performed by: INTERNAL MEDICINE

## 2023-06-22 ENCOUNTER — OFFICE VISIT (OUTPATIENT)
Dept: FAMILY MEDICINE CLINIC | Age: 73
End: 2023-06-22
Payer: MEDICARE

## 2023-06-22 VITALS
WEIGHT: 217 LBS | TEMPERATURE: 97.5 F | BODY MASS INDEX: 28.63 KG/M2 | RESPIRATION RATE: 16 BRPM | DIASTOLIC BLOOD PRESSURE: 62 MMHG | SYSTOLIC BLOOD PRESSURE: 94 MMHG | OXYGEN SATURATION: 96 % | HEART RATE: 54 BPM

## 2023-06-22 DIAGNOSIS — F51.01 PRIMARY INSOMNIA: ICD-10-CM

## 2023-06-22 DIAGNOSIS — F41.8 SITUATIONAL ANXIETY: Primary | ICD-10-CM

## 2023-06-22 DIAGNOSIS — Z63.8 FAMILY CONFLICT: ICD-10-CM

## 2023-06-22 PROCEDURE — 3078F DIAST BP <80 MM HG: CPT | Performed by: NURSE PRACTITIONER

## 2023-06-22 PROCEDURE — 3074F SYST BP LT 130 MM HG: CPT | Performed by: NURSE PRACTITIONER

## 2023-06-22 PROCEDURE — 3017F COLORECTAL CA SCREEN DOC REV: CPT | Performed by: NURSE PRACTITIONER

## 2023-06-22 PROCEDURE — 1123F ACP DISCUSS/DSCN MKR DOCD: CPT | Performed by: NURSE PRACTITIONER

## 2023-06-22 PROCEDURE — 99214 OFFICE O/P EST MOD 30 MIN: CPT | Performed by: NURSE PRACTITIONER

## 2023-06-22 PROCEDURE — G8427 DOCREV CUR MEDS BY ELIG CLIN: HCPCS | Performed by: NURSE PRACTITIONER

## 2023-06-22 PROCEDURE — 4004F PT TOBACCO SCREEN RCVD TLK: CPT | Performed by: NURSE PRACTITIONER

## 2023-06-22 PROCEDURE — G8417 CALC BMI ABV UP PARAM F/U: HCPCS | Performed by: NURSE PRACTITIONER

## 2023-06-22 RX ORDER — ESCITALOPRAM OXALATE 10 MG/1
10 TABLET ORAL DAILY
Qty: 30 TABLET | Refills: 3 | Status: SHIPPED | OUTPATIENT
Start: 2023-06-22

## 2023-06-22 RX ORDER — TRAZODONE HYDROCHLORIDE 50 MG/1
50 TABLET ORAL NIGHTLY PRN
Qty: 30 TABLET | Refills: 2 | Status: SHIPPED | OUTPATIENT
Start: 2023-06-22

## 2023-06-22 SDOH — SOCIAL STABILITY - SOCIAL INSECURITY: OTHER SPECIFIED PROBLEMS RELATED TO PRIMARY SUPPORT GROUP: Z63.8

## 2023-06-23 ASSESSMENT — ENCOUNTER SYMPTOMS
WHEEZING: 0
ABDOMINAL PAIN: 0
ABDOMINAL DISTENTION: 0
SHORTNESS OF BREATH: 0
CHEST TIGHTNESS: 0
BACK PAIN: 0
COUGH: 0

## 2023-06-23 NOTE — PROGRESS NOTES
mass or thyromegaly. Vascular: No carotid bruit or JVD. Trachea: Trachea normal.   Cardiovascular:      Rate and Rhythm: Normal rate and regular rhythm. Heart sounds: Normal heart sounds, S1 normal and S2 normal. No murmur heard. No friction rub. No gallop. Pulmonary:      Effort: Pulmonary effort is normal. No respiratory distress. Breath sounds: Normal breath sounds. No wheezing, rhonchi or rales. Chest:      Chest wall: No tenderness. Abdominal:      General: Bowel sounds are normal.      Palpations: Abdomen is soft. There is no hepatomegaly, splenomegaly or mass. Tenderness: There is no guarding or rebound. Hernia: No hernia is present. There is no hernia in the ventral area or left inguinal area. Musculoskeletal:         General: No tenderness. Normal range of motion. Cervical back: Normal range of motion and neck supple. No edema or erythema. Normal range of motion. Lymphadenopathy:      Head:      Right side of head: No submental, submandibular, tonsillar, preauricular, posterior auricular or occipital adenopathy. Left side of head: No submental, submandibular, tonsillar, preauricular, posterior auricular or occipital adenopathy. Cervical: No cervical adenopathy. Right cervical: No superficial, deep or posterior cervical adenopathy. Left cervical: No superficial, deep or posterior cervical adenopathy. Upper Body:      Right upper body: No supraclavicular or pectoral adenopathy. Left upper body: No supraclavicular or pectoral adenopathy. Skin:     General: Skin is warm and dry. Coloration: Skin is not pale. Findings: No bruising, ecchymosis, laceration, lesion or rash. Nails: There is no clubbing. Neurological:      Mental Status: He is alert and oriented to person, place, and time. Cranial Nerves: No cranial nerve deficit. Motor: No abnormal muscle tone.       Coordination: Coordination normal.      Deep

## 2023-07-06 ENCOUNTER — OFFICE VISIT (OUTPATIENT)
Dept: FAMILY MEDICINE CLINIC | Age: 73
End: 2023-07-06
Payer: MEDICARE

## 2023-07-06 VITALS
RESPIRATION RATE: 16 BRPM | DIASTOLIC BLOOD PRESSURE: 68 MMHG | HEART RATE: 60 BPM | WEIGHT: 211 LBS | BODY MASS INDEX: 27.84 KG/M2 | OXYGEN SATURATION: 96 % | SYSTOLIC BLOOD PRESSURE: 120 MMHG

## 2023-07-06 DIAGNOSIS — F51.01 PRIMARY INSOMNIA: ICD-10-CM

## 2023-07-06 DIAGNOSIS — Z63.8 FAMILY CONFLICT: ICD-10-CM

## 2023-07-06 DIAGNOSIS — B37.0 THRUSH: ICD-10-CM

## 2023-07-06 DIAGNOSIS — R42 DIZZINESS: ICD-10-CM

## 2023-07-06 DIAGNOSIS — Z87.891 HISTORY OF TOBACCO USE: ICD-10-CM

## 2023-07-06 DIAGNOSIS — K08.9 CHRONIC DENTAL PAIN: ICD-10-CM

## 2023-07-06 DIAGNOSIS — F10.11 HISTORY OF ETOH ABUSE: ICD-10-CM

## 2023-07-06 DIAGNOSIS — F41.8 SITUATIONAL ANXIETY: ICD-10-CM

## 2023-07-06 DIAGNOSIS — G89.29 CHRONIC DENTAL PAIN: ICD-10-CM

## 2023-07-06 DIAGNOSIS — I95.9 HYPOTENSION, UNSPECIFIED HYPOTENSION TYPE: Primary | ICD-10-CM

## 2023-07-06 PROCEDURE — 3017F COLORECTAL CA SCREEN DOC REV: CPT | Performed by: NURSE PRACTITIONER

## 2023-07-06 PROCEDURE — 1123F ACP DISCUSS/DSCN MKR DOCD: CPT | Performed by: NURSE PRACTITIONER

## 2023-07-06 PROCEDURE — G8417 CALC BMI ABV UP PARAM F/U: HCPCS | Performed by: NURSE PRACTITIONER

## 2023-07-06 PROCEDURE — 4004F PT TOBACCO SCREEN RCVD TLK: CPT | Performed by: NURSE PRACTITIONER

## 2023-07-06 PROCEDURE — 3078F DIAST BP <80 MM HG: CPT | Performed by: NURSE PRACTITIONER

## 2023-07-06 PROCEDURE — G8427 DOCREV CUR MEDS BY ELIG CLIN: HCPCS | Performed by: NURSE PRACTITIONER

## 2023-07-06 PROCEDURE — 3074F SYST BP LT 130 MM HG: CPT | Performed by: NURSE PRACTITIONER

## 2023-07-06 PROCEDURE — 99214 OFFICE O/P EST MOD 30 MIN: CPT | Performed by: NURSE PRACTITIONER

## 2023-07-06 RX ORDER — CLINDAMYCIN HYDROCHLORIDE 300 MG/1
300 CAPSULE ORAL 3 TIMES DAILY
Qty: 30 CAPSULE | Refills: 0 | Status: SHIPPED | OUTPATIENT
Start: 2023-07-06 | End: 2023-07-16

## 2023-07-06 RX ORDER — FLUCONAZOLE 100 MG/1
100 TABLET ORAL DAILY
Qty: 14 TABLET | Refills: 0 | Status: SHIPPED | OUTPATIENT
Start: 2023-07-06 | End: 2023-07-20

## 2023-07-06 SDOH — SOCIAL STABILITY - SOCIAL INSECURITY: OTHER SPECIFIED PROBLEMS RELATED TO PRIMARY SUPPORT GROUP: Z63.8

## 2023-07-06 NOTE — PROGRESS NOTES
[address] MEDICINE  [address] [facility] [place] [address]  Dept: [phone]  Dept Fax: [phone]  Loc: [phone]  PROGRESS NOTE      VisitDate: 7/6/2023    Janett Leyden is a 81 y.o. male who presents today for:     Chief Complaint   Patient presents with    Follow-up     Denies ongoing N/V, was able to start eating regular food again Monday, trazodone working well for him, anxiety better, has been talking to [address] [place] [address] Counseling services. Subjective:  Patient presents with complaint of intermittent dizziness. Reports this occurs mainly on the weekends and associated with position change/sitting to standing. Denies chest pain shortness of breath. Reports that mood has improved. Reports that anxiety levels have lessened. Currently consulting with counselor weekly. Reports that trazodone as very effective. Reports sleeping well. Anita Ervin History of CVA CHF ED hypertension EtOH abuse tobacco use GERD insomnia chronic low back pain depression anxiety. Positive orthostats. 120/68 sitting. 90/60 standing      Review of Systems   Neurological:  Positive for dizziness. Psychiatric/Behavioral:  Positive for decreased concentration, dysphoric mood and sleep disturbance. The patient is nervous/anxious.     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

## 2023-07-14 DIAGNOSIS — F41.0 PANIC DISORDER: ICD-10-CM

## 2023-07-14 DIAGNOSIS — F41.8 SITUATIONAL ANXIETY: ICD-10-CM

## 2023-07-14 RX ORDER — ALPRAZOLAM 0.5 MG/1
TABLET ORAL
Qty: 120 TABLET | Refills: 0 | Status: SHIPPED | OUTPATIENT
Start: 2023-07-14 | End: 2023-08-13

## 2023-07-17 ENCOUNTER — OFFICE VISIT (OUTPATIENT)
Dept: FAMILY MEDICINE CLINIC | Age: 73
End: 2023-07-17
Payer: MEDICARE

## 2023-07-17 VITALS
BODY MASS INDEX: 27.18 KG/M2 | WEIGHT: 206 LBS | TEMPERATURE: 98 F | HEART RATE: 83 BPM | OXYGEN SATURATION: 96 % | SYSTOLIC BLOOD PRESSURE: 110 MMHG | RESPIRATION RATE: 16 BRPM | DIASTOLIC BLOOD PRESSURE: 56 MMHG

## 2023-07-17 DIAGNOSIS — R39.198 DECREASED URINE STREAM: ICD-10-CM

## 2023-07-17 DIAGNOSIS — N39.0 URINARY TRACT INFECTION WITHOUT HEMATURIA, SITE UNSPECIFIED: Primary | ICD-10-CM

## 2023-07-17 DIAGNOSIS — R42 DIZZINESS: ICD-10-CM

## 2023-07-17 PROCEDURE — 3078F DIAST BP <80 MM HG: CPT | Performed by: NURSE PRACTITIONER

## 2023-07-17 PROCEDURE — G8427 DOCREV CUR MEDS BY ELIG CLIN: HCPCS | Performed by: NURSE PRACTITIONER

## 2023-07-17 PROCEDURE — G8417 CALC BMI ABV UP PARAM F/U: HCPCS | Performed by: NURSE PRACTITIONER

## 2023-07-17 PROCEDURE — 3074F SYST BP LT 130 MM HG: CPT | Performed by: NURSE PRACTITIONER

## 2023-07-17 PROCEDURE — 81003 URINALYSIS AUTO W/O SCOPE: CPT | Performed by: NURSE PRACTITIONER

## 2023-07-17 PROCEDURE — 4004F PT TOBACCO SCREEN RCVD TLK: CPT | Performed by: NURSE PRACTITIONER

## 2023-07-17 PROCEDURE — 1123F ACP DISCUSS/DSCN MKR DOCD: CPT | Performed by: NURSE PRACTITIONER

## 2023-07-17 PROCEDURE — 99213 OFFICE O/P EST LOW 20 MIN: CPT | Performed by: NURSE PRACTITIONER

## 2023-07-17 PROCEDURE — 3017F COLORECTAL CA SCREEN DOC REV: CPT | Performed by: NURSE PRACTITIONER

## 2023-07-17 RX ORDER — CIPROFLOXACIN 500 MG/1
500 TABLET, FILM COATED ORAL 2 TIMES DAILY
Qty: 20 TABLET | Refills: 0 | Status: SHIPPED | OUTPATIENT
Start: 2023-07-17 | End: 2023-07-17 | Stop reason: SDUPTHER

## 2023-07-17 RX ORDER — CIPROFLOXACIN 500 MG/1
500 TABLET, FILM COATED ORAL 2 TIMES DAILY
Qty: 20 TABLET | Refills: 0 | Status: SHIPPED | OUTPATIENT
Start: 2023-07-17 | End: 2023-07-27

## 2023-07-17 ASSESSMENT — ENCOUNTER SYMPTOMS
ABDOMINAL DISTENTION: 0
COUGH: 0
CHEST TIGHTNESS: 0
BACK PAIN: 0
WHEEZING: 0
ABDOMINAL PAIN: 0
SHORTNESS OF BREATH: 0

## 2023-07-17 NOTE — PROGRESS NOTES
MG disintegrating tablet Take 1 tablet by mouth 3 times daily as needed for Nausea or Vomiting 20 tablet 0    sucralfate (CARAFATE) 1 GM/10ML suspension Take 10 mLs by mouth 4 times daily 1200 mL 0    clopidogrel (PLAVIX) 75 MG tablet Take 1 tablet by mouth daily 30 tablet 11    mupirocin (BACTROBAN) 2 % ointment Apply topically      ezetimibe (ZETIA) 10 MG tablet Take 1 tablet by mouth once daily 90 tablet 3    atorvastatin (LIPITOR) 80 MG tablet Take 1 tablet by mouth nightly 90 tablet 3    albuterol (PROVENTIL) (2.5 MG/3ML) 0.083% nebulizer solution Take 3 mLs by nebulization every 6 hours as needed for Wheezing 90 each 5    pantoprazole (PROTONIX) 40 MG tablet take 1 tablet by mouth once daily 90 tablet 3    Respiratory Therapy Supplies (NEBULIZER/TUBING/MOUTHPIECE) KIT 1 kit by Does not apply route daily as needed (cough, wheeze) Diagnosis:RAD and Bronchitis 1 kit 0    aspirin 81 MG chewable tablet Take 1 tablet by mouth daily       No current facility-administered medications for this visit.      Allergies   Allergen Reactions    Tape Michelle Must Tape]     Augmentin [Amoxicillin-Pot Clavulanate] Rash     Health Maintenance   Topic Date Due    Shingles vaccine (1 of 2) Never done    COVID-19 Vaccine (3 - Booster for Pfizer series) 07/02/2021    A1C test (Diabetic or Prediabetic)  10/27/2022    Flu vaccine (1) 08/01/2023    Colorectal Cancer Screen  02/09/2024    Depression Monitoring  06/15/2024    Annual Wellness Visit (AWV)  06/15/2024    Lipids  06/16/2024    DTaP/Tdap/Td vaccine (3 - Td or Tdap) 05/11/2032    Pneumococcal 65+ years Vaccine  Completed    AAA screen  Completed    Hepatitis C screen  Completed    Hepatitis A vaccine  Aged Out    Hib vaccine  Aged Out    Meningococcal (ACWY) vaccine  Aged Out    Diabetes screen  Discontinued    Low dose CT lung screening &/or counseling  Discontinued    Prostate Specific Antigen (PSA) Screening or Monitoring  Discontinued     UA in office today indicates trace of

## 2023-07-18 LAB
BACTERIA UR CULT: ABNORMAL
ORGANISM: ABNORMAL

## 2023-08-02 RX ORDER — PANTOPRAZOLE SODIUM 40 MG/1
TABLET, DELAYED RELEASE ORAL
Qty: 90 TABLET | Refills: 3 | Status: SHIPPED | OUTPATIENT
Start: 2023-08-02

## 2023-08-15 DIAGNOSIS — F41.0 PANIC DISORDER: ICD-10-CM

## 2023-08-15 DIAGNOSIS — F41.8 SITUATIONAL ANXIETY: ICD-10-CM

## 2023-08-15 RX ORDER — ALPRAZOLAM 0.5 MG/1
TABLET ORAL
Qty: 120 TABLET | Refills: 0 | Status: SHIPPED | OUTPATIENT
Start: 2023-08-15 | End: 2023-09-14

## 2023-09-01 ENCOUNTER — TELEPHONE (OUTPATIENT)
Dept: FAMILY MEDICINE CLINIC | Age: 73
End: 2023-09-01

## 2023-09-01 NOTE — TELEPHONE ENCOUNTER
----- Message from Bowen Benson sent at 8/31/2023  2:49 PM EDT -----  Subject: Hospital Follow Up    QUESTIONS  What hospital was the Patient Discharged from? [facility]  Date of Discharge? 2023-08-31  Discharge Location? Home  Reason for hospitalization as patient stated? Generalized weakness  What question does the patient have, if applicable?   ---------------------------------------------------------------------------  --------------  CALL BACK INFO  What is the best way for the office to contact you? OK to leave message on   voicemail  Preferred Call Back Phone Number? [phone]  ---------------------------------------------------------------------------  --------------  SCRIPT ANSWERS  Relationship to Patient? Covered Entity  Covered Entity Type? Hospital?  Representative Name?  Yuan Balderas

## 2023-09-01 NOTE — TELEPHONE ENCOUNTER
Care Transitions Initial Follow Up Call    Outreach made within 2 business days of discharge: Yes    Patient: Jayshree Mackay Patient : 1950   MRN: [id number]  Reason for Admission: There are no discharge diagnoses documented for the most recent discharge. Discharge Date: 23       Spoke with: patient    Discharge department/facility: [facility]    TCM Interactive Patient Contact:  Was patient able to fill all prescriptions: Yes  Was patient instructed to bring all medications to the follow-up visit: Yes  Is patient taking all medications as directed in the discharge summary?  Yes  Does patient understand their discharge instructions: Yes  Does patient have questions or concerns that need addressed prior to 7-14 day follow up office visit: no    Scheduled appointment with PCP within 7-14 days    Follow Up  Future Appointments   Date Time Provider [address] [place] [address]   2024 11:00 AM Jackie Ratliff MD [address] [place] [address] Neurology -   2024 10:00 AM MANE Wood - CNP SRPX [facility] - [facility]   2024  2:00 PM Viraj Montalvo MD N SRPX Heart [facility] - [address] [place] [address], [address] [place] [address] ([facility] [facility]

## 2023-09-06 ENCOUNTER — OFFICE VISIT (OUTPATIENT)
Dept: FAMILY MEDICINE CLINIC | Age: 73
End: 2023-09-06

## 2023-09-06 VITALS
HEART RATE: 81 BPM | RESPIRATION RATE: 18 BRPM | OXYGEN SATURATION: 96 % | SYSTOLIC BLOOD PRESSURE: 116 MMHG | BODY MASS INDEX: 28.76 KG/M2 | DIASTOLIC BLOOD PRESSURE: 70 MMHG | WEIGHT: 218 LBS

## 2023-09-06 DIAGNOSIS — Z87.891 HISTORY OF TOBACCO USE: ICD-10-CM

## 2023-09-06 DIAGNOSIS — R26.81 UNSTEADY GAIT: ICD-10-CM

## 2023-09-06 DIAGNOSIS — Z63.8 FAMILY CONFLICT: ICD-10-CM

## 2023-09-06 DIAGNOSIS — J41.8 MIXED SIMPLE AND MUCOPURULENT CHRONIC BRONCHITIS (HCC): ICD-10-CM

## 2023-09-06 DIAGNOSIS — G45.9 TIA (TRANSIENT ISCHEMIC ATTACK): Primary | ICD-10-CM

## 2023-09-06 DIAGNOSIS — Z09 HOSPITAL DISCHARGE FOLLOW-UP: ICD-10-CM

## 2023-09-06 DIAGNOSIS — N39.43 DRIBBLING URINE: ICD-10-CM

## 2023-09-06 DIAGNOSIS — R42 VERTIGO: ICD-10-CM

## 2023-09-06 DIAGNOSIS — S51.019A SKIN TEAR OF ELBOW WITHOUT COMPLICATION, UNSPECIFIED LATERALITY, INITIAL ENCOUNTER: ICD-10-CM

## 2023-09-06 DIAGNOSIS — F41.8 SITUATIONAL ANXIETY: ICD-10-CM

## 2023-09-06 DIAGNOSIS — K08.9 CHRONIC DENTAL PAIN: ICD-10-CM

## 2023-09-06 DIAGNOSIS — G89.29 CHRONIC DENTAL PAIN: ICD-10-CM

## 2023-09-06 DIAGNOSIS — R42 DIZZINESS: ICD-10-CM

## 2023-09-06 DIAGNOSIS — S81.811A NONINFECTED SKIN TEAR OF RIGHT LOWER EXTREMITY, INITIAL ENCOUNTER: ICD-10-CM

## 2023-09-06 DIAGNOSIS — N32.81 OAB (OVERACTIVE BLADDER): ICD-10-CM

## 2023-09-06 DIAGNOSIS — F10.11 HISTORY OF ETOH ABUSE: ICD-10-CM

## 2023-09-06 RX ORDER — VIBEGRON 75 MG/1
75 TABLET, FILM COATED ORAL DAILY
Qty: 30 TABLET | Refills: 0 | Status: SHIPPED | COMMUNITY
Start: 2023-09-06

## 2023-09-06 SDOH — SOCIAL STABILITY - SOCIAL INSECURITY: OTHER SPECIFIED PROBLEMS RELATED TO PRIMARY SUPPORT GROUP: Z63.8

## 2023-09-07 RX ORDER — NEBULIZER ACCESSORIES
1 KIT MISCELLANEOUS DAILY PRN
Qty: 1 KIT | Refills: 0 | Status: SHIPPED | OUTPATIENT
Start: 2023-09-07

## 2023-09-07 ASSESSMENT — ENCOUNTER SYMPTOMS
COUGH: 0
WHEEZING: 0
BACK PAIN: 0
ABDOMINAL DISTENTION: 0
SHORTNESS OF BREATH: 0
CHEST TIGHTNESS: 0
ABDOMINAL PAIN: 0

## 2023-09-07 NOTE — PROGRESS NOTES
Post-Discharge Transitional Care Management Services orspital Follow Up      Andrea Tirado   YOB: 1950    Date ofOffice Visit:  9/6/2023  Date of Hospital Admission: 8/30/23  Date of Hospital Discharge8/31/23    Care management risk score Rising risk (score 2-5) andComplex Care (Scores >=6): No Risk Score On File    Non face to face  following discharge, date last encounter closed(first attempt may have been earlier): 09/01/2023     Call initiated 2 business days of discharge: Yes    Patient Active Problem List   Diagnosis    Low back pain    ETOH abuse    Insomnia    Erectile dysfunction    Hyperlipidemia    Chest pain at rest    Cigarette smoker    Alcohol abuse    Bradycardia    Subarachnoid hemorrhage following injury ([address])    Closed head injury    Scalp laceration    Dizziness    Diplopia    Other headache syndromes    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 9)    Stenosis of right internal carotid artery    Stroke-like symptoms    TIA (transient ischemic attack)    Essential hypertension, malignant    PAD (peripheral artery disease) (HCC)    Mixed simple and mucopurulent chronic bronchitis (HCC)       Allergies   Allergen Reactions    Tape [Adhesive Tape]     Augmentin [Amoxicillin-Pot Clavulanate] Rash       Medications listed as ordered at the time of discharge from hospital  Reviewed    Medications marked \"taking\" at this time  Outpatient Medications Marked as Taking for the 9/6/23 encounter (Office Visit) with MANE Reeves CNP   Medication Sig Dispense Refill    Respiratory Therapy Supplies (NEBULIZER/TUBING/MOUTHPIECE) KIT 1 kit by Does not apply route daily as

## 2023-09-14 DIAGNOSIS — F41.8 SITUATIONAL ANXIETY: ICD-10-CM

## 2023-09-14 DIAGNOSIS — F41.0 PANIC DISORDER: ICD-10-CM

## 2023-09-14 RX ORDER — ALPRAZOLAM 0.5 MG/1
TABLET ORAL
Qty: 120 TABLET | Refills: 0 | Status: SHIPPED | OUTPATIENT
Start: 2023-09-14 | End: 2023-10-14

## 2023-09-18 RX ORDER — TRAZODONE HYDROCHLORIDE 50 MG/1
TABLET ORAL
Qty: 30 TABLET | Refills: 2 | Status: SHIPPED | OUTPATIENT
Start: 2023-09-18

## 2023-09-21 ENCOUNTER — TELEPHONE (OUTPATIENT)
Dept: FAMILY MEDICINE CLINIC | Age: 73
End: 2023-09-21

## 2023-09-21 RX ORDER — ESCITALOPRAM OXALATE 10 MG/1
10 TABLET ORAL DAILY
Qty: 30 TABLET | Refills: 3 | Status: CANCELLED | OUTPATIENT
Start: 2023-09-21

## 2023-09-21 NOTE — TELEPHONE ENCOUNTER
Patient is requesting a refill sent to [facility] of escitalopram (LEXAPRO) 10 MG tablet,Take 1 tablet by mouth daily    Last Refill: 6/22/23 #30/3  Last OV:9/16/23

## 2023-09-22 RX ORDER — ESCITALOPRAM OXALATE 10 MG/1
10 TABLET ORAL DAILY
Qty: 90 TABLET | Refills: 3 | Status: SHIPPED | OUTPATIENT
Start: 2023-09-22

## 2023-10-04 ENCOUNTER — OFFICE VISIT (OUTPATIENT)
Dept: FAMILY MEDICINE CLINIC | Age: 73
End: 2023-10-04
Payer: MEDICARE

## 2023-10-04 VITALS
HEART RATE: 80 BPM | WEIGHT: 222 LBS | BODY MASS INDEX: 29.29 KG/M2 | OXYGEN SATURATION: 94 % | TEMPERATURE: 98.3 F | RESPIRATION RATE: 16 BRPM | SYSTOLIC BLOOD PRESSURE: 124 MMHG | DIASTOLIC BLOOD PRESSURE: 64 MMHG

## 2023-10-04 DIAGNOSIS — R60.0 PEDAL EDEMA: ICD-10-CM

## 2023-10-04 DIAGNOSIS — S90.812A ABRASION, FOOT, LEFT, INITIAL ENCOUNTER: ICD-10-CM

## 2023-10-04 DIAGNOSIS — R26.81 UNSTEADY GAIT: ICD-10-CM
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
## 2023-10-04 DIAGNOSIS — Z86.73 HISTORY OF CVA IN ADULTHOOD: ICD-10-CM

## 2023-10-04 DIAGNOSIS — R42 DIZZINESS: ICD-10-CM

## 2023-10-04 DIAGNOSIS — F10.11 HISTORY OF ETOH ABUSE: ICD-10-CM

## 2023-10-04 DIAGNOSIS — S90.811A ABRASION, FOOT, RIGHT, INITIAL ENCOUNTER: ICD-10-CM

## 2023-10-04 DIAGNOSIS — Z87.891 HISTORY OF TOBACCO USE: ICD-10-CM

## 2023-10-04 DIAGNOSIS — R41.0 CONFUSION: ICD-10-CM

## 2023-10-04 PROCEDURE — 3074F SYST BP LT 130 MM HG: CPT | Performed by: NURSE PRACTITIONER

## 2023-10-04 PROCEDURE — 4004F PT TOBACCO SCREEN RCVD TLK: CPT | Performed by: NURSE PRACTITIONER

## 2023-10-04 PROCEDURE — 3078F DIAST BP <80 MM HG: CPT | Performed by: NURSE PRACTITIONER

## 2023-10-04 PROCEDURE — 1123F ACP DISCUSS/DSCN MKR DOCD: CPT | Performed by: NURSE PRACTITIONER

## 2023-10-04 PROCEDURE — G8417 CALC BMI ABV UP PARAM F/U: HCPCS | Performed by: NURSE PRACTITIONER

## 2023-10-04 PROCEDURE — G8484 FLU IMMUNIZE NO ADMIN: HCPCS | Performed by: NURSE PRACTITIONER

## 2023-10-04 PROCEDURE — G8427 DOCREV CUR MEDS BY ELIG CLIN: HCPCS | Performed by: NURSE PRACTITIONER

## 2023-10-04 PROCEDURE — 99214 OFFICE O/P EST MOD 30 MIN: CPT | Performed by: NURSE PRACTITIONER

## 2023-10-04 PROCEDURE — 3017F COLORECTAL CA SCREEN DOC REV: CPT | Performed by: NURSE PRACTITIONER

## 2023-10-04 RX ORDER — FUROSEMIDE 20 MG/1
TABLET ORAL
COMMUNITY
Start: 2023-08-31

## 2023-10-04 RX ORDER — POTASSIUM CHLORIDE 750 MG/1
10 TABLET, FILM COATED, EXTENDED RELEASE ORAL DAILY
COMMUNITY
Start: 2023-08-31

## 2023-10-05 ASSESSMENT — ENCOUNTER SYMPTOMS
SHORTNESS OF BREATH: 0
COUGH: 0
ABDOMINAL DISTENTION: 0
CHEST TIGHTNESS: 0
WHEEZING: 0
ABDOMINAL PAIN: 0
BACK PAIN: 0

## 2023-10-05 NOTE — PROGRESS NOTES
[address] MEDICINE  [address] [facility] [address]  Dept: [phone]  Dept Fax: [phone]  Loc: [phone]  PROGRESS NOTE      VisitDate: 10/4/2023    Syed Gar is a 81 y.o. male who presents today for:     Chief Complaint   Patient presents with    Edema     Bilateral foot swelling x1wk, denies sob, falls x3-4 due to dizziness-denies LOC or hitting head,          Subjective:  Presents accompanied with son complaint of multiple falls at home, confusion, slurred speech, bilateral foot swelling past week. Son states that patient stumbled and fell outside while he was barefoot and sustained some abrasions/wounds on his toes and feet. History of CVA CHF ED hypertension EtOH abuse tobacco abuse GERD insomnia anxiety depression osteoarthritis chronic low back pain. Denies chest pain shortness of breath fever dysuria flank pain hematuria, headache. Review of Systems   Constitutional:  Negative for activity change, appetite change, chills, fatigue and fever. Eyes:  Negative for visual disturbance. Respiratory:  Negative for cough, chest tightness, shortness of breath and wheezing. Cardiovascular:  Positive for leg swelling. Negative for chest pain and palpitations. Gastrointestinal:  Negative for abdominal distention and abdominal pain. Genitourinary:  Negative for dysuria. Musculoskeletal:  Negative for arthralgias, back pain and neck pain. Skin:  Positive for wound. Negative for rash. Neurological:  Positive for dizziness and weakness. Negative for light-headedness and headaches. Hematological:  Negative for adenopathy. Psychiatric/Behavioral:  Positive for confusion. Negative for decreased concentration and dysphoric mood. All other systems reviewed and are negative.     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

## 2023-10-09 ENCOUNTER — TELEPHONE (OUTPATIENT)
Dept: FAMILY MEDICINE CLINIC | Age: 73
End: 2023-10-09

## 2023-10-09 RX ORDER — LISINOPRIL 5 MG/1
5 TABLET ORAL DAILY
Qty: 90 TABLET | Refills: 3 | OUTPATIENT
Start: 2023-10-09

## 2023-10-09 NOTE — TELEPHONE ENCOUNTER
Care Transitions Initial Follow Up Call    Outreach made within 2 business days of discharge: Yes    Patient: Hermann Scott Patient : 1950   MRN: [id number]  Reason for Admission: There are no discharge diagnoses documented for the most recent discharge. Discharge Date: 23       Spoke with: patient     Discharge department/facility: [facility]    TCM Interactive Patient Contact:  Was patient able to fill all prescriptions: Yes  Was patient instructed to bring all medications to the follow-up visit: Yes  Is patient taking all medications as directed in the discharge summary?  Yes  Does patient understand their discharge instructions: Yes  Does patient have questions or concerns that need addressed prior to 7-14 day follow up office visit: no    Scheduled appointment with PCP within 7-14 days    Follow Up  Future Appointments   Date Time Provider [address] [place] [address]   10/13/2023 11:40 AM MANE Damian CNP  JERARDO áMrquez   2024 11:00 AM Jayme Lange, [address] [facility] Neurology -   2024 10:00 AM MANE Damian CNP  JERARDO Márquez   2024  2:00 PM MD KRAIG Phan Heart [facility] - [address] [facility], [address] [facility] ([facility] [facility])

## 2023-10-13 ENCOUNTER — OFFICE VISIT (OUTPATIENT)
Dept: FAMILY MEDICINE CLINIC | Age: 73
End: 2023-10-13

## 2023-10-13 VITALS
BODY MASS INDEX: 27.71 KG/M2 | HEART RATE: 72 BPM | RESPIRATION RATE: 16 BRPM | DIASTOLIC BLOOD PRESSURE: 64 MMHG | SYSTOLIC BLOOD PRESSURE: 110 MMHG | WEIGHT: 210 LBS

## 2023-10-13 DIAGNOSIS — Z09 HOSPITAL DISCHARGE FOLLOW-UP: Primary | ICD-10-CM

## 2023-10-13 DIAGNOSIS — F41.8 SITUATIONAL ANXIETY: ICD-10-CM

## 2023-10-13 DIAGNOSIS — Z87.891 HISTORY OF TOBACCO USE: ICD-10-CM

## 2023-10-13 DIAGNOSIS — S90.811A ABRASION, FOOT, RIGHT, INITIAL ENCOUNTER: ICD-10-CM

## 2023-10-13 DIAGNOSIS — F41.0 PANIC DISORDER: ICD-10-CM

## 2023-10-13 DIAGNOSIS — Z95.0 S/P PLACEMENT OF CARDIAC PACEMAKER: ICD-10-CM

## 2023-10-13 DIAGNOSIS — Z86.73 HISTORY OF CVA IN ADULTHOOD: ICD-10-CM

## 2023-10-13 DIAGNOSIS — F10.11 HISTORY OF ETOH ABUSE: ICD-10-CM

## 2023-10-13 RX ORDER — ALPRAZOLAM 0.5 MG/1
TABLET ORAL
Qty: 120 TABLET | Refills: 0 | Status: SHIPPED | OUTPATIENT
Start: 2023-10-13 | End: 2023-11-12

## 2023-10-13 ASSESSMENT — ENCOUNTER SYMPTOMS
SHORTNESS OF BREATH: 0
COUGH: 0
ABDOMINAL PAIN: 0
WHEEZING: 0
BACK PAIN: 0
ABDOMINAL DISTENTION: 0
CHEST TIGHTNESS: 0

## 2023-10-13 NOTE — PROGRESS NOTES
All other systems reviewed and are negative. Assessment/Plan:   Diagnosis Orders   1. Hospital discharge follow-up  [place] DISCHARGE MEDS RECONCILED W/ CURRENT OUTPATIENT MED LIST         Diagnosis Orders   1. Hospital discharge follow-up  [place] DISCHARGE MEDS RECONCILED W/ CURRENT OUTPATIENT MED LIST      2. S/P placement of cardiac pacemaker        3. History of CVA in adulthood        4. Abrasion, foot, right, initial encounter        5. History of tobacco use        6. History of ETOH abuse        7. Situational anxiety  ALPRAZolam (XANAX) 0.5 MG tablet      8. Panic disorder  ALPRAZolam (XANAX) 0.5 MG tablet        Xanax refilled #120.   OARRS report reviewed    Medical Decision Making: moderate complexity

## 2023-10-24 ENCOUNTER — TELEPHONE (OUTPATIENT)
Dept: FAMILY MEDICINE CLINIC | Age: 73
End: 2023-10-24

## 2023-10-24 NOTE — TELEPHONE ENCOUNTER
Okay for heart cath. Patient coming in after heart cath will reevaluate. No need for antibiotic at this time.

## 2023-10-24 NOTE — TELEPHONE ENCOUNTER
Having a heart cath tomorrow at [facility] with Dr Miguel Hope. He is concerned because he has a wound on his toe that is not healing \"you can almost see to the bone\". He is not on any antibiotic. Asking if he can still have the heart cath? Does he need medication?       [facility]  [facility]

## 2023-11-07 DIAGNOSIS — F41.0 PANIC DISORDER: ICD-10-CM

## 2023-11-07 DIAGNOSIS — F41.8 SITUATIONAL ANXIETY: ICD-10-CM

## 2023-11-08 RX ORDER — METOPROLOL SUCCINATE 25 MG/1
12.5 TABLET, EXTENDED RELEASE ORAL DAILY
Qty: 90 TABLET | Refills: 3 | OUTPATIENT
Start: 2023-11-08

## 2023-11-10 RX ORDER — ALPRAZOLAM 0.5 MG/1
TABLET ORAL
Qty: 120 TABLET | Refills: 0 | Status: SHIPPED | OUTPATIENT
Start: 2023-11-10 | End: 2023-12-10

## 2023-12-11 DIAGNOSIS — F41.8 SITUATIONAL ANXIETY: ICD-10-CM

## 2023-12-11 DIAGNOSIS — F41.0 PANIC DISORDER: ICD-10-CM

## 2023-12-11 RX ORDER — ALPRAZOLAM 0.5 MG/1
TABLET ORAL
Qty: 120 TABLET | Refills: 0 | Status: SHIPPED | OUTPATIENT
Start: 2023-12-11 | End: 2024-01-10

## 2023-12-11 RX ORDER — LISINOPRIL 5 MG/1
5 TABLET ORAL DAILY
Qty: 90 TABLET | Refills: 3 | OUTPATIENT
Start: 2023-12-11

## 2024-01-04 RX ORDER — ATORVASTATIN CALCIUM 80 MG/1
80 TABLET, FILM COATED ORAL NIGHTLY
Qty: 90 TABLET | Refills: 3 | Status: SHIPPED | OUTPATIENT
Start: 2024-01-04

## 2024-01-05 RX ORDER — TRAZODONE HYDROCHLORIDE 50 MG/1
TABLET ORAL
Qty: 30 TABLET | Refills: 2 | Status: SHIPPED | OUTPATIENT
Start: 2024-01-05

## 2024-01-18 DIAGNOSIS — F41.8 SITUATIONAL ANXIETY: ICD-10-CM

## 2024-01-18 DIAGNOSIS — F41.0 PANIC DISORDER: ICD-10-CM

## 2024-01-18 RX ORDER — ALPRAZOLAM 0.5 MG/1
TABLET ORAL
Qty: 120 TABLET | OUTPATIENT
Start: 2024-01-18

## 2024-01-22 ENCOUNTER — OFFICE VISIT (OUTPATIENT)
Dept: FAMILY MEDICINE CLINIC | Age: 74
End: 2024-01-22
Payer: MEDICARE

## 2024-01-22 VITALS
SYSTOLIC BLOOD PRESSURE: 118 MMHG | HEART RATE: 90 BPM | TEMPERATURE: 98.3 F | WEIGHT: 202 LBS | DIASTOLIC BLOOD PRESSURE: 74 MMHG | OXYGEN SATURATION: 96 % | BODY MASS INDEX: 26.65 KG/M2

## 2024-01-22 DIAGNOSIS — F33.41 RECURRENT MAJOR DEPRESSIVE DISORDER, IN PARTIAL REMISSION (HCC): ICD-10-CM

## 2024-01-22 DIAGNOSIS — F10.11 HISTORY OF ETOH ABUSE: ICD-10-CM

## 2024-01-22 DIAGNOSIS — J41.8 MIXED SIMPLE AND MUCOPURULENT CHRONIC BRONCHITIS (HCC): ICD-10-CM

## 2024-01-22 DIAGNOSIS — F51.01 PRIMARY INSOMNIA: ICD-10-CM

## 2024-01-22 DIAGNOSIS — Z95.0 S/P PLACEMENT OF CARDIAC PACEMAKER: ICD-10-CM

## 2024-01-22 DIAGNOSIS — F41.8 SITUATIONAL ANXIETY: ICD-10-CM

## 2024-01-22 DIAGNOSIS — Z86.73 HISTORY OF CVA IN ADULTHOOD: ICD-10-CM

## 2024-01-22 DIAGNOSIS — I73.9 PAD (PERIPHERAL ARTERY DISEASE) (HCC): ICD-10-CM

## 2024-01-22 DIAGNOSIS — H10.501 BLEPHAROCONJUNCTIVITIS OF RIGHT EYE, UNSPECIFIED BLEPHAROCONJUNCTIVITIS TYPE: ICD-10-CM

## 2024-01-22 DIAGNOSIS — I50.23 ACUTE ON CHRONIC SYSTOLIC HEART FAILURE (HCC): Primary | ICD-10-CM

## 2024-01-22 DIAGNOSIS — F41.0 PANIC DISORDER: ICD-10-CM

## 2024-01-22 DIAGNOSIS — Z87.891 HISTORY OF TOBACCO USE: ICD-10-CM

## 2024-01-22 PROCEDURE — 4004F PT TOBACCO SCREEN RCVD TLK: CPT | Performed by: NURSE PRACTITIONER

## 2024-01-22 PROCEDURE — 3078F DIAST BP <80 MM HG: CPT | Performed by: NURSE PRACTITIONER

## 2024-01-22 PROCEDURE — G8427 DOCREV CUR MEDS BY ELIG CLIN: HCPCS | Performed by: NURSE PRACTITIONER

## 2024-01-22 PROCEDURE — G8484 FLU IMMUNIZE NO ADMIN: HCPCS | Performed by: NURSE PRACTITIONER

## 2024-01-22 PROCEDURE — 3023F SPIROM DOC REV: CPT | Performed by: NURSE PRACTITIONER

## 2024-01-22 PROCEDURE — 3017F COLORECTAL CA SCREEN DOC REV: CPT | Performed by: NURSE PRACTITIONER

## 2024-01-22 PROCEDURE — 3074F SYST BP LT 130 MM HG: CPT | Performed by: NURSE PRACTITIONER

## 2024-01-22 PROCEDURE — 1123F ACP DISCUSS/DSCN MKR DOCD: CPT | Performed by: NURSE PRACTITIONER

## 2024-01-22 PROCEDURE — G8417 CALC BMI ABV UP PARAM F/U: HCPCS | Performed by: NURSE PRACTITIONER

## 2024-01-22 PROCEDURE — 99214 OFFICE O/P EST MOD 30 MIN: CPT | Performed by: NURSE PRACTITIONER

## 2024-01-22 RX ORDER — POLYMYXIN B SULFATE AND TRIMETHOPRIM 1; 10000 MG/ML; [USP'U]/ML
1 SOLUTION OPHTHALMIC 2 TIMES DAILY
Qty: 10 ML | Refills: 0 | Status: SHIPPED | OUTPATIENT
Start: 2024-01-22 | End: 2024-02-01

## 2024-01-22 RX ORDER — ALPRAZOLAM 0.5 MG/1
TABLET ORAL
Qty: 150 TABLET | Refills: 0 | Status: SHIPPED | OUTPATIENT
Start: 2024-01-22 | End: 2024-02-22

## 2024-01-22 ASSESSMENT — PATIENT HEALTH QUESTIONNAIRE - PHQ9: DEPRESSION UNABLE TO ASSESS: PT REFUSES

## 2024-01-23 ASSESSMENT — ENCOUNTER SYMPTOMS
CHEST TIGHTNESS: 0
EYE REDNESS: 1
WHEEZING: 0
ABDOMINAL PAIN: 0
ABDOMINAL DISTENTION: 0
SINUS PRESSURE: 1
BACK PAIN: 0
SHORTNESS OF BREATH: 0
COUGH: 0

## 2024-01-23 NOTE — PROGRESS NOTES
SRPX  [facility]I PROFESSIONAL SERVS  [facility]  [address] [address]  Dept: [phone]  Dept Fax: [phone]  Loc: [phone]  PROGRESS NOTE      VisitDate: 1/22/2024    Manuel Lay is a 73 y.o. male who presents today for:     Chief Complaint   Patient presents with    Follow-up     Patient was seen @ [facility] ED last night; notes states stroke like symptoms, he states it was not. He states he was having nausea and light headedness, states the stroke like symptoms more came from his daughter's opinion.          Subjective:  ED follow-up from last night [facility] dizziness and anxiety.  CT labs within normal limits.  Patient reports that he has been out of his Xanax for a couple of days.  Requesting refill.  Accompanied with son.  Denies any fever chest pain shortness of breath falls confusion abdominal pain numbness or edema.  Patient did notice report recent cold with mild congestion sinus congestion postnasal drainage ear fullness.  Medical history reviewed.  Patient also complains of stye formation to irritation to the right lower eyelid eye irritation.  Denies any injury.          Review of Systems   Constitutional:  Negative for activity change, appetite change, chills, fatigue and fever.   HENT:  Positive for congestion, ear pain and sinus pressure.    Eyes:  Positive for redness. Negative for visual disturbance.   Respiratory:  Negative for cough, chest tightness, shortness of breath and wheezing.    Cardiovascular:  Negative for chest pain, palpitations and leg swelling.   Gastrointestinal:  Negative for abdominal distention and abdominal pain.   Genitourinary:  Negative for dysuria.   Musculoskeletal:  Negative for arthralgias, back pain and neck pain.   Skin: Negative.  Negative for rash.   Neurological:  Positive for dizziness. Negative for light-headedness and headaches.   Hematological:  Negative for adenopathy.   Psychiatric/Behavioral:  Negative for

## 2024-02-13 ENCOUNTER — OFFICE VISIT (OUTPATIENT)
Dept: FAMILY MEDICINE CLINIC | Age: 74
End: 2024-02-13
Payer: MEDICARE

## 2024-02-13 VITALS
DIASTOLIC BLOOD PRESSURE: 60 MMHG | SYSTOLIC BLOOD PRESSURE: 132 MMHG | BODY MASS INDEX: 26.91 KG/M2 | OXYGEN SATURATION: 98 % | RESPIRATION RATE: 18 BRPM | WEIGHT: 204 LBS | HEART RATE: 90 BPM

## 2024-02-13 DIAGNOSIS — Z86.73 HISTORY OF CVA IN ADULTHOOD: ICD-10-CM

## 2024-02-13 DIAGNOSIS — Z95.0 S/P PLACEMENT OF CARDIAC PACEMAKER: ICD-10-CM

## 2024-02-13 DIAGNOSIS — F41.8 SITUATIONAL ANXIETY: Primary | ICD-10-CM

## 2024-02-13 DIAGNOSIS — G89.29 CHRONIC PAIN OF RIGHT KNEE: ICD-10-CM

## 2024-02-13 DIAGNOSIS — M25.561 CHRONIC PAIN OF RIGHT KNEE: ICD-10-CM

## 2024-02-13 DIAGNOSIS — Z87.891 HISTORY OF TOBACCO USE: ICD-10-CM

## 2024-02-13 DIAGNOSIS — F10.11 HISTORY OF ETOH ABUSE: ICD-10-CM

## 2024-02-13 PROCEDURE — G8484 FLU IMMUNIZE NO ADMIN: HCPCS | Performed by: NURSE PRACTITIONER

## 2024-02-13 PROCEDURE — 99214 OFFICE O/P EST MOD 30 MIN: CPT | Performed by: NURSE PRACTITIONER

## 2024-02-13 PROCEDURE — 3078F DIAST BP <80 MM HG: CPT | Performed by: NURSE PRACTITIONER

## 2024-02-13 PROCEDURE — 3017F COLORECTAL CA SCREEN DOC REV: CPT | Performed by: NURSE PRACTITIONER

## 2024-02-13 PROCEDURE — 4004F PT TOBACCO SCREEN RCVD TLK: CPT | Performed by: NURSE PRACTITIONER

## 2024-02-13 PROCEDURE — 1123F ACP DISCUSS/DSCN MKR DOCD: CPT | Performed by: NURSE PRACTITIONER

## 2024-02-13 PROCEDURE — 3075F SYST BP GE 130 - 139MM HG: CPT | Performed by: NURSE PRACTITIONER

## 2024-02-13 PROCEDURE — G8417 CALC BMI ABV UP PARAM F/U: HCPCS | Performed by: NURSE PRACTITIONER

## 2024-02-13 PROCEDURE — G8427 DOCREV CUR MEDS BY ELIG CLIN: HCPCS | Performed by: NURSE PRACTITIONER

## 2024-02-13 RX ORDER — TRAZODONE HYDROCHLORIDE 100 MG/1
100 TABLET ORAL NIGHTLY
Qty: 30 TABLET | Refills: 2 | Status: SHIPPED | OUTPATIENT
Start: 2024-02-13

## 2024-02-13 RX ORDER — TRAMADOL HYDROCHLORIDE 50 MG/1
50 TABLET ORAL EVERY 6 HOURS PRN
Qty: 120 TABLET | Refills: 0 | Status: SHIPPED | OUTPATIENT
Start: 2024-02-13 | End: 2024-03-14

## 2024-02-13 NOTE — PROGRESS NOTES
SRPX  [facility] PROFESSIONAL SERVS  [facility]  [address] [address]  Dept: [phone]  Dept Fax: [phone]  Loc: [phone]  PROGRESS NOTE      VisitDate: 2/13/2024    Manuel Lay is a 73 y.o. male who presents today for:     Chief Complaint   Patient presents with    Pain     Pain in right knee down to toes in right foot, painful when he walks too much and when it's cold    Insomnia     Taking 1.5 tabs of trazadone         Subjective:  Patient presents for routine 3-month.  Refill tramadol.  Requesting increase of his trazodone.  Reports he is taking 1-1/2 tablets currently with good results.  History of CVA CHF pacemaker ED hypertension and alcohol abuse.  Insomnia chronic low back pain osteoarthritis anxiety depression.  Medications as prescribed.  Patient reports chronic pain right knee status post knee replacement.  Reports clicking sensation in the right knee.  Patient does report pain from right knee down in the right foot especially with cold temperatures.          Review of Systems   Constitutional:  Negative for activity change, appetite change, chills, fatigue and fever.   Eyes:  Negative for visual disturbance.   Respiratory:  Negative for cough, chest tightness, shortness of breath and wheezing.    Cardiovascular:  Negative for chest pain, palpitations and leg swelling.   Gastrointestinal:  Negative for abdominal distention and abdominal pain.   Genitourinary:  Negative for dysuria.   Musculoskeletal:  Positive for arthralgias, back pain and gait problem. Negative for neck pain.   Skin: Negative.  Negative for rash.   Neurological:  Negative for dizziness, light-headedness and headaches.   Hematological:  Negative for adenopathy.   Psychiatric/Behavioral:  Positive for sleep disturbance. Negative for decreased concentration and dysphoric mood. The patient is nervous/anxious.    All other systems reviewed and are negative.    Past Medical History:

## 2024-02-22 ENCOUNTER — TELEPHONE (OUTPATIENT)
Dept: FAMILY MEDICINE CLINIC | Age: 74
End: 2024-02-22

## 2024-02-22 DIAGNOSIS — F41.9 ANXIETY: ICD-10-CM

## 2024-02-22 DIAGNOSIS — F33.0 MILD EPISODE OF RECURRENT MAJOR DEPRESSIVE DISORDER (HCC): Primary | ICD-10-CM

## 2024-02-23 ENCOUNTER — OFFICE VISIT (OUTPATIENT)
Dept: NEUROLOGY | Age: 74
End: 2024-02-23

## 2024-02-23 VITALS
OXYGEN SATURATION: 98 % | SYSTOLIC BLOOD PRESSURE: 115 MMHG | WEIGHT: 202 LBS | BODY MASS INDEX: 26.77 KG/M2 | HEIGHT: 73 IN | HEART RATE: 65 BPM | DIASTOLIC BLOOD PRESSURE: 60 MMHG

## 2024-02-23 DIAGNOSIS — Z86.73 HISTORY OF STROKE: Primary | ICD-10-CM

## 2024-02-23 DIAGNOSIS — Z95.828 HISTORY OF RIGHT COMMON CAROTID ARTERY STENT PLACEMENT: ICD-10-CM

## 2024-02-23 DIAGNOSIS — Z98.890 HISTORY OF RIGHT COMMON CAROTID ARTERY STENT PLACEMENT: ICD-10-CM

## 2024-02-23 RX ORDER — CLOPIDOGREL BISULFATE 75 MG/1
75 TABLET ORAL DAILY
Qty: 30 TABLET | Refills: 11 | Status: SHIPPED | OUTPATIENT
Start: 2024-02-23

## 2024-02-23 NOTE — PATIENT INSTRUCTIONS
Continue with Aspirin and Plavix.   Target LDL below 70, follow-up with your family physician  Modify risk factors for stroke (blood pressure, cholesterol, diabetes, smoking cessation etc.. Control)  Follow up in 1 year or sooner if needed.  Call if any questions or concerns.

## 2024-02-23 NOTE — PROGRESS NOTES
is seen. Gray-white differentiation is maintained. There is a remote lacunar infarct in the right internal capsule. That appearance is stable.  Ventricles are normal.  No mass effect.  Postsurgical changes maxillary sinuses. Acute maxillary sinus disease in the right cyst on the left. Acute Ethmoid sinus disease.  Impression  There is acute ethmoid and maxillary sinusitis. There is no acute intracranial pathology.  **This report has been created using voice recognition software.  It may contain minor errors which are inherent in voice recognition technology.**  Final report electronically signed by Dr. Elliot Nieto on 2022 9:46 AM       CT head 2024 :  Details    Narrative  Ordering Provider: Niles Huntley [facility]        Radiology Department  Patient:  JARAD SHAIKH.  :  1950   Sex:  Mary Ann Calvo [id number]  Location:  [phone]   Unit #:   [id number]      Acct #:  [id number]      Ordering Phys: Niles Sexton MD          Exam Date: 24    Accession #:  [id number]    Exam:  CT   CT Head Without Contrast 19245    Result: See Report          REPORT-ID:[id number]        STUDY:  CT BRAIN WITHOUT CONTRAST        REASON FOR EXAM:   Male, 73 years old.  Dizziness.        RADIATION DOSAGE (If Supplied By Facility):  CTDIvol = ( 48 ) mGy, DLP = (    876 ) mGycm        TECHNIQUE:   Transaxial CT imaging of the brain was performed without    administration of intravenous contrast material.        Individualized dose optimization techniques were used for this CT.        COMPARISON: Prior study dated: 10/4/2023    ___________________________________        FINDINGS:        PARENCHYMA:    There is a subcentimeter old lacunar infarct in the right basal ganglia.    There is no acute bleed or infarct.    There are stable chronic ischemic and atrophic changes.        VENTRICLES:    There is no hydrocephalus.        MASTOID AIR CELLS AND

## 2024-03-06 ENCOUNTER — OFFICE VISIT (OUTPATIENT)
Dept: FAMILY MEDICINE CLINIC | Age: 74
End: 2024-03-06
Payer: MEDICARE

## 2024-03-06 VITALS
SYSTOLIC BLOOD PRESSURE: 104 MMHG | BODY MASS INDEX: 26.91 KG/M2 | DIASTOLIC BLOOD PRESSURE: 60 MMHG | HEART RATE: 82 BPM | WEIGHT: 204 LBS

## 2024-03-06 DIAGNOSIS — G93.40 ACUTE ENCEPHALOPATHY: ICD-10-CM

## 2024-03-06 DIAGNOSIS — F33.0 MILD EPISODE OF RECURRENT MAJOR DEPRESSIVE DISORDER (HCC): ICD-10-CM

## 2024-03-06 DIAGNOSIS — F41.8 SITUATIONAL ANXIETY: ICD-10-CM

## 2024-03-06 DIAGNOSIS — F29 PSYCHOSIS, UNSPECIFIED PSYCHOSIS TYPE (HCC): ICD-10-CM

## 2024-03-06 DIAGNOSIS — R41.0 CONFUSION: ICD-10-CM

## 2024-03-06 DIAGNOSIS — F10.11 HISTORY OF ETOH ABUSE: ICD-10-CM

## 2024-03-06 DIAGNOSIS — F41.0 PANIC DISORDER: ICD-10-CM

## 2024-03-06 DIAGNOSIS — R42 DIZZINESS: ICD-10-CM

## 2024-03-06 DIAGNOSIS — F13.931 BENZODIAZEPINE WITHDRAWAL WITH DELIRIUM (HCC): Primary | ICD-10-CM

## 2024-03-06 DIAGNOSIS — Z86.73 HISTORY OF CVA IN ADULTHOOD: ICD-10-CM

## 2024-03-06 DIAGNOSIS — R26.81 UNSTEADY GAIT: ICD-10-CM

## 2024-03-06 DIAGNOSIS — F51.01 PRIMARY INSOMNIA: ICD-10-CM

## 2024-03-06 DIAGNOSIS — Z86.73 HISTORY OF STROKE: ICD-10-CM

## 2024-03-06 DIAGNOSIS — H10.501 BLEPHAROCONJUNCTIVITIS OF RIGHT EYE, UNSPECIFIED BLEPHAROCONJUNCTIVITIS TYPE: ICD-10-CM

## 2024-03-06 PROCEDURE — 3017F COLORECTAL CA SCREEN DOC REV: CPT | Performed by: NURSE PRACTITIONER

## 2024-03-06 PROCEDURE — 3074F SYST BP LT 130 MM HG: CPT | Performed by: NURSE PRACTITIONER

## 2024-03-06 PROCEDURE — 4004F PT TOBACCO SCREEN RCVD TLK: CPT | Performed by: NURSE PRACTITIONER

## 2024-03-06 PROCEDURE — 3078F DIAST BP <80 MM HG: CPT | Performed by: NURSE PRACTITIONER

## 2024-03-06 PROCEDURE — G8427 DOCREV CUR MEDS BY ELIG CLIN: HCPCS | Performed by: NURSE PRACTITIONER

## 2024-03-06 PROCEDURE — G8417 CALC BMI ABV UP PARAM F/U: HCPCS | Performed by: NURSE PRACTITIONER

## 2024-03-06 PROCEDURE — G8484 FLU IMMUNIZE NO ADMIN: HCPCS | Performed by: NURSE PRACTITIONER

## 2024-03-06 PROCEDURE — 99214 OFFICE O/P EST MOD 30 MIN: CPT | Performed by: NURSE PRACTITIONER

## 2024-03-06 PROCEDURE — 1123F ACP DISCUSS/DSCN MKR DOCD: CPT | Performed by: NURSE PRACTITIONER

## 2024-03-06 RX ORDER — METOPROLOL SUCCINATE 25 MG/1
12.5 TABLET, EXTENDED RELEASE ORAL DAILY
COMMUNITY

## 2024-03-06 RX ORDER — DIAZEPAM 5 MG/1
5 TABLET ORAL 3 TIMES DAILY PRN
COMMUNITY
Start: 2024-03-01 | End: 2024-03-06 | Stop reason: SDUPTHER

## 2024-03-06 RX ORDER — DIAZEPAM 5 MG/1
5 TABLET ORAL 2 TIMES DAILY
Qty: 60 TABLET | Refills: 0 | Status: SHIPPED | OUTPATIENT
Start: 2024-03-06 | End: 2024-04-05

## 2024-03-06 RX ORDER — FOLIC ACID 1 MG/1
TABLET ORAL
COMMUNITY

## 2024-03-06 RX ORDER — MELATONIN: COMMUNITY

## 2024-03-06 RX ORDER — LANOLIN ALCOHOL/MO/W.PET/CERES
CREAM (GRAM) TOPICAL
COMMUNITY
Start: 2024-03-02

## 2024-03-06 RX ORDER — LANOLIN ALCOHOL/MO/W.PET/CERES
1000 CREAM (GRAM) TOPICAL DAILY
COMMUNITY

## 2024-03-07 PROBLEM — Z86.73 HISTORY OF STROKE: Status: ACTIVE | Noted: 2024-03-07

## 2024-03-07 RX ORDER — POLYMYXIN B SULFATE AND TRIMETHOPRIM 1; 10000 MG/ML; [USP'U]/ML
1 SOLUTION OPHTHALMIC EVERY 4 HOURS
Qty: 3 ML | Refills: 0 | Status: SHIPPED | OUTPATIENT
Start: 2024-03-07 | End: 2024-03-17

## 2024-03-07 ASSESSMENT — ENCOUNTER SYMPTOMS
BACK PAIN: 0
ABDOMINAL PAIN: 0
ABDOMINAL DISTENTION: 0
WHEEZING: 0
EYE REDNESS: 1
SHORTNESS OF BREATH: 0
EYE ITCHING: 1
CHEST TIGHTNESS: 0
COUGH: 0

## 2024-03-07 NOTE — PROGRESS NOTES
SRPX [facility] PROFESSIONAL SERVS  [facility]  [address] [id number]  Dept: [phone]  Dept Fax: [phone]  Loc: [phone]  PROGRESS NOTE      VisitDate: 3/6/2024    Manuel Lay is a 73 y.o. male who presents today for:     Chief Complaint   Patient presents with    Follow-Up from Hospital     Hospital follow up from [facility] 2-28-24 D/C 3-1-24 Dx: Withdrawal from Benzodiazepines. He started at [facility], they called Raquel in and they sent him to [facility]. They kept him for about 5 hours, then they sent him on to [facility].     Eye Drainage     Requesting eye drops for right eye drainage/watering.          Subjective:  Hospital follow-up acute encephalopathy psychosis benzodiazepine withdrawal.  Xanax discontinued substituted with Valium.  Patient reports taking 5 mg to 10 mg daily currently.  Reports he is doing well accompanied with son.  Denies any hallucinations visual or auditory patient reports that ran out of his Xanax early and was without for several days and developed increased agitation anxiety jitteriness.  Medical history reviewed.  Reports mood stable .does complain of some drainage right eye several irritation.  Denies any homicidal or suicidal ideations.          Review of Systems   Constitutional:  Negative for activity change, appetite change, chills, fatigue and fever.   Eyes:  Positive for redness and itching. Negative for visual disturbance.   Respiratory:  Negative for cough, chest tightness, shortness of breath and wheezing.    Cardiovascular:  Negative for chest pain, palpitations and leg swelling.   Gastrointestinal:  Negative for abdominal distention and abdominal pain.   Genitourinary:  Negative for dysuria.   Musculoskeletal:  Positive for arthralgias. Negative for back pain and neck pain.   Skin: Negative.  Negative for rash.   Neurological:  Negative for dizziness, light-headedness and headaches.

## 2024-03-25 RX ORDER — TRAZODONE HYDROCHLORIDE 50 MG/1
TABLET ORAL
Qty: 30 TABLET | Refills: 2 | Status: SHIPPED | OUTPATIENT
Start: 2024-03-25

## 2024-03-26 ENCOUNTER — OFFICE VISIT (OUTPATIENT)
Dept: FAMILY MEDICINE CLINIC | Age: 74
End: 2024-03-26
Payer: MEDICARE

## 2024-03-26 VITALS
WEIGHT: 211.4 LBS | OXYGEN SATURATION: 94 % | SYSTOLIC BLOOD PRESSURE: 118 MMHG | HEART RATE: 76 BPM | TEMPERATURE: 97.6 F | BODY MASS INDEX: 27.89 KG/M2 | DIASTOLIC BLOOD PRESSURE: 76 MMHG

## 2024-03-26 DIAGNOSIS — E78.2 MIXED HYPERLIPIDEMIA: ICD-10-CM

## 2024-03-26 DIAGNOSIS — Z86.73 HISTORY OF CVA IN ADULTHOOD: ICD-10-CM

## 2024-03-26 DIAGNOSIS — R35.0 URINARY FREQUENCY: ICD-10-CM

## 2024-03-26 DIAGNOSIS — R35.0 BENIGN PROSTATIC HYPERPLASIA WITH URINARY FREQUENCY: ICD-10-CM

## 2024-03-26 DIAGNOSIS — F51.01 PRIMARY INSOMNIA: ICD-10-CM

## 2024-03-26 DIAGNOSIS — F41.0 PANIC DISORDER: ICD-10-CM

## 2024-03-26 DIAGNOSIS — N40.1 BENIGN PROSTATIC HYPERPLASIA WITH URINARY FREQUENCY: ICD-10-CM

## 2024-03-26 DIAGNOSIS — F41.8 SITUATIONAL ANXIETY: Primary | ICD-10-CM

## 2024-03-26 DIAGNOSIS — Z95.0 S/P PLACEMENT OF CARDIAC PACEMAKER: ICD-10-CM

## 2024-03-26 DIAGNOSIS — F10.11 HISTORY OF ETOH ABUSE: ICD-10-CM

## 2024-03-26 DIAGNOSIS — Z87.891 HISTORY OF TOBACCO USE: ICD-10-CM

## 2024-03-26 LAB
BILIRUBIN, POC: NORMAL
BLOOD URINE, POC: NORMAL
CLARITY, POC: CLEAR
COLOR, POC: YELLOW
GLUCOSE URINE, POC: NORMAL
KETONES, POC: NORMAL
LEUKOCYTE EST, POC: NORMAL
NITRITE, POC: NORMAL
PH, POC: 6.5
PROTEIN, POC: NORMAL
SPECIFIC GRAVITY, POC: 1.01
UROBILINOGEN, POC: 0.2

## 2024-03-26 PROCEDURE — 99214 OFFICE O/P EST MOD 30 MIN: CPT | Performed by: NURSE PRACTITIONER

## 2024-03-26 PROCEDURE — G8417 CALC BMI ABV UP PARAM F/U: HCPCS | Performed by: NURSE PRACTITIONER

## 2024-03-26 PROCEDURE — G8484 FLU IMMUNIZE NO ADMIN: HCPCS | Performed by: NURSE PRACTITIONER

## 2024-03-26 PROCEDURE — G8427 DOCREV CUR MEDS BY ELIG CLIN: HCPCS | Performed by: NURSE PRACTITIONER

## 2024-03-26 PROCEDURE — 81003 URINALYSIS AUTO W/O SCOPE: CPT | Performed by: NURSE PRACTITIONER

## 2024-03-26 PROCEDURE — 1123F ACP DISCUSS/DSCN MKR DOCD: CPT | Performed by: NURSE PRACTITIONER

## 2024-03-26 PROCEDURE — 3017F COLORECTAL CA SCREEN DOC REV: CPT | Performed by: NURSE PRACTITIONER

## 2024-03-26 PROCEDURE — 3074F SYST BP LT 130 MM HG: CPT | Performed by: NURSE PRACTITIONER

## 2024-03-26 PROCEDURE — 4004F PT TOBACCO SCREEN RCVD TLK: CPT | Performed by: NURSE PRACTITIONER

## 2024-03-26 PROCEDURE — 3078F DIAST BP <80 MM HG: CPT | Performed by: NURSE PRACTITIONER

## 2024-03-26 RX ORDER — SULFAMETHOXAZOLE AND TRIMETHOPRIM 800; 160 MG/1; MG/1
1 TABLET ORAL 2 TIMES DAILY
Qty: 20 TABLET | Refills: 0 | Status: SHIPPED | OUTPATIENT
Start: 2024-03-26 | End: 2024-04-05

## 2024-03-26 RX ORDER — TAMSULOSIN HYDROCHLORIDE 0.4 MG/1
0.4 CAPSULE ORAL DAILY
Qty: 30 CAPSULE | Refills: 1 | Status: SHIPPED | OUTPATIENT
Start: 2024-03-26

## 2024-03-26 SDOH — ECONOMIC STABILITY: FOOD INSECURITY: WITHIN THE PAST 12 MONTHS, YOU WORRIED THAT YOUR FOOD WOULD RUN OUT BEFORE YOU GOT MONEY TO BUY MORE.: NEVER TRUE

## 2024-03-26 SDOH — ECONOMIC STABILITY: INCOME INSECURITY: HOW HARD IS IT FOR YOU TO PAY FOR THE VERY BASICS LIKE FOOD, HOUSING, MEDICAL CARE, AND HEATING?: NOT HARD AT ALL

## 2024-03-26 SDOH — ECONOMIC STABILITY: FOOD INSECURITY: WITHIN THE PAST 12 MONTHS, THE FOOD YOU BOUGHT JUST DIDN'T LAST AND YOU DIDN'T HAVE MONEY TO GET MORE.: NEVER TRUE

## 2024-03-26 ASSESSMENT — ENCOUNTER SYMPTOMS
ABDOMINAL PAIN: 0
ABDOMINAL DISTENTION: 0
WHEEZING: 0
BACK PAIN: 0
CHEST TIGHTNESS: 0
COUGH: 0
SHORTNESS OF BREATH: 0

## 2024-03-26 NOTE — PROGRESS NOTES
adenopathy.      Right cervical: No superficial, deep or posterior cervical adenopathy.     Left cervical: No superficial, deep or posterior cervical adenopathy.      Upper Body:      Right upper body: No supraclavicular or pectoral adenopathy.      Left upper body: No supraclavicular or pectoral adenopathy.   Skin:     General: Skin is warm and dry.      Coloration: Skin is not pale.      Findings: No bruising, ecchymosis, laceration, lesion or rash.      Nails: There is no clubbing.   Neurological:      Mental Status: He is alert and oriented to person, place, and time.      Cranial Nerves: No cranial nerve deficit.      Motor: No abnormal muscle tone.      Coordination: Coordination normal.      Deep Tendon Reflexes: Reflexes normal.   Psychiatric:         Speech: Speech normal.         Behavior: Behavior normal.         Thought Content: Thought content normal.         Judgment: Judgment normal.       /76 (Site: Right Upper Arm)   Pulse 76   Temp 97.6 °F (36.4 °C) (Oral)   Wt 95.9 kg (211 lb 6.4 oz)   SpO2 94%   BMI 27.89 kg/m²        POCT Urinalysis No Micro (Auto)  Order: [id number]  Status: Final result       Visible to patient: Yes (not seen)       Next appt: 04/09/2024 at 11:40 AM in Family Medicine (MANE CHACON - CNP)       Dx: Urinary frequency    0 Result Notes      Component 3/26/24 1341   Color, UA YELLOW   Clarity, UA CLEAR   Glucose, UA POC NEG   Bilirubin, UA NEG   Ketones, UA NEG   Spec Grav, UA 1.010   Blood, UA POC NEG   pH, UA 6.5   Protein, UA POC NEG   Urobilinogen, UA 0.2   Leukocytes, UA NEG   Nitrite, UA NEG              Specimen Collected: 03/26/24 13:41 EDT Last Resulted: 03/26/24 00:00 EDT        Lab Flowsheet        Order Details        View Encounter        Lab and Collection Details        Routing        Result History     View All Conversations on this Encounter           Result Care Coordination      Patient Communication     Add Comments   Add Notifications  Back

## 2024-03-29 ENCOUNTER — NURSE ONLY (OUTPATIENT)
Dept: LAB | Age: 74
End: 2024-03-29

## 2024-03-29 DIAGNOSIS — Z86.73 HISTORY OF CVA IN ADULTHOOD: ICD-10-CM

## 2024-03-29 DIAGNOSIS — N40.1 BENIGN PROSTATIC HYPERPLASIA WITH URINARY FREQUENCY: ICD-10-CM

## 2024-03-29 DIAGNOSIS — R35.0 URINARY FREQUENCY: ICD-10-CM

## 2024-03-29 DIAGNOSIS — E78.2 MIXED HYPERLIPIDEMIA: ICD-10-CM

## 2024-03-29 DIAGNOSIS — R35.0 BENIGN PROSTATIC HYPERPLASIA WITH URINARY FREQUENCY: ICD-10-CM

## 2024-03-29 LAB
ALBUMIN SERPL BCG-MCNC: 4.6 G/DL (ref 3.5–5.1)
ALP SERPL-CCNC: 89 U/L (ref 38–126)
ALT SERPL W/O P-5'-P-CCNC: 12 U/L (ref 11–66)
ANION GAP SERPL CALC-SCNC: 9 MEQ/L (ref 8–16)
AST SERPL-CCNC: 13 U/L (ref 5–40)
BILIRUB SERPL-MCNC: 0.6 MG/DL (ref 0.3–1.2)
BUN SERPL-MCNC: 8 MG/DL (ref 7–22)
CALCIUM SERPL-MCNC: 9.5 MG/DL (ref 8.5–10.5)
CHLORIDE SERPL-SCNC: 102 MEQ/L (ref 98–111)
CHOLEST SERPL-MCNC: 109 MG/DL (ref 100–199)
CO2 SERPL-SCNC: 27 MEQ/L (ref 23–33)
CREAT SERPL-MCNC: 0.9 MG/DL (ref 0.4–1.2)
GFR SERPL CREATININE-BSD FRML MDRD: > 90 ML/MIN/1.73M2
GLUCOSE SERPL-MCNC: 96 MG/DL (ref 70–108)
HDLC SERPL-MCNC: 42 MG/DL
LDLC SERPL CALC-MCNC: 45 MG/DL
POTASSIUM SERPL-SCNC: 4.2 MEQ/L (ref 3.5–5.2)
PROT SERPL-MCNC: 7.1 G/DL (ref 6.1–8)
PSA SERPL-MCNC: 0.53 NG/ML (ref 0–1)
SODIUM SERPL-SCNC: 138 MEQ/L (ref 135–145)
TRIGL SERPL-MCNC: 112 MG/DL (ref 0–199)

## 2024-04-09 ENCOUNTER — OFFICE VISIT (OUTPATIENT)
Dept: FAMILY MEDICINE CLINIC | Age: 74
End: 2024-04-09
Payer: MEDICARE

## 2024-04-09 VITALS
RESPIRATION RATE: 16 BRPM | WEIGHT: 223 LBS | TEMPERATURE: 97.6 F | DIASTOLIC BLOOD PRESSURE: 64 MMHG | OXYGEN SATURATION: 93 % | SYSTOLIC BLOOD PRESSURE: 110 MMHG | BODY MASS INDEX: 29.42 KG/M2 | HEART RATE: 65 BPM

## 2024-04-09 DIAGNOSIS — Z87.891 HISTORY OF TOBACCO USE: ICD-10-CM

## 2024-04-09 DIAGNOSIS — F41.8 SITUATIONAL ANXIETY: Primary | ICD-10-CM

## 2024-04-09 DIAGNOSIS — F51.01 PRIMARY INSOMNIA: ICD-10-CM

## 2024-04-09 DIAGNOSIS — Z95.0 S/P PLACEMENT OF CARDIAC PACEMAKER: ICD-10-CM

## 2024-04-09 DIAGNOSIS — F13.931 BENZODIAZEPINE WITHDRAWAL WITH DELIRIUM (HCC): ICD-10-CM

## 2024-04-09 DIAGNOSIS — F41.0 PANIC DISORDER: ICD-10-CM

## 2024-04-09 DIAGNOSIS — F10.11 HISTORY OF ETOH ABUSE: ICD-10-CM

## 2024-04-09 PROCEDURE — 4004F PT TOBACCO SCREEN RCVD TLK: CPT | Performed by: NURSE PRACTITIONER

## 2024-04-09 PROCEDURE — 3074F SYST BP LT 130 MM HG: CPT | Performed by: NURSE PRACTITIONER

## 2024-04-09 PROCEDURE — 1123F ACP DISCUSS/DSCN MKR DOCD: CPT | Performed by: NURSE PRACTITIONER

## 2024-04-09 PROCEDURE — 3078F DIAST BP <80 MM HG: CPT | Performed by: NURSE PRACTITIONER

## 2024-04-09 PROCEDURE — 3017F COLORECTAL CA SCREEN DOC REV: CPT | Performed by: NURSE PRACTITIONER

## 2024-04-09 PROCEDURE — 99214 OFFICE O/P EST MOD 30 MIN: CPT | Performed by: NURSE PRACTITIONER

## 2024-04-09 PROCEDURE — G8427 DOCREV CUR MEDS BY ELIG CLIN: HCPCS | Performed by: NURSE PRACTITIONER

## 2024-04-09 PROCEDURE — G8417 CALC BMI ABV UP PARAM F/U: HCPCS | Performed by: NURSE PRACTITIONER

## 2024-04-09 RX ORDER — TRAZODONE HYDROCHLORIDE 50 MG/1
TABLET ORAL
Qty: 30 TABLET | Refills: 2 | Status: SHIPPED | OUTPATIENT
Start: 2024-04-09

## 2024-04-09 RX ORDER — DIAZEPAM 5 MG/1
5 TABLET ORAL 2 TIMES DAILY
Qty: 60 TABLET | Refills: 0 | Status: SHIPPED | OUTPATIENT
Start: 2024-04-09 | End: 2024-05-09

## 2024-04-09 RX ORDER — FUROSEMIDE 20 MG/1
20 TABLET ORAL DAILY PRN
Qty: 90 TABLET | Refills: 1 | Status: SHIPPED | OUTPATIENT
Start: 2024-04-09

## 2024-04-09 ASSESSMENT — ENCOUNTER SYMPTOMS
COUGH: 0
BACK PAIN: 1
ABDOMINAL DISTENTION: 0
CHEST TIGHTNESS: 0
WHEEZING: 0
SHORTNESS OF BREATH: 0
ABDOMINAL PAIN: 0

## 2024-04-09 NOTE — PROGRESS NOTES
SRPX  PREETI PROFESSIONAL SERVS  [facility]  [address] [address]  Dept: [phone]  Dept Fax: [phone]  Loc: [phone]  PROGRESS NOTE      VisitDate: 4/9/2024    Manuel Lay is a 73 y.o. male who presents today for:     Chief Complaint   Patient presents with    1 Month Follow-Up         Subjective:  Monthly follow-up refill of Valium.  Patient reports doing well walking daily.  Taking medications as prescribed.  Patient reports that his son layout his meds daily.  Patient has complaints of swelling lower extremities over the past few weeks.  Currently not taking diuretic.  Medical history reviewed          Review of Systems   Constitutional:  Negative for activity change, appetite change, chills, fatigue and fever.   Eyes:  Negative for visual disturbance.   Respiratory:  Negative for cough, chest tightness, shortness of breath and wheezing.    Cardiovascular:  Positive for leg swelling. Negative for chest pain and palpitations.   Gastrointestinal:  Negative for abdominal distention and abdominal pain.   Genitourinary:  Negative for dysuria.   Musculoskeletal:  Positive for arthralgias and back pain. Negative for neck pain.   Skin: Negative.  Negative for rash.   Neurological:  Negative for dizziness, light-headedness and headaches.   Hematological:  Negative for adenopathy.   Psychiatric/Behavioral:  Positive for decreased concentration and sleep disturbance. Negative for dysphoric mood. The patient is nervous/anxious.    All other systems reviewed and are negative.    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

## 2024-05-09 ENCOUNTER — OFFICE VISIT (OUTPATIENT)
Dept: FAMILY MEDICINE CLINIC | Age: 74
End: 2024-05-09
Payer: MEDICARE

## 2024-05-09 VITALS
WEIGHT: 218 LBS | BODY MASS INDEX: 28.76 KG/M2 | SYSTOLIC BLOOD PRESSURE: 102 MMHG | DIASTOLIC BLOOD PRESSURE: 64 MMHG | RESPIRATION RATE: 18 BRPM | HEART RATE: 80 BPM

## 2024-05-09 DIAGNOSIS — F13.931 BENZODIAZEPINE WITHDRAWAL WITH DELIRIUM (HCC): ICD-10-CM

## 2024-05-09 DIAGNOSIS — F41.8 SITUATIONAL ANXIETY: Primary | ICD-10-CM

## 2024-05-09 DIAGNOSIS — Z86.73 HISTORY OF CVA IN ADULTHOOD: ICD-10-CM

## 2024-05-09 DIAGNOSIS — F51.01 PRIMARY INSOMNIA: ICD-10-CM

## 2024-05-09 DIAGNOSIS — F41.0 PANIC DISORDER: ICD-10-CM

## 2024-05-09 DIAGNOSIS — Z87.891 HISTORY OF TOBACCO USE: ICD-10-CM

## 2024-05-09 DIAGNOSIS — F10.11 HISTORY OF ETOH ABUSE: ICD-10-CM

## 2024-05-09 DIAGNOSIS — S06.6X0D SUBARACHNOID HEMORRHAGE FOLLOWING INJURY, NO LOSS OF CONSCIOUSNESS, SUBSEQUENT ENCOUNTER: ICD-10-CM

## 2024-05-09 PROCEDURE — 3074F SYST BP LT 130 MM HG: CPT | Performed by: NURSE PRACTITIONER

## 2024-05-09 PROCEDURE — G8427 DOCREV CUR MEDS BY ELIG CLIN: HCPCS | Performed by: NURSE PRACTITIONER

## 2024-05-09 PROCEDURE — 3017F COLORECTAL CA SCREEN DOC REV: CPT | Performed by: NURSE PRACTITIONER

## 2024-05-09 PROCEDURE — G8417 CALC BMI ABV UP PARAM F/U: HCPCS | Performed by: NURSE PRACTITIONER

## 2024-05-09 PROCEDURE — 4004F PT TOBACCO SCREEN RCVD TLK: CPT | Performed by: NURSE PRACTITIONER

## 2024-05-09 PROCEDURE — 1123F ACP DISCUSS/DSCN MKR DOCD: CPT | Performed by: NURSE PRACTITIONER

## 2024-05-09 PROCEDURE — 99214 OFFICE O/P EST MOD 30 MIN: CPT | Performed by: NURSE PRACTITIONER

## 2024-05-09 PROCEDURE — 3078F DIAST BP <80 MM HG: CPT | Performed by: NURSE PRACTITIONER

## 2024-05-09 RX ORDER — DIAZEPAM 5 MG/1
5 TABLET ORAL 2 TIMES DAILY
Qty: 60 TABLET | Refills: 0 | Status: SHIPPED | OUTPATIENT
Start: 2024-05-09 | End: 2024-06-08

## 2024-05-09 RX ORDER — TRAZODONE HYDROCHLORIDE 100 MG/1
100 TABLET ORAL NIGHTLY
Qty: 30 TABLET | Refills: 2 | Status: SHIPPED | OUTPATIENT
Start: 2024-05-09

## 2024-06-07 ENCOUNTER — OFFICE VISIT (OUTPATIENT)
Dept: FAMILY MEDICINE CLINIC | Age: 74
End: 2024-06-07
Payer: MEDICARE

## 2024-06-07 ENCOUNTER — CARE COORDINATION (OUTPATIENT)
Dept: CARE COORDINATION | Age: 74
End: 2024-06-07

## 2024-06-07 VITALS
WEIGHT: 220 LBS | BODY MASS INDEX: 29.03 KG/M2 | TEMPERATURE: 97.8 F | SYSTOLIC BLOOD PRESSURE: 122 MMHG | RESPIRATION RATE: 16 BRPM | HEART RATE: 66 BPM | OXYGEN SATURATION: 96 % | DIASTOLIC BLOOD PRESSURE: 52 MMHG

## 2024-06-07 DIAGNOSIS — J30.2 SEASONAL ALLERGIC RHINITIS, UNSPECIFIED TRIGGER: ICD-10-CM

## 2024-06-07 DIAGNOSIS — F51.01 PRIMARY INSOMNIA: ICD-10-CM

## 2024-06-07 DIAGNOSIS — G89.29 CHRONIC PAIN OF RIGHT KNEE: ICD-10-CM

## 2024-06-07 DIAGNOSIS — Z79.899 LONG TERM PRESCRIPTION BENZODIAZEPINE USE: ICD-10-CM

## 2024-06-07 DIAGNOSIS — M25.561 CHRONIC PAIN OF RIGHT KNEE: ICD-10-CM

## 2024-06-07 DIAGNOSIS — Z86.73 HISTORY OF CVA IN ADULTHOOD: ICD-10-CM

## 2024-06-07 DIAGNOSIS — F41.9 ANXIETY: ICD-10-CM

## 2024-06-07 DIAGNOSIS — F10.11 HISTORY OF ETOH ABUSE: ICD-10-CM

## 2024-06-07 DIAGNOSIS — Z87.891 HISTORY OF TOBACCO USE: ICD-10-CM

## 2024-06-07 DIAGNOSIS — F41.8 SITUATIONAL ANXIETY: Primary | ICD-10-CM

## 2024-06-07 DIAGNOSIS — M15.9 PRIMARY OSTEOARTHRITIS INVOLVING MULTIPLE JOINTS: ICD-10-CM

## 2024-06-07 DIAGNOSIS — F41.0 PANIC DISORDER: ICD-10-CM

## 2024-06-07 PROCEDURE — 4004F PT TOBACCO SCREEN RCVD TLK: CPT | Performed by: NURSE PRACTITIONER

## 2024-06-07 PROCEDURE — G8427 DOCREV CUR MEDS BY ELIG CLIN: HCPCS | Performed by: NURSE PRACTITIONER

## 2024-06-07 PROCEDURE — 3074F SYST BP LT 130 MM HG: CPT | Performed by: NURSE PRACTITIONER

## 2024-06-07 PROCEDURE — 3078F DIAST BP <80 MM HG: CPT | Performed by: NURSE PRACTITIONER

## 2024-06-07 PROCEDURE — G8417 CALC BMI ABV UP PARAM F/U: HCPCS | Performed by: NURSE PRACTITIONER

## 2024-06-07 PROCEDURE — 99214 OFFICE O/P EST MOD 30 MIN: CPT | Performed by: NURSE PRACTITIONER

## 2024-06-07 PROCEDURE — 1123F ACP DISCUSS/DSCN MKR DOCD: CPT | Performed by: NURSE PRACTITIONER

## 2024-06-07 PROCEDURE — 96372 THER/PROPH/DIAG INJ SC/IM: CPT | Performed by: NURSE PRACTITIONER

## 2024-06-07 PROCEDURE — 3017F COLORECTAL CA SCREEN DOC REV: CPT | Performed by: NURSE PRACTITIONER

## 2024-06-07 RX ORDER — DIAZEPAM 5 MG/1
5 TABLET ORAL 2 TIMES DAILY
Qty: 60 TABLET | Refills: 0 | Status: SHIPPED | OUTPATIENT
Start: 2024-06-07 | End: 2024-07-07

## 2024-06-07 RX ORDER — OLOPATADINE HYDROCHLORIDE 2 MG/ML
1 SOLUTION/ DROPS OPHTHALMIC DAILY
Qty: 2.5 ML | Refills: 2 | Status: SHIPPED | OUTPATIENT
Start: 2024-06-07

## 2024-06-07 RX ORDER — METHYLPREDNISOLONE ACETATE 80 MG/ML
120 INJECTION, SUSPENSION INTRA-ARTICULAR; INTRALESIONAL; INTRAMUSCULAR; SOFT TISSUE ONCE
Status: COMPLETED | OUTPATIENT
Start: 2024-06-07 | End: 2024-06-07

## 2024-06-07 RX ADMIN — METHYLPREDNISOLONE ACETATE 120 MG: 80 INJECTION, SUSPENSION INTRA-ARTICULAR; INTRALESIONAL; INTRAMUSCULAR; SOFT TISSUE at 12:04

## 2024-06-07 NOTE — CARE COORDINATION
Ambulatory Care Coordination Note     6/7/2024 1:12 PM     patient outreach attempt by this ACM today to offer care management services. ACM was unable to reach the patient by telephone today; left voice message requesting a return phone call to this ACM.     ACM: Lana Vasquez RN     Care Summary Note:     BPA referral for remote patient monitoring from PCP. Attempted to reach patient. Left message to return phone call to ambulatory care manager at [phone]. My chart letter sent.     PCP/Specialist follow up:   Future Appointments         Provider Specialty Dept Phone    7/8/2024 12:00 PM Charan Gunderson MD Cardiology [phone]    7/9/2024 10:20 AM Pedro Jurado APRN - [facility] Family Medicine [phone]    2/28/2025 10:45 AM Grace Zuleta MD Neurology [phone]            Follow Up:   Plan for next AC outreach in approximately 1 week to complete:  - outreach attempt to offer care management services.

## 2024-06-10 NOTE — PROGRESS NOTES
SRPX [facility] PROFESSIONAL SERVS  [facility]  [address] [address]  Dept: [phone]  Dept Fax: [phone]  Loc: [phone]  PROGRESS NOTE      VisitDate: 6/7/2024    Manuel Lay is a 73 y.o. male who presents today for:     Chief Complaint   Patient presents with    Anxiety     Still c/o ongoing anxiety,     Eye Problem     When sweat gets into his eyes he can't see, eyes watering-allergies?, using drops without relief    Knee Pain     Req brace for right knee         Subjective:  Monthly follow-up regarding anxiety and benzodiazepine use.  Patient requesting refill of his Valium.  Request to keep it at twice daily dosing.  Reports increased family stressors.  Patient complains of watery itchy eyes past few weeks believes it was related to allergies.  Patient also complains of continued right knee pain.  Patient reports that he walks approximately 2 to 3 miles a day and notices knee is sore by the end of the walk.  History of knee replacement on the right.  History of CVA hypertension EtOH abuse chronic low back pain and anxiety depression.          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 ([facility])     Chronic diastolic CHF (congestive heart failure) ([facility]) 11/23/2021    Erectile dysfunction     Essential hypertension, malignant     ETOH abuse     GERD (gastroesophageal reflux disease)     Insomnia     Low back pain     Major depressive disorder, recurrent ([facility]) 4/4/2018    Other headache syndromes 12/25/2016    Pneumonia     Pneumonia 02/2022    Sciatica     right side strain      Past Surgical History:   Procedure Laterality Date    APPENDECTOMY      COLONOSCOPY      EKG 12-LEAD  7/17/2015         HERNIA REPAIR      umbilical    JOINT REPLACEMENT  08/03/2016    RTK       KNEE ARTHROSCOPY      Rt knee Dr. Celaya    KNEE SURGERY Right      Family 
Administrations This Visit       methylPREDNISolone acetate (DEPO-MEDROL) injection 120 mg       Admin Date  06/07/2024  12:04 Action  Given Dose  120 mg Route  IntraMUSCular Site  Dorsogluteal Right Administered By  Indira Cherry RN    Ordering Provider: Pedro Jurado APRN - CNP    NDC: 0312-5762-00    Lot#: [id number]    : [employer] [place]    Patient Supplied?: No                  
Render Risk Assessment In Note?: yes
Additional Notes: Patient consent was obtained to proceed with the visit and recommended plan of care after discussion of all risks and benefits, including the risks of COVID-19 exposure.
Detail Level: Simple

## 2024-06-13 ENCOUNTER — CARE COORDINATION (OUTPATIENT)
Dept: CARE COORDINATION | Age: 74
End: 2024-06-13

## 2024-06-13 ENCOUNTER — CARE COORDINATION (OUTPATIENT)
Dept: PRIMARY CARE CLINIC | Age: 74
End: 2024-06-13

## 2024-06-13 DIAGNOSIS — I50.23 ACUTE ON CHRONIC SYSTOLIC HEART FAILURE (HCC): ICD-10-CM

## 2024-06-13 DIAGNOSIS — I10 ESSENTIAL HYPERTENSION, MALIGNANT: Primary | ICD-10-CM

## 2024-06-13 SDOH — ECONOMIC STABILITY: FOOD INSECURITY: WITHIN THE PAST 12 MONTHS, YOU WORRIED THAT YOUR FOOD WOULD RUN OUT BEFORE YOU GOT MONEY TO BUY MORE.: NEVER TRUE

## 2024-06-13 SDOH — ECONOMIC STABILITY: TRANSPORTATION INSECURITY
IN THE PAST 12 MONTHS, HAS THE LACK OF TRANSPORTATION KEPT YOU FROM MEDICAL APPOINTMENTS OR FROM GETTING MEDICATIONS?: NO

## 2024-06-13 SDOH — SOCIAL STABILITY: SOCIAL NETWORK: HOW OFTEN DO YOU ATTENT MEETINGS OF THE CLUB OR ORGANIZATION YOU BELONG TO?: NEVER

## 2024-06-13 SDOH — ECONOMIC STABILITY: HOUSING INSECURITY: IN THE LAST 12 MONTHS, HOW MANY PLACES HAVE YOU LIVED?: 1

## 2024-06-13 SDOH — ECONOMIC STABILITY: FOOD INSECURITY: WITHIN THE PAST 12 MONTHS, THE FOOD YOU BOUGHT JUST DIDN'T LAST AND YOU DIDN'T HAVE MONEY TO GET MORE.: NEVER TRUE

## 2024-06-13 SDOH — SOCIAL STABILITY: SOCIAL NETWORK: HOW OFTEN DO YOU GET TOGETHER WITH FRIENDS OR RELATIVES?: TWICE A WEEK

## 2024-06-13 SDOH — SOCIAL STABILITY: SOCIAL NETWORK: ARE YOU MARRIED, WIDOWED, DIVORCED, SEPARATED, NEVER MARRIED, OR LIVING WITH A PARTNER?: DIVORCED

## 2024-06-13 SDOH — ECONOMIC STABILITY: INCOME INSECURITY: IN THE LAST 12 MONTHS, WAS THERE A TIME WHEN YOU WERE NOT ABLE TO PAY THE MORTGAGE OR RENT ON TIME?: NO

## 2024-06-13 SDOH — SOCIAL STABILITY: SOCIAL NETWORK: HOW OFTEN DO YOU ATTEND CHURCH OR RELIGIOUS SERVICES?: NEVER

## 2024-06-13 SDOH — HEALTH STABILITY: MENTAL HEALTH
STRESS IS WHEN SOMEONE FEELS TENSE, NERVOUS, ANXIOUS, OR CAN'T SLEEP AT NIGHT BECAUSE THEIR MIND IS TROUBLED. HOW STRESSED ARE YOU?: ONLY A LITTLE

## 2024-06-13 SDOH — ECONOMIC STABILITY: TRANSPORTATION INSECURITY
IN THE PAST 12 MONTHS, HAS LACK OF TRANSPORTATION KEPT YOU FROM MEETINGS, WORK, OR FROM GETTING THINGS NEEDED FOR DAILY LIVING?: NO

## 2024-06-13 SDOH — ECONOMIC STABILITY: INCOME INSECURITY: HOW HARD IS IT FOR YOU TO PAY FOR THE VERY BASICS LIKE FOOD, HOUSING, MEDICAL CARE, AND HEATING?: NOT HARD AT ALL

## 2024-06-13 SDOH — SOCIAL STABILITY: SOCIAL NETWORK
DO YOU BELONG TO ANY CLUBS OR ORGANIZATIONS SUCH AS CHURCH GROUPS UNIONS, FRATERNAL OR ATHLETIC GROUPS, OR SCHOOL GROUPS?: NO

## 2024-06-13 SDOH — SOCIAL STABILITY: SOCIAL NETWORK: IN A TYPICAL WEEK, HOW MANY TIMES DO YOU TALK ON THE PHONE WITH FAMILY, FRIENDS, OR NEIGHBORS?: TWICE A WEEK

## 2024-06-13 NOTE — CARE COORDINATION
Management tool to seek urgent or emergent care.  I will notify my provider of any symptoms that indicate a worsening of my condition.    Barriers: lack of support and overwhelmed by complexity of regimen  Plan for overcoming my barriers: care coordination, remote patient monitoring  Confidence: 9/10  Anticipated Goal Completion Date: 9/13/2024                 PCP/Specialist follow up:   Future Appointments         Provider Specialty Dept Phone    7/8/2024 12:00 PM Charan Gunderson MD Cardiology [phone]    7/9/2024 10:20 AM Pedro Jurado APRN - [facility] [phone]    2/28/2025 10:45 AM Grace Zuleta MD Neurology [phone]            Follow Up:   Plan for next ACM outreach in approximately 1 week to complete:  - CC Protocol assessments  - disease specific assessments  - goal progression  - education   - RPM.   patient  is agreeable to this plan.

## 2024-06-13 NOTE — CARE COORDINATION
Remote Patient Kit Ordering Note      Date/Time:  6/13/2024 1:17 PM      CCSS placed phone call to patient/family today to notify of RPM kit order; patient/family was available; discussed the following topics below and all questions answered.    [] CCSS confirmed patient shipping address  [] Patient will receive package over the next 1-3 business days. Someone 21 years or older must be present to sign for UPS delivery.  [] HRS will contact patient within 24 hours, an HRS  will call the patient directly: If the patient does not answer, HRS will follow up with the clinical team notifying them about the unsuccessful attempt to contact the patient. HRS will make three call attempts to the patient.Provide patient with [facility] Virtual install number is: [phone].  [] ACM will contact patient once equipment is active to welcome them to the program.                                                         [] Hours of RPM monitoring - Monday-Friday [id number]; encourage patient to get vitals entered by Noon each day to have the alert addressed same day.  [][facility].                      ACM made aware the RPM kit has been ordered.

## 2024-06-13 NOTE — PROGRESS NOTES
Remote Patient Monitoring Treatment Plan    Received request from [facility]/Lana Xie RN   to order remote patient monitoring for in home monitoring of CHF; Condition managed by PCP.  HTN; Condition managed by PCP.  and order completed.     Patient will be monitoring blood pressure   pulse ox   weight  disease specific education modules .      Patient will engage in Remote Patient Monitoring each day to develop the skills necessary for self management.       RPM Care Team Responsibilities:   Alerts will be reviewed daily and addressed within 2-4 hours during operational hours (Monday -Friday 9 am-4 pm)  Alert response and intervention documented in patient medical record  Alert response escalated to PCP per protocol and documented in patient medical record  Patient monitored over approximately  days  Discharge from program based on self-management readiness    See care coordination encounters for additional details.

## 2024-06-17 ENCOUNTER — TELEPHONE (OUTPATIENT)
Dept: FAMILY MEDICINE CLINIC | Age: 74
End: 2024-06-17

## 2024-06-17 ENCOUNTER — NURSE ONLY (OUTPATIENT)
Dept: LAB | Age: 74
End: 2024-06-17

## 2024-06-17 DIAGNOSIS — Z79.899 LONG TERM PRESCRIPTION BENZODIAZEPINE USE: ICD-10-CM

## 2024-06-17 LAB
ALBUMIN SERPL BCG-MCNC: 4.3 G/DL (ref 3.5–5.1)
ALP SERPL-CCNC: 83 U/L (ref 38–126)
ALT SERPL W/O P-5'-P-CCNC: 13 U/L (ref 11–66)
ANION GAP SERPL CALC-SCNC: 8 MEQ/L (ref 8–16)
AST SERPL-CCNC: 17 U/L (ref 5–40)
BASOPHILS ABSOLUTE: 0.1 THOU/MM3 (ref 0–0.1)
BASOPHILS NFR BLD AUTO: 0.7 %
BILIRUB SERPL-MCNC: 0.7 MG/DL (ref 0.3–1.2)
BUN SERPL-MCNC: 8 MG/DL (ref 7–22)
CALCIUM SERPL-MCNC: 9.3 MG/DL (ref 8.5–10.5)
CHLORIDE SERPL-SCNC: 102 MEQ/L (ref 98–111)
CO2 SERPL-SCNC: 30 MEQ/L (ref 23–33)
CREAT SERPL-MCNC: 1 MG/DL (ref 0.4–1.2)
DEPRECATED RDW RBC AUTO: 45.9 FL (ref 35–45)
EOSINOPHIL NFR BLD AUTO: 4.3 %
EOSINOPHILS ABSOLUTE: 0.5 THOU/MM3 (ref 0–0.4)
ERYTHROCYTE [DISTWIDTH] IN BLOOD BY AUTOMATED COUNT: 12.9 % (ref 11.5–14.5)
GFR SERPL CREATININE-BSD FRML MDRD: 80 ML/MIN/1.73M2
GLUCOSE SERPL-MCNC: 132 MG/DL (ref 70–108)
HCT VFR BLD AUTO: 46.9 % (ref 42–52)
HGB BLD-MCNC: 15.4 GM/DL (ref 14–18)
IMM GRANULOCYTES # BLD AUTO: 0.04 THOU/MM3 (ref 0–0.07)
IMM GRANULOCYTES NFR BLD AUTO: 0.4 %
LYMPHOCYTES ABSOLUTE: 4.5 THOU/MM3 (ref 1–4.8)
LYMPHOCYTES NFR BLD AUTO: 39.3 %
MCH RBC QN AUTO: 31.7 PG (ref 26–33)
MCHC RBC AUTO-ENTMCNC: 32.8 GM/DL (ref 32.2–35.5)
MCV RBC AUTO: 96.5 FL (ref 80–94)
MONOCYTES ABSOLUTE: 0.7 THOU/MM3 (ref 0.4–1.3)
MONOCYTES NFR BLD AUTO: 6.2 %
NEUTROPHILS ABSOLUTE: 5.6 THOU/MM3 (ref 1.8–7.7)
NEUTROPHILS NFR BLD AUTO: 49.1 %
NRBC BLD AUTO-RTO: 0 /100 WBC
PLATELET # BLD AUTO: 253 THOU/MM3 (ref 130–400)
PMV BLD AUTO: 10.3 FL (ref 9.4–12.4)
POTASSIUM SERPL-SCNC: 4.1 MEQ/L (ref 3.5–5.2)
PROT SERPL-MCNC: 7 G/DL (ref 6.1–8)
RBC # BLD AUTO: 4.86 MILL/MM3 (ref 4.7–6.1)
SODIUM SERPL-SCNC: 140 MEQ/L (ref 135–145)
WBC # BLD AUTO: 11.4 THOU/MM3 (ref 4.8–10.8)

## 2024-06-17 NOTE — TELEPHONE ENCOUNTER
----- Message from MANE Newberry CNP sent at 6/17/2024 11:41 AM EDT -----  Elevated fasting glucose 132.  Avoid simple sugars low-carb diet.  We will check an A1c level  Patient examined.  CBC CMP otherwise within normal limits

## 2024-06-20 ENCOUNTER — CARE COORDINATION (OUTPATIENT)
Dept: CARE COORDINATION | Age: 74
End: 2024-06-20

## 2024-06-20 RX ORDER — ALBUTEROL SULFATE 90 UG/1
2 AEROSOL, METERED RESPIRATORY (INHALATION) EVERY 6 HOURS PRN
Qty: 1 EACH | Refills: 3 | Status: SHIPPED | OUTPATIENT
Start: 2024-06-20

## 2024-06-20 NOTE — CARE COORDINATION
Remote Patient Monitoring Welcome Note Date/Time: 2024 10:18 AM Patient Current Location: Home: [address] [place], [place] [id number] Verified patients name and  as identifiers. Completed and confirmed the following: Emergency Contact: lori minaya jr [phone] [x] Patient received all RPM equipment (tablet, scale, blood pressure device and cuff, and pulse oximeter)  Cuff Size: regular (9.05\"-15.74\")  Weight Scale: regular (<330lbs) [x] Instructed patient keep box for use when returning equipment [x] Reviewed Patient Welcome Letter with patient [x] Reviewed Consent Form  Copy of consent form in chart. [x] Reviewed expectations for patient and care team  Monitoring hours M-F 9-4pm  It is important to take your vitals every day, even on the weekends,to keep your care team aware of how you are doing every day of the week.  Completing monitoring by 12pm on  so that alerts can be responded to in the same day  Patient weighs self at same time every day (or after urinating and waking up)  Take blood pressure 1-2 hrs after medications  RPM team may have different phone area code (including [place], [place], [place] or [place])                        [x] Instructed patient to keep scale on flat surface [x] Instructed patient to keep tablet plugged in at all times [x] Instructed how to contact IT support ([phone]) [x] Provided Remote Patient Monitoring care  information All questions answered at this time. ---- Current Patient Metrics ---- Blood Pressure: 152/43, 77bpm Pulseox: 96%, 68bpm Weight: 215.4lbs Note Created at: 2024 10:40 AM ET ---- Time-Spent: 15 minutes 0 seconds      Ambulatory Care Coordination Note     2024 10:39 AM     Patient Current Location:  Home: [address] [place], [place] [id number]     ACM contacted the patient by telephone. Verified name and  with patient as identifiers.         ACM: Lana Vasquez RN     Challenges to be reviewed by the provider  23-Sep-2018 16:18

## 2024-06-21 ENCOUNTER — CARE COORDINATION (OUTPATIENT)
Dept: CARE COORDINATION | Age: 74
End: 2024-06-21

## 2024-06-25 ENCOUNTER — CARE COORDINATION (OUTPATIENT)
Dept: CARE COORDINATION | Age: 74
End: 2024-06-25

## 2024-06-25 NOTE — CARE COORDINATION
not get a chance to weigh himself upon rising. He was already fully dressed and had eaten by the time he had a chance.  Patient agreeable to resume normal routine tomorrow.       Signs and symptoms of CHF discussed with patient, such as feeling more tired than normal, feeling short of breath, coughing that increases when lying down, sudden weight gain, swelling of the feet, legs or belly.  Patient verbalized understanding to notify physician office if these symptoms occur.    Plan/Follow Up: Will continue to review, monitor and address alerts with follow up based on severity of symptoms and risk factors.    Leonila Sen LPN  [facility]/ CTN/ Remote Patient monitoring  [phone]

## 2024-06-26 ENCOUNTER — CARE COORDINATION (OUTPATIENT)
Dept: CARE COORDINATION | Age: 74
End: 2024-06-26

## 2024-06-26 NOTE — CARE COORDINATION
Attempted to reach patient for continued Care Coordination follow up and education.  Patient was unavailable at the time of my call, and a generic voicemail message was left asking patient to return my call at [phone].

## 2024-06-27 ENCOUNTER — CARE COORDINATION (OUTPATIENT)
Dept: CARE COORDINATION | Age: 74
End: 2024-06-27

## 2024-06-27 NOTE — CARE COORDINATION
Remote Alert Monitoring Note      Date/Time:  2024 10:57 AM  Patient Current Location: Home: [address] [id number]    LPN contacted patient by telephone regarding red alert received for weight increase (220lb). Verified patients name and  as identifiers.    Rpm alert to be reviewed by the provider                         Background:  High Blood-Pressure, CHF     Refer to 911 immediately if:  Patient unresponsive or unable to provide history  Change in cognition or sudden confusion  Patient unable to respond in complete sentences  Intense chest pain/tightness  Any concern for any clinical emergency  Red Alert: Provider response time of 1 hr required for any red alert requiring intervention  Yellow Alert: Provider response time of 3hr required for any escalated yellow alert        Weight Scale Triage  Was your weight obtained upon rising/waking today? yes   Was your weight obtained after voiding and/or use of the bathroom today? yes   Did you weigh yourself in the same amount of clothing today, compared to how you typically do? yes   Was the scale bumped or moved prior to today's weight? no   Is your scale on a flat/hard surface? yes   Did you obtain your weight with shoes on? no   If yes, is this something you normally do during your daily weights? yes   Were you standing up straight on the scale today? yes   Were you leaning on anything while obtaining your weight today? possibly       Weight Triage  Are you weighing any different than you did yesterday? (time of day, clothes and shoes on or off, etc)? no   Do you have any shortness of breath? no   Do you have any swelling in your hands of feet? no   Are you having any other health concerns or issues? no       Clinical Interventions: Reviewed and followed up on alerts and treatments-. Pt is asymptomatic and in no apparent distress, speaking in full sentences.  Weight is baseline, but does fluctuate quite a bit due to balance issues. He

## 2024-06-28 VITALS
HEART RATE: 69 BPM | WEIGHT: 219.6 LBS | BODY MASS INDEX: 28.97 KG/M2 | DIASTOLIC BLOOD PRESSURE: 68 MMHG | SYSTOLIC BLOOD PRESSURE: 106 MMHG | OXYGEN SATURATION: 92 %

## 2024-07-02 ENCOUNTER — CARE COORDINATION (OUTPATIENT)
Dept: CARE COORDINATION | Age: 74
End: 2024-07-02

## 2024-07-02 NOTE — CARE COORDINATION
Ambulatory Care Coordination Note     2024 3:37 PM     Patient Current Location:  Home: [address] [place] [id number]     ACM contacted the patient by telephone. Verified name and  with patient as identifiers.         ACM: Lana Vasquez RN     Challenges to be reviewed by the provider   Additional needs identified to be addressed with provider No  none               Method of communication with provider: none.    Care Summary Note:     RPM - no alerts. Stable.   Exercising daily. Walking in evening.  No recent falls but admits to \"off balance\"  Advised on fall precautions  Weight stable, no leg swelling, lasix daily, reviewed zones.     Plan -    cardiology   PCP  Counseling   RPM CHF and HTN  Early symptom recognition and reporting to prevent ED and admission  Use same or next day appts with PCP office for non-emergency problems    Offered patient enrollment in the Remote Patient Monitoring (RPM) program for in-home monitoring: Yes, patient enrolled; current status is activated and monitoring.     Assessments Completed:    ,   Congestive Heart Failure Assessment    Are you currently restricting fluids?: 2000cc  Do you understand a low sodium diet?: Yes  Do you understand how to read food labels?: Yes  How many restaurant meals do you eat per week?: 1-2  Do you salt your food before tasting it?: No     No patient-reported symptoms      Symptoms:  None: Yes      Symptom course: stable  Weight trend: stable  Salt intake watch compared to last visit: stable      , and   Hypertension - Encounter Level    Symptom course: stable          Medications Reviewed:   Patient denies any changes with medications and reports taking all medications as prescribed.    Advance Care Planning:   Referral to internal ACP facilitator     Care Planning:    Goals Addressed                   This Visit's Progress     Conditions and Symptoms   On track     I will schedule office visits, as directed by my

## 2024-07-08 ENCOUNTER — OFFICE VISIT (OUTPATIENT)
Dept: CARDIOLOGY CLINIC | Age: 74
End: 2024-07-08
Payer: MEDICARE

## 2024-07-08 VITALS
DIASTOLIC BLOOD PRESSURE: 58 MMHG | BODY MASS INDEX: 29.03 KG/M2 | SYSTOLIC BLOOD PRESSURE: 92 MMHG | HEART RATE: 87 BPM | HEIGHT: 73 IN | WEIGHT: 219 LBS

## 2024-07-08 DIAGNOSIS — I73.9 PAD (PERIPHERAL ARTERY DISEASE) (HCC): ICD-10-CM

## 2024-07-08 DIAGNOSIS — I50.32 CHRONIC DIASTOLIC CHF (CONGESTIVE HEART FAILURE) (HCC): Primary | ICD-10-CM

## 2024-07-08 PROCEDURE — 3078F DIAST BP <80 MM HG: CPT | Performed by: INTERNAL MEDICINE

## 2024-07-08 PROCEDURE — G8427 DOCREV CUR MEDS BY ELIG CLIN: HCPCS | Performed by: INTERNAL MEDICINE

## 2024-07-08 PROCEDURE — 99213 OFFICE O/P EST LOW 20 MIN: CPT | Performed by: INTERNAL MEDICINE

## 2024-07-08 PROCEDURE — 1123F ACP DISCUSS/DSCN MKR DOCD: CPT | Performed by: INTERNAL MEDICINE

## 2024-07-08 PROCEDURE — G8417 CALC BMI ABV UP PARAM F/U: HCPCS | Performed by: INTERNAL MEDICINE

## 2024-07-08 PROCEDURE — 93000 ELECTROCARDIOGRAM COMPLETE: CPT | Performed by: INTERNAL MEDICINE

## 2024-07-08 PROCEDURE — 3017F COLORECTAL CA SCREEN DOC REV: CPT | Performed by: INTERNAL MEDICINE

## 2024-07-08 PROCEDURE — 4004F PT TOBACCO SCREEN RCVD TLK: CPT | Performed by: INTERNAL MEDICINE

## 2024-07-08 PROCEDURE — 3074F SYST BP LT 130 MM HG: CPT | Performed by: INTERNAL MEDICINE

## 2024-07-08 RX ORDER — POLYMYXIN B SULFATE AND TRIMETHOPRIM 1; 10000 MG/ML; [USP'U]/ML
SOLUTION OPHTHALMIC
Qty: 10 ML | Refills: 0 | Status: SHIPPED | OUTPATIENT
Start: 2024-07-08

## 2024-07-08 NOTE — PROGRESS NOTES
time. Appears well-developed and well-nourished.   HENT:   Head: Normocephalic and atraumatic.   Eyes: EOM are normal. Pupils are equal, round, and reactive to light.   Neck: Normal range of motion. Neck supple. No JVD present.   Cardiovascular: Normal rate, regular rhythm, normal heart sounds and intact distal pulses.    No murmur heard.  Pulmonary/Chest: Effort normal and breath sounds normal. No respiratory distress. No wheezes. No rales.   Abdominal: Soft. Bowel sounds are normal. No distension. There is no tenderness.   Musculoskeletal: Normal range of motion. No edema.   Neurological: Alert and oriented to person, place, and time. No cranial nerve deficit. Coordination normal.   Skin: Skin is warm and dry.   Psychiatric: Normal mood and affect.       Lab Results   Component Value Date/Time    CKTOTAL 134 07/16/2015 11:49 AM    CKMB 2.1 07/16/2015 11:49 AM       Lab Results   Component Value Date/Time    WBC 11.4 06/17/2024 08:48 AM    RBC 4.86 06/17/2024 08:48 AM    RBC 4.93 06/16/2023 03:05 PM    HGB 15.4 06/17/2024 08:48 AM    HGB 15.5 05/11/2012 11:00 AM    HCT 46.9 06/17/2024 08:48 AM    MCV 96.5 06/17/2024 08:48 AM    MCH 31.7 06/17/2024 08:48 AM    MCHC 32.8 06/17/2024 08:48 AM    RDW 14.1 02/27/2024 11:01 AM     06/17/2024 08:48 AM    MPV 10.3 06/17/2024 08:48 AM       Lab Results   Component Value Date/Time     06/17/2024 08:48 AM    K 4.1 06/17/2024 08:48 AM    K 3.6 06/19/2023 07:20 PM     06/17/2024 08:48 AM    CO2 30 06/17/2024 08:48 AM    BUN 8 06/17/2024 08:48 AM    CREATININE 1.0 06/17/2024 08:48 AM    CALCIUM 9.3 06/17/2024 08:48 AM    LABGLOM 80 06/17/2024 08:48 AM    LABGLOM >90 03/29/2024 02:56 PM    GLUCOSE 132 06/17/2024 08:48 AM    GLUCOSE 92 02/27/2024 11:01 AM       Lab Results   Component Value Date/Time    ALKPHOS 83 06/17/2024 08:48 AM    ALT 13 06/17/2024 08:48 AM    AST 17 06/17/2024 08:48 AM    BILITOT 0.7 06/17/2024 08:48 AM       Lab Results   Component Value

## 2024-07-09 ENCOUNTER — OFFICE VISIT (OUTPATIENT)
Dept: FAMILY MEDICINE CLINIC | Age: 74
End: 2024-07-09
Payer: MEDICARE

## 2024-07-09 VITALS
HEART RATE: 68 BPM | WEIGHT: 219 LBS | DIASTOLIC BLOOD PRESSURE: 64 MMHG | BODY MASS INDEX: 29.03 KG/M2 | HEIGHT: 73 IN | RESPIRATION RATE: 20 BRPM | SYSTOLIC BLOOD PRESSURE: 96 MMHG

## 2024-07-09 DIAGNOSIS — Z12.11 SCREEN FOR COLON CANCER: ICD-10-CM

## 2024-07-09 DIAGNOSIS — Z00.00 INITIAL MEDICARE ANNUAL WELLNESS VISIT: Primary | ICD-10-CM

## 2024-07-09 DIAGNOSIS — E78.2 MIXED HYPERLIPIDEMIA: ICD-10-CM

## 2024-07-09 DIAGNOSIS — F10.11 HISTORY OF ETOH ABUSE: ICD-10-CM

## 2024-07-09 DIAGNOSIS — F41.8 SITUATIONAL ANXIETY: ICD-10-CM

## 2024-07-09 DIAGNOSIS — Z95.0 S/P PLACEMENT OF CARDIAC PACEMAKER: ICD-10-CM

## 2024-07-09 DIAGNOSIS — Z86.73 HISTORY OF CVA IN ADULTHOOD: ICD-10-CM

## 2024-07-09 DIAGNOSIS — Z87.891 PERSONAL HISTORY OF TOBACCO USE: ICD-10-CM

## 2024-07-09 DIAGNOSIS — Z87.891 HISTORY OF TOBACCO USE: ICD-10-CM

## 2024-07-09 DIAGNOSIS — M15.9 PRIMARY OSTEOARTHRITIS INVOLVING MULTIPLE JOINTS: ICD-10-CM

## 2024-07-09 DIAGNOSIS — F51.01 PRIMARY INSOMNIA: ICD-10-CM

## 2024-07-09 DIAGNOSIS — F33.0 MILD EPISODE OF RECURRENT MAJOR DEPRESSIVE DISORDER (HCC): ICD-10-CM

## 2024-07-09 PROCEDURE — 1123F ACP DISCUSS/DSCN MKR DOCD: CPT | Performed by: NURSE PRACTITIONER

## 2024-07-09 PROCEDURE — G0438 PPPS, INITIAL VISIT: HCPCS | Performed by: NURSE PRACTITIONER

## 2024-07-09 PROCEDURE — G0296 VISIT TO DETERM LDCT ELIG: HCPCS | Performed by: NURSE PRACTITIONER

## 2024-07-09 PROCEDURE — 3078F DIAST BP <80 MM HG: CPT | Performed by: NURSE PRACTITIONER

## 2024-07-09 PROCEDURE — 3074F SYST BP LT 130 MM HG: CPT | Performed by: NURSE PRACTITIONER

## 2024-07-09 PROCEDURE — 3017F COLORECTAL CA SCREEN DOC REV: CPT | Performed by: NURSE PRACTITIONER

## 2024-07-09 RX ORDER — TRAZODONE HYDROCHLORIDE 50 MG/1
TABLET ORAL
Qty: 30 TABLET | Refills: 2 | OUTPATIENT
Start: 2024-07-09

## 2024-07-09 ASSESSMENT — PATIENT HEALTH QUESTIONNAIRE - PHQ9
SUM OF ALL RESPONSES TO PHQ QUESTIONS 1-9: 4
SUM OF ALL RESPONSES TO PHQ QUESTIONS 1-9: 4
7. TROUBLE CONCENTRATING ON THINGS, SUCH AS READING THE NEWSPAPER OR WATCHING TELEVISION: NOT AT ALL
SUM OF ALL RESPONSES TO PHQ QUESTIONS 1-9: 4
9. THOUGHTS THAT YOU WOULD BE BETTER OFF DEAD, OR OF HURTING YOURSELF: NOT AT ALL
10. IF YOU CHECKED OFF ANY PROBLEMS, HOW DIFFICULT HAVE THESE PROBLEMS MADE IT FOR YOU TO DO YOUR WORK, TAKE CARE OF THINGS AT HOME, OR GET ALONG WITH OTHER PEOPLE: NOT DIFFICULT AT ALL
4. FEELING TIRED OR HAVING LITTLE ENERGY: SEVERAL DAYS
1. LITTLE INTEREST OR PLEASURE IN DOING THINGS: NOT AT ALL
5. POOR APPETITE OR OVEREATING: NEARLY EVERY DAY
3. TROUBLE FALLING OR STAYING ASLEEP: NOT AT ALL
6. FEELING BAD ABOUT YOURSELF - OR THAT YOU ARE A FAILURE OR HAVE LET YOURSELF OR YOUR FAMILY DOWN: NOT AT ALL
2. FEELING DOWN, DEPRESSED OR HOPELESS: NOT AT ALL
SUM OF ALL RESPONSES TO PHQ9 QUESTIONS 1 & 2: 0
SUM OF ALL RESPONSES TO PHQ QUESTIONS 1-9: 4
8. MOVING OR SPEAKING SO SLOWLY THAT OTHER PEOPLE COULD HAVE NOTICED. OR THE OPPOSITE, BEING SO FIGETY OR RESTLESS THAT YOU HAVE BEEN MOVING AROUND A LOT MORE THAN USUAL: NOT AT ALL

## 2024-07-09 ASSESSMENT — LIFESTYLE VARIABLES
HOW MANY STANDARD DRINKS CONTAINING ALCOHOL DO YOU HAVE ON A TYPICAL DAY: 10 OR MORE
HOW OFTEN DO YOU HAVE A DRINK CONTAINING ALCOHOL: NEVER

## 2024-07-09 ASSESSMENT — ENCOUNTER SYMPTOMS
SHORTNESS OF BREATH: 0
CHEST TIGHTNESS: 0
WHEEZING: 0
ABDOMINAL PAIN: 0
ABDOMINAL DISTENTION: 0
COUGH: 0
BACK PAIN: 0

## 2024-07-09 NOTE — PATIENT INSTRUCTIONS
go to all appointments, and call your doctor if you are having problems. It's also a good idea to know your test results and keep a list of the medicines you take.  How can you care for yourself at home?  Diet    Use less salt when you cook and eat. This helps lower your blood pressure. Taste food before salting. Add only a little salt when you think you need it. With time, your taste buds will adjust to less salt.     Eat fewer snack items, fast foods, canned soups, and other high-salt, high-fat, processed foods.     Read food labels and try to avoid saturated and trans fats. They increase your risk of heart disease by raising cholesterol levels.     Limit the amount of solid fat--butter, margarine, and shortening--you eat. Use olive, peanut, or canola oil when you cook. Bake, broil, and steam foods instead of frying them.     Eat a variety of fruit and vegetables every day. Dark green, deep orange, red, or yellow fruits and vegetables are especially good for you. Examples include spinach, carrots, peaches, and berries.     Foods high in fiber can reduce your cholesterol and provide important vitamins and minerals. High-fiber foods include whole-grain cereals and breads, oatmeal, beans, brown rice, citrus fruits, and apples.     Eat lean proteins. Heart-healthy proteins include seafood, lean meats and poultry, eggs, beans, peas, nuts, seeds, and soy products.     Limit drinks and foods with added sugar. These include candy, desserts, and soda pop.   Heart-healthy lifestyle    If your doctor recommends it, get more exercise. For many people, walking is a good choice. Or you may want to swim, bike, or do other activities. Bit by bit, increase the time you're active every day. Try for at least 30 minutes on most days of the week.     Try to quit or cut back on using tobacco and other nicotine products. This includes smoking and vaping. If you need help quitting, talk to your doctor about stop-smoking programs and

## 2024-07-09 NOTE — PROGRESS NOTES
0  Total Score Interpretation: Abnormal Mini-Cog  Interventions:  See AVS for additional education material      Drug Use:    DAST-10 Score: 2   Interpretation:  1-2: Low level - Monitor, re-assess at a later date  3-5: Moderate level - Further Investigation  6-8: Substantial level - Intensive Assessment  9-10: Severe level - Intensive Assessment  Interventions:  See AVS for additional education material                Tobacco Use:  Tobacco Use: High Risk (7/9/2024)    Patient History     Smoking Tobacco Use: Every Day     Smokeless Tobacco Use: Never     Passive Exposure: Not on file     E-cigarette/Vaping       Questions Responses    E-cigarette/Vaping Use Never User    Start Date     Passive Exposure     Quit Date     Counseling Given     Comments           Interventions:  See AVS for additional education material                      Objective   Vitals:    07/09/24 1028   BP: 96/64   Pulse: 68   Resp: 20   Weight: 99.3 kg (219 lb)   Height: 1.854 m (6' 1\")      Body mass index is 28.89 kg/m².        General Appearance: alert and oriented to person, place and time, well developed and well- nourished, in no acute distress  Skin: warm and dry, no rash or erythema  Head: normocephalic and atraumatic  Eyes: pupils equal, round, and reactive to light, extraocular eye movements intact, conjunctivae normal  ENT: tympanic membrane, external ear and ear canal normal bilaterally, nose without deformity, nasal mucosa and turbinates normal without polyps  Neck: supple and non-tender without mass, no thyromegaly or thyroid nodules, no cervical lymphadenopathy  Pulmonary/Chest: clear to auscultation bilaterally- no wheezes, rales or rhonchi, normal air movement, no respiratory distress  Cardiovascular: normal rate, regular rhythm, normal S1 and S2, no murmurs, rubs, clicks, or gallops, distal pulses intact, no carotid bruits  Abdomen: soft, non-tender, non-distended, normal bowel sounds, no masses or organomegaly  Extremities:

## 2024-07-10 ENCOUNTER — CARE COORDINATION (OUTPATIENT)
Dept: CARE COORDINATION | Age: 74
End: 2024-07-10

## 2024-07-10 ENCOUNTER — TELEPHONE (OUTPATIENT)
Dept: CARDIOLOGY CLINIC | Age: 74
End: 2024-07-10

## 2024-07-10 NOTE — CARE COORDINATION
Reviewed:   Patient denies any changes with medications and reports taking all medications as prescribed.    Advance Care Planning:   Referral to internal ACP facilitator     Care Planning:    Goals Addressed                   This Visit's Progress     Conditions and Symptoms   On track     I will schedule office visits, as directed by my provider.  I will keep my appointment or reschedule if I have to cancel.  I will notify my provider of any barriers to my plan of care.  I will follow my Zone Management tool to seek urgent or emergent care.  I will notify my provider of any symptoms that indicate a worsening of my condition.    Barriers: lack of support and overwhelmed by complexity of regimen  Plan for overcoming my barriers: care coordination, remote patient monitoring  Confidence: 9/10  Anticipated Goal Completion Date: 9/13/2024                 PCP/Specialist follow up:   Future Appointments         Provider Specialty Dept Phone    7/11/2024 3:40 PM (Arrive by 3:10 PM) STR [facility] IMAG[facility] [phone]    2/28/2025 10:45 AM Grace Zuleta MD Neurology [phone]    7/10/2025 11:00 AM Pedro Jurado APRN - [facility] [phone]            Follow Up:   Plan for next AC outreach in approximately 2 weeks to complete:  - CC Protocol assessments  - disease specific assessments  - goal progression  - education   - RPM.   Patient  is agreeable to this plan.

## 2024-07-10 NOTE — TELEPHONE ENCOUNTER
See my last note  If PCP thinks he needs it up  To them from my end he complained of clinical orthostasis due to volume depletion

## 2024-07-10 NOTE — TELEPHONE ENCOUNTER
Patient saw Dr. Gunderson on 7/8/2024.  He said he saw PCP yesterday.  He states he received a call from the PCP nurse and they told him he has CHF and should be taking his water pill.   He states per Dr. Gunderson he didn't need to take them and to follow-up PRN.     Pt would like some clarification on this.

## 2024-07-11 ENCOUNTER — HOSPITAL ENCOUNTER (OUTPATIENT)
Dept: CT IMAGING | Age: 74
Discharge: HOME | End: 2024-07-11
Payer: MEDICARE

## 2024-07-11 DIAGNOSIS — Z87.891 PERSONAL HISTORY OF TOBACCO USE: ICD-10-CM

## 2024-07-11 PROCEDURE — 71271 CT THORAX LUNG CANCER SCR C-: CPT

## 2024-07-12 ENCOUNTER — CARE COORDINATION (OUTPATIENT)
Dept: CARE COORDINATION | Age: 74
End: 2024-07-12

## 2024-07-12 NOTE — CARE COORDINATION
Remote Alert Monitoring Note      Date/Time:  2024 8:35 AM  Patient Current Location: Home: [address] [id number]    LPN contacted patient by telephone regarding red alert received for weight increase (220lb). Verified patients name and  as identifiers.    Rpm alert to be reviewed by the provider                         Background: High Blood-Pressure, CHF     Refer to 911 immediately if:  Patient unresponsive or unable to provide history  Change in cognition or sudden confusion  Patient unable to respond in complete sentences  Intense chest pain/tightness  Any concern for any clinical emergency  Red Alert: Provider response time of 1 hr required for any red alert requiring intervention  Yellow Alert: Provider response time of 3hr required for any escalated yellow alert          Have you taken your medications as instructed by your doctor today? Yes   Weight Triage  Are you weighing any different than you did yesterday? (time of day, clothes and shoes on or off, etc)? no   Do you have any shortness of breath? no   Do you have any swelling in your hands of feet? no   Are you having any other health concerns or issues? no       Clinical Interventions: Reviewed and followed up on alerts and treatments-. Pt is asymptomatic and in no apparent distress, speaking in full sentences.  Patient states he is feeling fine and denies any swelling or SOB. He is no longer taking any diuretics and is tolerating well.  He reports his appetite has increased at this time. Patient was seen by PCP 2 days ago and no changes made at that time.  Signs and symptoms of CHF discussed with patient, such as feeling more tired than normal, feeling short of breath, coughing that increases when lying down, sudden weight gain, swelling of the feet, legs or belly.  Patient verbalized understanding to notify physician office if these symptoms occur.    Plan/Follow Up: Will continue to review, monitor and address alerts with

## 2024-07-15 ENCOUNTER — CARE COORDINATION (OUTPATIENT)
Dept: CARE COORDINATION | Age: 74
End: 2024-07-15

## 2024-07-15 NOTE — CARE COORDINATION
Remote Alert Monitoring Note      Date/Time:  7/15/2024 9:58 AM  Patient Current Location: Home: [address] [id number]    LPN contacted patient by telephone regarding red alert received for weight increase (220lb). Verified patients name and  as identifiers.    Rpm alert to be reviewed by the provider                         Background: High Blood-Pressure, CHF     Refer to 911 immediately if:  Patient unresponsive or unable to provide history  Change in cognition or sudden confusion  Patient unable to respond in complete sentences  Intense chest pain/tightness  Any concern for any clinical emergency  Red Alert: Provider response time of 1 hr required for any red alert requiring intervention  Yellow Alert: Provider response time of 3hr required for any escalated yellow alert        Weight Scale Triage  Was your weight obtained upon rising/waking today? yes   Was your weight obtained after voiding and/or use of the bathroom today? yes   Did you weigh yourself in the same amount of clothing today, compared to how you typically do? yes   Was the scale bumped or moved prior to today's weight? no   Is your scale on a flat/hard surface? yes   Did you obtain your weight with shoes on? no   If yes, is this something you normally do during your daily weights? yes   Were you standing up straight on the scale today? yes   Were you leaning on anything while obtaining your weight today? no       Weight Triage  Are you weighing any different than you did yesterday? (time of day, clothes and shoes on or off, etc)? yes   Do you have any shortness of breath? no   Do you have any swelling in your hands of feet? no   Are you having any other health concerns or issues? no       Clinical Interventions: Reviewed and followed up on alerts and treatments-. Pt is asymptomatic and in no apparent distress, speaking in full sentences.  Patient states he did have breakfast prior to weighing this am.  He also reports that

## 2024-07-16 ENCOUNTER — CARE COORDINATION (OUTPATIENT)
Dept: CARE COORDINATION | Age: 74
End: 2024-07-16

## 2024-07-16 RX ORDER — TRAZODONE HYDROCHLORIDE 100 MG/1
100 TABLET ORAL NIGHTLY
Qty: 30 TABLET | Refills: 2 | OUTPATIENT
Start: 2024-07-16

## 2024-07-16 NOTE — CARE COORDINATION
Remote Alert Monitoring Note      Date/Time:  2024 8:55 AM  Patient Current Location: Home: [address] [place] [address]    LPN contacted patient by telephone regarding red alert received for weight increase (224lb). 4lb weight increase overnight. Verified patients name and  as identifiers.    Rpm alert to be reviewed by the provider                         Background: High Blood-Pressure, CHF     Refer to 911 immediately if:  Patient unresponsive or unable to provide history  Change in cognition or sudden confusion  Patient unable to respond in complete sentences  Intense chest pain/tightness  Any concern for any clinical emergency  Red Alert: Provider response time of 1 hr required for any red alert requiring intervention  Yellow Alert: Provider response time of 3hr required for any escalated yellow alert        Weight Scale Triage  Was your weight obtained upon rising/waking today? no   Was your weight obtained after voiding and/or use of the bathroom today? yes   Did you weigh yourself in the same amount of clothing today, compared to how you typically do? no   Was the scale bumped or moved prior to today's weight? no   Is your scale on a flat/hard surface? yes   Did you obtain your weight with shoes on? yes   If yes, is this something you normally do during your daily weights? no   Were you standing up straight on the scale today? yes   Were you leaning on anything while obtaining your weight today? no       Weight Triage  Are you weighing any different than you did yesterday? (time of day, clothes and shoes on or off, etc)? no   Do you have any shortness of breath? no   Do you have any swelling in your hands of feet? no   Are you having any other health concerns or issues? constipation       Clinical Interventions: Reviewed and followed up on alerts and treatments-. Pt is asymptomatic and in no apparent distress, speaking in full sentences.  Patient states he has not had a BM in 2-3 days

## 2024-07-17 RX ORDER — PANTOPRAZOLE SODIUM 40 MG/1
TABLET, DELAYED RELEASE ORAL
Qty: 90 TABLET | Refills: 3 | Status: SHIPPED | OUTPATIENT
Start: 2024-07-17

## 2024-07-23 LAB — NONINV COLON CA DNA+OCC BLD SCRN STL QL: NEGATIVE

## 2024-07-24 ENCOUNTER — CARE COORDINATION (OUTPATIENT)
Dept: CARE COORDINATION | Age: 74
End: 2024-07-24

## 2024-07-24 NOTE — CARE COORDINATION
Ambulatory Care Coordination Note     2024 12:04 PM     Patient Current Location:  Home: [address] [place] [address]     ACM contacted the patient by telephone. Verified name and  with patient as identifiers.         ACM: Lana Vasquez RN     Challenges to be reviewed by the provider   Additional needs identified to be addressed with provider No  none               Method of communication with provider: none.    Care Summary Note:     RPM day 38. Weight fluctuating but states his appetite has improved and eating more. Walking and exercising daily. Denies SOB or leg swelling.   Good medication adherence  Colonoscopy completed and no concerns per patient.     Plan -   CHF zones - report weight gain, leg swelling, SOB  Counseling   RPM CHF and HTN  Early symptom recognition and reporting to prevent ED and admission  Use same or next day appts with PCP office for non-emergency problems       Offered patient enrollment in the Remote Patient Monitoring (RPM) program for in-home monitoring: Yes, patient enrolled; current status is activated and monitoring.     Assessments Completed:   Care Coordination Interventions    Referral from Primary Care Provider: Yes  Suggested Interventions and Community Resources  [facility] Completed (Comment: Susana Koch counselor)  Fall Risk Prevention: In Process  Meals on Wheels: Declined  Transportation Support: Declined  Zone Management Tools: In Process      ,   Congestive Heart Failure Assessment    Are you currently restricting fluids?: 2000cc  Do you understand a low sodium diet?: Yes  Do you understand how to read food labels?: Yes  How many restaurant meals do you eat per week?: 1-2  Do you salt your food before tasting it?: No     No patient-reported symptoms      Symptoms:  None: Yes      Symptom course: stable  Patient-reported weight (lb): 220  Weight trend: stable  Salt intake watch compared to last visit: stable      , and

## 2024-07-26 RX ORDER — TRAZODONE HYDROCHLORIDE 100 MG/1
100 TABLET ORAL NIGHTLY
Qty: 30 TABLET | Refills: 2 | Status: SHIPPED | OUTPATIENT
Start: 2024-07-26

## 2024-07-31 ENCOUNTER — CARE COORDINATION (OUTPATIENT)
Dept: CARE COORDINATION | Age: 74
End: 2024-07-31

## 2024-07-31 NOTE — CARE COORDINATION
Remote Patient Monitoring Note      Date/Time:  7/31/2024 3:09 PM    LPN attempted to contact patient by telephone regarding red alert received for blood pressure reading (126/101) and weight increase (222lb).    Background: High Blood-Pressure, CHF     Clinical Interventions:  No answer, unable to LM.    Plan/Follow Up: Will continue to review, monitor and address alerts with follow up based on severity of symptoms and risk factors.       MING Webb [facility]/ CTN/ Remote Patient Monitoring  [phone]

## 2024-08-07 ENCOUNTER — CARE COORDINATION (OUTPATIENT)
Dept: CARE COORDINATION | Age: 74
End: 2024-08-07

## 2024-08-07 NOTE — CARE COORDINATION
Ambulatory Care Coordination Note     2024 9:44 AM     Patient Current Location:  Home: [address] [place] [address]     Patient contacted the patient by telephone. Verified name and  with patient as identifiers.         ACM: Lana Vasquez RN     Challenges to be reviewed by the provider   Additional needs identified to be addressed with provider No  none               Method of communication with provider: none.    Care Summary Note:   Eye drops helping with burning and watering. Symptoms continue. Patient states he will schedule eye appt.    CHF, HTN, HLD - lasix as needed, K+, metoprolol, atorvastatin, zetia, ASA, plavix  RPM day 52. No alerts.   Walking and exercising daily. Denies SOB or leg swelling.   Good medication adherence     Plan -   CHF zones - report weight gain, leg swelling, SOB  Counseling   RPM CHF and HTN  Early symptom recognition and reporting to prevent ED and admission  Use same or next day appts with PCP office for non-emergency problems     Offered patient enrollment in the Remote Patient Monitoring (RPM) program for in-home monitoring: Yes, patient enrolled; current status is activated and monitoring.     Assessments Completed:   Care Coordination Interventions    Referral from Primary Care Provider: Yes  Suggested Interventions and Community Resources  [facility]: Completed (Comment: Susana Koch counselor)  Fall Risk Prevention: In Process  Meals on Wheels: Declined  Transportation Support: Declined  Zone Management Tools: In Process      ,   Congestive Heart Failure Assessment    Are you currently restricting fluids?: 2000cc  Do you understand a low sodium diet?: Yes  Do you understand how to read food labels?: Yes  How many restaurant meals do you eat per week?: 1-2  Do you salt your food before tasting it?: No     No patient-reported symptoms      Symptoms:  None: Yes      Symptom course: stable  Weight trend: stable  Salt intake watch

## 2024-08-09 ENCOUNTER — CARE COORDINATION (OUTPATIENT)
Dept: CARE COORDINATION | Age: 74
End: 2024-08-09

## 2024-08-09 NOTE — CARE COORDINATION
Remote Alert Monitoring Note      Date/Time:  2024 11:59 AM  Patient Current Location: Home: [address] [place] [address]    LPN contacted patient by telephone regarding red alert received for pulse ox reading (80%) and pulse ox heart rate (149). Verified patients name and  as identifiers.    Rpm alert to be reviewed by the provider                         Background:  High Blood-Pressure, CHF     Refer to 911 immediately if:  Patient unresponsive or unable to provide history  Change in cognition or sudden confusion  Patient unable to respond in complete sentences  Intense chest pain/tightness  Any concern for any clinical emergency  Red Alert: Provider response time of 1 hr required for any red alert requiring intervention  Yellow Alert: Provider response time of 3hr required for any escalated yellow alert        O2 Triage  Are you having any Chest Pain? no   Are you having any Shortness of Breath? no                 Clinical Interventions: Reviewed and followed up on alerts and treatments-. Pt is asymptomatic and in no apparent distress, speaking in full sentences.  Patient states he is feeling fine and had trouble getting the pulse ox to work this am.  He states he will recheck when he returns home.  Will await update.        Plan/Follow Up: Will continue to review, monitor and address alerts with follow up based on severity of symptoms and risk factors.    Leonila Sen LPN  [facility]/ CTN/ Remote Patient monitoring  [phone]

## 2024-08-12 DIAGNOSIS — F41.8 SITUATIONAL ANXIETY: ICD-10-CM

## 2024-08-12 RX ORDER — PANTOPRAZOLE SODIUM 40 MG/1
40 TABLET, DELAYED RELEASE ORAL DAILY
Qty: 90 TABLET | Refills: 2 | Status: SHIPPED | OUTPATIENT
Start: 2024-08-12

## 2024-08-12 RX ORDER — DIAZEPAM 5 MG/1
5 TABLET ORAL 2 TIMES DAILY
Qty: 60 TABLET | Refills: 0 | Status: SHIPPED | OUTPATIENT
Start: 2024-08-12 | End: 2024-09-11

## 2024-08-13 ENCOUNTER — CARE COORDINATION (OUTPATIENT)
Dept: CARE COORDINATION | Age: 74
End: 2024-08-13

## 2024-08-13 NOTE — CARE COORDINATION
SW called pt to follow up with ACP documents. Pt requested I resend because his boy is \"coming out next week.\" SW will send documents again. Advised pt to call once he receives them Pt voiced understanding.

## 2024-08-15 ENCOUNTER — CARE COORDINATION (OUTPATIENT)
Dept: CARE COORDINATION | Age: 74
End: 2024-08-15

## 2024-08-15 NOTE — CARE COORDINATION
Remote Alert Monitoring Note      Date/Time:  8/15/2024 11:18 AM  Patient Current Location: Home: [address] [facility] [address]    LPN contacted patient by telephone regarding red alert received for pulse ox reading (88%). Verified patients name and  as identifiers.    Rpm alert to be reviewed by the provider                         Background:  High Blood-Pressure, CHF     Refer to 911 immediately if:  Patient unresponsive or unable to provide history  Change in cognition or sudden confusion  Patient unable to respond in complete sentences  Intense chest pain/tightness  Any concern for any clinical emergency  Red Alert: Provider response time of 1 hr required for any red alert requiring intervention  Yellow Alert: Provider response time of 3hr required for any escalated yellow alert        O2 Triage  Are you having any Chest Pain? no   Are you having any Shortness of Breath? no             Clinical Interventions: Reviewed and followed up on alerts and treatments-. Pt is asymptomatic and in no apparent distress, speaking in full sentences.  Patient states he is feeling fine, but hands are cold.  Patient advised to warm hands under warm water prior to rechecking.  He is agreeable, will await update.        Plan/Follow Up: Will continue to review, monitor and address alerts with follow up based on severity of symptoms and risk factors.    Leonila Sen LPN  [facility]/ CTN/ Remote Patient monitoring  [phone]

## 2024-08-15 NOTE — CARE COORDINATION
Remote Patient Monitoring Note      Date/Time:  8/15/2024 10:50 AM    LPN attempted to contact patient by telephone regarding red alert received for pulse ox reading (88%).    Background: High Blood-Pressure, CHF     Clinical Interventions:  HIPAA compliant message left on  requesting a return call.    Plan/Follow Up: Will continue to review, monitor and address alerts with follow up based on severity of symptoms and risk factors.       MING Webb [facility]/ CTN/ Remote Patient Monitoring  [phone]

## 2024-08-16 ENCOUNTER — CARE COORDINATION (OUTPATIENT)
Dept: CARE COORDINATION | Age: 74
End: 2024-08-16

## 2024-08-19 ENCOUNTER — CARE COORDINATION (OUTPATIENT)
Dept: CARE COORDINATION | Age: 74
End: 2024-08-19

## 2024-08-19 NOTE — TELEPHONE ENCOUNTER
Patient called. Says he is stable and doesn't need appt at this time. Advised patient to call if needed.

## 2024-08-19 NOTE — CARE COORDINATION
Remote Alert Monitoring Note      Date/Time:  2024 9:11 AM  Patient Current Location: Home: [address] [facility] [address]    LPN contacted patient by telephone regarding red alert received for weight increase (226.5lb). Verified patients name and  as identifiers.    Rpm alert to be reviewed by the provider    LASHONDA   Patient states he was working out yesterday outside and felt dizzy.  He states he sat down for a while and when he got back up he fell, hitting his head (temporal area) knee and ellbow.  He states he has significant bruising around the eye. Patient is currently taking Plavix daily.  He does not feel the need to get checked out. Dangers of head injury and blood thinners discussed with patient.  Please reach out to patient if you wish to see him.                   Background:  High Blood-Pressure, CHF     Refer to 911 immediately if:  Patient unresponsive or unable to provide history  Change in cognition or sudden confusion  Patient unable to respond in complete sentences  Intense chest pain/tightness  Any concern for any clinical emergency  Red Alert: Provider response time of 1 hr required for any red alert requiring intervention  Yellow Alert: Provider response time of 3hr required for any escalated yellow alert          Have you taken your medications as instructed by your doctor today? Yes   Weight Triage  Are you weighing any different than you did yesterday? (time of day, clothes and shoes on or off, etc)? yes   Do you have any shortness of breath? no   Do you have any swelling in your hands of feet? no   Are you having any other health concerns or issues? yes       Clinical Interventions: Reviewed and followed up on alerts and treatments-. Pt is in no apparent distress, speaking in full sentences.  Patient states he was completely dressed and had breakfast this am prior to weighing. Education provided to patient that weights should be taken upon rising after using the restroom for

## 2024-08-19 NOTE — TELEPHONE ENCOUNTER
Noted.  Thank you.  Could we possibly call patient see how he is doing today no need for appointment at this time unless patient feels it is necessary.

## 2024-08-27 ENCOUNTER — CARE COORDINATION (OUTPATIENT)
Dept: CARE COORDINATION | Age: 74
End: 2024-08-27

## 2024-08-28 ENCOUNTER — CARE COORDINATION (OUTPATIENT)
Dept: CARE COORDINATION | Age: 74
End: 2024-08-28

## 2024-08-28 NOTE — CARE COORDINATION
Ambulatory Care Coordination Note     2024 10:22 AM     Patient Current Location:  Home: [address] [place] [address]     ACM contacted the patient by telephone. Verified name and  with patient as identifiers.         ACM: Lana Vasquez RN     Challenges to be reviewed by the provider   Additional needs identified to be addressed with provider No  none               Method of communication with provider: none.    Care Summary Note:   Eye drops helping with burning and watering. Symptoms continue. Patient states he will schedule eye appt.    CHF, HTN, HLD - lasix as needed, K+, metoprolol, atorvastatin, zetia, ASA, plavix  RPM day 73. No alerts. /68. States not sending BP readings since last week. Will send my chart message with IT number per patient request for son to help.   Walking and exercising daily. Denies SOB or leg swelling. He did fall recently after exercising. States he was overheated and lost his balance.   Good medication adherence    Plan -    PCP requested by pt \"wants to talk to Pedro about symptoms\"  CHF zones - report weight gain, leg swelling, SOB  Counseling   RPM CHF and HTN  Early symptom recognition and reporting to prevent ED and admission  Use same or next day appts with PCP office for non-emergency problems     Offered patient enrollment in the Remote Patient Monitoring (RPM) program for in-home monitoring: Yes, patient enrolled; current status is activated and monitoring.     Assessments Completed:   Care Coordination Interventions    Referral from Primary Care Provider: Yes  Suggested Interventions and Community Resources  [facility]: Completed (Comment: Susana Koch counselor)  Fall Risk Prevention: In Process  Meals on Wheels: Declined  Transportation Support: Declined  Zone Management Tools: In Process      ,   Congestive Heart Failure Assessment    Are you currently restricting fluids?: 2000cc  Do you understand a low sodium

## 2024-08-29 ENCOUNTER — TELEPHONE (OUTPATIENT)
Dept: CARDIOLOGY CLINIC | Age: 74
End: 2024-08-29

## 2024-08-29 ENCOUNTER — OFFICE VISIT (OUTPATIENT)
Dept: FAMILY MEDICINE CLINIC | Age: 74
End: 2024-08-29
Payer: MEDICARE

## 2024-08-29 VITALS
BODY MASS INDEX: 28.76 KG/M2 | SYSTOLIC BLOOD PRESSURE: 110 MMHG | TEMPERATURE: 98.3 F | DIASTOLIC BLOOD PRESSURE: 62 MMHG | WEIGHT: 218 LBS | OXYGEN SATURATION: 98 % | HEART RATE: 68 BPM

## 2024-08-29 DIAGNOSIS — Z95.0 S/P PLACEMENT OF CARDIAC PACEMAKER: ICD-10-CM

## 2024-08-29 DIAGNOSIS — R26.81 UNSTEADY GAIT: ICD-10-CM

## 2024-08-29 DIAGNOSIS — Z86.73 HISTORY OF STROKE: ICD-10-CM

## 2024-08-29 DIAGNOSIS — F41.8 SITUATIONAL ANXIETY: Primary | ICD-10-CM

## 2024-08-29 DIAGNOSIS — R07.9 CHEST PAIN, UNSPECIFIED TYPE: ICD-10-CM

## 2024-08-29 DIAGNOSIS — T07.XXXA MULTIPLE ABRASIONS: ICD-10-CM

## 2024-08-29 DIAGNOSIS — F41.0 PANIC DISORDER: ICD-10-CM

## 2024-08-29 DIAGNOSIS — R42 DIZZINESS: ICD-10-CM
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
## 2024-08-29 DIAGNOSIS — F51.01 PRIMARY INSOMNIA: ICD-10-CM
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
## 2024-08-29 DIAGNOSIS — R07.89 LEFT-SIDED CHEST WALL PAIN: ICD-10-CM

## 2024-08-29 DIAGNOSIS — Z87.891 PERSONAL HISTORY OF TOBACCO USE: ICD-10-CM

## 2024-08-29 DIAGNOSIS — F10.11 HISTORY OF ETOH ABUSE: ICD-10-CM

## 2024-08-29 DIAGNOSIS — Z86.73 HISTORY OF CVA IN ADULTHOOD: ICD-10-CM

## 2024-08-29 PROCEDURE — 4004F PT TOBACCO SCREEN RCVD TLK: CPT | Performed by: NURSE PRACTITIONER

## 2024-08-29 PROCEDURE — G8427 DOCREV CUR MEDS BY ELIG CLIN: HCPCS | Performed by: NURSE PRACTITIONER

## 2024-08-29 PROCEDURE — 99214 OFFICE O/P EST MOD 30 MIN: CPT | Performed by: NURSE PRACTITIONER

## 2024-08-29 PROCEDURE — 1123F ACP DISCUSS/DSCN MKR DOCD: CPT | Performed by: NURSE PRACTITIONER

## 2024-08-29 PROCEDURE — 3078F DIAST BP <80 MM HG: CPT | Performed by: NURSE PRACTITIONER

## 2024-08-29 PROCEDURE — 3074F SYST BP LT 130 MM HG: CPT | Performed by: NURSE PRACTITIONER

## 2024-08-29 PROCEDURE — 3017F COLORECTAL CA SCREEN DOC REV: CPT | Performed by: NURSE PRACTITIONER

## 2024-08-29 PROCEDURE — G8417 CALC BMI ABV UP PARAM F/U: HCPCS | Performed by: NURSE PRACTITIONER

## 2024-08-29 RX ORDER — TRAZODONE HYDROCHLORIDE 100 MG/1
100 TABLET ORAL NIGHTLY
Qty: 30 TABLET | Refills: 2 | Status: SHIPPED | OUTPATIENT
Start: 2024-08-29

## 2024-08-29 ASSESSMENT — ENCOUNTER SYMPTOMS
BACK PAIN: 0
SHORTNESS OF BREATH: 0
WHEEZING: 0
ABDOMINAL PAIN: 0
ABDOMINAL DISTENTION: 0
CHEST TIGHTNESS: 0
COUGH: 0

## 2024-08-29 NOTE — TELEPHONE ENCOUNTER
Pt called stating he has been shocked x2 and wanted his device checked. Pt informed that he has never followed with our clinic and we are unable to check his device if he belongs to another clinic. Pt stated he thinks he sees [facility] device clinic and will call their office. Pt informed they should be aware of shocks if they follow his carelink. Offered pt [facility] device clinic number to which he declined needing. Pt does have an OV with Maurizio Moss PA-C on 9-10-24.

## 2024-08-29 NOTE — PROGRESS NOTES
[facility]  [facility]  [address] [address]  Dept: [phone]  Dept Fax: [phone]  Loc: [phone]  PROGRESS NOTE      VisitDate: 8/29/2024    Manuel Lay is a 73 y.o. male who presents today for:     Chief Complaint   Patient presents with    Fall     Fell last week injured right leg did not get evaluated but does have some laceration on his right leg. He states his balance is not that great.    Anxiety     Yesterday while waiting on his friend getting out from getting a stent placed at the hospital he had a moment of anxiety and had to leave.          Subjective:  Patient report to a fall at home last week reports he tripped over a step.  Sustained some abrasions on his right knee and lower leg right forearm right side of his face.  No loss of consciousness.  Patient also complains of left chest wall pain with extension of left arm since fall.  Requesting consult with cardiology and neurology.  Denies any shortness of breath.  Patient does report an episode of panic while waiting on a friend in the hospital ,remedied with Valium as needed.  Patient requesting refill of his trazodone.  Patient reports that his balance stable.  Ambulation with cane assist..  Mood stable.          Review of Systems   Constitutional:  Negative for activity change, appetite change, chills, fatigue and fever.   Eyes:  Negative for visual disturbance.   Respiratory:  Negative for cough, chest tightness, shortness of breath and wheezing.    Cardiovascular:  Negative for chest pain, palpitations and leg swelling.   Gastrointestinal:  Negative for abdominal distention and abdominal pain.   Genitourinary:  Negative for dysuria.   Musculoskeletal:  Negative for arthralgias, back pain and neck pain.   Skin: Negative.  Negative for rash.   Neurological:  Negative for dizziness, light-headedness and headaches.   Hematological:  Negative for adenopathy.  Left side of head: No submental, submandibular, tonsillar, preauricular, posterior auricular or occipital adenopathy.      Cervical: No cervical adenopathy.      Right cervical: No superficial, deep or posterior cervical adenopathy.     Left cervical: No superficial, deep or posterior cervical adenopathy.      Upper Body:      Right upper body: No supraclavicular or pectoral adenopathy.      Left upper body: No supraclavicular or pectoral adenopathy.   Skin:     General: Skin is warm and dry.      Coloration: Skin is not pale.      Findings: No bruising, ecchymosis, laceration, lesion or rash.      Nails: There is no clubbing.             Comments: Multiple scabbed superficial abrasions noted to right lower extremity knee leg elbow forearm right cheek.   Neurological:      Mental Status: He is alert and oriented to person, place, and time.      Cranial Nerves: No cranial nerve deficit.      Motor: Weakness present. No abnormal muscle tone.      Coordination: Coordination abnormal.      Deep Tendon Reflexes: Reflexes normal.      Comments: Shuffled unsteady gait noted with ambulation   Psychiatric:         Speech: Speech normal.         Behavior: Behavior normal.         Thought Content: Thought content normal.         Judgment: Judgment normal.       /62   Pulse 68   Temp 98.3 °F (36.8 °C) (Oral)   Wt 98.9 kg (218 lb)   SpO2 98%   BMI 28.76 kg/m²       Impression/Plan:  1. Situational anxiety    2. History of CVA in adulthood    3. Personal history of tobacco use    4. History of ETOH abuse    5. Primary insomnia    6. S/P placement of cardiac pacemaker    7. Panic disorder    8. Unsteady gait    9. Dizziness    10. History of stroke    11. Chest pain, unspecified type    12. Left-sided chest wall pain    13. Fall in home, initial encounter    14. Multiple abrasions      Requested Prescriptions     Signed Prescriptions Disp Refills    traZODone (DESYREL) 100 MG tablet 30 tablet 2     Sig: Take 1 tablet by

## 2024-08-29 NOTE — CARE COORDINATION
SW called to follow up with ACP documents that were resent to pt. Pt stated he did receive and will complete with his son. Advised pt to call with any questions or assistance with completing. Pt voiced understanding Pt has SW contact #. Will follow up in a couple weeks.

## 2024-08-30 ENCOUNTER — CARE COORDINATION (OUTPATIENT)
Dept: CARE COORDINATION | Age: 74
End: 2024-08-30

## 2024-08-30 DIAGNOSIS — Z86.73 HISTORY OF STROKE: ICD-10-CM

## 2024-08-30 RX ORDER — CLOPIDOGREL BISULFATE 75 MG/1
75 TABLET ORAL DAILY
Qty: 30 TABLET | Refills: 11 | Status: SHIPPED | OUTPATIENT
Start: 2024-08-30

## 2024-08-30 NOTE — CARE COORDINATION
Remote Alert Monitoring Note      Date/Time:  2024 8:54 AM  Patient Current Location: Home: [address] [address]    LPN contacted patient by telephone regarding red alert received for weight increase (222lb). Verified patients name and  as identifiers.    Rpm alert to be reviewed by the provider                         Background: High Blood-Pressure, CHF     Refer to 911 immediately if:  Patient unresponsive or unable to provide history  Change in cognition or sudden confusion  Patient unable to respond in complete sentences  Intense chest pain/tightness  Any concern for any clinical emergency  Red Alert: Provider response time of 1 hr required for any red alert requiring intervention  Yellow Alert: Provider response time of 3hr required for any escalated yellow alert        Weight Scale Triage  Was your weight obtained upon rising/waking today? no   Was your weight obtained after voiding and/or use of the bathroom today? yes   Did you weigh yourself in the same amount of clothing today, compared to how you typically do? no   Was the scale bumped or moved prior to today's weight? no   Is your scale on a flat/hard surface? yes   Did you obtain your weight with shoes on? yes   If yes, is this something you normally do during your daily weights? no   Were you standing up straight on the scale today? yes   Were you leaning on anything while obtaining your weight today? no     Have you taken your medications as instructed by your doctor today? Yes   Weight Triage  Are you weighing any different than you did yesterday? (time of day, clothes and shoes on or off, etc)? yes   Do you have any shortness of breath? no   Do you have any swelling in your hands of feet? no   Are you having any other health concerns or issues? no       Clinical Interventions: Reviewed and followed up on alerts and treatments-. Pt is asymptomatic and in no apparent distress, speaking in full sentences.  Patient states he

## 2024-08-30 NOTE — TELEPHONE ENCOUNTER
Patient requesting refill of plavix to be sent to [facility]. Was previously going to [facility] which closed.   Please approve or deny   Last Visit Date:  2/23/2024     Next Visit Date:    2/28/2025       2. Patient reports having a fall last week while exercising. Patient felt dizzy and lost balance. Patient admits to hitting his head on concrete. Patient did not go to be evaluated after fall. Reports to having some \"head pressure\" after fall which has since resolved. Reports no symptoms as of today.  Patient is requesting to have a sooner appointment with  to be evaluated since fall.   Please advise.

## 2024-09-03 ENCOUNTER — CARE COORDINATION (OUTPATIENT)
Dept: CARE COORDINATION | Age: 74
End: 2024-09-03

## 2024-09-03 NOTE — CARE COORDINATION
He denies any swelling or SOB. Patient agreeable to continue to limit high sodium foods.      Signs and symptoms of CHF discussed with patient, such as feeling more tired than normal, feeling short of breath, coughing that increases when lying down, sudden weight gain, swelling of the feet, legs or belly.  Patient verbalized understanding to notify physician office if these symptoms occur.    Plan/Follow Up: Will continue to review, monitor and address alerts with follow up based on severity of symptoms and risk factors.    Leonila Sen LPN  [facility]/ CTN/ Remote Patient monitoring  [phone]

## 2024-09-04 ENCOUNTER — CARE COORDINATION (OUTPATIENT)
Dept: CARE COORDINATION | Age: 74
End: 2024-09-04

## 2024-09-04 NOTE — TELEPHONE ENCOUNTER
Spoke with patient regarding RPM alert.  Weight up 3.5 lbs from yesterday. States he has been eating more at night. Denies swelling or SOB. Staying active daily with exercise and house repairs. Advised to report SOB or swelling ASAP.

## 2024-09-04 NOTE — CARE COORDINATION
Remote Patient Monitoring Note      Date/Time:  9/4/2024 8:42 AM    LPN attempted to contact patient by telephone regarding red alert received for weight increase (225.5lb).     Background: High Blood-Pressure, CHF     Clinical Interventions:   HIPAA compliant message left on  requesting a return call.    Plan/Follow Up: Will continue to review, monitor and address alerts with follow up based on severity of symptoms and risk factors.       MING Webb [facility]/ CTN/ Remote Patient Monitoring  [phone]

## 2024-09-04 NOTE — CARE COORDINATION
Wheels: Declined  Transportation Support: Declined  Zone Management Tools: In Process      ,   Congestive Heart Failure Assessment    Are you currently restricting fluids?: 2000cc  Do you understand a low sodium diet?: Yes  Do you understand how to read food labels?: Yes  How many restaurant meals do you eat per week?: 1-2  Do you salt your food before tasting it?: No     No patient-reported symptoms      Symptoms:  None: Yes      Symptom course: stable  Patient-reported weight (lb): 225.5  Weight trend: stable  Salt intake watch compared to last visit: stable      ,   Hypertension - Encounter Level    Symptom course: stable          Medications Reviewed:   Patient denies any changes with medications and reports taking all medications as prescribed.    Advance Care Planning:   Referral to internal ACP facilitator     Care Planning:    Goals Addressed                   This Visit's Progress     Conditions and Symptoms   On track     I will schedule office visits, as directed by my provider.  I will keep my appointment or reschedule if I have to cancel.  I will notify my provider of any barriers to my plan of care.  I will follow my Zone Management tool to seek urgent or emergent care.  I will notify my provider of any symptoms that indicate a worsening of my condition.    Barriers: lack of support and overwhelmed by complexity of regimen  Plan for overcoming my barriers: care coordination, remote patient monitoring  Confidence: 9/10  Anticipated Goal Completion Date: 9/13/2024                 PCP/Specialist follow up:   Future Appointments         Provider Specialty Dept Phone    9/10/2024 10:45 AM Maurizio Moss PA-C Cardiology [phone]    2/28/2025 10:45 AM Grace Zuleta MD Neurology [phone]    7/10/2025 11:00 AM Pedro Jurado APRN - [facility] Family [facility] [phone]            Follow Up:   Plan for next AC outreach in approximately 3 weeks to complete:  - CC Protocol assessments  - disease

## 2024-09-04 NOTE — CARE COORDINATION
Remote Patient Monitoring Note    2nd Attempt  Date/Time:  9/4/2024 9:36 AM    LPN attempted to contact patient by telephone regarding red alert received for weight increase (225.5lb).      Background: High Blood-Pressure, CHF      Clinical Interventions:   HIPAA compliant message left on VM of patient and EC requesting a return call.  ACM informed.     Plan/Follow Up: Will continue to review, monitor and address alerts with follow up based on severity of symptoms and risk factors.       Leonila Sen LPN  [facility]/ CTN/ Remote Patient Monitoring  [phone]

## 2024-09-06 ENCOUNTER — CARE COORDINATION (OUTPATIENT)
Dept: CARE COORDINATION | Age: 74
End: 2024-09-06

## 2024-09-06 NOTE — CARE COORDINATION
with how much he is eating. He states his appetite has been much better so  he tends to consume more food on occasion.  He denies any swelling or SOB.      Signs and symptoms of CHF discussed with patient, such as feeling more tired than normal, feeling short of breath, coughing that increases when lying down, sudden weight gain, swelling of the feet, legs or belly.  Patient verbalized understanding to notify physician office if these symptoms occur.    Plan/Follow Up: Will continue to review, monitor and address alerts with follow up based on severity of symptoms and risk factors.    MING Webb [facility]/ CTN/ Remote Patient monitoring  [phone]

## 2024-09-10 ENCOUNTER — NURSE ONLY (OUTPATIENT)
Dept: CARDIOLOGY CLINIC | Age: 74
End: 2024-09-10
Payer: MEDICARE

## 2024-09-10 ENCOUNTER — PROCEDURE VISIT (OUTPATIENT)
Dept: CARDIOLOGY CLINIC | Age: 74
End: 2024-09-10

## 2024-09-10 ENCOUNTER — OFFICE VISIT (OUTPATIENT)
Dept: CARDIOLOGY CLINIC | Age: 74
End: 2024-09-10
Payer: MEDICARE

## 2024-09-10 ENCOUNTER — CARE COORDINATION (OUTPATIENT)
Dept: CARE COORDINATION | Age: 74
End: 2024-09-10

## 2024-09-10 VITALS
DIASTOLIC BLOOD PRESSURE: 48 MMHG | BODY MASS INDEX: 30.1 KG/M2 | HEART RATE: 80 BPM | WEIGHT: 227.13 LBS | SYSTOLIC BLOOD PRESSURE: 110 MMHG | HEIGHT: 73 IN

## 2024-09-10 DIAGNOSIS — Z98.890 HISTORY OF RIGHT COMMON CAROTID ARTERY STENT PLACEMENT: ICD-10-CM

## 2024-09-10 DIAGNOSIS — Z95.0 PACEMAKER: Primary | ICD-10-CM

## 2024-09-10 DIAGNOSIS — I50.32 CHRONIC DIASTOLIC CHF (CONGESTIVE HEART FAILURE) (HCC): Primary | ICD-10-CM

## 2024-09-10 DIAGNOSIS — Z95.828 HISTORY OF RIGHT COMMON CAROTID ARTERY STENT PLACEMENT: ICD-10-CM

## 2024-09-10 DIAGNOSIS — I10 ESSENTIAL HYPERTENSION, MALIGNANT: ICD-10-CM

## 2024-09-10 DIAGNOSIS — Z95.0 PACEMAKER: ICD-10-CM

## 2024-09-10 PROCEDURE — 99214 OFFICE O/P EST MOD 30 MIN: CPT | Performed by: PHYSICIAN ASSISTANT

## 2024-09-10 PROCEDURE — 3074F SYST BP LT 130 MM HG: CPT | Performed by: PHYSICIAN ASSISTANT

## 2024-09-10 PROCEDURE — 1123F ACP DISCUSS/DSCN MKR DOCD: CPT | Performed by: PHYSICIAN ASSISTANT

## 2024-09-10 PROCEDURE — 3078F DIAST BP <80 MM HG: CPT | Performed by: PHYSICIAN ASSISTANT

## 2024-09-10 PROCEDURE — 93280 PM DEVICE PROGR EVAL DUAL: CPT | Performed by: INTERNAL MEDICINE

## 2024-09-12 RX ORDER — TRAZODONE HYDROCHLORIDE 100 MG/1
100 TABLET ORAL NIGHTLY
Qty: 30 TABLET | Refills: 2 | Status: SHIPPED | OUTPATIENT
Start: 2024-09-12

## 2024-09-17 ENCOUNTER — CARE COORDINATION (OUTPATIENT)
Dept: CARE COORDINATION | Age: 74
End: 2024-09-17

## 2024-09-17 DIAGNOSIS — F41.8 SITUATIONAL ANXIETY: ICD-10-CM

## 2024-09-17 DIAGNOSIS — Z86.73 HISTORY OF STROKE: ICD-10-CM

## 2024-09-17 RX ORDER — CLOPIDOGREL BISULFATE 75 MG/1
75 TABLET ORAL DAILY
Qty: 30 TABLET | Refills: 11 | OUTPATIENT
Start: 2024-09-17

## 2024-09-18 RX ORDER — DIAZEPAM 5 MG
5 TABLET ORAL 2 TIMES DAILY
Qty: 60 TABLET | Refills: 0 | Status: SHIPPED | OUTPATIENT
Start: 2024-09-18 | End: 2024-10-18

## 2024-09-18 RX ORDER — ESCITALOPRAM OXALATE 10 MG/1
10 TABLET ORAL DAILY
Qty: 90 TABLET | Refills: 3 | Status: SHIPPED | OUTPATIENT
Start: 2024-09-18

## 2024-09-18 RX ORDER — ATORVASTATIN CALCIUM 80 MG/1
80 TABLET, FILM COATED ORAL NIGHTLY
Qty: 90 TABLET | Refills: 3 | Status: SHIPPED | OUTPATIENT
Start: 2024-09-18

## 2024-09-18 RX ORDER — PANTOPRAZOLE SODIUM 40 MG/1
40 TABLET, DELAYED RELEASE ORAL DAILY
Qty: 90 TABLET | Refills: 2 | Status: SHIPPED | OUTPATIENT
Start: 2024-09-18

## 2024-09-24 ENCOUNTER — CARE COORDINATION (OUTPATIENT)
Dept: CARE COORDINATION | Age: 74
End: 2024-09-24

## 2024-10-08 ENCOUNTER — CARE COORDINATION (OUTPATIENT)
Dept: CARE COORDINATION | Age: 74
End: 2024-10-08

## 2024-10-08 NOTE — CARE COORDINATION
Remote Alert Monitoring Note      Date/Time:  10/8/2024 9:44 AM  Patient Current Location: Home: [address] [place] [address]    LPN contacted patient by telephone regarding red alert received for weight increase (230.5lb). 3.5lb weight gain overnight. Verified patients name and  as identifiers.    Rpm alert to be reviewed by the provider                         Background:  High Blood-Pressure, CHF     Refer to 911 immediately if:  Patient unresponsive or unable to provide history  Change in cognition or sudden confusion  Patient unable to respond in complete sentences  Intense chest pain/tightness  Any concern for any clinical emergency  Red Alert: Provider response time of 1 hr required for any red alert requiring intervention  Yellow Alert: Provider response time of 3hr required for any escalated yellow alert        Weight Weight Scale Triage  Was your weight obtained upon rising/waking today? no   Was your weight obtained after voiding and/or use of the bathroom today? yes   Did you weigh yourself in the same amount of clothing today, compared to how you typically do? no   Was the scale bumped or moved prior to today's weight? no   Is your scale on a flat/hard surface? yes   Did you obtain your weight with shoes on? yes   If yes, is this something you normally do during your daily weights? no   Were you standing up straight on the scale today? yes   Were you leaning on anything while obtaining your weight today? no     Have you taken your medications as instructed by your doctor today? Yes   Weight Triage  Are you weighing any different than you did yesterday? (time of day, clothes and shoes on or off, etc)? yes   Do you have any shortness of breath? no   Do you have any swelling in your hands of feet? Mild right foot swelling resolving   Are you having any other health concerns or issues? no       Clinical Interventions: Reviewed and followed up on alerts and treatments-. Pt is in no apparent

## 2024-10-17 ENCOUNTER — CARE COORDINATION (OUTPATIENT)
Dept: CARE COORDINATION | Age: 74
End: 2024-10-17

## 2024-10-17 NOTE — CARE COORDINATION
Ambulatory Care Coordination Note     10/17/2024 9:54 AM     ACM outreach attempt by this ACM today to perform care management follow up . ACM was unable to reach the patient by telephone today; left voice message requesting a return phone call to this ACM.     ACM: Lana Vasquez, RN     Care Summary Note:      Plan -   CHF zones - report weight gain, leg swelling, SOB  Counseling   RPM CHF and HTN  Early symptom recognition and reporting to prevent ED and admission  Use same or next day appts with PCP office for non-emergency problems       PCP/Specialist follow up:   Future Appointments         Provider Specialty Dept Phone    12/10/2024 11:00 AM Maurizio Moss PA-C Cardiology [phone]    1/20/2025 9:40 AM Pedro Jurado APRN - CNP Family Medicine [phone]    2/28/2025 10:45 AM Grace Zuleta MD Neurology [phone]    7/10/2025 11:00 AM Pedro Jurado APRN - CNP Family Medicine [phone]    9/16/2025 11:00 AM SCHEDULE, SRPMIRACLE PACER NURSE Cardiology [phone]            Follow Up:   Plan for next ACM outreach in approximately 1 week to complete:  - disease specific assessments  - goal progression  - education   - RPM.

## 2024-10-18 ENCOUNTER — OFFICE VISIT (OUTPATIENT)
Dept: FAMILY MEDICINE CLINIC | Age: 74
End: 2024-10-18

## 2024-10-18 VITALS
SYSTOLIC BLOOD PRESSURE: 118 MMHG | TEMPERATURE: 97.9 F | BODY MASS INDEX: 29.74 KG/M2 | DIASTOLIC BLOOD PRESSURE: 70 MMHG | HEART RATE: 86 BPM | OXYGEN SATURATION: 94 % | WEIGHT: 225.4 LBS

## 2024-10-18 DIAGNOSIS — F41.8 SITUATIONAL ANXIETY: ICD-10-CM

## 2024-10-18 DIAGNOSIS — M54.31 RIGHT SIDED SCIATICA: ICD-10-CM

## 2024-10-18 DIAGNOSIS — Z95.0 S/P PLACEMENT OF CARDIAC PACEMAKER: ICD-10-CM

## 2024-10-18 DIAGNOSIS — F41.0 PANIC DISORDER: ICD-10-CM

## 2024-10-18 DIAGNOSIS — F51.01 PRIMARY INSOMNIA: ICD-10-CM

## 2024-10-18 DIAGNOSIS — F10.11 HISTORY OF ETOH ABUSE: Primary | ICD-10-CM

## 2024-10-18 DIAGNOSIS — Z87.891 PERSONAL HISTORY OF TOBACCO USE: ICD-10-CM

## 2024-10-18 DIAGNOSIS — F33.0 MILD EPISODE OF RECURRENT MAJOR DEPRESSIVE DISORDER (HCC): ICD-10-CM

## 2024-10-18 RX ORDER — METHYLPREDNISOLONE ACETATE 80 MG/ML
120 INJECTION, SUSPENSION INTRA-ARTICULAR; INTRALESIONAL; INTRAMUSCULAR; SOFT TISSUE ONCE
Status: COMPLETED | OUTPATIENT
Start: 2024-10-18 | End: 2024-10-18

## 2024-10-18 RX ORDER — DIAZEPAM 5 MG
5 TABLET ORAL 2 TIMES DAILY
Qty: 60 TABLET | Refills: 0 | Status: SHIPPED | OUTPATIENT
Start: 2024-10-18 | End: 2024-11-17

## 2024-10-18 RX ADMIN — METHYLPREDNISOLONE ACETATE 120 MG: 80 INJECTION, SUSPENSION INTRA-ARTICULAR; INTRALESIONAL; INTRAMUSCULAR; SOFT TISSUE at 09:46

## 2024-10-18 ASSESSMENT — ENCOUNTER SYMPTOMS
COUGH: 0
WHEEZING: 0
CHEST TIGHTNESS: 0
BACK PAIN: 1
ABDOMINAL DISTENTION: 0
SHORTNESS OF BREATH: 0
ABDOMINAL PAIN: 0

## 2024-10-18 NOTE — PROGRESS NOTES
[facility]  [facility]  [facility]  [address] [address]  Dept: [phone]  Dept Fax: [phone]  Loc: [phone]  PROGRESS NOTE      VisitDate: 10/18/2024    Manuel Lay is a 73 y.o. male who presents today for:     Chief Complaint   Patient presents with    Annual Exam     Patient states no exams     Flu Vaccine     declines    Medication Refill         Subjective:  Routine 3-month refill of Valium.  History osteoarthritis CVA anxiety depression hypertension EtOH abuse tobacco use.  Insomnia.  Patient no longer smokes.  Declines any alcohol use.  Patient does complain of some right lower back pain with radiation into leg hip.  Denies any recent falls headaches chest pain shortness of breath abdominal pain numbness rash or edema.          Review of Systems   Constitutional:  Negative for activity change, appetite change, chills, fatigue and fever.   Eyes:  Negative for visual disturbance.   Respiratory:  Negative for cough, chest tightness, shortness of breath and wheezing.    Cardiovascular:  Negative for chest pain, palpitations and leg swelling.   Gastrointestinal:  Negative for abdominal distention and abdominal pain.   Genitourinary:  Negative for dysuria.   Musculoskeletal:  Positive for arthralgias and back pain. Negative for neck pain.   Skin: Negative.  Negative for rash.   Neurological:  Negative for dizziness, light-headedness and headaches.   Hematological:  Negative for adenopathy.   Psychiatric/Behavioral:  Positive for decreased concentration, dysphoric mood and sleep disturbance. The patient is nervous/anxious.      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

## 2024-10-18 NOTE — PROGRESS NOTES
Administrations This Visit       methylPREDNISolone acetate (DEPO-MEDROL) injection 120 mg       Admin Date  10/18/2024  09:46 Action  Given Dose  120 mg Route  IntraMUSCular Site  Dorsogluteal Right Documented By  Sofía Vizcaino    NDC: [address]-4    Lot#: [id number]    : Bevvy    Patient Supplied?: No

## 2024-10-21 ENCOUNTER — CARE COORDINATION (OUTPATIENT)
Dept: CARE COORDINATION | Age: 74
End: 2024-10-21

## 2024-10-21 NOTE — CARE COORDINATION
Remote Alert Monitoring Note      Date/Time:  10/21/2024 1:40 PM  Patient Current Location: Home: [address] [facility] [address]    LPN contacted patient by telephone regarding red alert received for weight increase (224.5lb). Verified patients name and  as identifiers.    Rpm alert to be reviewed by the provider                         Background:  High Blood-Pressure, CHF     Refer to 911 immediately if:  Patient unresponsive or unable to provide history  Change in cognition or sudden confusion  Patient unable to respond in complete sentences  Intense chest pain/tightness  Any concern for any clinical emergency  Red Alert: Provider response time of 1 hr required for any red alert requiring intervention  Yellow Alert: Provider response time of 3hr required for any escalated yellow alert          Have you taken your medications as instructed by your doctor today? Yes   Weight Triage  Are you weighing any different than you did yesterday? (time of day, clothes and shoes on or off, etc)? no   Do you have any shortness of breath? no   Do you have any swelling in your hands of feet? no   Are you having any other health concerns or issues? no       Clinical Interventions: Reviewed and followed up on alerts and treatments-. Pt is asymptomatic and in no apparent distress, speaking in full sentences.  Patients weight is baseline for him, he did have an abnormally low weight recently causing the alert.       Signs and symptoms of CHF discussed with patient, such as feeling more tired than normal, feeling short of breath, coughing that increases when lying down, sudden weight gain, swelling of the feet, legs or belly.  Patient verbalized understanding to notify physician office if these symptoms occur.  Weight removed.    Plan/Follow Up: Will continue to review, monitor and address alerts with follow up based on severity of symptoms and risk factors.    MING Webb [facility]/ CTN/ Remote

## 2024-10-21 NOTE — CARE COORDINATION
Remote Patient Monitoring Note      Date/Time:  10/21/2024 10:51 AM    LPN attempted to contact patient by telephone regarding red alert received for weight increase (224.5lb).  Weight is baseline for patient.    Background:  High Blood-Pressure, CHF     Clinical Interventions:   HIPAA compliant message left on  requesting a return call.    Plan/Follow Up: Will continue to review, monitor and address alerts with follow up based on severity of symptoms and risk factors.       MING Webb [facility]/ CTN/ Remote Patient Monitoring  [phone]

## 2024-10-23 ENCOUNTER — CARE COORDINATION (OUTPATIENT)
Dept: CARE COORDINATION | Age: 74
End: 2024-10-23

## 2024-10-23 NOTE — CARE COORDINATION
Ambulatory Care Coordination Note     10/23/2024 10:46 AM     Patient Current Location:  Home: [address] [place] [address]     ACM contacted the patient by telephone. Verified name and  with patient as identifiers.         ACM: Lana Vasquez RN     Challenges to be reviewed by the provider   Additional needs identified to be addressed with provider No  none               Method of communication with provider: none.    Has the patient been seen in the ED since your last call? no    Care Summary Note:   BPA referral for remote patient monitoring from PCP. Spoke with patient. Agreed to program  Retired. Lives with sister. Drives himself. Manages own meds.   Upset with kids saying he is a drug addict. Recent psych admission. Started at [facility] and transferred to [facility]. 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 Notes state overtaking xanax due to anxiety and ran out. Switched to valium. He did follow up with PCP regarding this problem. PCP following closely.   Following with neuro for history of stroke  Continue with Aspirin and Plavix.   Target LDL below 70, follow-up with your family physician  Modify risk factors for stroke (blood pressure, cholesterol, diabetes, smoking cessation etc.. Control)  Follow up in 1 year or sooner if needed.  CHF, HTN, HLD - lasix as needed, K+, metoprolol, atorvastatin, zetia, ASA, plavix. One recent alert for weight gain. Had leg swelling as well. Took Lasix and resolved.   Walking and exercising daily. Denies SOB or leg swelling.   Good medication adherence. Son helps manage meds.   RPM - no alerts. Day 129. Patient ready for graduation. Agreed to continue to monitor VS and weight with own equipment.      Plan -   CHF zones - report weight gain, leg swelling, SOB  Counseling  Working toward [facility] graduation  RPM CHF and HTN graduate   Early symptom recognition and reporting to prevent ED and admission  Use

## 2024-11-11 ENCOUNTER — OFFICE VISIT (OUTPATIENT)
Dept: FAMILY MEDICINE CLINIC | Age: 74
End: 2024-11-11

## 2024-11-11 VITALS
SYSTOLIC BLOOD PRESSURE: 118 MMHG | HEART RATE: 70 BPM | OXYGEN SATURATION: 97 % | RESPIRATION RATE: 18 BRPM | WEIGHT: 228 LBS | BODY MASS INDEX: 30.08 KG/M2 | DIASTOLIC BLOOD PRESSURE: 62 MMHG | TEMPERATURE: 97.7 F

## 2024-11-11 DIAGNOSIS — Z87.891 PERSONAL HISTORY OF TOBACCO USE: ICD-10-CM

## 2024-11-11 DIAGNOSIS — F41.0 PANIC DISORDER: ICD-10-CM

## 2024-11-11 DIAGNOSIS — R53.1 GENERALIZED WEAKNESS: ICD-10-CM

## 2024-11-11 DIAGNOSIS — Z86.73 HISTORY OF CVA IN ADULTHOOD: ICD-10-CM

## 2024-11-11 DIAGNOSIS — F51.01 PRIMARY INSOMNIA: ICD-10-CM

## 2024-11-11 DIAGNOSIS — R26.89 SHUFFLING GAIT: ICD-10-CM

## 2024-11-11 DIAGNOSIS — Z95.0 S/P PLACEMENT OF CARDIAC PACEMAKER: ICD-10-CM

## 2024-11-11 DIAGNOSIS — R26.81 UNSTEADY GAIT: ICD-10-CM

## 2024-11-11 DIAGNOSIS — F41.8 SITUATIONAL ANXIETY: Primary | ICD-10-CM

## 2024-11-11 DIAGNOSIS — R53.81 PHYSICAL DECONDITIONING: ICD-10-CM

## 2024-11-11 DIAGNOSIS — F10.11 HISTORY OF ETOH ABUSE: ICD-10-CM

## 2024-11-11 ASSESSMENT — ENCOUNTER SYMPTOMS
ABDOMINAL DISTENTION: 0
ABDOMINAL PAIN: 0
COUGH: 0
CHEST TIGHTNESS: 0
WHEEZING: 0
SHORTNESS OF BREATH: 0
BACK PAIN: 0

## 2024-11-11 NOTE — PROGRESS NOTES
Immunizations Administered       Name Date Dose Route    Influenza, FLUAD, (age 65 y+), IM, Trivalent PF, 0.5mL 11/11/2024 0.5 mL Intramuscular    Site: Deltoid- Right    Lot: [id number]    NDC: 15500-393-78            
not injected.      Left eye: Left conjunctiva is not injected.   Neck:      Thyroid: No thyroid mass or thyromegaly.      Vascular: No carotid bruit or JVD.      Trachea: Trachea normal.   Cardiovascular:      Rate and Rhythm: Normal rate and regular rhythm.      Heart sounds: Normal heart sounds, S1 normal and S2 normal. No murmur heard.     No friction rub. No gallop.   Pulmonary:      Effort: Pulmonary effort is normal. No respiratory distress.      Breath sounds: Normal breath sounds. No wheezing, rhonchi or rales.   Chest:      Chest wall: No tenderness.   Abdominal:      General: Bowel sounds are normal.      Palpations: Abdomen is soft. There is no hepatomegaly, splenomegaly or mass.      Tenderness: There is no guarding or rebound.      Hernia: No hernia is present. There is no hernia in the ventral area or left inguinal area.   Musculoskeletal:         General: No tenderness. Normal range of motion.      Cervical back: Normal range of motion and neck supple. No edema or erythema. Normal range of motion.   Lymphadenopathy:      Head:      Right side of head: No submental, submandibular, tonsillar, preauricular, posterior auricular or occipital adenopathy.      Left side of head: No submental, submandibular, tonsillar, preauricular, posterior auricular or occipital adenopathy.      Cervical: No cervical adenopathy.      Right cervical: No superficial, deep or posterior cervical adenopathy.     Left cervical: No superficial, deep or posterior cervical adenopathy.      Upper Body:      Right upper body: No supraclavicular or pectoral adenopathy.      Left upper body: No supraclavicular or pectoral adenopathy.   Skin:     General: Skin is warm and dry.      Coloration: Skin is not pale.      Findings: No bruising, ecchymosis, laceration, lesion or rash.      Nails: There is no clubbing.   Neurological:      Mental Status: He is alert and oriented to person, place, and time.      Cranial Nerves: No cranial nerve

## 2024-11-12 ENCOUNTER — CARE COORDINATION (OUTPATIENT)
Dept: CARE COORDINATION | Age: 74
End: 2024-11-12

## 2024-11-12 NOTE — CARE COORDINATION
Ambulatory Care Coordination Note     2024 11:24 AM     Patient Current Location:  Home: [address] [place] [address]     ACM contacted the patient by telephone. Verified name and  with patient as identifiers.     Patient graduated from the High Risk Care Management program on 2024.  Patient verbalizes confidence in the ability to self-manage at this time. has the ability to self manage at this time..  Care management goals have been completed. No further Ambulatory Care Manager follow up scheduled.      ACM: Lana Vasquez RN     Challenges to be reviewed by the provider   Additional needs identified to be addressed with provider No  none               Method of communication with provider: none.    Utilization: Patient has not had any utilization since our last call.     Care Summary Note:   BPA referral for remote patient monitoring from PCP.   Retired. Lives with sister. Drives himself. Manages own meds.   Upset with kids saying he is a drug addict. Recent psych admission. Started at [facility] and transferred to [facility]. 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 Notes state overtaking xanax due to anxiety and ran out. Switched to valium. He did follow up with PCP regarding this problem. PCP following closely.   Following with neuro for history of stroke  Continue with Aspirin and Plavix.   Target LDL below 70, follow-up with your family physician  Modify risk factors for stroke (blood pressure, cholesterol, diabetes, smoking cessation etc.. Control)  Follow up in 1 year or sooner if needed.  CHF, HTN, HLD - lasix as needed, K+, metoprolol, atorvastatin, zetia, ASA, plavix. Good understanding of zone management and symptom monitoring. Good understanding of medical action plan to take lasix for weight gain or edema.   Walking and exercising daily. Denies SOB or leg swelling.   Good medication adherence. Son helps manage meds.

## 2024-11-21 DIAGNOSIS — F41.8 SITUATIONAL ANXIETY: ICD-10-CM

## 2024-11-21 RX ORDER — DIAZEPAM 5 MG/1
5 TABLET ORAL 2 TIMES DAILY
Qty: 60 TABLET | Refills: 0 | Status: SHIPPED | OUTPATIENT
Start: 2024-11-21 | End: 2024-12-21

## 2024-12-02 DIAGNOSIS — Z86.73 HISTORY OF STROKE: ICD-10-CM

## 2024-12-02 RX ORDER — ATORVASTATIN CALCIUM 80 MG/1
80 TABLET, FILM COATED ORAL NIGHTLY
Qty: 90 TABLET | Refills: 3 | Status: CANCELLED | OUTPATIENT
Start: 2024-12-02

## 2024-12-02 RX ORDER — TRAZODONE HYDROCHLORIDE 100 MG/1
100 TABLET ORAL NIGHTLY
Qty: 30 TABLET | Refills: 2 | Status: SHIPPED | OUTPATIENT
Start: 2024-12-02

## 2024-12-02 RX ORDER — ALBUTEROL SULFATE 90 UG/1
2 INHALANT RESPIRATORY (INHALATION) EVERY 6 HOURS PRN
Qty: 1 EACH | Refills: 3 | Status: SHIPPED | OUTPATIENT
Start: 2024-12-02

## 2024-12-02 RX ORDER — ESCITALOPRAM OXALATE 10 MG/1
10 TABLET ORAL DAILY
Qty: 90 TABLET | Refills: 3 | Status: CANCELLED | OUTPATIENT
Start: 2024-12-02

## 2024-12-03 RX ORDER — CLOPIDOGREL BISULFATE 75 MG/1
75 TABLET ORAL DAILY
Qty: 30 TABLET | Refills: 11 | OUTPATIENT
Start: 2024-12-03

## 2024-12-10 ENCOUNTER — OFFICE VISIT (OUTPATIENT)
Dept: CARDIOLOGY CLINIC | Age: 74
End: 2024-12-10
Payer: MEDICARE

## 2024-12-10 VITALS
BODY MASS INDEX: 30.48 KG/M2 | WEIGHT: 230 LBS | HEART RATE: 69 BPM | DIASTOLIC BLOOD PRESSURE: 73 MMHG | SYSTOLIC BLOOD PRESSURE: 121 MMHG | HEIGHT: 73 IN

## 2024-12-10 DIAGNOSIS — I10 PRIMARY HYPERTENSION: ICD-10-CM

## 2024-12-10 DIAGNOSIS — Z95.0 PACEMAKER: ICD-10-CM

## 2024-12-10 DIAGNOSIS — Z95.828 HISTORY OF RIGHT COMMON CAROTID ARTERY STENT PLACEMENT: ICD-10-CM

## 2024-12-10 DIAGNOSIS — Z98.890 HISTORY OF RIGHT COMMON CAROTID ARTERY STENT PLACEMENT: ICD-10-CM

## 2024-12-10 DIAGNOSIS — I50.32 CHRONIC DIASTOLIC CHF (CONGESTIVE HEART FAILURE) (HCC): Primary | ICD-10-CM

## 2024-12-10 PROCEDURE — G8417 CALC BMI ABV UP PARAM F/U: HCPCS | Performed by: PHYSICIAN ASSISTANT

## 2024-12-10 PROCEDURE — 3074F SYST BP LT 130 MM HG: CPT | Performed by: PHYSICIAN ASSISTANT

## 2024-12-10 PROCEDURE — G8482 FLU IMMUNIZE ORDER/ADMIN: HCPCS | Performed by: PHYSICIAN ASSISTANT

## 2024-12-10 PROCEDURE — G8427 DOCREV CUR MEDS BY ELIG CLIN: HCPCS | Performed by: PHYSICIAN ASSISTANT

## 2024-12-10 PROCEDURE — 1123F ACP DISCUSS/DSCN MKR DOCD: CPT | Performed by: PHYSICIAN ASSISTANT

## 2024-12-10 PROCEDURE — 4004F PT TOBACCO SCREEN RCVD TLK: CPT | Performed by: PHYSICIAN ASSISTANT

## 2024-12-10 PROCEDURE — 1159F MED LIST DOCD IN RCRD: CPT | Performed by: PHYSICIAN ASSISTANT

## 2024-12-10 PROCEDURE — 99214 OFFICE O/P EST MOD 30 MIN: CPT | Performed by: PHYSICIAN ASSISTANT

## 2024-12-10 PROCEDURE — 3017F COLORECTAL CA SCREEN DOC REV: CPT | Performed by: PHYSICIAN ASSISTANT

## 2024-12-10 PROCEDURE — 3078F DIAST BP <80 MM HG: CPT | Performed by: PHYSICIAN ASSISTANT

## 2024-12-10 NOTE — PROGRESS NOTES
[facility] PHYSICIANS [place] SPECIALTY  [facility] CARDIOLOGY  [address]  [address]  [facility] [address]  Dept: [phone]  Dept Fax: [phone]  Loc: [phone]    Chief Complaint   Patient presents with    Follow-up     3 month f/u   Office visit 9/10/2024  Patient with a history of chronic diastolic congestive heart failure, CVA and history of carotid stent presents for follow-up.  Patient states that he has been feeling intermittent episodes of a shocklike feeling over his left pacemaker.  He is under the impression that he has a ICD and that he is getting shocked.  He states otherwise he has been doing fairly well.  He has chronic shortness of breath which has not worsened in severity or frequency.  He denies any chest pain or palpitations.  His device is followed at [facility].  He would like to follow-up with our pacer clinic.  Cardiologist:  Dr. Gunderson  History of Present Illness  The patient is a 74-year-old male with a history of permanent pacemaker, chronic diastolic congestive heart failure, and carotid stent, presenting for a 3-month follow-up.    He reports an improvement in his overall health status, including weight gain and enhanced appetite.     He experiences pain when bending over or twisting incorrectly, localized near his pacemaker. He has a history of frequent falls due to instability in his right leg but reports no recent falls.       He denies any chest pain, shortness of breath or palpitations.  MEDICATIONS  Aspirin 81 mg daily, atorvastatin 80 mg at bedtime, clopidogrel 75 mg daily, metoprolol (discontinued).             General:   No fever, no chills, No fatigue or weight loss  Pulmonary:    No dyspnea, no wheezing  Cardiac:   Occasional musculoskeletal chest discomfort    GI:     No nausea or vomiting, no abdominal pain  Neuro:    No dizziness or light headedness  Musculoskeletal:  No recent active issues  Extremities:   No edema, good

## 2024-12-26 DIAGNOSIS — F41.8 SITUATIONAL ANXIETY: ICD-10-CM

## 2024-12-27 RX ORDER — DIAZEPAM 5 MG/1
5 TABLET ORAL 2 TIMES DAILY
Qty: 60 TABLET | Refills: 0 | Status: SHIPPED | OUTPATIENT
Start: 2024-12-27 | End: 2025-01-26

## 2025-01-20 ENCOUNTER — OFFICE VISIT (OUTPATIENT)
Dept: FAMILY MEDICINE CLINIC | Age: 75
End: 2025-01-20
Payer: MEDICARE

## 2025-01-20 VITALS
BODY MASS INDEX: 29.95 KG/M2 | SYSTOLIC BLOOD PRESSURE: 106 MMHG | TEMPERATURE: 97.8 F | RESPIRATION RATE: 16 BRPM | HEART RATE: 60 BPM | OXYGEN SATURATION: 97 % | WEIGHT: 227 LBS | DIASTOLIC BLOOD PRESSURE: 64 MMHG

## 2025-01-20 DIAGNOSIS — Z12.5 SCREENING FOR PROSTATE CANCER: ICD-10-CM

## 2025-01-20 DIAGNOSIS — R26.81 UNSTEADY GAIT: ICD-10-CM

## 2025-01-20 DIAGNOSIS — E78.2 MIXED HYPERLIPIDEMIA: ICD-10-CM

## 2025-01-20 DIAGNOSIS — Z86.73 HISTORY OF CVA IN ADULTHOOD: ICD-10-CM

## 2025-01-20 DIAGNOSIS — Z87.891 PERSONAL HISTORY OF TOBACCO USE: ICD-10-CM

## 2025-01-20 DIAGNOSIS — R53.81 PHYSICAL DECONDITIONING: ICD-10-CM

## 2025-01-20 DIAGNOSIS — R26.89 SHUFFLING GAIT: ICD-10-CM

## 2025-01-20 DIAGNOSIS — R53.1 GENERALIZED WEAKNESS: ICD-10-CM

## 2025-01-20 DIAGNOSIS — J41.8 MIXED SIMPLE AND MUCOPURULENT CHRONIC BRONCHITIS (HCC): ICD-10-CM

## 2025-01-20 DIAGNOSIS — M15.0 PRIMARY OSTEOARTHRITIS INVOLVING MULTIPLE JOINTS: ICD-10-CM

## 2025-01-20 DIAGNOSIS — R73.01 IMPAIRED FASTING GLUCOSE: ICD-10-CM

## 2025-01-20 DIAGNOSIS — F51.01 PRIMARY INSOMNIA: ICD-10-CM

## 2025-01-20 DIAGNOSIS — F41.8 SITUATIONAL ANXIETY: Primary | ICD-10-CM

## 2025-01-20 DIAGNOSIS — F10.11 HISTORY OF ETOH ABUSE: ICD-10-CM

## 2025-01-20 DIAGNOSIS — Z95.0 S/P PLACEMENT OF CARDIAC PACEMAKER: ICD-10-CM

## 2025-01-20 PROBLEM — F29 PSYCHOSIS, UNSPECIFIED PSYCHOSIS TYPE (HCC): Status: ACTIVE | Noted: 2025-01-20

## 2025-01-20 PROBLEM — F13.931 BENZODIAZEPINE WITHDRAWAL WITH DELIRIUM (HCC): Status: ACTIVE | Noted: 2025-01-20

## 2025-01-20 PROCEDURE — G8417 CALC BMI ABV UP PARAM F/U: HCPCS | Performed by: NURSE PRACTITIONER

## 2025-01-20 PROCEDURE — 3074F SYST BP LT 130 MM HG: CPT | Performed by: NURSE PRACTITIONER

## 2025-01-20 PROCEDURE — G8427 DOCREV CUR MEDS BY ELIG CLIN: HCPCS | Performed by: NURSE PRACTITIONER

## 2025-01-20 PROCEDURE — 99214 OFFICE O/P EST MOD 30 MIN: CPT | Performed by: NURSE PRACTITIONER

## 2025-01-20 PROCEDURE — 4004F PT TOBACCO SCREEN RCVD TLK: CPT | Performed by: NURSE PRACTITIONER

## 2025-01-20 PROCEDURE — 1159F MED LIST DOCD IN RCRD: CPT | Performed by: NURSE PRACTITIONER

## 2025-01-20 PROCEDURE — 3023F SPIROM DOC REV: CPT | Performed by: NURSE PRACTITIONER

## 2025-01-20 PROCEDURE — 1123F ACP DISCUSS/DSCN MKR DOCD: CPT | Performed by: NURSE PRACTITIONER

## 2025-01-20 PROCEDURE — 3078F DIAST BP <80 MM HG: CPT | Performed by: NURSE PRACTITIONER

## 2025-01-20 PROCEDURE — 3017F COLORECTAL CA SCREEN DOC REV: CPT | Performed by: NURSE PRACTITIONER

## 2025-01-20 PROCEDURE — 1160F RVW MEDS BY RX/DR IN RCRD: CPT | Performed by: NURSE PRACTITIONER

## 2025-01-20 RX ORDER — ALBUTEROL SULFATE 90 UG/1
2 INHALANT RESPIRATORY (INHALATION) EVERY 6 HOURS PRN
Qty: 1 EACH | Refills: 3 | Status: SHIPPED | OUTPATIENT
Start: 2025-01-20

## 2025-01-20 RX ORDER — DIAZEPAM 5 MG/1
5 TABLET ORAL 2 TIMES DAILY
Qty: 60 TABLET | Refills: 0 | Status: SHIPPED | OUTPATIENT
Start: 2025-01-20 | End: 2025-02-19

## 2025-01-20 RX ORDER — POLYMYXIN B SULFATE AND TRIMETHOPRIM 1; 10000 MG/ML; [USP'U]/ML
1 SOLUTION OPHTHALMIC EVERY 6 HOURS
Qty: 10 ML | Refills: 1 | Status: SHIPPED | OUTPATIENT
Start: 2025-01-20 | End: 2025-01-30

## 2025-01-20 RX ORDER — OLOPATADINE HYDROCHLORIDE 2 MG/ML
1 SOLUTION/ DROPS OPHTHALMIC DAILY
Qty: 2.5 ML | Refills: 2 | Status: SHIPPED | OUTPATIENT
Start: 2025-01-20

## 2025-01-20 SDOH — ECONOMIC STABILITY: FOOD INSECURITY: WITHIN THE PAST 12 MONTHS, YOU WORRIED THAT YOUR FOOD WOULD RUN OUT BEFORE YOU GOT MONEY TO BUY MORE.: NEVER TRUE

## 2025-01-20 SDOH — ECONOMIC STABILITY: FOOD INSECURITY: WITHIN THE PAST 12 MONTHS, THE FOOD YOU BOUGHT JUST DIDN'T LAST AND YOU DIDN'T HAVE MONEY TO GET MORE.: NEVER TRUE

## 2025-01-20 ASSESSMENT — PATIENT HEALTH QUESTIONNAIRE - PHQ9
SUM OF ALL RESPONSES TO PHQ QUESTIONS 1-9: 4
5. POOR APPETITE OR OVEREATING: MORE THAN HALF THE DAYS
7. TROUBLE CONCENTRATING ON THINGS, SUCH AS READING THE NEWSPAPER OR WATCHING TELEVISION: NOT AT ALL
3. TROUBLE FALLING OR STAYING ASLEEP: NOT AT ALL
4. FEELING TIRED OR HAVING LITTLE ENERGY: SEVERAL DAYS
2. FEELING DOWN, DEPRESSED OR HOPELESS: NOT AT ALL
9. THOUGHTS THAT YOU WOULD BE BETTER OFF DEAD, OR OF HURTING YOURSELF: NOT AT ALL
SUM OF ALL RESPONSES TO PHQ QUESTIONS 1-9: 4
1. LITTLE INTEREST OR PLEASURE IN DOING THINGS: SEVERAL DAYS
SUM OF ALL RESPONSES TO PHQ QUESTIONS 1-9: 4
10. IF YOU CHECKED OFF ANY PROBLEMS, HOW DIFFICULT HAVE THESE PROBLEMS MADE IT FOR YOU TO DO YOUR WORK, TAKE CARE OF THINGS AT HOME, OR GET ALONG WITH OTHER PEOPLE: SOMEWHAT DIFFICULT
SUM OF ALL RESPONSES TO PHQ9 QUESTIONS 1 & 2: 1
SUM OF ALL RESPONSES TO PHQ QUESTIONS 1-9: 4
8. MOVING OR SPEAKING SO SLOWLY THAT OTHER PEOPLE COULD HAVE NOTICED. OR THE OPPOSITE, BEING SO FIGETY OR RESTLESS THAT YOU HAVE BEEN MOVING AROUND A LOT MORE THAN USUAL: NOT AT ALL
6. FEELING BAD ABOUT YOURSELF - OR THAT YOU ARE A FAILURE OR HAVE LET YOURSELF OR YOUR FAMILY DOWN: NOT AT ALL

## 2025-01-20 ASSESSMENT — ENCOUNTER SYMPTOMS
COUGH: 0
SHORTNESS OF BREATH: 0
ABDOMINAL DISTENTION: 0
ABDOMINAL PAIN: 0
WHEEZING: 0
CHEST TIGHTNESS: 0
BACK PAIN: 0

## 2025-01-20 NOTE — PROGRESS NOTES
[facility]  [facility]  [facility]  [address] [address]  Dept: [phone]  Dept Fax: [phone]  Loc: [phone]  PROGRESS NOTE      VisitDate: 1/20/2025    Manuel Lay is a 74 y.o. male who presents today for:     Chief Complaint   Patient presents with    3 Month Follow-Up     No concerns         Subjective:  Routine 3-month/refills.  History of CVA anxiety hypertension EtOH abuse GERD tobacco use insomnia depression osteoarthritis.  Refill Valium.  Patient reports overall doing well.  Denies any chest pain shortness of breath abdominal pain numbness rash or edema.  Denies any recent falls.  Regular bowel movements no blood or melena denies any dysuria.          Review of Systems   Constitutional:  Negative for activity change, appetite change, chills, fatigue and fever.   Eyes:  Negative for visual disturbance.   Respiratory:  Negative for cough, chest tightness, shortness of breath and wheezing.    Cardiovascular:  Negative for chest pain, palpitations and leg swelling.   Gastrointestinal:  Negative for abdominal distention and abdominal pain.   Genitourinary:  Negative for dysuria.   Musculoskeletal:  Negative for arthralgias, back pain and neck pain.   Skin: Negative.  Negative for rash.   Neurological:  Negative for dizziness, light-headedness and headaches.   Hematological:  Negative for adenopathy.   Psychiatric/Behavioral:  Positive for decreased concentration, dysphoric mood and sleep disturbance. Negative for hallucinations. The patient is nervous/anxious.    All other systems reviewed and are negative.    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

## 2025-01-31 ENCOUNTER — TELEPHONE (OUTPATIENT)
Dept: FAMILY MEDICINE CLINIC | Age: 75
End: 2025-01-31

## 2025-01-31 LAB
ALBUMIN: 4.1 G/DL (ref 3.5–5.2)
ALK PHOSPHATASE: 92 U/L (ref 39–118)
ALT SERPL-CCNC: 17 U/L (ref 5–41)
ANION GAP SERPL CALCULATED.3IONS-SCNC: 9 MMOL/L (ref 7–16)
AST SERPL-CCNC: 13 U/L (ref 9–50)
BASOPHILS ABSOLUTE: 0.06 K/UL (ref 0–0.2)
BASOPHILS RELATIVE PERCENT: 0.7 % (ref 0–2)
BILIRUB SERPL-MCNC: 0.5 MG/DL
BUN BLDV-MCNC: 6 MG/DL (ref 8–23)
CALCIUM SERPL-MCNC: 9.4 MG/DL (ref 8.6–10.5)
CHLORIDE BLD-SCNC: 104 MMOL/L (ref 96–107)
CHOLESTEROL, TOTAL: 112 MG/DL (ref 100–199)
CHOLESTEROL/HDL RATIO: 3.1 (ref 2–4.5)
CO2: 28 MMOL/L (ref 18–32)
CREAT SERPL-MCNC: 0.96 MG/DL (ref 0.67–1.3)
EGFR IF NONAFRICAN AMERICAN: 84 ML/MIN/1.73M2
EOSINOPHILS ABSOLUTE: 0.27 K/UL (ref 0–0.8)
EOSINOPHILS RELATIVE PERCENT: 3.2 % (ref 0–5)
ESTIMATED AVERAGE GLUCOSE: 111 MG/DL
GLUCOSE: 100 MG/DL (ref 70–100)
HBA1C MFR BLD: 5.5 %
HCT VFR BLD CALC: 42.8 % (ref 39–52)
HDLC SERPL-MCNC: 36 MG/DL
HEMOGLOBIN: 14.5 G/DL (ref 13–18)
IMMATURE GRANS (ABS): 0.04 K/UL (ref 0–0.06)
IMMATURE GRANULOCYTES %: 0.5 % (ref 0–2)
LDL CHOLESTEROL: 54 MG/DL
LDL/HDL RATIO: 1.5
LYMPHOCYTES ABSOLUTE: 2.57 K/UL (ref 0.9–5.2)
LYMPHOCYTES RELATIVE PERCENT: 30.6 % (ref 20–45)
MCH RBC QN AUTO: 31.9 PG (ref 26–32)
MCHC RBC AUTO-ENTMCNC: 33.9 G/DL (ref 32–35)
MCV RBC AUTO: 94 FL (ref 75–100)
MONOCYTES ABSOLUTE: 0.58 K/UL (ref 0.1–1)
MONOCYTES RELATIVE PERCENT: 6.9 % (ref 0–13)
NEUTROPHILS ABSOLUTE: 4.88 K/UL (ref 1.9–8)
NEUTROPHILS RELATIVE PERCENT: 58.1 % (ref 45–75)
PDW BLD-RTO: 13 % (ref 11.2–14.8)
PLATELET # BLD: 310 THOUS/CMM (ref 140–440)
POTASSIUM SERPL-SCNC: 4.9 MMOL/L (ref 3.5–5.4)
PSA, ULTRASENSITIVE: 0.68 NG/ML (ref 0–4)
RBC # BLD: 4.55 MILL/CMM (ref 4.4–6.1)
SODIUM BLD-SCNC: 141 MMOL/L (ref 135–148)
TOTAL PROTEIN: 6.6 G/DL (ref 6–8.3)
TRIGL SERPL-MCNC: 109 MG/DL (ref 20–149)
VLDLC SERPL CALC-MCNC: 22 MG/DL
WBC # BLD: 8.4 THDS/CMM (ref 3.6–11)

## 2025-01-31 NOTE — TELEPHONE ENCOUNTER
----- Message from MANE Newberry - CNP sent at 1/31/2025  8:02 AM EST -----  CBC CMP FLP PSA A1c all within normal limits/ok

## 2025-02-27 DIAGNOSIS — F41.8 SITUATIONAL ANXIETY: ICD-10-CM

## 2025-02-27 RX ORDER — DIAZEPAM 5 MG/1
TABLET ORAL
Qty: 60 TABLET | Refills: 0 | Status: SHIPPED | OUTPATIENT
Start: 2025-02-27 | End: 2025-03-29

## 2025-02-27 RX ORDER — TRAZODONE HYDROCHLORIDE 100 MG/1
100 TABLET ORAL NIGHTLY
Qty: 30 TABLET | Refills: 2 | Status: SHIPPED | OUTPATIENT
Start: 2025-02-27

## 2025-02-27 NOTE — TELEPHONE ENCOUNTER
Pt request Trazodone refill to [facility] pharmacy.     Last seen 1/20/25  3 month f/u 4/18/25  Will check pharmacy.

## 2025-03-21 PROCEDURE — 93296 REM INTERROG EVL PM/IDS: CPT | Performed by: INTERNAL MEDICINE

## 2025-03-21 PROCEDURE — 93294 REM INTERROG EVL PM/LDLS PM: CPT | Performed by: INTERNAL MEDICINE

## 2025-04-02 DIAGNOSIS — F41.8 SITUATIONAL ANXIETY: ICD-10-CM

## 2025-04-02 DIAGNOSIS — Z86.73 HISTORY OF STROKE: ICD-10-CM

## 2025-04-03 RX ORDER — DIAZEPAM 5 MG/1
5 TABLET ORAL 2 TIMES DAILY
Qty: 60 TABLET | Refills: 0 | Status: SHIPPED | OUTPATIENT
Start: 2025-04-03 | End: 2025-05-03

## 2025-04-03 RX ORDER — ATORVASTATIN CALCIUM 80 MG/1
80 TABLET, FILM COATED ORAL NIGHTLY
Qty: 90 TABLET | Refills: 3 | Status: SHIPPED | OUTPATIENT
Start: 2025-04-03

## 2025-04-03 RX ORDER — CLOPIDOGREL BISULFATE 75 MG/1
75 TABLET ORAL DAILY
Qty: 30 TABLET | Refills: 11 | Status: SHIPPED | OUTPATIENT
Start: 2025-04-03

## 2025-04-17 ENCOUNTER — OFFICE VISIT (OUTPATIENT)
Dept: NEUROLOGY | Age: 75
End: 2025-04-17
Payer: MEDICARE

## 2025-04-17 VITALS
OXYGEN SATURATION: 97 % | HEART RATE: 72 BPM | SYSTOLIC BLOOD PRESSURE: 135 MMHG | WEIGHT: 227 LBS | HEIGHT: 73 IN | DIASTOLIC BLOOD PRESSURE: 75 MMHG | BODY MASS INDEX: 30.09 KG/M2

## 2025-04-17 DIAGNOSIS — Z98.890 HISTORY OF RIGHT COMMON CAROTID ARTERY STENT PLACEMENT: ICD-10-CM

## 2025-04-17 DIAGNOSIS — Z86.73 HISTORY OF STROKE: Primary | ICD-10-CM

## 2025-04-17 DIAGNOSIS — Z95.828 HISTORY OF RIGHT COMMON CAROTID ARTERY STENT PLACEMENT: ICD-10-CM

## 2025-04-17 PROCEDURE — 4004F PT TOBACCO SCREEN RCVD TLK: CPT | Performed by: NURSE PRACTITIONER

## 2025-04-17 PROCEDURE — 3075F SYST BP GE 130 - 139MM HG: CPT | Performed by: NURSE PRACTITIONER

## 2025-04-17 PROCEDURE — G8427 DOCREV CUR MEDS BY ELIG CLIN: HCPCS | Performed by: NURSE PRACTITIONER

## 2025-04-17 PROCEDURE — G8417 CALC BMI ABV UP PARAM F/U: HCPCS | Performed by: NURSE PRACTITIONER

## 2025-04-17 PROCEDURE — 1159F MED LIST DOCD IN RCRD: CPT | Performed by: NURSE PRACTITIONER

## 2025-04-17 PROCEDURE — 1123F ACP DISCUSS/DSCN MKR DOCD: CPT | Performed by: NURSE PRACTITIONER

## 2025-04-17 PROCEDURE — 99213 OFFICE O/P EST LOW 20 MIN: CPT | Performed by: NURSE PRACTITIONER

## 2025-04-17 PROCEDURE — 3078F DIAST BP <80 MM HG: CPT | Performed by: NURSE PRACTITIONER

## 2025-04-17 PROCEDURE — 3017F COLORECTAL CA SCREEN DOC REV: CPT | Performed by: NURSE PRACTITIONER

## 2025-04-17 NOTE — PROGRESS NOTES
10/26/2021 and MR brain dated 2020.    TECHNIQUE: Multiplanar and multiple spin echo MRI images were obtained of the brain without contrast.    FINDINGS:  There is stable mild volume loss. There are multiple small focal areas of encephalomalacia in the bilateral basal ganglia and bilateral thalami and left corona radiata, stable compared to prior MRI. There are stable small focal areas of encephalomalacia  in the right cerebellar hemisphere. There are mild scattered focal areas of T2/FLAIR hyperintensity in the subcortical and deep white matter. These are stable compared to prior MRI. No intra or extra-axial mass is identified. No focal areas of restricted  diffusion are present.    The major vascular flow voids appear patent. Orbits are unremarkable. There is moderate mucosal thickening in the ethmoid air cells and mild mucosal thickening in the frontal sinuses, decreased compared to prior MRI.    Impression  1. No evidence of acute intracranial abnormality.  2. Stable old lacunar infarcts in the bilateral basal ganglia, thalami and right cerebellar hemisphere compared to prior MRI.  3. Mild to moderate chronic sinus mucosal inflammation, decreased compared to prior MRI.          **This report has been created using voice recognition software. It may contain minor errors which are inherent in voice recognition technology.**    Final report electronically signed by Dr. Jorge Herman DO, MD on 10/27/2021 9:35 AM    No results found for this or any previous visit.    No results found for this or any previous visit.    Results for orders placed in visit on 24    CT HEAD WO CONTRAST    Narrative  Ordering Provider: Niles Huntley [facility]    Radiology Department  Patient:  JARAD SHAIKH  [address]  :  1950   Sex:  [place] [address]  Location:    [phone]   Unit #:   [id number]  Acct #:  [id number]  Ordering Phys: Niles Sexton MD    Exam Date:

## 2025-04-17 NOTE — PATIENT INSTRUCTIONS
Repeat CTA head and neck W/WO contrast  Continue with Aspirin and Plavix.   Target LDL below 70, follow-up with your family physician  Modify risk factors for stroke (blood pressure, cholesterol, diabetes, smoking cessation etc.. Control)  Follow up in 1 year or sooner if needed.  Call if any questions or concerns.

## 2025-04-18 ENCOUNTER — OFFICE VISIT (OUTPATIENT)
Dept: FAMILY MEDICINE CLINIC | Age: 75
End: 2025-04-18

## 2025-04-18 VITALS
RESPIRATION RATE: 14 BRPM | HEIGHT: 72 IN | SYSTOLIC BLOOD PRESSURE: 100 MMHG | BODY MASS INDEX: 30.88 KG/M2 | HEART RATE: 72 BPM | DIASTOLIC BLOOD PRESSURE: 62 MMHG | WEIGHT: 228 LBS

## 2025-04-18 DIAGNOSIS — R26.81 UNSTEADY GAIT: ICD-10-CM

## 2025-04-18 DIAGNOSIS — F41.8 SITUATIONAL ANXIETY: Primary | ICD-10-CM

## 2025-04-18 DIAGNOSIS — Z87.891 PERSONAL HISTORY OF TOBACCO USE: ICD-10-CM

## 2025-04-18 DIAGNOSIS — R26.89 SHUFFLING GAIT: ICD-10-CM

## 2025-04-18 DIAGNOSIS — Z95.0 S/P PLACEMENT OF CARDIAC PACEMAKER: ICD-10-CM

## 2025-04-18 DIAGNOSIS — F51.01 PRIMARY INSOMNIA: ICD-10-CM

## 2025-04-18 DIAGNOSIS — N32.81 OAB (OVERACTIVE BLADDER): ICD-10-CM

## 2025-04-18 DIAGNOSIS — M54.31 RIGHT SIDED SCIATICA: ICD-10-CM

## 2025-04-18 DIAGNOSIS — E78.2 MIXED HYPERLIPIDEMIA: ICD-10-CM

## 2025-04-18 DIAGNOSIS — M15.0 PRIMARY OSTEOARTHRITIS INVOLVING MULTIPLE JOINTS: ICD-10-CM

## 2025-04-18 DIAGNOSIS — F33.0 MILD EPISODE OF RECURRENT MAJOR DEPRESSIVE DISORDER: ICD-10-CM

## 2025-04-18 DIAGNOSIS — F10.11 HISTORY OF ETOH ABUSE: ICD-10-CM

## 2025-04-18 DIAGNOSIS — Z86.73 HISTORY OF CVA IN ADULTHOOD: ICD-10-CM

## 2025-04-18 PROBLEM — F13.931 BENZODIAZEPINE WITHDRAWAL WITH DELIRIUM (HCC): Status: RESOLVED | Noted: 2025-01-20 | Resolved: 2025-04-18

## 2025-04-18 PROBLEM — F29 PSYCHOSIS, UNSPECIFIED PSYCHOSIS TYPE (HCC): Status: RESOLVED | Noted: 2025-01-20 | Resolved: 2025-04-18

## 2025-04-18 PROBLEM — I50.23 ACUTE ON CHRONIC SYSTOLIC HEART FAILURE (HCC): Status: RESOLVED | Noted: 2024-01-22 | Resolved: 2025-04-18

## 2025-04-18 RX ORDER — OLOPATADINE HYDROCHLORIDE 2 MG/ML
1 SOLUTION/ DROPS OPHTHALMIC DAILY
Qty: 2.5 ML | Refills: 5 | Status: SHIPPED | OUTPATIENT
Start: 2025-04-18

## 2025-04-18 ASSESSMENT — ENCOUNTER SYMPTOMS
ABDOMINAL PAIN: 0
SHORTNESS OF BREATH: 0
COUGH: 0
ABDOMINAL DISTENTION: 0
WHEEZING: 0
BACK PAIN: 1
CHEST TIGHTNESS: 0

## 2025-04-18 NOTE — PROGRESS NOTES
[facility]  [facility]  [address] [address]  Dept: [phone]  Dept Fax: [phone]  Loc: [phone]  PROGRESS NOTE      VisitDate: 4/18/2025    Manuel Lay is a 74 y.o. male who presents today for:     Chief Complaint   Patient presents with    3 Month Follow-Up         Subjective:  Routine 3-month.  History of CVA anxiety depression tobacco use EtOH abuse insomnia low back pain sciatica.  Denies any use of tobacco or alcohol.  Reports walking nightly.  Reports overall feeling very well.  Does report continued family stressors.  Taking medications as prescribed.  Denies any fever illness falls chest pain shortness of breath headaches dizziness abdominal pain numbness rash or edema.  Does complain of right ear irritation tearing.  Requesting refill Pataday.          Review of Systems   Constitutional:  Negative for activity change, appetite change, chills, fatigue and fever.   Eyes:  Negative for visual disturbance.   Respiratory:  Negative for cough, chest tightness, shortness of breath and wheezing.    Cardiovascular:  Negative for chest pain, palpitations and leg swelling.   Gastrointestinal:  Negative for abdominal distention and abdominal pain.   Genitourinary:  Negative for dysuria.   Musculoskeletal:  Positive for arthralgias and back pain. Negative for neck pain.   Skin: Negative.  Negative for rash.   Neurological:  Negative for dizziness, light-headedness and headaches.   Hematological:  Negative for adenopathy.   Psychiatric/Behavioral:  Positive for decreased concentration, dysphoric mood and sleep disturbance. The patient is nervous/anxious.    All other systems reviewed and are negative.    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

## 2025-04-28 ENCOUNTER — TELEPHONE (OUTPATIENT)
Dept: FAMILY MEDICINE CLINIC | Age: 75
End: 2025-04-28

## 2025-04-28 NOTE — TELEPHONE ENCOUNTER
Spoke to patient and told him this; states he is feeling ok but would like an appt with JR to discuss some things. Requested next week appointment; scheduled Monday 5/5/25 @ 11:00 am.

## 2025-04-28 NOTE — TELEPHONE ENCOUNTER
Leobardo wants a letter to give to his kids that says he is dying. He said at his last visit you told him he was dying and wants proof to tell his kids. I told him he may have taken this out of context and I need to check with you regarding this.

## 2025-04-29 ENCOUNTER — OFFICE VISIT (OUTPATIENT)
Dept: FAMILY MEDICINE CLINIC | Age: 75
End: 2025-04-29
Payer: MEDICARE

## 2025-04-29 VITALS
BODY MASS INDEX: 30.23 KG/M2 | RESPIRATION RATE: 18 BRPM | HEIGHT: 72 IN | OXYGEN SATURATION: 93 % | HEART RATE: 88 BPM | SYSTOLIC BLOOD PRESSURE: 100 MMHG | WEIGHT: 223.2 LBS | DIASTOLIC BLOOD PRESSURE: 62 MMHG

## 2025-04-29 DIAGNOSIS — F41.8 SITUATIONAL ANXIETY: ICD-10-CM

## 2025-04-29 DIAGNOSIS — Z86.73 HISTORY OF CVA IN ADULTHOOD: ICD-10-CM

## 2025-04-29 DIAGNOSIS — R07.89 ATYPICAL CHEST PAIN: ICD-10-CM

## 2025-04-29 DIAGNOSIS — R26.89 SHUFFLING GAIT: ICD-10-CM

## 2025-04-29 DIAGNOSIS — J40 BRONCHITIS: ICD-10-CM

## 2025-04-29 DIAGNOSIS — F10.11 HISTORY OF ETOH ABUSE: ICD-10-CM

## 2025-04-29 DIAGNOSIS — R07.9 CHEST PAIN, UNSPECIFIED TYPE: Primary | ICD-10-CM

## 2025-04-29 DIAGNOSIS — Z95.0 S/P PLACEMENT OF CARDIAC PACEMAKER: ICD-10-CM

## 2025-04-29 DIAGNOSIS — R26.81 UNSTEADY GAIT: ICD-10-CM

## 2025-04-29 PROCEDURE — G8427 DOCREV CUR MEDS BY ELIG CLIN: HCPCS | Performed by: NURSE PRACTITIONER

## 2025-04-29 PROCEDURE — 1159F MED LIST DOCD IN RCRD: CPT | Performed by: NURSE PRACTITIONER

## 2025-04-29 PROCEDURE — 3078F DIAST BP <80 MM HG: CPT | Performed by: NURSE PRACTITIONER

## 2025-04-29 PROCEDURE — 3017F COLORECTAL CA SCREEN DOC REV: CPT | Performed by: NURSE PRACTITIONER

## 2025-04-29 PROCEDURE — 1160F RVW MEDS BY RX/DR IN RCRD: CPT | Performed by: NURSE PRACTITIONER

## 2025-04-29 PROCEDURE — 93000 ELECTROCARDIOGRAM COMPLETE: CPT | Performed by: NURSE PRACTITIONER

## 2025-04-29 PROCEDURE — 1123F ACP DISCUSS/DSCN MKR DOCD: CPT | Performed by: NURSE PRACTITIONER

## 2025-04-29 PROCEDURE — 3074F SYST BP LT 130 MM HG: CPT | Performed by: NURSE PRACTITIONER

## 2025-04-29 PROCEDURE — 99214 OFFICE O/P EST MOD 30 MIN: CPT | Performed by: NURSE PRACTITIONER

## 2025-04-29 PROCEDURE — G8417 CALC BMI ABV UP PARAM F/U: HCPCS | Performed by: NURSE PRACTITIONER

## 2025-04-29 PROCEDURE — 4004F PT TOBACCO SCREEN RCVD TLK: CPT | Performed by: NURSE PRACTITIONER

## 2025-04-29 RX ORDER — DIAZEPAM 5 MG/1
5 TABLET ORAL 2 TIMES DAILY
Qty: 60 TABLET | Refills: 0 | Status: SHIPPED | OUTPATIENT
Start: 2025-04-29 | End: 2025-05-29

## 2025-04-29 RX ORDER — PANTOPRAZOLE SODIUM 40 MG/1
40 TABLET, DELAYED RELEASE ORAL DAILY
Qty: 90 TABLET | Refills: 2 | Status: SHIPPED | OUTPATIENT
Start: 2025-04-29

## 2025-04-29 RX ORDER — AZITHROMYCIN 250 MG/1
TABLET, FILM COATED ORAL
Qty: 6 TABLET | Refills: 0 | Status: SHIPPED | OUTPATIENT
Start: 2025-04-29 | End: 2025-05-09

## 2025-04-29 RX ORDER — PREDNISONE 10 MG/1
10 TABLET ORAL 2 TIMES DAILY
Qty: 10 TABLET | Refills: 0 | Status: SHIPPED | OUTPATIENT
Start: 2025-04-29 | End: 2025-05-04

## 2025-04-29 RX ORDER — BENZONATATE 200 MG/1
200 CAPSULE ORAL 3 TIMES DAILY PRN
Qty: 30 CAPSULE | Refills: 0 | Status: SHIPPED | OUTPATIENT
Start: 2025-04-29 | End: 2025-05-09

## 2025-04-29 ASSESSMENT — ENCOUNTER SYMPTOMS
SHORTNESS OF BREATH: 0
BACK PAIN: 0
WHEEZING: 0
COUGH: 1
ABDOMINAL DISTENTION: 0
ABDOMINAL PAIN: 0
CHEST TIGHTNESS: 0

## 2025-04-29 NOTE — PROGRESS NOTES
[facility]  [facility]  [address] [address]  Dept: [phone]  Dept Fax: [phone]  Loc: [phone]  PROGRESS NOTE      VisitDate: 4/29/2025    Manuel Lay is a 74 y.o. male who presents today for:     Chief Complaint   Patient presents with    Chest Pain     Chest pain and headaches. Patient has a pace maker.          Subjective:  Patient presents complaining of cough chest congestion headache chest pain past couple of days.  Patient also reports increased stressors with family members.  CVA CHF hypertension EtOH abuse tobacco use GERD anxiety depression pacemaker.          Review of Systems   Constitutional:  Positive for fatigue. Negative for activity change, appetite change, chills and fever.   HENT:  Positive for congestion.    Eyes:  Negative for visual disturbance.   Respiratory:  Positive for cough. Negative for chest tightness, shortness of breath and wheezing.    Cardiovascular:  Positive for chest pain. Negative for palpitations and leg swelling.   Gastrointestinal:  Negative for abdominal distention and abdominal pain.   Genitourinary:  Negative for dysuria.   Musculoskeletal:  Negative for arthralgias, back pain and neck pain.   Skin: Negative.  Negative for rash.   Neurological:  Positive for headaches. Negative for dizziness and light-headedness.   Hematological:  Negative for adenopathy.   Psychiatric/Behavioral:  Negative for decreased concentration and dysphoric mood.    All other systems reviewed and are negative.    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

## 2025-04-30 ENCOUNTER — RESULTS FOLLOW-UP (OUTPATIENT)
Dept: FAMILY MEDICINE CLINIC | Age: 75
End: 2025-04-30

## 2025-04-30 LAB
ALBUMIN: 4 G/DL (ref 3.5–5.2)
ALK PHOSPHATASE: 96 U/L (ref 39–118)
ALT SERPL-CCNC: 15 U/L (ref 5–41)
ANION GAP SERPL CALCULATED.3IONS-SCNC: 13 MMOL/L (ref 7–16)
AST SERPL-CCNC: 17 U/L (ref 9–50)
BASOPHILS ABSOLUTE: 0.06 K/UL (ref 0–0.2)
BASOPHILS RELATIVE PERCENT: 0.7 % (ref 0–2)
BILIRUB SERPL-MCNC: 0.8 MG/DL
BUN BLDV-MCNC: 6 MG/DL (ref 8–23)
CALCIUM SERPL-MCNC: 9.3 MG/DL (ref 8.6–10.5)
CHLORIDE BLD-SCNC: 102 MMOL/L (ref 96–107)
CHOLESTEROL, TOTAL: 99 MG/DL (ref 100–199)
CHOLESTEROL/HDL RATIO: 3.4 (ref 2–4.5)
CO2: 24 MMOL/L (ref 18–32)
CREAT SERPL-MCNC: 1.05 MG/DL (ref 0.67–1.3)
EGFR IF NONAFRICAN AMERICAN: 75 ML/MIN/1.73M2
EOSINOPHILS ABSOLUTE: 0.34 K/UL (ref 0–0.8)
EOSINOPHILS RELATIVE PERCENT: 3.8 % (ref 0–5)
GLUCOSE: 118 MG/DL (ref 70–100)
HCT VFR BLD CALC: 45.3 % (ref 39–52)
HDLC SERPL-MCNC: 29 MG/DL
HEMOGLOBIN: 15.4 G/DL (ref 13–18)
IMMATURE GRANS (ABS): 0.02 K/UL (ref 0–0.06)
IMMATURE GRANULOCYTES %: 0.2 % (ref 0–2)
LDL CHOLESTEROL: 42 MG/DL
LDL/HDL RATIO: 1.4
LYMPHOCYTES ABSOLUTE: 2.86 K/UL (ref 0.9–5.2)
LYMPHOCYTES RELATIVE PERCENT: 31.7 % (ref 20–45)
MCH RBC QN AUTO: 31.8 PG (ref 26–32)
MCHC RBC AUTO-ENTMCNC: 34 G/DL (ref 32–35)
MCV RBC AUTO: 93 FL (ref 75–100)
MONOCYTES ABSOLUTE: 0.57 K/UL (ref 0.1–1)
MONOCYTES RELATIVE PERCENT: 6.3 % (ref 0–13)
NEUTROPHILS ABSOLUTE: 5.16 K/UL (ref 1.9–8)
NEUTROPHILS RELATIVE PERCENT: 57.3 % (ref 45–75)
PDW BLD-RTO: 13.1 % (ref 11.2–14.8)
PLATELET # BLD: 294 THOUS/CMM (ref 140–440)
POTASSIUM SERPL-SCNC: 3.8 MMOL/L (ref 3.5–5.4)
RBC # BLD: 4.85 MILL/CMM (ref 4.4–6.1)
SODIUM BLD-SCNC: 139 MMOL/L (ref 135–148)
TOTAL PROTEIN: 6.9 G/DL (ref 6–8.3)
TRIGL SERPL-MCNC: 139 MG/DL (ref 20–149)
VLDLC SERPL CALC-MCNC: 28 MG/DL
WBC # BLD: 9 THDS/CMM (ref 3.6–11)

## 2025-05-05 RX ORDER — PANTOPRAZOLE SODIUM 40 MG/1
40 TABLET, DELAYED RELEASE ORAL DAILY
Qty: 90 TABLET | Refills: 2 | OUTPATIENT
Start: 2025-05-05

## 2025-05-05 NOTE — TELEPHONE ENCOUNTER
Pantoprazole was escribed 4/29/25 for 90/2rf to [facility] pharmacy. Rx refused.

## 2025-05-08 ENCOUNTER — HOSPITAL ENCOUNTER (OUTPATIENT)
Dept: CT IMAGING | Age: 75
Discharge: HOME | End: 2025-05-08
Payer: MEDICARE

## 2025-05-08 DIAGNOSIS — Z86.73 HISTORY OF STROKE: ICD-10-CM

## 2025-05-08 DIAGNOSIS — Z95.828 HISTORY OF RIGHT COMMON CAROTID ARTERY STENT PLACEMENT: ICD-10-CM

## 2025-05-08 DIAGNOSIS — Z98.890 HISTORY OF RIGHT COMMON CAROTID ARTERY STENT PLACEMENT: ICD-10-CM

## 2025-05-08 PROCEDURE — 70498 CT ANGIOGRAPHY NECK: CPT

## 2025-05-08 PROCEDURE — 70496 CT ANGIOGRAPHY HEAD: CPT

## 2025-05-08 PROCEDURE — 6360000004 HC RX CONTRAST MEDICATION: Performed by: NURSE PRACTITIONER

## 2025-05-08 RX ORDER — IOPAMIDOL 755 MG/ML
80 INJECTION, SOLUTION INTRAVASCULAR
Status: COMPLETED | OUTPATIENT
Start: 2025-05-08 | End: 2025-05-08

## 2025-05-08 RX ADMIN — IOPAMIDOL 80 ML: 755 INJECTION, SOLUTION INTRAVENOUS at 14:09

## 2025-05-12 ENCOUNTER — TELEPHONE (OUTPATIENT)
Dept: NEUROLOGY | Age: 75
End: 2025-05-12

## 2025-05-12 NOTE — TELEPHONE ENCOUNTER
CTA head and neck W/WO contrast showed Stent in the right common and internal carotid arteries which is widely patent.  No significant hemodynamic stenosis in the left common and internal carotid arteries.   Paige Leopold, CNP

## 2025-06-02 DIAGNOSIS — F41.8 SITUATIONAL ANXIETY: ICD-10-CM

## 2025-06-03 RX ORDER — DIAZEPAM 5 MG/1
TABLET ORAL
Qty: 60 TABLET | Refills: 0 | Status: SHIPPED | OUTPATIENT
Start: 2025-06-03 | End: 2025-07-03

## 2025-06-10 ENCOUNTER — TELEPHONE (OUTPATIENT)
Dept: CARDIOLOGY CLINIC | Age: 75
End: 2025-06-10

## 2025-06-23 ENCOUNTER — OFFICE VISIT (OUTPATIENT)
Dept: CARDIOLOGY CLINIC | Age: 75
End: 2025-06-23
Payer: MEDICARE

## 2025-06-23 VITALS
DIASTOLIC BLOOD PRESSURE: 70 MMHG | WEIGHT: 222.2 LBS | SYSTOLIC BLOOD PRESSURE: 122 MMHG | HEIGHT: 72 IN | HEART RATE: 84 BPM | BODY MASS INDEX: 30.1 KG/M2

## 2025-06-23 DIAGNOSIS — Z95.0 PACEMAKER: Primary | ICD-10-CM

## 2025-06-23 DIAGNOSIS — I48.92 ATRIAL FLUTTER, UNSPECIFIED TYPE (HCC): ICD-10-CM

## 2025-06-23 DIAGNOSIS — I50.32 CHRONIC DIASTOLIC CHF (CONGESTIVE HEART FAILURE) (HCC): ICD-10-CM

## 2025-06-23 DIAGNOSIS — I10 PRIMARY HYPERTENSION: ICD-10-CM

## 2025-06-23 PROCEDURE — G8427 DOCREV CUR MEDS BY ELIG CLIN: HCPCS | Performed by: PHYSICIAN ASSISTANT

## 2025-06-23 PROCEDURE — 3074F SYST BP LT 130 MM HG: CPT | Performed by: PHYSICIAN ASSISTANT

## 2025-06-23 PROCEDURE — 3017F COLORECTAL CA SCREEN DOC REV: CPT | Performed by: PHYSICIAN ASSISTANT

## 2025-06-23 PROCEDURE — G8417 CALC BMI ABV UP PARAM F/U: HCPCS | Performed by: PHYSICIAN ASSISTANT

## 2025-06-23 PROCEDURE — 99214 OFFICE O/P EST MOD 30 MIN: CPT | Performed by: PHYSICIAN ASSISTANT

## 2025-06-23 PROCEDURE — 3078F DIAST BP <80 MM HG: CPT | Performed by: PHYSICIAN ASSISTANT

## 2025-06-23 PROCEDURE — 1159F MED LIST DOCD IN RCRD: CPT | Performed by: PHYSICIAN ASSISTANT

## 2025-06-23 PROCEDURE — 1123F ACP DISCUSS/DSCN MKR DOCD: CPT | Performed by: PHYSICIAN ASSISTANT

## 2025-06-23 PROCEDURE — 4004F PT TOBACCO SCREEN RCVD TLK: CPT | Performed by: PHYSICIAN ASSISTANT

## 2025-06-23 NOTE — PROGRESS NOTES
[facility] [place] [facility]  [facility]  [address]  [address]  [facility] [address]  Dept: [phone]  Dept Fax: [phone]  Loc: [phone]    Chief Complaint   Patient presents with    Follow-up     Had short runs of aflutter on device no OAC    Chest Pain     States he was told it was scar tissue      Office visit 9/10/2024  Patient with a history of chronic diastolic congestive heart failure, CVA and history of carotid stent presents for follow-up.  Patient states that he has been feeling intermittent episodes of a shocklike feeling over his left pacemaker.  He is under the impression that he has a ICD and that he is getting shocked.  He states otherwise he has been doing fairly well.  He has chronic shortness of breath which has not worsened in severity or frequency.  He denies any chest pain or palpitations.  His device is followed at [facility].  He would like to follow-up with our pacer clinic.  Cardiologist:  Dr. Gunderson  History of Present Illness    12/10/2024  The patient is a 74-year-old male with a history of permanent pacemaker, chronic diastolic congestive heart failure, and carotid stent, presenting for a 3-month follow-up.     He reports an improvement in his overall health status, including weight gain and enhanced appetite.      He experiences pain when bending over or twisting incorrectly, localized near his pacemaker. He has a history of frequent falls due to instability in his right leg but reports no recent falls.         He denies any chest pain, shortness of breath or palpitations.  MEDICATIONS  Aspirin 81 mg daily, atorvastatin 80 mg at bedtime, clopidogrel 75 mg daily, metoprolol (discontinued).  History of Present Illness  The patient is a 74-year-old male with a history of chronic diastolic congestive heart failure, carotid stent, and a permanent pacemaker, who was found to have an episode of atrial flutter on his most recent

## 2025-07-03 DIAGNOSIS — F41.8 SITUATIONAL ANXIETY: ICD-10-CM

## 2025-07-03 RX ORDER — DIAZEPAM 5 MG/1
5 TABLET ORAL EVERY 12 HOURS PRN
Qty: 60 TABLET | Refills: 0 | Status: SHIPPED | OUTPATIENT
Start: 2025-07-03 | End: 2025-08-02

## 2025-07-03 RX ORDER — TRAZODONE HYDROCHLORIDE 100 MG/1
100 TABLET ORAL NIGHTLY
Qty: 30 TABLET | Refills: 2 | Status: SHIPPED | OUTPATIENT
Start: 2025-07-03

## 2025-07-15 ENCOUNTER — OFFICE VISIT (OUTPATIENT)
Dept: FAMILY MEDICINE CLINIC | Age: 75
End: 2025-07-15
Payer: MEDICARE

## 2025-07-15 VITALS
DIASTOLIC BLOOD PRESSURE: 64 MMHG | WEIGHT: 219 LBS | OXYGEN SATURATION: 95 % | SYSTOLIC BLOOD PRESSURE: 118 MMHG | HEART RATE: 66 BPM | BODY MASS INDEX: 29.66 KG/M2 | HEIGHT: 72 IN | RESPIRATION RATE: 16 BRPM | TEMPERATURE: 97.8 F

## 2025-07-15 DIAGNOSIS — M15.0 PRIMARY OSTEOARTHRITIS INVOLVING MULTIPLE JOINTS: ICD-10-CM

## 2025-07-15 DIAGNOSIS — F41.8 SITUATIONAL ANXIETY: ICD-10-CM

## 2025-07-15 DIAGNOSIS — Z87.891 HISTORY OF CIGARETTE SMOKING: ICD-10-CM

## 2025-07-15 DIAGNOSIS — I48.0 PAROXYSMAL ATRIAL FIBRILLATION (HCC): ICD-10-CM

## 2025-07-15 DIAGNOSIS — J41.8 MIXED SIMPLE AND MUCOPURULENT CHRONIC BRONCHITIS (HCC): ICD-10-CM

## 2025-07-15 DIAGNOSIS — Z95.0 S/P PLACEMENT OF CARDIAC PACEMAKER: ICD-10-CM

## 2025-07-15 DIAGNOSIS — R42 DIZZINESS: ICD-10-CM

## 2025-07-15 DIAGNOSIS — Z86.73 HISTORY OF CVA IN ADULTHOOD: ICD-10-CM

## 2025-07-15 DIAGNOSIS — Z00.00 MEDICARE ANNUAL WELLNESS VISIT, SUBSEQUENT: Primary | ICD-10-CM

## 2025-07-15 DIAGNOSIS — F10.11 HISTORY OF ETOH ABUSE: ICD-10-CM

## 2025-07-15 DIAGNOSIS — Z87.891 PERSONAL HISTORY OF TOBACCO USE: ICD-10-CM

## 2025-07-15 DIAGNOSIS — R26.89 SHUFFLING GAIT: ICD-10-CM

## 2025-07-15 DIAGNOSIS — S91.302A OPEN WOUND OF LEFT FOOT, INITIAL ENCOUNTER: ICD-10-CM

## 2025-07-15 PROCEDURE — 3078F DIAST BP <80 MM HG: CPT | Performed by: NURSE PRACTITIONER

## 2025-07-15 PROCEDURE — 4004F PT TOBACCO SCREEN RCVD TLK: CPT | Performed by: NURSE PRACTITIONER

## 2025-07-15 PROCEDURE — 1159F MED LIST DOCD IN RCRD: CPT | Performed by: NURSE PRACTITIONER

## 2025-07-15 PROCEDURE — G8427 DOCREV CUR MEDS BY ELIG CLIN: HCPCS | Performed by: NURSE PRACTITIONER

## 2025-07-15 PROCEDURE — 1123F ACP DISCUSS/DSCN MKR DOCD: CPT | Performed by: NURSE PRACTITIONER

## 2025-07-15 PROCEDURE — G0439 PPPS, SUBSEQ VISIT: HCPCS | Performed by: NURSE PRACTITIONER

## 2025-07-15 PROCEDURE — 3023F SPIROM DOC REV: CPT | Performed by: NURSE PRACTITIONER

## 2025-07-15 PROCEDURE — 99213 OFFICE O/P EST LOW 20 MIN: CPT | Performed by: NURSE PRACTITIONER

## 2025-07-15 PROCEDURE — G8417 CALC BMI ABV UP PARAM F/U: HCPCS | Performed by: NURSE PRACTITIONER

## 2025-07-15 PROCEDURE — 3074F SYST BP LT 130 MM HG: CPT | Performed by: NURSE PRACTITIONER

## 2025-07-15 PROCEDURE — 3017F COLORECTAL CA SCREEN DOC REV: CPT | Performed by: NURSE PRACTITIONER

## 2025-07-15 RX ORDER — MUPIROCIN 2 %
OINTMENT (GRAM) TOPICAL
Qty: 30 G | Refills: 0 | Status: SHIPPED | OUTPATIENT
Start: 2025-07-15

## 2025-07-15 RX ORDER — AZITHROMYCIN 250 MG/1
TABLET, FILM COATED ORAL
Qty: 6 TABLET | Refills: 0 | Status: SHIPPED | OUTPATIENT
Start: 2025-07-15 | End: 2025-07-25

## 2025-07-15 RX ORDER — ESCITALOPRAM OXALATE 10 MG/1
10 TABLET ORAL DAILY
Qty: 90 TABLET | Refills: 3 | Status: SHIPPED | OUTPATIENT
Start: 2025-07-15

## 2025-07-15 RX ORDER — ALBUTEROL SULFATE 90 UG/1
2 INHALANT RESPIRATORY (INHALATION) EVERY 6 HOURS PRN
Qty: 1 EACH | Refills: 3 | Status: SHIPPED | OUTPATIENT
Start: 2025-07-15

## 2025-07-15 RX ORDER — ALBUTEROL SULFATE 0.83 MG/ML
2.5 SOLUTION RESPIRATORY (INHALATION) EVERY 6 HOURS PRN
Qty: 90 EACH | Refills: 5 | Status: SHIPPED | OUTPATIENT
Start: 2025-07-15

## 2025-07-15 RX ORDER — OLOPATADINE HYDROCHLORIDE 2 MG/ML
1 SOLUTION OPHTHALMIC DAILY
Qty: 2.5 ML | Refills: 5 | Status: SHIPPED | OUTPATIENT
Start: 2025-07-15

## 2025-07-15 ASSESSMENT — PATIENT HEALTH QUESTIONNAIRE - PHQ9
SUM OF ALL RESPONSES TO PHQ QUESTIONS 1-9: 1
SUM OF ALL RESPONSES TO PHQ QUESTIONS 1-9: 1
3. TROUBLE FALLING OR STAYING ASLEEP: NOT AT ALL
5. POOR APPETITE OR OVEREATING: NOT AT ALL
6. FEELING BAD ABOUT YOURSELF - OR THAT YOU ARE A FAILURE OR HAVE LET YOURSELF OR YOUR FAMILY DOWN: NOT AT ALL
10. IF YOU CHECKED OFF ANY PROBLEMS, HOW DIFFICULT HAVE THESE PROBLEMS MADE IT FOR YOU TO DO YOUR WORK, TAKE CARE OF THINGS AT HOME, OR GET ALONG WITH OTHER PEOPLE: NOT DIFFICULT AT ALL
9. THOUGHTS THAT YOU WOULD BE BETTER OFF DEAD, OR OF HURTING YOURSELF: NOT AT ALL
1. LITTLE INTEREST OR PLEASURE IN DOING THINGS: SEVERAL DAYS
7. TROUBLE CONCENTRATING ON THINGS, SUCH AS READING THE NEWSPAPER OR WATCHING TELEVISION: NOT AT ALL
2. FEELING DOWN, DEPRESSED OR HOPELESS: NOT AT ALL
SUM OF ALL RESPONSES TO PHQ QUESTIONS 1-9: 1
4. FEELING TIRED OR HAVING LITTLE ENERGY: NOT AT ALL
8. MOVING OR SPEAKING SO SLOWLY THAT OTHER PEOPLE COULD HAVE NOTICED. OR THE OPPOSITE, BEING SO FIGETY OR RESTLESS THAT YOU HAVE BEEN MOVING AROUND A LOT MORE THAN USUAL: NOT AT ALL
SUM OF ALL RESPONSES TO PHQ QUESTIONS 1-9: 1

## 2025-07-15 ASSESSMENT — LIFESTYLE VARIABLES
HOW OFTEN DO YOU HAVE A DRINK CONTAINING ALCOHOL: NEVER
HOW MANY STANDARD DRINKS CONTAINING ALCOHOL DO YOU HAVE ON A TYPICAL DAY: PATIENT DOES NOT DRINK

## 2025-07-15 NOTE — PROGRESS NOTES
Medicare Annual Wellness Visit    Manuel Lay is here for Medicare AWV, 3 Month Follow-Up, and Foot Problem (Infected spot on inside of left foot, was a blister, hard to walk on it)    Assessment & Plan   Medicare annual wellness visit, subsequent  Mixed simple and mucopurulent chronic bronchitis (HCC)  -     albuterol (PROVENTIL) (2.5 MG/3ML) 0.083% nebulizer solution; Take 3 mLs by nebulization every 6 hours as needed for Wheezing, Disp-90 each, R-5Normal  History of cigarette smoking  -     CT LUNG CANCER SCREENING (INITIAL/ANNUAL); Future  History of CVA in adulthood  Shuffling gait  History of ETOH abuse  S/P placement of cardiac pacemaker  Situational anxiety  Primary osteoarthritis involving multiple joints  Personal history of tobacco use  Dizziness  Paroxysmal atrial fibrillation (HCC)  Open wound of left foot, initial encounter       No follow-ups on file.     Subjective       Patient's complete Health Risk Assessment and screening values have been reviewed and are found in Flowsheets. The following problems were reviewed today and where indicated follow up appointments were made and/or referrals ordered.    Positive Risk Factor Screenings with Interventions:              Inactivity:  On average, how many days per week do you engage in moderate to strenuous exercise (like a brisk walk)?: 0 days (!) Abnormal  On average, how many minutes do you engage in exercise at this level?: 0 min  Interventions:  See AVS for additional education material        Vision Screen:  Do you have difficulty driving, watching TV, or doing any of your daily activities because of your eyesight?: No  Have you had an eye exam within the past year?: (!) No  Interventions:   See AVS for additional education material        Tobacco Use:    Tobacco Use      Smoking status: Every Day        Packs/day: 0.50        Years: 0.5 packs/day for 59.0 years (30.5 ttl pk-yrs)        Types: Cigarettes        Start date: 7/16/1968

## 2025-07-24 ENCOUNTER — HOSPITAL ENCOUNTER (OUTPATIENT)
Dept: CT IMAGING | Age: 75
Discharge: HOME | End: 2025-07-24
Payer: MEDICARE

## 2025-07-24 DIAGNOSIS — Z87.891 HISTORY OF CIGARETTE SMOKING: ICD-10-CM

## 2025-07-24 PROCEDURE — 71271 CT THORAX LUNG CANCER SCR C-: CPT

## 2025-08-11 DIAGNOSIS — F41.8 SITUATIONAL ANXIETY: ICD-10-CM

## 2025-08-11 RX ORDER — DIAZEPAM 5 MG/1
5 TABLET ORAL EVERY 12 HOURS PRN
Qty: 60 TABLET | Refills: 0 | Status: SHIPPED | OUTPATIENT
Start: 2025-08-11 | End: 2025-09-10

## 2025-08-18 RX ORDER — ATORVASTATIN CALCIUM 80 MG/1
80 TABLET, FILM COATED ORAL NIGHTLY
Qty: 90 TABLET | Refills: 3 | Status: SHIPPED | OUTPATIENT
Start: 2025-08-18

## 2025-08-18 RX ORDER — ESCITALOPRAM OXALATE 10 MG/1
10 TABLET ORAL DAILY
Qty: 90 TABLET | Refills: 3 | Status: SHIPPED | OUTPATIENT
Start: 2025-08-18

## 2025-09-02 RX ORDER — PANTOPRAZOLE SODIUM 40 MG/1
40 TABLET, DELAYED RELEASE ORAL DAILY
Qty: 90 TABLET | Refills: 2 | Status: SHIPPED | OUTPATIENT
Start: 2025-09-02